# Patient Record
Sex: MALE | Race: WHITE | NOT HISPANIC OR LATINO | Employment: OTHER | URBAN - METROPOLITAN AREA
[De-identification: names, ages, dates, MRNs, and addresses within clinical notes are randomized per-mention and may not be internally consistent; named-entity substitution may affect disease eponyms.]

---

## 2017-01-05 ENCOUNTER — THERAPY VISIT (OUTPATIENT)
Dept: PHYSICAL THERAPY | Facility: CLINIC | Age: 57
End: 2017-01-05
Payer: COMMERCIAL

## 2017-01-05 DIAGNOSIS — M25.511 ACUTE PAIN OF RIGHT SHOULDER: Primary | ICD-10-CM

## 2017-01-05 PROCEDURE — 97110 THERAPEUTIC EXERCISES: CPT | Mod: GP | Performed by: PHYSICAL THERAPIST

## 2017-01-05 PROCEDURE — 97112 NEUROMUSCULAR REEDUCATION: CPT | Mod: GP | Performed by: PHYSICAL THERAPIST

## 2017-01-05 NOTE — PROGRESS NOTES
Subjective:    HPI                    Objective:    System    Physical Exam    General     ROS    Assessment/Plan:      SUBJECTIVE  Subjective changes as noted by pt:  Overall doing a little better--he saw MD and had an injection today. 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       Current pain level: 0/10     Changes in function:  Yes (See Goal flowsheet attached for changes in current functional level)     Adverse reaction to treatment or activity:  None    OBJECTIVE  Changes in objective findings:  Yes, Pt required significant manual, verbal and visual cuing to maintain proper scapular positioning during exercises today. NMR required for serratus, MT, and LT.      R s/l IR=45        ASSESSMENT  Jack continues to require intervention to meet STG and LTG's: PT  Patient's symptoms are resolving.  Patient is progressing as expected.  Response to therapy has shown an improvement in  muscle control and coordination and pain  Progress made towards STG/LTG?  Yes (See Goal flowsheet attached for updates on achievement of STG and LTG)    PLAN  Current treatment program is being advanced to more complex exercises.    PTA/ATC plan:  N/A    Please refer to the daily flowsheet for treatment today, total treatment time and time spent performing 1:1 timed codes.

## 2017-01-09 ENCOUNTER — THERAPY VISIT (OUTPATIENT)
Dept: PHYSICAL THERAPY | Facility: CLINIC | Age: 57
End: 2017-01-09
Payer: COMMERCIAL

## 2017-01-09 DIAGNOSIS — M25.511 ACUTE PAIN OF RIGHT SHOULDER: Primary | ICD-10-CM

## 2017-01-09 PROCEDURE — 97112 NEUROMUSCULAR REEDUCATION: CPT | Mod: GP | Performed by: PHYSICAL THERAPIST

## 2017-01-09 PROCEDURE — 97110 THERAPEUTIC EXERCISES: CPT | Mod: GP | Performed by: PHYSICAL THERAPIST

## 2017-01-09 NOTE — PROGRESS NOTES
"Subjective:    HPI                    Objective:    System    Physical Exam    General     ROS    Assessment/Plan:      SUBJECTIVE  Subjective changes as noted by pt:  Doing well--felt a \"good burn\" after doing his exercises this am.        Current pain level: 0/10     Changes in function:  Yes (See Goal flowsheet attached for changes in current functional level)     Adverse reaction to treatment or activity:  None    OBJECTIVE  Changes in objective findings:  Yes, pain at deltoid insertion when performing throwing mechanics incorrectly and also demonstrates hiking of his shoulder when he attempted 4 corner ABD/ER.    ER at 90/90 supine with wand = 100 and was pain free    See flow sheet for throwing mechanics points        ASSESSMENT  Jack continues to require intervention to meet STG and LTG's: PT  Patient's symptoms are resolving.  Patient is progressing as expected.  Response to therapy has shown an improvement in  pain level and flexibility  Progress made towards STG/LTG?  Yes (See Goal flowsheet attached for updates on achievement of STG and LTG)    PLAN  Current treatment program is being advanced to more complex exercises.    PTA/ATC plan:  N/A    Please refer to the daily flowsheet for treatment today, total treatment time and time spent performing 1:1 timed codes.              "

## 2017-01-12 ENCOUNTER — THERAPY VISIT (OUTPATIENT)
Dept: PHYSICAL THERAPY | Facility: CLINIC | Age: 57
End: 2017-01-12
Payer: COMMERCIAL

## 2017-01-12 ENCOUNTER — OFFICE VISIT (OUTPATIENT)
Dept: SLEEP MEDICINE | Facility: CLINIC | Age: 57
End: 2017-01-12
Payer: COMMERCIAL

## 2017-01-12 VITALS
WEIGHT: 195 LBS | DIASTOLIC BLOOD PRESSURE: 85 MMHG | HEART RATE: 79 BPM | BODY MASS INDEX: 25.84 KG/M2 | OXYGEN SATURATION: 98 % | HEIGHT: 73 IN | SYSTOLIC BLOOD PRESSURE: 126 MMHG

## 2017-01-12 DIAGNOSIS — G47.33 OSA (OBSTRUCTIVE SLEEP APNEA): ICD-10-CM

## 2017-01-12 DIAGNOSIS — G47.00 INSOMNIA, UNSPECIFIED TYPE: Primary | ICD-10-CM

## 2017-01-12 DIAGNOSIS — M25.511 ACUTE PAIN OF RIGHT SHOULDER: Primary | ICD-10-CM

## 2017-01-12 DIAGNOSIS — G25.81 RESTLESS LEGS SYNDROME (RLS): ICD-10-CM

## 2017-01-12 PROCEDURE — 97112 NEUROMUSCULAR REEDUCATION: CPT | Mod: GP | Performed by: PHYSICAL THERAPIST

## 2017-01-12 PROCEDURE — 90832 PSYTX W PT 30 MINUTES: CPT | Performed by: PSYCHOLOGIST

## 2017-01-12 PROCEDURE — 97110 THERAPEUTIC EXERCISES: CPT | Mod: GP | Performed by: PHYSICAL THERAPIST

## 2017-01-12 RX ORDER — DUTASTERIDE 0.5 MG/1
CAPSULE, LIQUID FILLED ORAL
COMMUNITY
Start: 2016-10-28 | End: 2017-08-30

## 2017-01-12 NOTE — PROGRESS NOTES
"BEHAVIORAL SLEEP MEDICINE PROGRESS NOTE  [facility]    Patient Name: Jack Marrero  MRN:  [id number]  Date of Service: Jan 12, 2017       Subjective Report     Jack Marrero returns to clinic today to discuss progress in implementing cognitive-behavioral strategies for the management of insomnia assocciated with RLS and severe SHY.  Jack reports excellent adherence to behavioral plan with improvement in SL, WASO and SE.  He. Reports he feels somewhat less tired.  Focus of session was on his \"resistance\" to attempting CPAP despite his awareness of the potention health implications of untreated severe SHY.  Discussed past history of successfully managing health issues and challenges including his recent smoking cessation.  Discussed possible benefits realized in daytime functioning and how to view treatment initiation as an experiment in self care and self monitoring.  Discussed motivators for initiating treatment including getting to sleep with wife again.    Daytime symptoms including tiredness have improved somewhat though underlying sleepiness persists.  .     Sleep Data:     Jack provided 30 days of sleep diary data.  Averaged estimates based on patient sleep diary data/self report include:    Sleep Latency: 10  min.  Times Awakened: 3  Wake Time After Sleep Onset: 15 min min.  Total Sleep Time:  450 min.  Sleep Efficiency: approx 90 %     Interventions     Cognitive-behavioral strategies and recommendations including maintenance of stimulus control behaviors were discussed today.  Education was provided regarding benefits of treatment for SHY and normal desens.      Vital Signs     /85 mmHg  Pulse 79  Ht 6' 1\" (1.854 m)  Wt 195 lb (88.451 kg)  BMI 25.73 kg/m2  SpO2 98%     Mental Status     Appearance:  Well kept  Orientation:  X3  Mood:  better  Affect:  appropriate and in normal range  Speech:  clear, coherent  Thought Process:  logical  Associations:  no loose " associations  Thought Content:  no evidence of suicidal ideation or homicidal ideation    Diagnostic Impressions and Plan   (G25.81) Restless legs syndrome (RLS)  (primary encounter diagnosis)        (G47.33) SHY (obstructive sleep apnea)  Comment: Patient indicates he will followup with Dr. Pak to initiate treatment      (G47.00) Insomnia, unspecified type  Comment: Excellent adherence to treatment and good treatment response    Follow-up: At this point with insomnia resolved, patient was advised to focus in initiation of CPAP therapy for SHY and to return if and as needed if insomnia symptoms return.    Time Spent: 35 minutes    Cristobal Rodríguez PsyD, LUKASZ, [facility]  Behavioral Sleep Medicine  [facility]  [facility]  [address], [address]  [place]  [address]  PH: [phone]

## 2017-01-12 NOTE — NURSING NOTE
"Chief Complaint   Patient presents with     RECHECK     insomnia       Initial Ht 1.854 m (6' 1\")  Wt 88.451 kg (195 lb)  BMI 25.73 kg/m2 Estimated body mass index is 25.73 kg/(m^2) as calculated from the following:    Height as of this encounter: 1.854 m (6' 1\").    Weight as of this encounter: 88.451 kg (195 lb).  BP completed using cuff size: large    "

## 2017-01-12 NOTE — PATIENT INSTRUCTIONS

## 2017-01-12 NOTE — PROGRESS NOTES
"Subjective:    HPI                    Objective:    System    Physical Exam    General     ROS    Assessment/Plan:      SUBJECTIVE  Subjective changes as noted by pt:  Pt was doing the \"w\" on the bench on Tuesday  And he sufferred a \"Pulled\" sensation in his L rib area and is now having pain while coughing, laughing, sneezing.     Current pain level: 4/10     Changes in function:  Yes (See Goal flowsheet attached for changes in current functional level)     Adverse reaction to treatment or activity:  None    OBJECTIVE  Changes in objective findings:  Yes, no evidence of posterior rib subluxations and pt reports no pain with rotation of spine to R or lateral bending to R.    When applying direct pressure to the lateral ribs on L he was able to reach L arm out to the side without pain as well as tolerate thoracic extension.        ASSESSMENT  Jack continues to require intervention to meet STG and LTG's: PT  Patient has experienced an exacerbation of symptoms.  Response to therapy has shown a worsening of  pain level  Progress made towards STG/LTG?  None    PLAN  Current treatment program is being advanced to more complex exercises.    PTA/ATC plan:  N/A    Please refer to the daily flowsheet for treatment today, total treatment time and time spent performing 1:1 timed codes.              "

## 2017-02-13 ENCOUNTER — DOCUMENTATION ONLY (OUTPATIENT)
Dept: SLEEP MEDICINE | Facility: CLINIC | Age: 57
End: 2017-02-13
Payer: COMMERCIAL

## 2017-02-13 DIAGNOSIS — G47.33 OSA (OBSTRUCTIVE SLEEP APNEA): ICD-10-CM

## 2017-02-13 DIAGNOSIS — G47.33 OSA (OBSTRUCTIVE SLEEP APNEA): Primary | ICD-10-CM

## 2017-02-13 DIAGNOSIS — G25.81 RESTLESS LEGS SYNDROME (RLS): ICD-10-CM

## 2017-02-13 PROCEDURE — 99207 ZZC NO BILLABLE SERVICE THIS VISIT: CPT

## 2017-02-13 NOTE — PROGRESS NOTES
Patient was offered choice of vendor and chose [facility].  Patient Jack Marrero was set up at [place] on February 13, 2017. Patient received a Resmed AirSense 10 Auto. Pressures were set at 6-12 cm H2O.   Patient s ramp is 5 cm H2O for Off and FLEX/EPR is EPR, 2.  Patient received a Resmed Mask name: Airfit P10  Pillow mask Size Medium, heated tubing and heated humidifier.  Patient is enrolled in the STM Program and does not need to meet compliance. Patient has a follow up on 4/5/17 with Dr. Pak.    Domitila Daugherty

## 2017-02-16 ENCOUNTER — DOCUMENTATION ONLY (OUTPATIENT)
Dept: SLEEP MEDICINE | Facility: CLINIC | Age: 57
End: 2017-02-16

## 2017-02-16 DIAGNOSIS — G25.81 RESTLESS LEGS SYNDROME (RLS): ICD-10-CM

## 2017-02-16 DIAGNOSIS — G47.33 OSA (OBSTRUCTIVE SLEEP APNEA): ICD-10-CM

## 2017-02-16 NOTE — PROGRESS NOTES
3 DAY STM VISIT    Patient contacted for 3 day STM visit  Message left for patient to return call    Current settings:  EPAP Min Auto CPAP: 6.0 (The minimum allowable pressure in cmH2O)       EPAP Max Auto CPAP: 12.0 (The maximum allowable pressure in cmH2O)       Assessment:  Pt is using nightly more than 4 hours   Action plan: Pt to have f/u 14 day  STM visit.

## 2017-02-17 NOTE — PROGRESS NOTES
Pt returned my call and states states things are going well.  He states that he has an nito that records his snoring and he's still snoring.  But other than that he has no issues or complaints.  Pt is benefiting from therapy.

## 2017-02-28 ENCOUNTER — DOCUMENTATION ONLY (OUTPATIENT)
Dept: SLEEP MEDICINE | Facility: CLINIC | Age: 57
End: 2017-02-28

## 2017-02-28 DIAGNOSIS — G25.81 RESTLESS LEGS SYNDROME (RLS): ICD-10-CM

## 2017-02-28 DIAGNOSIS — G47.33 OSA (OBSTRUCTIVE SLEEP APNEA): ICD-10-CM

## 2017-02-28 NOTE — PROGRESS NOTES
14 DAY [facility] VISIT    Message left for patient to return call    Assessment: Pt meeting objective benchmarks.     Action plan: Waiting for patient to return call and Pt to have 30 day STM visit.   Device settings:    EPAP Min Auto CPAP: 6.0 (The minimum allowable pressure in cmH2O)    EPAP Max Auto CPAP: 12.0 (The maximum allowable pressure in cmH2O)    Target IPAP (95% of Target) 14 day average (Resmed): 9.5cm H20      Objective measures: 14 day rolling measure      Compliance   (Goal >70%)  --% compliance greater than four hours rolling average 14 days: 100 %      Leak  (Goal < 24 lpm)  --95% OF Leak in litres Rolling Average 14 Days: 4.46 lpm last data upload         AHI  (Goal < 5)  --AHI Rolling Average 14 Day: 0.38  last data upload         Usage  (Goal >240)  --Time mask on face 14 day average: 481 min

## 2017-03-16 ENCOUNTER — DOCUMENTATION ONLY (OUTPATIENT)
Dept: SLEEP MEDICINE | Facility: CLINIC | Age: 57
End: 2017-03-16

## 2017-03-16 NOTE — PROGRESS NOTES
30 DAY [facility] VISIT    Message left for patient to return call     Assessment: Pt meeting objective benchmarks.     Action plan: Waiting for patient to return call.   Patient has a follow up visit with Dr. Pak on 4/5/2017.   Device settings:    EPAP Min Auto CPAP: 6.0 (The minimum allowable pressure in cmH2O)    EPAP Max Auto CPAP: 12.0 (The maximum allowable pressure in cmH2O)    Target EPAP (95% of Target) 14 day average (Resmed): 9.6cm H20    Objective measures: 14 day rolling measures      % compliance greater than four hours rolling average 14 days: 100 %     95% OF Leak in litres Rolling Average 14 Days: 4.4 lpm  last  upload     AHI Rolling Average 14 Day: 0.21 last  upload     Time mask on face 14 day average: 479 min        Objective measure goal  Compliance   Goal >70%  Leak   Goal < 10%  AHI  Goal < 5  Usage  Goal >240

## 2017-03-24 DIAGNOSIS — E78.5 DYSLIPIDEMIA: Primary | ICD-10-CM

## 2017-03-24 RX ORDER — ATORVASTATIN CALCIUM 40 MG/1
40 TABLET, FILM COATED ORAL DAILY
Qty: 90 TABLET | Refills: 2 | Status: SHIPPED | OUTPATIENT
Start: 2017-03-24 | End: 2017-12-15

## 2017-04-05 ENCOUNTER — OFFICE VISIT (OUTPATIENT)
Dept: SLEEP MEDICINE | Facility: CLINIC | Age: 57
End: 2017-04-05
Payer: COMMERCIAL

## 2017-04-05 VITALS
BODY MASS INDEX: 26.11 KG/M2 | HEIGHT: 73 IN | DIASTOLIC BLOOD PRESSURE: 86 MMHG | SYSTOLIC BLOOD PRESSURE: 130 MMHG | OXYGEN SATURATION: 97 % | WEIGHT: 197 LBS | HEART RATE: 82 BPM

## 2017-04-05 DIAGNOSIS — G25.81 RESTLESS LEGS SYNDROME (RLS): ICD-10-CM

## 2017-04-05 DIAGNOSIS — G47.33 OSA (OBSTRUCTIVE SLEEP APNEA): ICD-10-CM

## 2017-04-05 PROCEDURE — 99213 OFFICE O/P EST LOW 20 MIN: CPT | Performed by: INTERNAL MEDICINE

## 2017-04-05 NOTE — PROGRESS NOTES
Obstructive Sleep Apnea- PAP Follow-Up Visit:    Chief Complaint   Patient presents with     RECHECK     cpap f/u     Jack Marrero comes in today for follow-up of their severe sleep apnea, managed with CPAP.     Overall, the patient rates their experience with PAP as 9 (0 poor, 10 great). The mask is comfortable. The mask is leaking, 1-2 nights per week. They are rarely snoring with the mask on. They are not having gasp arousals.  They are not having significant oral/nasal dryness. The pressure settings are comfortable.     Patient uses nasal pillows.    Bedtime is typically midnight. Usually it takes about 5-15 minutes to fall asleep with the mask on. Wake time is typically 8am.  Patient is using PAP therapy 7.6 hours per night. The patient is usually getting 7 hours of sleep per night.    Patient does sometimes feel rested in the morning.    [place] Sleepiness Scale: 4/24    ResMed   Auto-PAP 6-12 cmH2O download:  29 total days of use. 1 nonuse days. 93% days with >4 hours use.  Average use 7 hours 17 minutes per day. Median Leak 0.2 L/min. 95%ile Leak 6.5 L/min. CPAP 95% pressure 9.9cm. AHI 0.2    Has had an awesome response to therapy.  Very happy with how he feels.  However, he is still feeling groggy in the mornings.    Currently taking 900 mg of Gabapentin in the AM and feels it works well for Restless Legs Syndrome.    Past medical/surgical history, family history, social history, medications and allergies were reviewed.      Problem List:  Patient Active Problem List    Diagnosis Date Noted     Acute pain of right shoulder 12/23/2016     Priority: Medium     SHY (obstructive sleep apnea) - Severe 12/13/2016     Priority: Medium     Split Night:  Sleep Study 12/01/2016 - (190.0 lbs) AHI 16.3, RDI 55.6, Supine AHI 17.1, REM AHI -, Low O2% 80.3%, Time Spent ?88% 14.2, Time Spent ?89% 21.5, CPAP was titrated to a pressure of 8 with an AHI 0.3. Time spent in REM supine at this pressure was 40.0 minutes.        "Restless legs syndrome (RLS) 11/16/2016     Priority: Medium     Benign non-nodular prostatic hyperplasia with lower urinary tract symptoms 10/24/2016     Priority: Medium     Tobacco abuse 10/24/2016     Priority: Medium     Venous lake of lip, left lower 05/20/2016     Priority: Medium     Patient will call for referral if ever needed       10 year risk of MI or stroke 7.5% or greater, 9.3% in Nov 2016 on atorvastatin 40 02/12/2016     Priority: Medium     Inflamed seborrheic keratosis 01/29/2016     Priority: Medium     Diverticulosis of colon 01/11/2016     Priority: Medium     Sensory polyneuropathy 11/19/2015     Priority: Medium     IGT (impaired glucose tolerance) 10/26/2015     Priority: Medium     High triglycerides 10/22/2015     Priority: Medium     HDL deficiency 10/22/2015     Priority: Medium     Tubular adenoma of colon on colonoscopy 01/12/2012     Priority: Medium     Erectile dysfunction 11/30/2011     Priority: Medium     ADHD (attention deficit hyperactivity disorder)      Priority: Medium     dx via Dr Olson PhD       HYPERLIPIDEMIA LDL GOAL <130 10/31/2010     Priority: Medium     Dyslipidemia 06/18/2009     Priority: Medium     Mixed anxiety depressive disorder 06/18/2009     Priority: Medium     (Problem list name updated by automated process. Provider to review and confirm.)          /86  Pulse 82  Ht 1.854 m (6' 1\")  Wt 89.4 kg (197 lb)  SpO2 97%  BMI 25.99 kg/m2        Impression/Plan:  1. Restless legs syndrome (RLS)  Options include waiting for 6 months on CPAP to see how you are doing or switching to an alternative Restless Legs Syndrome medication to see if that improves the grogginess.  If we were to switch, I would start you on Mirapex as you taper off Gabapentin.    One idea:  Go down to 600 mg (2 tabs) of Gabapentin for the next week and call or Mychart me to let me know how things are going.  If the grogginess resolves but the Restless Legs Syndrome returns we have our " answer.  If not we can still use the information and I can always electronically send a new prescription to your pharmacy for the Mirapex if we want to try it.    2. SHY (obstructive sleep apnea)  Moderate Sleep apnea. Tolerating PAP well. Daytime symptoms are improved..     Jack Marrero will follow up in about 1 year(s).     Fifteen minutes spent with patient, all of which were spent face-to-face counseling, consulting, coordinating plan of care.      Radames Pak MD, Sleep Physician  Apr 5, 2017     CC:  Jamshid Miller,

## 2017-04-05 NOTE — NURSING NOTE
"No chief complaint on file.      Initial There were no vitals taken for this visit. Estimated body mass index is 25.73 kg/(m^2) as calculated from the following:    Height as of 1/12/17: 1.854 m (6' 1\").    Weight as of 1/12/17: 88.5 kg (195 lb).  Medication Reconciliation: complete    "

## 2017-04-05 NOTE — PATIENT INSTRUCTIONS
Options include waiting for 6 months on CPAP to see how you are doing or switching to an alternative Restless Legs Syndrome medication to see if that improves the grogginess.  If we were to switch, I would start you on Mirapex as you taper off Gabapentin.    One idea:  Go down to 600 mg (2 tabs) of Gabapentin for the next week and call or Mychart me to let me know how things are going.  If the grogginess resolves but the Restless Legs Syndrome returns we have our answer.  If not we can still use the information and I can always electronically send a new prescription to your pharmacy for the Mirapex if we want to try it.      Your BMI is Body mass index is 25.99 kg/(m^2).  Weight management is a personal decision.  If you are interested in exploring weight loss strategies, the following discussion covers the approaches that may be successful. Body mass index (BMI) is one way to tell whether you are at a healthy weight, overweight, or obese. It measures your weight in relation to your height.  A BMI of 18.5 to 24.9 is in the healthy range. A person with a BMI of 25 to 29.9 is considered overweight, and someone with a BMI of 30 or greater is considered obese. More than two-thirds of American adults are considered overweight or obese.  Being overweight or obese increases the risk for further weight gain. Excess weight may lead to heart disease and diabetes.  Creating and following plans for healthy eating and physical activity may help you improve your health.  Weight control is part of healthy lifestyle and includes exercise, emotional health, and healthy eating habits. Careful eating habits lifelong are the mainstay of weight control. Though there are significant health benefits from weight loss, long-term weight loss with diet alone may be very difficult to achieve- studies show long-term success with dietary management in less than 10% of people. Attaining a healthy weight may be especially difficult to achieve in  those with severe obesity. In some cases, medications, devices and surgical management might be considered.  What can you do?  If you are overweight or obese and are interested in methods for weight loss, you should discuss this with your provider.     Consider reducing daily calorie intake by 500 calories.     Keep a food journal.     Avoiding skipping meals, consider cutting portions instead.    Diet combined with exercise helps maintain muscle while optimizing fat loss. Strength training is particularly important for building and maintaining muscle mass. Exercise helps reduce stress, increase energy, and improves fitness. Increasing exercise without diet control, however, may not burn enough calories to loose weight.       Start walking three days a week 10-20 minutes at a time    Work towards walking thirty minutes five days a week     Eventually, increase the speed of your walking for 1-2 minutes at time    In addition, we recommend that you review healthy lifestyles and methods for weight loss available through the National Institutes of Health patient information sites:  http://win.niddk.nih.gov/publications/index.htm    And look into health and wellness programs that may be available through your health insurance provider, employer, local community center, or garry club.    Weight management plan: Patient was referred to their PCP to discuss a diet and exercise plan.

## 2017-04-17 ENCOUNTER — TELEPHONE (OUTPATIENT)
Dept: FAMILY MEDICINE | Facility: CLINIC | Age: 57
End: 2017-04-17

## 2017-04-17 ENCOUNTER — TRANSFERRED RECORDS (OUTPATIENT)
Dept: HEALTH INFORMATION MANAGEMENT | Facility: CLINIC | Age: 57
End: 2017-04-17

## 2017-04-17 NOTE — TELEPHONE ENCOUNTER
Reason for Call:  Same Day Appointment, Requested Provider:  Angela    PCP: Jamshid Miller    Reason for visit: Follow up on ER visit for chest pain and anxiety      Duration of symptoms: yesterday     Have you been treated for this in the past? Yes    Additional comments: patient was seen in ER last night patient has some concerns on from 4/16/17 ER visit.    Can we leave a detailed message on this number? YES    Phone number patient can be reached at: Home number on file [phone] (home)    Best Time: anytime    Call taken on 4/17/2017 at 4:44 PM by Yana Lynn

## 2017-04-17 NOTE — TELEPHONE ENCOUNTER
[employer] message was sent to patient and he has read it.  Left message on his voicemail letting him know to call team RN tomorrow or notify via [employer] if issues with the scheduled appointment.  Mahsa Sin RN

## 2017-04-18 ENCOUNTER — OFFICE VISIT (OUTPATIENT)
Dept: SURGERY | Facility: CLINIC | Age: 57
End: 2017-04-18
Payer: COMMERCIAL

## 2017-04-18 VITALS
HEIGHT: 73 IN | BODY MASS INDEX: 25.71 KG/M2 | DIASTOLIC BLOOD PRESSURE: 86 MMHG | SYSTOLIC BLOOD PRESSURE: 140 MMHG | WEIGHT: 194 LBS

## 2017-04-18 DIAGNOSIS — R10.32 LT INGUINAL PAIN: Primary | ICD-10-CM

## 2017-04-18 PROCEDURE — 99214 OFFICE O/P EST MOD 30 MIN: CPT | Performed by: SURGERY

## 2017-04-18 NOTE — PATIENT INSTRUCTIONS
Assessment: Is tender on on the left one but no obvious hernia.  But has some swelling in the area.    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  And started smoking again in January    Plan to do since not able to feel obvious hernia will get ct scan.  If shows hernia or if patient is able to see me when it is sticking out more then would fix most likely anterior but if able to reduce could do laparoscopic robotic surgery.  If not able to prove a hernia can do laparoscopic look and if has hernia fix either way.    Briefly discussed laparoscopic robotic versus open.      Risks of surgery include damage to nerves, bleeding, infection, damage to  Vessels, recurrence.  Although mesh is a better long term repair if it gets infected it must be removed.   If the patient has any bacterial infection the week before and is seen by their doctor and started on antibiotics, I can probably still do the surgery if they are vastly improved by the time of surgery, but if the infection starts closer to the surgery date it will be better to cancel and reschedule to a later date.  A cough will also be hard on the repair and uncomfortable post operative.  If the patient is a smoker I did discuss increase risk of recurrence and more pain with the cough.  If the patient is willing to quit smoking would encourage to do so and start at least a week before surgery.  However, if patient is not going to quit then must understand that his repair is more likely to fail.    Risks of surgery discussed including, but not limited to bleeding, infection, recurrence, damage to nerves and what is in the hernia sac.  Risks of anesthesia also discussed.    Discussed massaging hernia back in and using ice if becomes more painful.  If not able to reduce then go to emergency room.  Also discussed hernia belt to use until able to get in for surgery.    For your feet neuralgia:  Capsaicin cream-  apply to area of chronic pain multiple times for several days. The first 2-3 days may be more painful. But you will notice a difference shortly after that. Do not get it in to your eyes. Wash hands well after putting it on the area of tenderness. Must be used daily and sometimes several times a day. Please follow the directions on the container.

## 2017-04-18 NOTE — TELEPHONE ENCOUNTER
Left message on answering machine for patient/parent to call back.   [phone].  Valery Kaba RN     Patient was seen today by Dr Gale.

## 2017-04-18 NOTE — NURSING NOTE
"Chief Complaint   Patient presents with     Hernia     LIH       Initial /86  Ht 6' 1\" (1.854 m)  Wt 194 lb (88 kg)  BMI 25.6 kg/m2 Estimated body mass index is 25.6 kg/(m^2) as calculated from the following:    Height as of this encounter: 6' 1\" (1.854 m).    Weight as of this encounter: 194 lb (88 kg).  Medication Reconciliation: complete   Crystal Camacho Cma      "

## 2017-04-18 NOTE — PROGRESS NOTES
"Dear Jamshid Dias  I was asked to see this patient by Jamshid Miller for please see below.  I have seen Jack Marrero and as you know his chief complaint is left groin lump that was there after surgery.  But in the last 2-3 weeks has prickly sensation and shooting in to the left  leg and scrotum  Worse with sitting or standing for a while.  And cl;imping steps and turning the lower extremity edema outwards.  Has some loose and constipation. Has been more frequent.  No abdomen pain.   HPI:  Patient is a 56 year old male  with complaints see above  The patient noticed the symptoms about 2-3 weeks ago.    Patient has family history of hernia problems  Laying down makes the episode better.   Is having burning sensation in the toes and heel on left side and occasional numbness in both feet. Sometimes feels like walking on hot coals. This has not been figured out as to the cause but is both feet and both lower legs.   On cpap for 2 months now.   Smokes 3/4 ppd  Review Of Systems  Respiratory: No shortness of breath, dyspnea on exertion, cough, or hemoptysis  Cardiovascular: negative  Gastrointestinal: negative, constipation and diarrhea  Endocrine: negative  :  Slow flow and nocturia x 1 or less  /86  Ht 6' 1\" (1.854 m)  Wt 194 lb (88 kg)  BMI 25.6 kg/m2    Past Medical History:   Diagnosis Date     ADHD (attention deficit hyperactivity disorder)     dx via Dr Olson PhD     Depression, anxiety      Depressive disorder      Diverticulosis of colon 1/11/2016     Dyslipidemia      Erectile dysfunction 11/30/2011     Hyperlipidemia LDL goal <130 10/31/2010     IGT (impaired glucose tolerance) 10/26/2015     Inflamed seborrheic keratosis 1/29/2016     Pain in joint, shoulder region 12/16/2013     Sensory polyneuropathy 11/19/2015     Tubular adenoma of colon on colonoscopy 1/12/2012       Past Surgical History:   Procedure Laterality Date     COLONOSCOPY       COLONOSCOPY WITH CO2 INSUFFLATION N/A " "1/11/2016    Procedure: COLONOSCOPY WITH CO2 INSUFFLATION;  Surgeon: Nilson Gale MD;  Location: [facility] OR     HC EXCISION SKIN OF NAIL FOLD  1993    BL GREAT TOE     HC TOOTH EXTRACTION W/FORCEP  1991     ALL 4 WISDOM TEETH EXTRACTED     HERNIORRHAPHY INGUINAL Left 3/11/2015    Procedure: HERNIORRHAPHY INGUINAL;  Surgeon: Nilson Gale MD;  Location: [facility] OR     TONSILLECTOMY & ADENOIDECTOMY         Social History     Social History     Marital status:      Spouse name: N/A     Number of children: N/A     Years of education: N/A     Occupational History     Not on file.     Social History Main Topics     Smoking status: Current Every Day Smoker     Packs/day: 0.50     Years: 40.00     Types: Pipe     Start date: 10/10/1975     Last attempt to quit: 2/19/2014     Smokeless tobacco: Never Used     Alcohol use Yes      Comment: Occasional; about 2 beers/week     Drug use: No     Sexual activity: Yes     Partners: Female     Other Topics Concern     Parent/Sibling W/ Cabg, Mi Or Angioplasty Before 65f 55m? No     Social History Narrative       Current Outpatient Prescriptions   Medication Sig Dispense Refill     atorvastatin (LIPITOR) 40 MG tablet Take 1 tablet (40 mg) by mouth daily 90 tablet 2     order for DME Equipment ordered: RESMED Auto PAP Mask type: Nasal Settings: 6-12 cm H2O       dutasteride (AVODART) 0.5 MG capsule        gabapentin (NEURONTIN) 300 MG capsule   5     NICOTROL 10 MG inhaler   5     aspirin 81 MG tablet Take 1 tablet by mouth daily.         10 Point review of systems all others are negative.   Above was reviewed  Physical exam: /86  Ht 6' 1\" (1.854 m)  Wt 194 lb (88 kg)  BMI 25.6 kg/m2   Patient able to get up on table with mild difficulty.   Patient is alert and orientated.   Head eyes, nose and mouth within normal limits.  No supraclavicular or cervical adenopathy palpated.  Thyroid within normal limits.  No carotid bruits auscultated.  Lungs are clear to " auscultation  Heart is regular rate and rhythm with no murmur or thrills noted.  No costal vertebral angle tenderness noted.  Abdomen is abdomen is soft without significant tenderness, masses, organomegaly or guarding  bowel sounds are positive and no caput medusa noted.  No obvious inguinal  hernias noted.  Is tender on on the left one but no obvious hernia.  But has some swelling in the area.     Testicles are normal.    Skin was warm and pink  Normal Affect    Easily palpable posterior tibial pulse or dorsalis pedis pulse bilaterally.  Lower extremity edema is not present.      Assessment: Is tender on on the left one but no obvious hernia.  But has some swelling in the area.    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  And started smoking again in January    Plan to do since not able to feel obvious hernia will get ct scan.  If shows hernia or if patient is able to see me when it is sticking out more then would fix most likely anterior but if able to reduce could do laparoscopic robotic surgery.  If not able to prove a hernia can do laparoscopic look and if has hernia fix either way.    Briefly discussed laparoscopic robotic versus open.      Risks of surgery include damage to nerves, bleeding, infection, damage to  Vessels, recurrence.  Although mesh is a better long term repair if it gets infected it must be removed.   If the patient has any bacterial infection the week before and is seen by their doctor and started on antibiotics, I can probably still do the surgery if they are vastly improved by the time of surgery, but if the infection starts closer to the surgery date it will be better to cancel and reschedule to a later date.  A cough will also be hard on the repair and uncomfortable post operative.  If the patient is a smoker I did discuss increase risk of recurrence and more pain with the cough.  If the patient is willing to quit smoking  would encourage to do so and start at least a week before surgery.  However, if patient is not going to quit then must understand that his repair is more likely to fail.    Risks of surgery discussed including, but not limited to bleeding, infection, recurrence, damage to nerves and what is in the hernia sac.  Risks of anesthesia also discussed.    Discussed massaging hernia back in and using ice if becomes more painful.  If not able to reduce then go to emergency room.  Also discussed hernia belt to use until able to get in for surgery.    For your feet neuralgia:  Capsaicin cream- apply to area of chronic pain multiple times for several days. The first 2-3 days may be more painful. But you will notice a difference shortly after that. Do not get it in to your eyes. Wash hands well after putting it on the area of tenderness. Must be used daily and sometimes several times a day. Please follow the directions on the container.    Time spent with the patient with greater that 50% of the time in discussion was 30 minutes.  In discussing the left groin pain.      Nilson Gale MD    Please wear compression stockings and see if they help your discomfort.  Then when you come back to see me let me know.  You will need to bring these with you for sclerotherapy or for your post op visit after surgery.  I would suggest going to either the [employer] medical supply store next to [facility] or to [employer] ([phone]) on [address].  They will need to measure your legs to get the right fit.  If you go somewhere else and they do not measure your legs do not get them there.  It is important that you get the right size.  Please allow several days after you are measured to get your stockings.  They may not have your size in stock and will have to order them.    Please wear your compression stocking as some insurance companies will want to wear them for at least 3 months before they  even consider paying for your surgery or sclerotherapy.    CALL [phone]  to get the utrasound set up.        You must follow up with me in the clinic to go over your utrasound results.  I will not be calling you with the results.   It is very difficult to do this over  the phone.  I am not able to show you while looking at your leg what the next step should be.

## 2017-04-19 ENCOUNTER — OFFICE VISIT (OUTPATIENT)
Dept: FAMILY MEDICINE | Facility: CLINIC | Age: 57
End: 2017-04-19
Payer: COMMERCIAL

## 2017-04-19 ENCOUNTER — TELEPHONE (OUTPATIENT)
Dept: FAMILY MEDICINE | Facility: CLINIC | Age: 57
End: 2017-04-19

## 2017-04-19 ENCOUNTER — RADIANT APPOINTMENT (OUTPATIENT)
Dept: CT IMAGING | Facility: CLINIC | Age: 57
End: 2017-04-19
Attending: SURGERY
Payer: COMMERCIAL

## 2017-04-19 VITALS
TEMPERATURE: 97.8 F | HEART RATE: 80 BPM | WEIGHT: 197 LBS | BODY MASS INDEX: 25.99 KG/M2 | SYSTOLIC BLOOD PRESSURE: 122 MMHG | OXYGEN SATURATION: 98 % | DIASTOLIC BLOOD PRESSURE: 80 MMHG

## 2017-04-19 DIAGNOSIS — R10.32 LT INGUINAL PAIN: ICD-10-CM

## 2017-04-19 DIAGNOSIS — N52.9 ERECTILE DYSFUNCTION, UNSPECIFIED ERECTILE DYSFUNCTION TYPE: ICD-10-CM

## 2017-04-19 DIAGNOSIS — F41.1 GAD (GENERALIZED ANXIETY DISORDER): ICD-10-CM

## 2017-04-19 DIAGNOSIS — F41.0 PANIC DISORDER WITHOUT AGORAPHOBIA: Primary | ICD-10-CM

## 2017-04-19 PROCEDURE — 74177 CT ABD & PELVIS W/CONTRAST: CPT | Performed by: RADIOLOGY

## 2017-04-19 PROCEDURE — 99214 OFFICE O/P EST MOD 30 MIN: CPT | Performed by: FAMILY MEDICINE

## 2017-04-19 RX ORDER — ESCITALOPRAM OXALATE 10 MG/1
10 TABLET ORAL DAILY
Qty: 30 TABLET | Refills: 1 | Status: CANCELLED | OUTPATIENT
Start: 2017-04-19

## 2017-04-19 RX ORDER — LORAZEPAM 0.5 MG/1
0.5 TABLET ORAL
Refills: 0 | COMMUNITY
Start: 2017-04-17 | End: 2017-04-19

## 2017-04-19 RX ORDER — ESCITALOPRAM OXALATE 10 MG/1
10 TABLET ORAL EVERY MORNING
Qty: 30 TABLET | Refills: 1 | Status: SHIPPED | OUTPATIENT
Start: 2017-04-19 | End: 2017-05-10

## 2017-04-19 RX ORDER — TADALAFIL 20 MG/1
10-20 TABLET ORAL DAILY PRN
Qty: 12 TABLET | Refills: 11 | Status: SHIPPED | OUTPATIENT
Start: 2017-04-19 | End: 2017-08-10

## 2017-04-19 RX ORDER — IOPAMIDOL 755 MG/ML
119 INJECTION, SOLUTION INTRAVASCULAR ONCE
Status: COMPLETED | OUTPATIENT
Start: 2017-04-19 | End: 2017-04-19

## 2017-04-19 RX ORDER — LORAZEPAM 0.5 MG/1
.25-.5 TABLET ORAL EVERY 6 HOURS PRN
Qty: 30 TABLET | Refills: 0 | Status: SHIPPED | OUTPATIENT
Start: 2017-04-19 | End: 2018-09-07

## 2017-04-19 RX ADMIN — IOPAMIDOL 119 ML: 755 INJECTION, SOLUTION INTRAVASCULAR at 08:28

## 2017-04-19 ASSESSMENT — ANXIETY QUESTIONNAIRES
7. FEELING AFRAID AS IF SOMETHING AWFUL MIGHT HAPPEN: SEVERAL DAYS
3. WORRYING TOO MUCH ABOUT DIFFERENT THINGS: MORE THAN HALF THE DAYS
IF YOU CHECKED OFF ANY PROBLEMS ON THIS QUESTIONNAIRE, HOW DIFFICULT HAVE THESE PROBLEMS MADE IT FOR YOU TO DO YOUR WORK, TAKE CARE OF THINGS AT HOME, OR GET ALONG WITH OTHER PEOPLE: SOMEWHAT DIFFICULT
2. NOT BEING ABLE TO STOP OR CONTROL WORRYING: MORE THAN HALF THE DAYS
1. FEELING NERVOUS, ANXIOUS, OR ON EDGE: MORE THAN HALF THE DAYS
5. BEING SO RESTLESS THAT IT IS HARD TO SIT STILL: NOT AT ALL
GAD7 TOTAL SCORE: 9
6. BECOMING EASILY ANNOYED OR IRRITABLE: SEVERAL DAYS

## 2017-04-19 ASSESSMENT — PATIENT HEALTH QUESTIONNAIRE - PHQ9: 5. POOR APPETITE OR OVEREATING: SEVERAL DAYS

## 2017-04-19 NOTE — MR AVS SNAPSHOT
After Visit Summary   4/19/2017    Jack Marrero    MRN: [id number]           Patient Information     Date Of Birth          1960        Visit Information        Provider Department      4/19/2017 9:30 AM Jamshid Miller MD [facility]        Today's Diagnoses     Panic disorder without agoraphobia    -  1    CASIMIRO (generalized anxiety disorder)          Care Instructions    Return to clinic for an appointment in 2.5 to 3 weeks         Follow-ups after your visit        Follow-up notes from your care team     Return in about 3 weeks (around 5/10/2017).      Who to contact     If you have questions or need follow up information about today's clinic visit or your schedule please contact [facility] directly at [phone].  Normal or non-critical lab and imaging results will be communicated to you by [employer], letter or phone within 4 business days after the clinic has received the results. If you do not hear from us within 7 days, please contact the clinic through [employer] or phone. If you have a critical or abnormal lab result, we will notify you by phone as soon as possible.  Submit refill requests through [employer] or call your pharmacy and they will forward the refill request to us. Please allow 3 business days for your refill to be completed.          Additional Information About Your Visit        MyChart Information     [employer] gives you secure access to your electronic health record. If you see a primary care provider, you can also send messages to your care team and make appointments. If you have questions, please call your primary care clinic.  If you do not have a primary care provider, please call [phone] and they will assist you.        Care EveryWhere ID     This is your Care EveryWhere ID. This could be used by other organizations to access your [facility] medical records  [id number]        Your Vitals Were     Pulse Temperature Pulse Oximetry BMI (Body  Mass Index)          80 97.8  F (36.6  C) (Oral) 98% 25.99 kg/m2         Blood Pressure from Last 3 Encounters:   04/19/17 122/80   04/18/17 140/86   04/05/17 130/86    Weight from Last 3 Encounters:   04/19/17 197 lb (89.4 kg)   04/18/17 194 lb (88 kg)   04/05/17 197 lb (89.4 kg)              We Performed the Following     DEPRESSION ACTION PLAN (DAP)          Today's Medication Changes          These changes are accurate as of: 4/19/17  9:53 AM.  If you have any questions, ask your nurse or doctor.               Start taking these medicines.        Dose/Directions    escitalopram 10 MG tablet   Commonly known as:  LEXAPRO   Used for:  Panic disorder without agoraphobia, CASIMIRO (generalized anxiety disorder)   Started by:  Jamshid Miller MD        Dose:  10 mg   Take 1 tablet (10 mg) by mouth every morning   Quantity:  30 tablet   Refills:  1         These medicines have changed or have updated prescriptions.        Dose/Directions    LORazepam 0.5 MG tablet   Commonly known as:  ATIVAN   This may have changed:    - how much to take  - how to take this  - when to take this  - reasons to take this   Used for:  CASIMIRO (generalized anxiety disorder)   Changed by:  Jamshid Miller MD        Dose:  0.25-0.5 mg   Take 0.5-1 tablets (0.25-0.5 mg) by mouth every 6 hours as needed for anxiety   Quantity:  30 tablet   Refills:  0            Where to get your medicines      These medications were sent to [facility] [address] [facility] [address] [address] [address]     Phone:  [phone]     escitalopram 10 MG tablet         Some of these will need a paper prescription and others can be bought over the counter.  Ask your nurse if you have questions.     Bring a paper prescription for each of these medications     LORazepam 0.5 MG tablet                Primary Care Provider Office Phone # Fax #    Jamshid Miller [phone]       [facility]  [place] [address] [facility] [address]        Thank you!     Thank you for choosing [facility]  for your care. Our goal is always to provide you with excellent care. Hearing back from our patients is one way we can continue to improve our services. Please take a few minutes to complete the written survey that you may receive in the mail after your visit with us. Thank you!             Your Updated Medication List - Protect others around you: Learn how to safely use, store and throw away your medicines at www.disposemymeds.org.          This list is accurate as of: 4/19/17  9:53 AM.  Always use your most recent med list.                   Brand Name Dispense Instructions for use    aspirin 81 MG tablet      Take 1 tablet by mouth daily.       atorvastatin 40 MG tablet    LIPITOR    90 tablet    Take 1 tablet (40 mg) by mouth daily       dutasteride 0.5 MG capsule    AVODART         EMERGEN-C FIVE PO          escitalopram 10 MG tablet    LEXAPRO    30 tablet    Take 1 tablet (10 mg) by mouth every morning       gabapentin 300 MG capsule    NEURONTIN         LORazepam 0.5 MG tablet    ATIVAN    30 tablet    Take 0.5-1 tablets (0.25-0.5 mg) by mouth every 6 hours as needed for anxiety       NICOTROL 10 MG Inhaler   Generic drug:  nicotine          order for DME      Equipment ordered: RESMED Auto PAP Mask type: Nasal Settings: 6-12 cm H2O

## 2017-04-19 NOTE — PROGRESS NOTES
"  SUBJECTIVE:                                                    Jack Marrero is a 56 year old male who presents to clinic today for the following health issues:      ER follow up Anxiety, [facility]. Medication making him dizzy. Stated taking in Monday. Went in 3:00 Monday morning      Reviewed and updated as needed this visit by clinical staff  Tobacco  Allergies  Meds  Med Hx  Surg Hx  Fam Hx  Soc Hx      Reviewed and updated as needed this visit by Provider       --------------------------------------------------------------------------------------------------------------------------------------  Subjective:  Jack is a 56 year old male who presents today for follow-up on a recent emergency department visit  at [facility] on 4-17 in the early morning, . The patient went to the hospital for symptoms of tingling down both arms , shortness of breath , chest tightness and pain in his between his shoulder blades. and feeling disoriented.  . He was diagnosed with anxiety. The patient was treated with IV ativan. He was asked to follow up today specifically to check up on . At this time the patient reports some improvement of the original symptoms. In addition the patient reports the following new symptoms:none.     He had a normal EKG and CHEST X-RAY    He has not had similar symptom(s).     He is feeling dizzy and groggy since taking the lorazepan.     He has taken wellbutrin for depression  He has taken several other medication for depression and anxiety.  He denies ever being treated for bipolar disorder.      There is all kinds of stressful stuff going on in his life.   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  (Use an \"x\" to indicate your answer) Not at all                (0) Several " days                (1) More than half the days        (2) Nearly every day          (3)   1. Feeling nervous, anxious or on edge   x    2. Not being able to stop or control worrying   x    3. Worrying too much about different things   x    4. Trouble relaxing  x     5. Being so restless that it is hard to sit still x      6. Becoming easily annoyed or irritable  x     7. Feeling afraid as if something awful might happen  x           Total_9______    Cut points for:   Mild Anxiety =  5  Moderate= 10  Severe=  15              Current Outpatient Prescriptions:      LORazepam (ATIVAN) 0.5 MG tablet, Take 0.5-1 tablets (0.25-0.5 mg) by mouth every 6 hours as needed for anxiety, Disp: 30 tablet, Rfl: 0     escitalopram (LEXAPRO) 10 MG tablet, Take 1 tablet (10 mg) by mouth every morning, Disp: 30 tablet, Rfl: 1     tadalafil (CIALIS) 20 MG tablet, Take 0.5-1 tablets (10-20 mg) by mouth daily as needed for erectile dysfunction Never use with nitroglycerin, terazosin or doxazosin., Disp: 12 tablet, Rfl: 11     atorvastatin (LIPITOR) 40 MG tablet, Take 1 tablet (40 mg) by mouth daily, Disp: 90 tablet, Rfl: 2     order for DME, Equipment ordered: RESMED Auto PAP Mask type: Nasal Settings: 6-12 cm H2O, Disp: , Rfl:      dutasteride (AVODART) 0.5 MG capsule, , Disp: , Rfl:      gabapentin (NEURONTIN) 300 MG capsule, , Disp: , Rfl: 5     NICOTROL 10 MG inhaler, , Disp: , Rfl: 5     aspirin 81 MG tablet, Take 1 tablet by mouth daily., Disp: , Rfl:      Multiple Vitamins-Minerals (EMERGEN-C FIVE PO), , Disp: , Rfl:     Past Medical History:   Diagnosis Date     ADHD (attention deficit hyperactivity disorder)     dx via Dr Olson PhD     Depression, anxiety      Depressive disorder      Diverticulosis of colon 1/11/2016     Dyslipidemia      Erectile dysfunction 11/30/2011     Hyperlipidemia LDL goal <130 10/31/2010     IGT (impaired glucose tolerance) 10/26/2015     Inflamed seborrheic keratosis 1/29/2016     Pain in joint,  shoulder region 12/16/2013     Sensory polyneuropathy 11/19/2015     Tubular adenoma of colon on colonoscopy 1/12/2012       OBJECTIVE:  /80  Pulse 80  Temp 97.8  F (36.6  C) (Oral)  Wt 197 lb (89.4 kg)  SpO2 98%  BMI 25.99 kg/m2  GENERAL APPEARANCE: healthy, alert and no distress      ASSESSMENT:56 year old  56 year old male who recently was had an emergency department visit for PANIC ATTACK which appear to have improved.      Plan: At this time we will start the patient on escitalopram 10 mg and continue the lorazepam but have him use this as needed only.  He was recommend(ed) to follow up in clinic in 2.5 to 3 weeks.   Refill(s) on cialis was given if needed he will fill it.   Jack  voiced understanding to informations discussed today.

## 2017-04-19 NOTE — TELEPHONE ENCOUNTER
Patient was seen for an appointment today for emergency department follow up.  Will close encounter.  Valery Kaba RN

## 2017-04-19 NOTE — NURSING NOTE
"Chief Complaint   Patient presents with     Hospital F/U     anxiety       Initial There were no vitals taken for this visit. Estimated body mass index is 25.6 kg/(m^2) as calculated from the following:    Height as of 4/18/17: 6' 1\" (1.854 m).    Weight as of 4/18/17: 194 lb (88 kg).  Medication Reconciliation: complete     Yesenia Kearns cma      "

## 2017-04-20 ASSESSMENT — PATIENT HEALTH QUESTIONNAIRE - PHQ9: SUM OF ALL RESPONSES TO PHQ QUESTIONS 1-9: 6

## 2017-04-20 ASSESSMENT — ANXIETY QUESTIONNAIRES: GAD7 TOTAL SCORE: 9

## 2017-05-10 ENCOUNTER — OFFICE VISIT (OUTPATIENT)
Dept: FAMILY MEDICINE | Facility: CLINIC | Age: 57
End: 2017-05-10
Payer: COMMERCIAL

## 2017-05-10 VITALS
TEMPERATURE: 98.7 F | DIASTOLIC BLOOD PRESSURE: 78 MMHG | BODY MASS INDEX: 25.46 KG/M2 | OXYGEN SATURATION: 98 % | SYSTOLIC BLOOD PRESSURE: 130 MMHG | HEART RATE: 69 BPM | WEIGHT: 193 LBS

## 2017-05-10 DIAGNOSIS — F41.1 GAD (GENERALIZED ANXIETY DISORDER): ICD-10-CM

## 2017-05-10 DIAGNOSIS — F41.0 PANIC DISORDER WITHOUT AGORAPHOBIA: ICD-10-CM

## 2017-05-10 PROBLEM — K82.4 GALLBLADDER POLYP: Status: ACTIVE | Noted: 2017-05-10

## 2017-05-10 PROBLEM — I70.0 ATHEROSCLEROSIS OF ABDOMINAL AORTA (H): Status: ACTIVE | Noted: 2017-05-10

## 2017-05-10 PROCEDURE — 99213 OFFICE O/P EST LOW 20 MIN: CPT | Performed by: FAMILY MEDICINE

## 2017-05-10 RX ORDER — ESCITALOPRAM OXALATE 10 MG/1
10 TABLET ORAL EVERY MORNING
Qty: 90 TABLET | Refills: 1 | Status: SHIPPED | OUTPATIENT
Start: 2017-05-10 | End: 2017-08-02

## 2017-05-10 ASSESSMENT — ANXIETY QUESTIONNAIRES
2. NOT BEING ABLE TO STOP OR CONTROL WORRYING: NOT AT ALL
3. WORRYING TOO MUCH ABOUT DIFFERENT THINGS: NOT AT ALL
IF YOU CHECKED OFF ANY PROBLEMS ON THIS QUESTIONNAIRE, HOW DIFFICULT HAVE THESE PROBLEMS MADE IT FOR YOU TO DO YOUR WORK, TAKE CARE OF THINGS AT HOME, OR GET ALONG WITH OTHER PEOPLE: NOT DIFFICULT AT ALL
GAD7 TOTAL SCORE: 0
1. FEELING NERVOUS, ANXIOUS, OR ON EDGE: NOT AT ALL
7. FEELING AFRAID AS IF SOMETHING AWFUL MIGHT HAPPEN: NOT AT ALL
6. BECOMING EASILY ANNOYED OR IRRITABLE: NOT AT ALL
5. BEING SO RESTLESS THAT IT IS HARD TO SIT STILL: NOT AT ALL

## 2017-05-10 ASSESSMENT — PATIENT HEALTH QUESTIONNAIRE - PHQ9: 5. POOR APPETITE OR OVEREATING: NOT AT ALL

## 2017-05-10 NOTE — PATIENT INSTRUCTIONS
Diverticulosis    Diverticulosis means that small pouches have formed in the wall of your large intestine (colon). Most often, this problem causes no symptoms and is common as people age. But the pouches in the colon are at risk of becoming infected. When this happens, the condition is called diverticulitis. Although most people with diverticulosis never develop diverticulitis, it is still not uncommon. Rectal bleeding can also occur and in less common situations, a type of colon inflammation called colitis.  While most people do not have symptoms, some people with diverticulosis may have:    Abdominal cramps and pain    Bloating    Constipation    Change in bowel habits  Causes  The exact cause of diverticulosis (and diverticulitis) has not been proved, but a few things are associated with the condition:    Low-fiber diet    Constipation    Lack of exercise  Your healthcare provider will talk with you about how to manage your condition. Diet changes may be all that are needed to help control diverticulosis and prevent progression to diverticulitis. If you develop diverticulitis, you will likely need other treatments.  Home care  You may be told to take fiber supplements daily. Fiber adds bulk to the stool so that it passes through the colon more easily. Stool softeners may be recommended. You may also be given medications for pain relief. Be sure to take all medications as directed.  In the past, people were told to avoid corn, nuts, and seeds. This is no longer necessary.  Follow these guidelines when caring for yourself at home:    Eat unprocessed foods that are high in fiber. Whole grains, fruits, and vegetables are good choices.    Drink 6 to 8 glasses of water every day unless your healthcare provider has you limit how much fluid you should have.    Watch for changes in your bowel movements. Tell your provider if you notice any changes.    Begin an exercise program. Ask your provider how to get started.  Generally, walking is the best.    Get plenty of rest and sleep.  Follow-up care  Follow up with your healthcare provider, or as advised. Regular visits may be needed to check on your health. Sometimes special procedures such as colonoscopy, are needed after an episode of diverticulitis or blooding. Be sure to keep all your appointments.  If a stool sample was taken, or cultures were done, you should be told if they are positive, or if your treatment needs to be changed. You can call as directed for the results.  If X-rays were done, a radiologist will look at them. You will be told if there is a change in your treatment.  If antibiotics were prescribed, be sure to finish them all.  When to seek medical advice  Call your healthcare provider right away if any of these occur:    Fever of 100.4 F (38 C) or higher, or as directed by your healthcare provider    Severe cramps in the lower left side of the abdomen or pain that is getting worse    Tenderness in the lower left side of the abdomen or worsening pain throughout the abdomen    Diarrhea or constipation that doesn't get better within 24 hours    Nausea and vomiting    Bleeding from the rectum  Call 911  Call emergency services if any of the following occur:    Trouble breathing    Confusion    Very drowsy or trouble awakening    Fainting or loss of consciousness    Rapid heart rate    Chest pain    [id number] [employer]. [address] [place] [address], [place] [address]. All rights reserved. This information is not intended as a substitute for professional medical care. Always follow your healthcare professional's instructions.

## 2017-05-10 NOTE — NURSING NOTE
"Chief Complaint   Patient presents with     Hospital F/U     anxiety       Initial /78  Pulse 69  Temp 98.7  F (37.1  C) (Oral)  Wt 193 lb (87.5 kg)  SpO2 98%  BMI 25.46 kg/m2 Estimated body mass index is 25.46 kg/(m^2) as calculated from the following:    Height as of 4/18/17: 6' 1\" (1.854 m).    Weight as of this encounter: 193 lb (87.5 kg).  Medication Reconciliation: complete     Yesenia Kearns, bob      "

## 2017-05-10 NOTE — MR AVS SNAPSHOT
After Visit Summary   5/10/2017    Jack Marrero    MRN: [id number]           Patient Information     Date Of Birth          1960        Visit Information        Provider Department      5/10/2017 12:00 PM Jamshid Miller MD [facility]        Today's Diagnoses     Panic disorder without agoraphobia        CASIMIRO (generalized anxiety disorder)          Care Instructions      Diverticulosis    Diverticulosis means that small pouches have formed in the wall of your large intestine (colon). Most often, this problem causes no symptoms and is common as people age. But the pouches in the colon are at risk of becoming infected. When this happens, the condition is called diverticulitis. Although most people with diverticulosis never develop diverticulitis, it is still not uncommon. Rectal bleeding can also occur and in less common situations, a type of colon inflammation called colitis.  While most people do not have symptoms, some people with diverticulosis may have:    Abdominal cramps and pain    Bloating    Constipation    Change in bowel habits  Causes  The exact cause of diverticulosis (and diverticulitis) has not been proved, but a few things are associated with the condition:    Low-fiber diet    Constipation    Lack of exercise  Your healthcare provider will talk with you about how to manage your condition. Diet changes may be all that are needed to help control diverticulosis and prevent progression to diverticulitis. If you develop diverticulitis, you will likely need other treatments.  Home care  You may be told to take fiber supplements daily. Fiber adds bulk to the stool so that it passes through the colon more easily. Stool softeners may be recommended. You may also be given medications for pain relief. Be sure to take all medications as directed.  In the past, people were told to avoid corn, nuts, and seeds. This is no longer necessary.  Follow these guidelines when caring  for yourself at home:    Eat unprocessed foods that are high in fiber. Whole grains, fruits, and vegetables are good choices.    Drink 6 to 8 glasses of water every day unless your healthcare provider has you limit how much fluid you should have.    Watch for changes in your bowel movements. Tell your provider if you notice any changes.    Begin an exercise program. Ask your provider how to get started. Generally, walking is the best.    Get plenty of rest and sleep.  Follow-up care  Follow up with your healthcare provider, or as advised. Regular visits may be needed to check on your health. Sometimes special procedures such as colonoscopy, are needed after an episode of diverticulitis or blooding. Be sure to keep all your appointments.  If a stool sample was taken, or cultures were done, you should be told if they are positive, or if your treatment needs to be changed. You can call as directed for the results.  If X-rays were done, a radiologist will look at them. You will be told if there is a change in your treatment.  If antibiotics were prescribed, be sure to finish them all.  When to seek medical advice  Call your healthcare provider right away if any of these occur:    Fever of 100.4 F (38 C) or higher, or as directed by your healthcare provider    Severe cramps in the lower left side of the abdomen or pain that is getting worse    Tenderness in the lower left side of the abdomen or worsening pain throughout the abdomen    Diarrhea or constipation that doesn't get better within 24 hours    Nausea and vomiting    Bleeding from the rectum  Call 911  Call emergency services if any of the following occur:    Trouble breathing    Confusion    Very drowsy or trouble awakening    Fainting or loss of consciousness    Rapid heart rate    Chest pain    4262-8926 [employer]. [address] [place] [address], [place] [address]. All rights reserved. This information is not intended as a substitute for professional  medical care. Always follow your healthcare professional's instructions.              Follow-ups after your visit        Follow-up notes from your care team     Return in about 3 months (around 8/10/2017) for recheck on anxiety.      Who to contact     If you have questions or need follow up information about today's clinic visit or your schedule please contact [facility] directly at [phone].  Normal or non-critical lab and imaging results will be communicated to you by [employer], letter or phone within 4 business days after the clinic has received the results. If you do not hear from us within 7 days, please contact the clinic through [employer] or phone. If you have a critical or abnormal lab result, we will notify you by phone as soon as possible.  Submit refill requests through [employer] or call your pharmacy and they will forward the refill request to us. Please allow 3 business days for your refill to be completed.          Additional Information About Your Visit        [employer] Information     [employer] gives you secure access to your electronic health record. If you see a primary care provider, you can also send messages to your care team and make appointments. If you have questions, please call your primary care clinic.  If you do not have a primary care provider, please call [phone] and they will assist you.        Care EveryWhere ID     This is your Care EveryWhere ID. This could be used by other organizations to access your [place] medical records  [id number]        Your Vitals Were     Pulse Temperature Pulse Oximetry BMI (Body Mass Index)          69 98.7  F (37.1  C) (Oral) 98% 25.46 kg/m2         Blood Pressure from Last 3 Encounters:   05/10/17 130/78   04/19/17 122/80   04/18/17 140/86    Weight from Last 3 Encounters:   05/10/17 193 lb (87.5 kg)   04/19/17 197 lb (89.4 kg)   04/18/17 194 lb (88 kg)              Today, you had the following     No orders found for display          Where to get your medicines      These medications were sent to [id number] - [place], [place] - [address] [facility] [address]  [address] [facility] [address], [facility] [address]     Phone:  [phone]     escitalopram 10 MG tablet          Primary Care Provider Office Phone # Fax #    Jamshid Miller [phone]       [facility] [address] [address] [facility] [[email or web address]] [id number]        Thank you!     Thank you for choosing [facility]  for your care. Our goal is always to provide you with excellent care. Hearing back from our patients is one way we can continue to improve our services. Please take a few minutes to complete the written survey that you may receive in the mail after your visit with us. Thank you!             Your Updated Medication List - Protect others around you: Learn how to safely use, store and throw away your medicines at www.disposemymeds.org.          This list is accurate as of: 5/10/17 12:40 PM.  Always use your most recent med list.                   Brand Name Dispense Instructions for use    aspirin 81 MG tablet      Take 1 tablet by mouth daily.       atorvastatin 40 MG tablet    LIPITOR    90 tablet    Take 1 tablet (40 mg) by mouth daily       dutasteride 0.5 MG capsule    AVODART         EMERGEN-C FIVE PO          escitalopram 10 MG tablet    LEXAPRO    90 tablet    Take 1 tablet (10 mg) by mouth every morning       gabapentin 300 MG capsule    NEURONTIN         LORazepam 0.5 MG tablet    ATIVAN    30 tablet    Take 0.5-1 tablets (0.25-0.5 mg) by mouth every 6 hours as needed for anxiety       NICOTROL 10 MG Inhaler   Generic drug:  nicotine          order for DME      Equipment ordered: RESMED Auto PAP Mask type: Nasal Settings: 6-12 cm H2O       tadalafil 20 MG tablet    CIALIS    12 tablet    Take 0.5-1 tablets (10-20 mg) by mouth daily as needed for erectile dysfunction Never use with nitroglycerin, terazosin or doxazosin.

## 2017-05-10 NOTE — PROGRESS NOTES
"  SUBJECTIVE:                                                    Jack Marrero is a 56 year old male who presents to clinic today for the following health issues:      ER follow up Anxiety. [facility]    Problem list and histories reviewed & adjusted, as indicated.  Additional history: none        Reviewed and updated as needed this visit by clinical staff       Reviewed and updated as needed this visit by Provider       --------------------------------------------------------------------------------------------------------------------------------------  SUBJECTIVE:  Jack Marrero is a 56 year old male who presents for a follow up evaluation of anxiety. The patient was started on Lexapro (escitalorpram) 10 mg daily at the last visit which was 2 weeks ago. The patient reports that his persistant symptoms of anxiety include Tension, edginess, and irritability.   The patient reports that his symptoms have significant(ly)  improved since starting this medication.   He reports that he feels more relaxed without being lethargic  The symptom(s) of tightness in his chest have occurred much less often.   When he feels the anxiety coming on he can sit down and get the symptom(s) to resolve quickly    The patient reports approximately a 90 percent improvement.   Hsi wife has noticed the improvement   He was even scared to drive his car or leave the house and that is not a problem now.      The patient reports that he has experienced side effects from this medication.  The patient reports the side effects include: tremors chattering of the teeth  He sometimes feels that he confused about some details at times.  He can have a flutter in his upper left chest that feels like a bubble working its way up and out.  It is not painful.        CASIMIRO-7    Over the last 2 weeks, how often have you been bothered by the following problems?  (Use an \"x\" to indicate your answer) Not at all                (0) Several days        "         (1) More than half the days        (2) Nearly every day          (3)   1. Feeling nervous, anxious or on edge  1/2     2. Not being able to stop or control worrying x      3. Worrying too much about different things x      4. Trouble relaxing x      5. Being so restless that it is hard to sit still x      6. Becoming easily annoyed or irritable x      7. Feeling afraid as if something awful might happen x        Total_______    Cut points for:   Mild Anxiety =  5  Moderate= 10  Severe=  15    CASIMIRO-7 SCORE 10/29/2014 12/15/2016 4/19/2017   Total Score 1 - -   Total Score - 3 9           Last PHQ-9 score on record= 1        OBJECTIVE:  /78  Pulse 69  Temp 98.7  F (37.1  C) (Oral)  Wt 193 lb (87.5 kg)  SpO2 98%  BMI 25.46 kg/m2   General: the patient had a calm and jocular affect during the visit today.    ASSESSMENT: Generalized Anxiety Disorder (CASIMIRO)  Panic Disorder which has improved    PLAN:  We will continue the patient on the same dose of his antidepressant and have the patient return to clinic for appointment in 3 month(s). He was instructed to return earlier if the anxiety symptoms return.     Problem List was updated with findings from the recent CT

## 2017-05-11 ASSESSMENT — ANXIETY QUESTIONNAIRES: GAD7 TOTAL SCORE: 0

## 2017-05-11 ASSESSMENT — PATIENT HEALTH QUESTIONNAIRE - PHQ9: SUM OF ALL RESPONSES TO PHQ QUESTIONS 1-9: 1

## 2017-06-01 DIAGNOSIS — G25.81 RESTLESS LEGS SYNDROME (RLS): Primary | ICD-10-CM

## 2017-06-02 RX ORDER — GABAPENTIN 600 MG/1
600 TABLET ORAL AT BEDTIME
Qty: 90 TABLET | Refills: 3 | Status: SHIPPED | OUTPATIENT
Start: 2017-06-02 | End: 2017-08-10

## 2017-06-02 NOTE — TELEPHONE ENCOUNTER
Patient has been taking 300mg 2 caps at bedtime. To provider for refill  .Elyssa PORTILLON, RN, CPN

## 2017-06-02 NOTE — TELEPHONE ENCOUNTER
Please contact the patient. The gabapentin was originally prescribed by his sleep doctor and they were trying different doses. Did they find a dose that works well? Please clarify and send back to me to sign the prescription(s)

## 2017-07-25 NOTE — PROGRESS NOTES
"  SUBJECTIVE:                                                    Jack Marrero is a 56 year old male who presents to clinic today for the following health issues:      Follow up Anxiety, things are going good.      Problem list and histories reviewed & adjusted, as indicated.      Reviewed and updated as needed this visit by clinical staff       Reviewed and updated as needed this visit by Provider       --------------------------------------------------------------------------------------------------------------------------------------  SUBJECTIVE:  Jack Marrero is a 56 year old male who presents for a follow up evaluation of anxiety. The patient was started on Lexapro (escitalorpram) 10  mg daily at the last visit which was 2.5 month(s)  ago. The patient reports that his persistant symptoms of anxiety include none.  He has not take the  lorazepam since his last visit.      The patient reports that his symptoms have improved since starting this medication. The patient reports approximately a 100 percent improvement.   The patient denies that he has experienced side effects from this medication.        CASIMIRO-7    Over the last 2 weeks, how often have you been bothered by the following problems?  (Use an \"x\" to indicate your answer) Not at all                (0) Several days                (1) More than half the days        (2) Nearly every day          (3)   1. Feeling nervous, anxious or on edge x      2. Not being able to stop or control worrying x      3. Worrying too much about different things x      4. Trouble relaxing x      5. Being so restless that it is hard to sit still x      6. Becoming easily annoyed or irritable x      7. Feeling afraid as if something awful might happen x        Total_______    Cut points for:   Mild Anxiety =  5  Moderate= 10  Severe=  15    CASIMIRO-7 SCORE 12/15/2016 4/19/2017 5/10/2017   Total Score - - -   Total Score 3 9 0           Last PHQ-9 score on record= 1    He reports " that he is taking the gabapentin 600 mg at bedtime for RESTLESS LEG SYNDROME.   He has tried 900 mg at bedtime and that is not helping very much lately.  It can take 45 minute(s) of before his legs will calm down and he can get to sleep.           OBJECTIVE:  General: the patient had a calm affect during the visit today.    ASSESSMENT: Generalized Anxiety Disorder (CASIMIRO) which has improved significant(ly)       PLAN:  We will continue the patient on the same dose of his antidepressant and have the patient return to clinic for appointment in 12 month(s). He was instructed to return earlier if the anxiety symptoms return.    For his restless leg syndrome we will taper down on the gabapentin and start mirapex.     Patient Instructions   Either follow up in 4 weeks regarding the restless leg syndrome medication or send me a [employer] message to let me know how you are doing on it.

## 2017-08-02 ENCOUNTER — OFFICE VISIT (OUTPATIENT)
Dept: FAMILY MEDICINE | Facility: CLINIC | Age: 57
End: 2017-08-02
Payer: COMMERCIAL

## 2017-08-02 VITALS
WEIGHT: 189 LBS | TEMPERATURE: 98.4 F | OXYGEN SATURATION: 98 % | BODY MASS INDEX: 24.94 KG/M2 | SYSTOLIC BLOOD PRESSURE: 129 MMHG | DIASTOLIC BLOOD PRESSURE: 81 MMHG | HEART RATE: 68 BPM

## 2017-08-02 DIAGNOSIS — G25.81 RESTLESS LEGS SYNDROME (RLS): ICD-10-CM

## 2017-08-02 DIAGNOSIS — M54.5 LOW BACK PAIN, UNSPECIFIED BACK PAIN LATERALITY, UNSPECIFIED CHRONICITY, WITH SCIATICA PRESENCE UNSPECIFIED: ICD-10-CM

## 2017-08-02 DIAGNOSIS — F41.0 PANIC DISORDER WITHOUT AGORAPHOBIA: ICD-10-CM

## 2017-08-02 DIAGNOSIS — F41.1 GAD (GENERALIZED ANXIETY DISORDER): Primary | ICD-10-CM

## 2017-08-02 PROCEDURE — 99214 OFFICE O/P EST MOD 30 MIN: CPT | Performed by: FAMILY MEDICINE

## 2017-08-02 RX ORDER — ROPINIROLE 0.25 MG/1
TABLET, FILM COATED ORAL
Qty: 60 TABLET | Refills: 1 | Status: SHIPPED | OUTPATIENT
Start: 2017-08-02 | End: 2017-08-30

## 2017-08-02 RX ORDER — ESCITALOPRAM OXALATE 10 MG/1
10 TABLET ORAL EVERY MORNING
Qty: 90 TABLET | Refills: 2 | Status: SHIPPED | OUTPATIENT
Start: 2017-08-02 | End: 2018-07-23

## 2017-08-02 ASSESSMENT — ANXIETY QUESTIONNAIRES
IF YOU CHECKED OFF ANY PROBLEMS ON THIS QUESTIONNAIRE, HOW DIFFICULT HAVE THESE PROBLEMS MADE IT FOR YOU TO DO YOUR WORK, TAKE CARE OF THINGS AT HOME, OR GET ALONG WITH OTHER PEOPLE: NOT DIFFICULT AT ALL
7. FEELING AFRAID AS IF SOMETHING AWFUL MIGHT HAPPEN: NOT AT ALL
6. BECOMING EASILY ANNOYED OR IRRITABLE: NOT AT ALL
1. FEELING NERVOUS, ANXIOUS, OR ON EDGE: NOT AT ALL
5. BEING SO RESTLESS THAT IT IS HARD TO SIT STILL: NOT AT ALL
GAD7 TOTAL SCORE: 0
3. WORRYING TOO MUCH ABOUT DIFFERENT THINGS: NOT AT ALL
2. NOT BEING ABLE TO STOP OR CONTROL WORRYING: NOT AT ALL

## 2017-08-02 ASSESSMENT — PATIENT HEALTH QUESTIONNAIRE - PHQ9: 5. POOR APPETITE OR OVEREATING: NOT AT ALL

## 2017-08-02 NOTE — PATIENT INSTRUCTIONS
Either follow up in 4 weeks regarding the restless leg syndrome medication or send me a [employer] message to let me know how you are doing on it.

## 2017-08-02 NOTE — MR AVS SNAPSHOT
After Visit Summary   8/2/2017    Jack Marrero    MRN: [id number]           Patient Information     Date Of Birth          1960        Visit Information        Provider Department      8/2/2017 10:00 AM Jamshid Miller MD [facility]        Today's Diagnoses     CASIMIRO (generalized anxiety disorder)    -  1    Restless legs syndrome (RLS)        Low back pain, unspecified back pain laterality, unspecified chronicity, with sciatica presence unspecified        Panic disorder without agoraphobia          Care Instructions    Either follow up in 4 weeks regarding the restless leg syndrome medication or send me a [employer] message to let me know how you are doing on it.          Follow-ups after your visit        Who to contact     If you have questions or need follow up information about today's clinic visit or your schedule please contact [facility] directly at [phone].  Normal or non-critical lab and imaging results will be communicated to you by MyChart, letter or phone within 4 business days after the clinic has received the results. If you do not hear from us within 7 days, please contact the clinic through [employer] or phone. If you have a critical or abnormal lab result, we will notify you by phone as soon as possible.  Submit refill requests through [employer] or call your pharmacy and they will forward the refill request to us. Please allow 3 business days for your refill to be completed.          Additional Information About Your Visit        MyChart Information     [employer] gives you secure access to your electronic health record. If you see a primary care provider, you can also send messages to your care team and make appointments. If you have questions, please call your primary care clinic.  If you do not have a primary care provider, please call [phone] and they will assist you.        Care EveryWhere ID     This is your Care EveryWhere ID. This could be  used by other organizations to access your [place] medical records  [id number]        Your Vitals Were     Pulse Temperature Pulse Oximetry BMI (Body Mass Index)          68 98.4  F (36.9  C) (Oral) 98% 24.94 kg/m2         Blood Pressure from Last 3 Encounters:   08/02/17 129/81   05/10/17 130/78   04/19/17 122/80    Weight from Last 3 Encounters:   08/02/17 189 lb (85.7 kg)   05/10/17 193 lb (87.5 kg)   04/19/17 197 lb (89.4 kg)              Today, you had the following     No orders found for display         Today's Medication Changes          These changes are accurate as of: 8/2/17 10:28 AM.  If you have any questions, ask your nurse or doctor.               Start taking these medicines.        Dose/Directions    nabumetone 750 MG tablet   Commonly known as:  RELAFEN   Used for:  Low back pain, unspecified back pain laterality, unspecified chronicity, with sciatica presence unspecified   Started by:  Jamshid Miller MD        1 tablet twice a day for 10 days and then as needed   Quantity:  60 tablet   Refills:  1       rOPINIRole 0.25 MG tablet   Commonly known as:  REQUIP   Used for:  Restless legs syndrome (RLS)   Started by:  Jamshid Miller MD        Take one tablet at bedtime for a week and then go up to 2 tablets at bedtime   Quantity:  60 tablet   Refills:  1         These medicines have changed or have updated prescriptions.        Dose/Directions    gabapentin 600 MG tablet   Commonly known as:  NEURONTIN   This may have changed:  how much to take   Used for:  Restless legs syndrome (RLS)        Dose:  600 mg   Take 1 tablet (600 mg) by mouth At Bedtime   Quantity:  90 tablet   Refills:  3            Where to get your medicines      These medications were sent to [facility] [address] [place], [place] - [address] [address] [address]  [address] [address] [address]     Phone:  [phone]     escitalopram 10 MG tablet    nabumetone 750 MG tablet    rOPINIRole 0.25 MG tablet                 Primary Care Provider Office Phone # Fax #    Jamshid Miller [phone]       [facility] [address] [facility] [address]        Equal Access to Services     MARIZOL KING: Hadii aad ku hadyoliso Sobruceali, waaxda luqadaha, qaybta kaalmada adeegyada, kathy cooper leonormadalyn villareal domitila king. So [facility] [phone].    ATENCIÓN: Si habla español, tiene a rivera disposición servicios gratuitos de asistencia lingüística. Llame al [phone].    We comply with applicable federal civil rights laws and [place] laws. We do not discriminate on the basis of race, color, national origin, age, disability sex, sexual orientation or gender identity.            Thank you!     Thank you for choosing [facility]  for your care. Our goal is always to provide you with excellent care. Hearing back from our patients is one way we can continue to improve our services. Please take a few minutes to complete the written survey that you may receive in the mail after your visit with us. Thank you!             Your Updated Medication List - Protect others around you: Learn how to safely use, store and throw away your medicines at www.disposemymeds.org.          This list is accurate as of: 8/2/17 10:28 AM.  Always use your most recent med list.                   Brand Name Dispense Instructions for use Diagnosis    aspirin 81 MG tablet      Take 1 tablet by mouth daily.        atorvastatin 40 MG tablet    LIPITOR    90 tablet    Take 1 tablet (40 mg) by mouth daily    Dyslipidemia       dutasteride 0.5 MG capsule    AVODART          EMERGEN-C FIVE PO           escitalopram 10 MG tablet    LEXAPRO    90 tablet    Take 1 tablet (10 mg) by mouth every morning    Panic disorder without agoraphobia, CASIMIRO (generalized anxiety disorder)       gabapentin 600 MG tablet    NEURONTIN    90 tablet    Take 1 tablet (600 mg) by mouth At Bedtime    Restless legs syndrome (RLS)       LORazepam 0.5 MG  tablet    ATIVAN    30 tablet    Take 0.5-1 tablets (0.25-0.5 mg) by mouth every 6 hours as needed for anxiety    CASIMIRO (generalized anxiety disorder)       nabumetone 750 MG tablet    RELAFEN    60 tablet    1 tablet twice a day for 10 days and then as needed    Low back pain, unspecified back pain laterality, unspecified chronicity, with sciatica presence unspecified       NICOTROL 10 MG Inhaler   Generic drug:  nicotine           order for DME      Equipment ordered: RESMED Auto PAP Mask type: Nasal Settings: 6-12 cm H2O        rOPINIRole 0.25 MG tablet    REQUIP    60 tablet    Take one tablet at bedtime for a week and then go up to 2 tablets at bedtime    Restless legs syndrome (RLS)       tadalafil 20 MG tablet    CIALIS    12 tablet    Take 0.5-1 tablets (10-20 mg) by mouth daily as needed for erectile dysfunction Never use with nitroglycerin, terazosin or doxazosin.    Erectile dysfunction, unspecified erectile dysfunction type

## 2017-08-02 NOTE — NURSING NOTE
"Chief Complaint   Patient presents with     Anxiety       Initial /81  Pulse 68  Temp 98.4  F (36.9  C) (Oral)  Wt 189 lb (85.7 kg)  SpO2 98%  BMI 24.94 kg/m2 Estimated body mass index is 24.94 kg/(m^2) as calculated from the following:    Height as of 4/18/17: 6' 1\" (1.854 m).    Weight as of this encounter: 189 lb (85.7 kg).  Medication Reconciliation: complete     Yesenia Kearns cma      "

## 2017-08-03 ASSESSMENT — PATIENT HEALTH QUESTIONNAIRE - PHQ9: SUM OF ALL RESPONSES TO PHQ QUESTIONS 1-9: 1

## 2017-08-03 ASSESSMENT — ANXIETY QUESTIONNAIRES: GAD7 TOTAL SCORE: 0

## 2017-08-10 ENCOUNTER — OFFICE VISIT (OUTPATIENT)
Dept: URGENT CARE | Facility: URGENT CARE | Age: 57
End: 2017-08-10
Payer: COMMERCIAL

## 2017-08-10 ENCOUNTER — RADIANT APPOINTMENT (OUTPATIENT)
Dept: GENERAL RADIOLOGY | Facility: CLINIC | Age: 57
End: 2017-08-10
Attending: PHYSICIAN ASSISTANT
Payer: COMMERCIAL

## 2017-08-10 VITALS
BODY MASS INDEX: 24.51 KG/M2 | WEIGHT: 185.8 LBS | HEART RATE: 65 BPM | DIASTOLIC BLOOD PRESSURE: 87 MMHG | TEMPERATURE: 98.5 F | OXYGEN SATURATION: 97 % | SYSTOLIC BLOOD PRESSURE: 140 MMHG

## 2017-08-10 DIAGNOSIS — M25.462 KNEE EFFUSION, LEFT: ICD-10-CM

## 2017-08-10 DIAGNOSIS — M25.562 ACUTE PAIN OF LEFT KNEE: Primary | ICD-10-CM

## 2017-08-10 PROCEDURE — 99213 OFFICE O/P EST LOW 20 MIN: CPT | Performed by: PHYSICIAN ASSISTANT

## 2017-08-10 PROCEDURE — 73562 X-RAY EXAM OF KNEE 3: CPT | Mod: LT

## 2017-08-10 ASSESSMENT — PAIN SCALES - GENERAL: PAINLEVEL: MILD PAIN (2)

## 2017-08-10 NOTE — PROGRESS NOTES
"  SUBJECTIVE:                                                    Jack Marrero is a 56 year old male who presents to clinic today for the following health issues:      Musculoskeletal problem/pain      Duration: 1 day    Description  Location: left knee    Intensity:  mild, severe    Accompanying signs and symptoms: pain with walking ,bending and squatting, swelling    History  Previous similar problem: no   Previous evaluation:  none    Precipitating or alleviating factors:  Trauma or overuse: no   Aggravating factors include: bending, walking and squatting    Therapies tried and outcome: nothing    This started with pain when he woke up yesterday morning.  Last night, he noticed it was swollen.  It feels \"puffed up\"  Pain is worse across the front of the knee and inner knee  Squatting is very painful, pivoting hurts  It does not lock or catch, but feels like it could give out  No treatments  No history of gout    Problem list and histories reviewed & adjusted, as indicated.  Additional history: as documented    Patient Active Problem List   Diagnosis     Dyslipidemia     Mixed anxiety depressive disorder     HYPERLIPIDEMIA LDL GOAL <130     ADHD (attention deficit hyperactivity disorder)     Erectile dysfunction     Tubular adenoma of colon on colonoscopy     High triglycerides     HDL deficiency     IGT (impaired glucose tolerance)     Sensory polyneuropathy     Diverticulosis of colon     Inflamed seborrheic keratosis     10 year risk of MI or stroke 7.5% or greater, 9.3% in Nov 2016 on atorvastatin 40     Venous lake of lip, left lower     Benign non-nodular prostatic hyperplasia with lower urinary tract symptoms     Tobacco abuse     Restless legs syndrome (RLS)     SHY (obstructive sleep apnea) - Severe     Acute pain of right shoulder     Gallbladder polyp, need fu US yearly     Atherosclerosis of abdominal aorta (H), seen on CT     Past Surgical History:   Procedure Laterality Date     COLONOSCOPY       " COLONOSCOPY WITH CO2 INSUFFLATION N/A 1/11/2016    Procedure: COLONOSCOPY WITH CO2 INSUFFLATION;  Surgeon: Nilson Gale MD;  Location: [facility] OR     HC EXCISION SKIN OF NAIL FOLD  1993    BL GREAT TOE     HC TOOTH EXTRACTION W/FORCEP  1991     ALL 4 WISDOM TEETH EXTRACTED     HERNIORRHAPHY INGUINAL Left 3/11/2015    Procedure: HERNIORRHAPHY INGUINAL;  Surgeon: Nilson Gale MD;  Location: [facility] OR     TONSILLECTOMY & ADENOIDECTOMY         Social History   Substance Use Topics     Smoking status: Current Every Day Smoker     Packs/day: 0.50     Years: 40.00     Types: Pipe     Start date: 10/10/1975     Last attempt to quit: 2/19/2014     Smokeless tobacco: Never Used     Alcohol use Yes      Comment: Occasional; about 2 beers/week     Family History   Problem Relation Age of Onset     Arthritis Mother      HEART DISEASE Mother      Lipids Mother      Eye Disorder Mother      GASTROINTESTINAL DISEASE Mother      irritable bowel     Breast Cancer Mother      OSTEOPOROSIS Mother      Lipids Father      HEART DISEASE Father      Psychotic Disorder Father      Depression Father      Eye Disorder Father      C.A.D. Father 56     CABG at age 56     Coronary Artery Disease Father      Hyperlipidemia Father      Depression/Anxiety Father      Mental Illness Father      Arthritis Maternal Grandmother      Alcohol/Drug Maternal Grandfather      Cancer - colorectal Maternal Grandfather      Hypertension Paternal Grandmother      Breast Cancer Paternal Grandmother      HEART DISEASE Paternal Grandfather      Hypertension Paternal Grandfather      Coronary Artery Disease Paternal Grandfather      Hypertension Brother      Depression/Anxiety Brother      Thyroid Disease Sister      Hypertension Brother      Depression/Anxiety Brother      Hypertension Brother      GASTROINTESTINAL DISEASE Brother      Prostate Cancer Brother      Cardiovascular Other      DIABETES Maternal Half-Brother      Thyroid Disease Sister       Thyroid Disease Sister      Other Cancer Other      Lung; Paternal Uncle/Lung; Paternal Uncle     CEREBROVASCULAR DISEASE No family hx of          Current Outpatient Prescriptions   Medication Sig Dispense Refill     escitalopram (LEXAPRO) 10 MG tablet Take 1 tablet (10 mg) by mouth every morning 90 tablet 2     rOPINIRole (REQUIP) 0.25 MG tablet Take one tablet at bedtime for a week and then go up to 2 tablets at bedtime 60 tablet 1     atorvastatin (LIPITOR) 40 MG tablet Take 1 tablet (40 mg) by mouth daily 90 tablet 2     order for DME Equipment ordered: RESMED Auto PAP Mask type: Nasal Settings: 6-12 cm H2O       dutasteride (AVODART) 0.5 MG capsule        aspirin 81 MG tablet Take 1 tablet by mouth daily.       LORazepam (ATIVAN) 0.5 MG tablet Take 0.5-1 tablets (0.25-0.5 mg) by mouth every 6 hours as needed for anxiety (Patient not taking: Reported on 8/10/2017) 30 tablet 0     NICOTROL 10 MG inhaler   5     Allergies   Allergen Reactions     Codeine      FELT WIRED ON THIS         Reviewed and updated as needed this visit by clinical staff     Reviewed and updated as needed this visit by Provider       ROS:  As in HPI      OBJECTIVE:     /87 (BP Location: Left arm, Patient Position: Chair)  Pulse 65  Temp 98.5  F (36.9  C) (Oral)  Wt 185 lb 12.8 oz (84.3 kg)  SpO2 97%  BMI 24.51 kg/m2  Body mass index is 24.51 kg/(m^2).  GENERAL: healthy, alert and no distress  MS: LLE exam shows: Left knee swelling and effusion, no erythema warmth or abrasion, tenderness over the medial joint line, pain with Gabbi's, tolerated passive motion without pain until endpoints are reached, negative Lachman's, varus stress and valgus stress tests  SKIN: no suspicious lesions or rashes  NEURO: Normal strength and tone, mentation intact and speech normal    Diagnostic Test Results:  Xray - 3 views of the left knee  Findings: Normal alignment, no fracture, no arthritic changes.     ASSESSMENT/PLAN:     1. Acute pain  of left knee  - XR Knee Left 3 Views  - ORTHO  REFERRAL  - EXTENSOR KNEE SLEEVE L    2. Knee effusion, left    I feel it is likely he has a medial meniscus tear.  We discussed several treatment options.  He would like to start with rest, time, elevation, ice, and a knee sleeve.  He will follow up with ortho in two weeks if he is not improving or the knee swelling does not go down.  He declined an MRI today, which I feel is very reasonable.   If he has locking or catching, he will follow up right away.       Brianna Stewart PA-C  [facility]

## 2017-08-10 NOTE — MR AVS SNAPSHOT
After Visit Summary   8/10/2017    Jack Marrero    MRN: [id number]           Patient Information     Date Of Birth          1960        Visit Information        Provider Department      8/10/2017 11:30 AM Brianna Stewart PA-C [facility]        Today's Diagnoses     Acute pain of left knee    -  1    Knee effusion, left           Follow-ups after your visit        Additional Services     ORTHO  REFERRAL       [facility] is referring you to the Orthopedic  Services at [facility].       The  Representative will assist you in the coordination of your Orthopedic and Musculoskeletal Care as prescribed by your physician.    The  Representative will call you within 1 business day to help schedule your appointment, or you may contact the  Representative at:    All areas ~ [phone]     Type of Referral : Non Surgical       Timeframe requested: 2-4 weeks  Patient has a knee effusion and likely a medial meniscus tear, and would like to try conservative treatment to start.     Coverage of these services is subject to the terms and limitations of your health insurance plan.  Please call member services at your health plan with any benefit or coverage questions.      If X-rays, CT or MRI's have been performed, please contact the facility where they were done to arrange for , prior to your scheduled appointment.  Please bring this referral request to your appointment and present it to your specialist.                  Who to contact     If you have questions or need follow up information about today's clinic visit or your schedule please contact [facility] directly at [phone].  Normal or non-critical lab and imaging results will be communicated to you by MyChart, letter or phone within 4 business days after the clinic has received the results. If you do not  hear from us within 7 days, please contact the clinic through [employer] or phone. If you have a critical or abnormal lab result, we will notify you by phone as soon as possible.  Submit refill requests through [employer] or call your pharmacy and they will forward the refill request to us. Please allow 3 business days for your refill to be completed.          Additional Information About Your Visit        [employer] Information     [employer] gives you secure access to your electronic health record. If you see a primary care provider, you can also send messages to your care team and make appointments. If you have questions, please call your primary care clinic.  If you do not have a primary care provider, please call [phone] and they will assist you.        Care EveryWhere ID     This is your Care EveryWhere ID. This could be used by other organizations to access your [facility] medical records  [id number]        Your Vitals Were     Pulse Temperature Pulse Oximetry BMI (Body Mass Index)          65 98.5  F (36.9  C) (Oral) 97% 24.51 kg/m2         Blood Pressure from Last 3 Encounters:   08/10/17 140/87   08/02/17 129/81   05/10/17 130/78    Weight from Last 3 Encounters:   08/10/17 185 lb 12.8 oz (84.3 kg)   08/02/17 189 lb (85.7 kg)   05/10/17 193 lb (87.5 kg)              We Performed the Following     EXTENSOR KNEE SLEEVE L     ORTHO  REFERRAL     XR Knee Left 3 Views        Primary Care Provider Office Phone # Fax #    Jamshid Miller [phone]       [address] [facility] [address]        Equal Access to Services     [facility] : Hadii aad ku hadasho Soomaali, waaxda luqadaha, qaybta kaalmada adejoe, kathy ramesh . So [facility] [phone].    ATENCIÓN: Si habla español, tiene a rivera disposición servicios gratuitos de asistencia lingüística. Llame al [phone].    We comply with applicable federal civil rights laws and [place] laws. We do not  discriminate on the basis of race, color, national origin, age, disability sex, sexual orientation or gender identity.            Thank you!     Thank you for choosing [facility]  for your care. Our goal is always to provide you with excellent care. Hearing back from our patients is one way we can continue to improve our services. Please take a few minutes to complete the written survey that you may receive in the mail after your visit with us. Thank you!             Your Updated Medication List - Protect others around you: Learn how to safely use, store and throw away your medicines at www.disposemymeds.org.          This list is accurate as of: 8/10/17 11:59 PM.  Always use your most recent med list.                   Brand Name Dispense Instructions for use Diagnosis    aspirin 81 MG tablet      Take 1 tablet by mouth daily.        atorvastatin 40 MG tablet    LIPITOR    90 tablet    Take 1 tablet (40 mg) by mouth daily    Dyslipidemia       dutasteride 0.5 MG capsule    AVODART          escitalopram 10 MG tablet    LEXAPRO    90 tablet    Take 1 tablet (10 mg) by mouth every morning    Panic disorder without agoraphobia, CASIMIRO (generalized anxiety disorder)       LORazepam 0.5 MG tablet    ATIVAN    30 tablet    Take 0.5-1 tablets (0.25-0.5 mg) by mouth every 6 hours as needed for anxiety    CASIMIRO (generalized anxiety disorder)       NICOTROL 10 MG Inhaler   Generic drug:  nicotine           order for DME      Equipment ordered: RESMED Auto PAP Mask type: Nasal Settings: 6-12 cm H2O        rOPINIRole 0.25 MG tablet    REQUIP    60 tablet    Take one tablet at bedtime for a week and then go up to 2 tablets at bedtime    Restless legs syndrome (RLS)

## 2017-08-24 ENCOUNTER — OFFICE VISIT (OUTPATIENT)
Dept: ORTHOPEDICS | Facility: CLINIC | Age: 57
End: 2017-08-24
Payer: COMMERCIAL

## 2017-08-24 VITALS — SYSTOLIC BLOOD PRESSURE: 132 MMHG | OXYGEN SATURATION: 97 % | HEART RATE: 70 BPM | DIASTOLIC BLOOD PRESSURE: 82 MMHG

## 2017-08-24 DIAGNOSIS — M25.462 SWELLING OF LEFT KNEE JOINT: Primary | ICD-10-CM

## 2017-08-24 PROCEDURE — 99213 OFFICE O/P EST LOW 20 MIN: CPT | Performed by: FAMILY MEDICINE

## 2017-08-24 RX ORDER — NAPROXEN 500 MG/1
500 TABLET ORAL 2 TIMES DAILY WITH MEALS
Qty: 30 TABLET | Refills: 0 | Status: SHIPPED | OUTPATIENT
Start: 2017-08-24 | End: 2017-10-11

## 2017-08-24 ASSESSMENT — PAIN SCALES - GENERAL: PAINLEVEL: MILD PAIN (3)

## 2017-08-24 NOTE — MR AVS SNAPSHOT
After Visit Summary   2017    Jack Marrero    MRN: [id number]           Patient Information     Date Of Birth          1960        Visit Information        Provider Department      2017 8:00 AM Harlan Hall, DO [facility]        Today's Diagnoses     Swelling of left knee joint    -  1      Care Instructions    Thanks for coming today.  Ortho/Sports Medicine Clinic  [address] [place] [address]    To schedule future appointments in Ortho Clinic, you may call [phone].    To schedule ordered imaging by your provider:   Call [facility] [phone]    To schedule an injection ordered by your provider:  Call [facility] Injection scheduling line: [phone]  [employer] available online at:  [email or web address]    Please call if any further questions or concerns ([phone]).  Clinic hours 8 am to 5 pm.    Return to clinic (call) if symptoms worsen or fail to improve.            Follow-ups after your visit        Who to contact     If you have questions or need follow up information about today's clinic visit or your schedule please contact [facility] directly at [phone].  Normal or non-critical lab and imaging results will be communicated to you by [employer], letter or phone within 4 business days after the clinic has received the results. If you do not hear from us within 7 days, please contact the clinic through [employer] or phone. If you have a critical or abnormal lab result, we will notify you by phone as soon as possible.  Submit refill requests through [employer] or call your pharmacy and they will forward the refill request to us. Please allow 3 business days for your refill to be completed.          Additional Information About Your Visit        MyChart Information     [employer] gives you secure access to your electronic health record. If you see a primary care provider, you can also send messages  to your care team and make appointments. If you have questions, please call your primary care clinic.  If you do not have a primary care provider, please call [phone] and they will assist you.      [employer] is an electronic gateway that provides easy, online access to your medical records. With [employer], you can request a clinic appointment, read your test results, renew a prescription or communicate with your care team.     To access your existing account, please contact your [facility] or call [phone] for assistance.        Care EveryWhere ID     This is your Care EveryWhere ID. This could be used by other organizations to access your [place] medical records  [id number]        Your Vitals Were     Pulse Pulse Oximetry                70 97%           Blood Pressure from Last 3 Encounters:   08/24/17 132/82   08/10/17 140/87   08/02/17 129/81    Weight from Last 3 Encounters:   08/10/17 84.3 kg (185 lb 12.8 oz)   08/02/17 85.7 kg (189 lb)   05/10/17 87.5 kg (193 lb)              Today, you had the following     No orders found for display         Today's Medication Changes          These changes are accurate as of: 8/24/17  9:00 AM.  If you have any questions, ask your nurse or doctor.               Start taking these medicines.        Dose/Directions    naproxen 500 MG tablet   Commonly known as:  NAPROSYN   Used for:  Swelling of left knee joint        Dose:  500 mg   Take 1 tablet (500 mg) by mouth 2 times daily (with meals)   Quantity:  30 tablet   Refills:  0            Where to get your medicines      [facility] [address] [facility] [address] [address] [address]     Phone:  [phone]     naproxen 500 MG tablet                Primary Care Provider Office Phone # Fax #    Jamshid Miller [phone] [address] [address] [facility] [address]        Equal Access  to Services     [facility] : Hadii kayla Stephens, wanadirda luqadaha, qaybta kaalmaveena finley, kathy king. So [facility] [phone].    ATENCIÓN: Si jlla lolis, tiene a rivera disposición servicios gratuitos de asistencia lingüística. Llame al [phone].    We comply with applicable federal civil rights laws and [place] laws. We do not discriminate on the basis of race, color, national origin, age, disability sex, sexual orientation or gender identity.            Thank you!     Thank you for choosing [facility]  for your care. Our goal is always to provide you with excellent care. Hearing back from our patients is one way we can continue to improve our services. Please take a few minutes to complete the written survey that you may receive in the mail after your visit with us. Thank you!             Your Updated Medication List - Protect others around you: Learn how to safely use, store and throw away your medicines at www.disposemymeds.org.          This list is accurate as of: 8/24/17  9:00 AM.  Always use your most recent med list.                   Brand Name Dispense Instructions for use Diagnosis    aspirin 81 MG tablet      Take 1 tablet by mouth daily.        atorvastatin 40 MG tablet    LIPITOR    90 tablet    Take 1 tablet (40 mg) by mouth daily    Dyslipidemia       dutasteride 0.5 MG capsule    AVODART          escitalopram 10 MG tablet    LEXAPRO    90 tablet    Take 1 tablet (10 mg) by mouth every morning    Panic disorder without agoraphobia, CASIMIRO (generalized anxiety disorder)       LORazepam 0.5 MG tablet    ATIVAN    30 tablet    Take 0.5-1 tablets (0.25-0.5 mg) by mouth every 6 hours as needed for anxiety    CASIMIRO (generalized anxiety disorder)       naproxen 500 MG tablet    NAPROSYN    30 tablet    Take 1 tablet (500 mg) by mouth 2 times daily (with meals)    Swelling of left knee joint       NICOTROL 10 MG Inhaler   Generic drug:  nicotine            order for DME      Equipment ordered: RESMED Auto PAP Mask type: Nasal Settings: 6-12 cm H2O        rOPINIRole 0.25 MG tablet    REQUIP    60 tablet    Take one tablet at bedtime for a week and then go up to 2 tablets at bedtime    Restless legs syndrome (RLS)

## 2017-08-24 NOTE — PROGRESS NOTES
HISTORY OF PRESENT ILLNESS  Mr. Marrero is a pleasant 56 year old year old male who presents to clinic today with left knee pain. He's referred by Brianna Stewart PA-C.   Elliot's knee started to bother him about 2 weeks ago with no clear injury. He woke up that morning with pain.  His knee became swollen the next day, prompting him to go to the urgent care facility.  He was given a compression sleeve and Advil. Pain with bending, walking, squatting.  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  Quality: achy pain, sharp    Severity: moderate to severe  Timing: occurs intermittently  Modifying factors:  resting and non-use makes it better, movement and use makes it worse  Associated signs & symptoms: none  Additional history: as documented    MEDICAL HISTORY  Patient Active Problem List   Diagnosis     Dyslipidemia     Mixed anxiety depressive disorder     HYPERLIPIDEMIA LDL GOAL <130     ADHD (attention deficit hyperactivity disorder)     Erectile dysfunction     Tubular adenoma of colon on colonoscopy     High triglycerides     HDL deficiency     IGT (impaired glucose tolerance)     Sensory polyneuropathy     Diverticulosis of colon     Inflamed seborrheic keratosis     10 year risk of MI or stroke 7.5% or greater, 9.3% in Nov 2016 on atorvastatin 40     Venous lake of lip, left lower     Benign non-nodular prostatic hyperplasia with lower urinary tract symptoms     Tobacco abuse     Restless legs syndrome (RLS)     SHY (obstructive sleep apnea) - Severe     Acute pain of right shoulder     Gallbladder polyp, need fu US yearly     Atherosclerosis of abdominal aorta (H), seen on CT       Current Outpatient Prescriptions   Medication Sig Dispense Refill     escitalopram (LEXAPRO) 10 MG tablet Take 1 tablet (10 mg) by mouth every morning 90 tablet 2     rOPINIRole (REQUIP) 0.25 MG tablet Take one tablet at bedtime for a week and then go up to 2 tablets at bedtime 60 tablet 1     LORazepam (ATIVAN)  0.5 MG tablet Take 0.5-1 tablets (0.25-0.5 mg) by mouth every 6 hours as needed for anxiety (Patient not taking: Reported on 8/10/2017) 30 tablet 0     atorvastatin (LIPITOR) 40 MG tablet Take 1 tablet (40 mg) by mouth daily 90 tablet 2     order for DME Equipment ordered: RESMED Auto PAP Mask type: Nasal Settings: 6-12 cm H2O       dutasteride (AVODART) 0.5 MG capsule        NICOTROL 10 MG inhaler   5     aspirin 81 MG tablet Take 1 tablet by mouth daily.         Allergies   Allergen Reactions     Codeine      FELT WIRED ON THIS       Family History   Problem Relation Age of Onset     Arthritis Mother      HEART DISEASE Mother      Lipids Mother      Eye Disorder Mother      GASTROINTESTINAL DISEASE Mother      irritable bowel     Breast Cancer Mother      OSTEOPOROSIS Mother      Lipids Father      HEART DISEASE Father      Psychotic Disorder Father      Depression Father      Eye Disorder Father      C.A.D. Father 56     CABG at age 56     Coronary Artery Disease Father      Hyperlipidemia Father      Depression/Anxiety Father      Mental Illness Father      Arthritis Maternal Grandmother      Alcohol/Drug Maternal Grandfather      Cancer - colorectal Maternal Grandfather      Hypertension Paternal Grandmother      Breast Cancer Paternal Grandmother      HEART DISEASE Paternal Grandfather      Hypertension Paternal Grandfather      Coronary Artery Disease Paternal Grandfather      Hypertension Brother      Depression/Anxiety Brother      Thyroid Disease Sister      Hypertension Brother      Depression/Anxiety Brother      Hypertension Brother      GASTROINTESTINAL DISEASE Brother      Prostate Cancer Brother      Cardiovascular Other      DIABETES Maternal Half-Brother      Thyroid Disease Sister      Thyroid Disease Sister      Other Cancer Other      Lung; Paternal Uncle/Lung; Paternal Uncle     CEREBROVASCULAR DISEASE No family hx of        Additional medical/Social/Surgical histories reviewed in [facility] and  updated as appropriate.     REVIEW OF SYSTEMS (8/24/2017)  10 point ROS of systems including Constitutional, Eyes, Respiratory, Cardiovascular, Gastroenterology, Genitourinary, Integumentary, Musculoskeletal, Psychiatric were all negative except for pertinent positives noted in my HPI.     PHYSICAL EXAM  Vitals:    08/24/17 0810   BP: 132/82   BP Location: Right arm   Patient Position: Chair   Cuff Size: Adult Regular   Pulse: 70   SpO2: 97%     General  - normal appearance, in no obvious distress  CV  - normal popliteal pulse  Pulm  - normal respiratory pattern, non-labored  Musculoskeletal - left knee  - stance: normal gait without limp, no obvious leg length discrepancy  - inspection: trace effusion  - palpation: lateral joint line tenderness, patella and patellar tendon non-tender, normal popliteal pulse  - ROM: 135 degrees flexion, -5 degrees extension  - strength: 5/5 in flexion, 5/5 in extension  - neuro: no sensory or motor deficit  - special tests:  (-) Lachman  (-) anterior drawer  (-) posterior drawer  (-) pivot shift  (+) Gabbi  (-) varus at 0 and 30 degrees flexion  (-) valgus at 0 and 30 degrees flexion  Neuro  - no sensory or motor deficit, grossly normal coordination, normal muscle tone  Skin  - no ecchymosis, erythema, warmth, or induration, no obvious rash  Psych  - interactive, appropriate, normal mood and affect          ASSESSMENT & PLAN  Mr. Marrero is a 56 year old year old male who is in the office today with a left knee effusion.    I reviewed his x-ray in the room with him which appears normal.    I do think it's likely that Elliot has an intra-articular process given his swelling and positional pain.  We had a long discussion centering around pathology and initial management.  If this is a meniscus injury this may get better in the next 2-4 weeks.    I discussed some easy exercises that Elliot can do at home to strengthen his knee.  I do recommend that he continues wearing his knee sleeve.  I  did prescribe him naproxen for longer term anti-inflammatory coverage over the next week to 2 weeks.    Elliot is going to let me know how he is doing in about 2 or 3 weeks.  If no better, or worsening, I will likely order an MRI.    It was a pleasure taking care of Elliot.        Harlan Hall, DO, CAM

## 2017-08-24 NOTE — NURSING NOTE
"Jack Marrero's goals for this visit include: Evaluate and treat left knee pain.   He requests these members of his care team be copied on today's visit information: yes    PCP: Jamshid Miller    Referring Provider:  No referring provider defined for this encounter.    Chief Complaint   Patient presents with     Consult     Knee left     Pain x 2 weeks. No known injury.        Initial /82 (BP Location: Right arm, Patient Position: Chair, Cuff Size: Adult Regular)  Pulse 70  SpO2 97% Estimated body mass index is 24.51 kg/(m^2) as calculated from the following:    Height as of 4/18/17: 1.854 m (6' 1\").    Weight as of 8/10/17: 84.3 kg (185 lb 12.8 oz).  Medication Reconciliation: complete    "

## 2017-08-29 NOTE — PROGRESS NOTES
SUBJECTIVE:   Jack Marrero is a 56 year old male who presents to clinic today for the following health issues:      Restless legs, x a few years. Getting worse          Problem list and histories reviewed & adjusted, as indicated.      Reviewed and updated as needed this visit by clinical staff  Tobacco  Allergies  Meds  Med Hx  Surg Hx  Fam Hx  Soc Hx      Reviewed and updated as needed this visit by Provider       --------------------------------------------------------------------------------------------------------------------------------------    SUBJECTIVE:  Jack Marrero is a 56 year old male who scheduled an appointment to discuss the following issues:  RESTLESS LEG SYNDROME and neuropathy    He was on gabapentin for about 1 year for his neuropathy but it stopped working after a while.   About 6 weeks ago he was started on the requip     He is taking 2 tabs at bedtime now.    During the day(s) if he sits too long or takes a nap he will get the restless legs..  They can get just as bad as they do at night. He never had day time symptom(s) before the switch off of gabapentin to requip    He reports that his toes go numb at night  There is pain in the calves and ankle and bottom of the feet.   His uncle had similar symptom(s) and has PVD.        Past Medical, social, family histories, medications, and allergies reviewed and updated   ROS: other than that noted above all other review of systems was negative    ROS:       Current Outpatient Prescriptions:      rOPINIRole (REQUIP) 1 MG tablet, Take 1 tablet (1 mg) by mouth At Bedtime, Disp: 30 tablet, Rfl: 1     amitriptyline (ELAVIL) 10 MG tablet, Start taking 1 tab at bedtime for 7 day(s). If this dose is effective then continue If this dose is not effective increase by 10 mg weekly until you reach an effective dose or reach 50 mg, Disp: 150 tablet, Rfl: 2     naproxen (NAPROSYN) 500 MG tablet, Take 1 tablet (500 mg) by mouth 2 times daily (with  meals), Disp: 30 tablet, Rfl: 0     escitalopram (LEXAPRO) 10 MG tablet, Take 1 tablet (10 mg) by mouth every morning, Disp: 90 tablet, Rfl: 2     LORazepam (ATIVAN) 0.5 MG tablet, Take 0.5-1 tablets (0.25-0.5 mg) by mouth every 6 hours as needed for anxiety, Disp: 30 tablet, Rfl: 0     atorvastatin (LIPITOR) 40 MG tablet, Take 1 tablet (40 mg) by mouth daily, Disp: 90 tablet, Rfl: 2     order for DME, Equipment ordered: RESMED Auto PAP Mask type: Nasal Settings: 6-12 cm H2O, Disp: , Rfl:      aspirin 81 MG tablet, Take 1 tablet by mouth daily., Disp: , Rfl:      [DISCONTINUED] rOPINIRole (REQUIP) 0.25 MG tablet, Take one tablet at bedtime for a week and then go up to 2 tablets at bedtime, Disp: 60 tablet, Rfl: 1    OBJECTIVE:  /85  Pulse 69  Temp 98.2  F (36.8  C) (Oral)  Wt 188 lb (85.3 kg)  SpO2 98%  BMI 24.8 kg/m2    EXAM:  GENERAL APPEARANCE: healthy, alert and no distress  EYES: EOMI,  PERRL  HENT: ear canals and TM's normal and nose and mouth without ulcers or lesions  RESP: lungs clear to auscultation - no rales, rhonchi or wheezes  CV: regular rates and rhythm, normal S1 S2, no S3 or S4 and no murmur, click or rub -  ABDOMEN:  soft, nontender, no HSM or masses and bowel sounds normal  MS: lower extremities normal- no gross deformities noted, no evidence of inflammation in joints, FROM in all extremities. Normal DP pulse of +2/2 bilateral(ly) cap refill was less than 2 seconds      Results for orders placed or performed in visit on 08/30/17   Hemoglobin A1c   Result Value Ref Range    Hemoglobin A1C 5.7 4.3 - 6.0 %         ASSESSMENT/PLAN:    (G25.81) Restless legs syndrome (RLS)  (primary encounter diagnosis)  Comment:   Plan: rOPINIRole (REQUIP) 1 MG tablet        We will increase the dose from 0.5 mg  Check an iron level    (G60.8) Sensory polyneuropathy    Plan:  amitriptyline (ELAVIL) 10 MG tablet in titrating doses, Hemoglobin A1c          Follow up in 6 weeks on both    Will recheck blood  pressure at the next visit

## 2017-08-30 ENCOUNTER — OFFICE VISIT (OUTPATIENT)
Dept: FAMILY MEDICINE | Facility: CLINIC | Age: 57
End: 2017-08-30
Payer: COMMERCIAL

## 2017-08-30 VITALS
TEMPERATURE: 98.2 F | OXYGEN SATURATION: 98 % | SYSTOLIC BLOOD PRESSURE: 144 MMHG | DIASTOLIC BLOOD PRESSURE: 85 MMHG | HEART RATE: 69 BPM | WEIGHT: 188 LBS | BODY MASS INDEX: 24.8 KG/M2

## 2017-08-30 DIAGNOSIS — G25.81 RESTLESS LEGS SYNDROME (RLS): Primary | ICD-10-CM

## 2017-08-30 DIAGNOSIS — G25.81 RESTLESS LEGS SYNDROME (RLS): ICD-10-CM

## 2017-08-30 DIAGNOSIS — G60.8 SENSORY POLYNEUROPATHY: ICD-10-CM

## 2017-08-30 LAB
HBA1C MFR BLD: 5.7 % (ref 4.3–6)
IRON SATN MFR SERPL: 32 % (ref 15–46)
IRON SERPL-MCNC: 100 UG/DL (ref 35–180)
TIBC SERPL-MCNC: 313 UG/DL (ref 240–430)

## 2017-08-30 PROCEDURE — 36415 COLL VENOUS BLD VENIPUNCTURE: CPT | Performed by: FAMILY MEDICINE

## 2017-08-30 PROCEDURE — 99214 OFFICE O/P EST MOD 30 MIN: CPT | Performed by: FAMILY MEDICINE

## 2017-08-30 PROCEDURE — 83550 IRON BINDING TEST: CPT | Performed by: FAMILY MEDICINE

## 2017-08-30 PROCEDURE — 83036 HEMOGLOBIN GLYCOSYLATED A1C: CPT | Performed by: FAMILY MEDICINE

## 2017-08-30 PROCEDURE — 83540 ASSAY OF IRON: CPT | Performed by: FAMILY MEDICINE

## 2017-08-30 RX ORDER — AMITRIPTYLINE HYDROCHLORIDE 10 MG/1
TABLET ORAL
Qty: 150 TABLET | Refills: 2 | Status: SHIPPED | OUTPATIENT
Start: 2017-08-30 | End: 2017-10-11

## 2017-08-30 RX ORDER — ROPINIROLE 1 MG/1
1 TABLET, FILM COATED ORAL AT BEDTIME
Qty: 30 TABLET | Refills: 1 | Status: SHIPPED | OUTPATIENT
Start: 2017-08-30 | End: 2017-12-15

## 2017-08-30 NOTE — NURSING NOTE
"Chief Complaint   Patient presents with     restless legs       Initial /85  Pulse 69  Temp 98.2  F (36.8  C) (Oral)  Wt 188 lb (85.3 kg)  SpO2 98%  BMI 24.8 kg/m2 Estimated body mass index is 24.8 kg/(m^2) as calculated from the following:    Height as of 4/18/17: 6' 1\" (1.854 m).    Weight as of this encounter: 188 lb (85.3 kg).  Medication Reconciliation: complete     Yesenia Kearns cma      "

## 2017-08-30 NOTE — MR AVS SNAPSHOT
After Visit Summary   8/30/2017    Jack Marrero    MRN: [id number]           Patient Information     Date Of Birth          1960        Visit Information        Provider Department      8/30/2017 11:30 AM Jamshid Miller MD [facility]        Today's Diagnoses     Restless legs syndrome (RLS)    -  1    Sensory polyneuropathy           Follow-ups after your visit        Follow-up notes from your care team     Return in about 6 weeks (around 10/11/2017) for recheck on pain RLS and neuropathy.      Who to contact     If you have questions or need follow up information about today's clinic visit or your schedule please contact [facility] directly at [phone].  Normal or non-critical lab and imaging results will be communicated to you by MyChart, letter or phone within 4 business days after the clinic has received the results. If you do not hear from us within 7 days, please contact the clinic through [facility] [employer] or phone. If you have a critical or abnormal lab result, we will notify you by phone as soon as possible.  Submit refill requests through [employer] or call your pharmacy and they will forward the refill request to us. Please allow 3 business days for your refill to be completed.          Additional Information About Your Visit        MyChart Information     [employer] gives you secure access to your electronic health record. If you see a primary care provider, you can also send messages to your care team and make appointments. If you have questions, please call your primary care clinic.  If you do not have a primary care provider, please call [phone] and they will assist you.        Care EveryWhere ID     This is your Care EveryWhere ID. This could be used by other organizations to access your [facility] medical records  [id number]        Your Vitals Were     Pulse Temperature Pulse Oximetry BMI (Body Mass Index)          69 98.2  F (36.8  C) (Oral) 98% 24.8  kg/m2         Blood Pressure from Last 3 Encounters:   08/30/17 144/85   08/24/17 132/82   08/10/17 140/87    Weight from Last 3 Encounters:   08/30/17 188 lb (85.3 kg)   08/10/17 185 lb 12.8 oz (84.3 kg)   08/02/17 189 lb (85.7 kg)              We Performed the Following     Iron and iron binding capacity          Today's Medication Changes          These changes are accurate as of: 8/30/17 11:50 AM.  If you have any questions, ask your nurse or doctor.               Start taking these medicines.        Dose/Directions    amitriptyline 10 MG tablet   Commonly known as:  ELAVIL   Used for:  Sensory polyneuropathy   Started by:  Jamshid Miller MD        Start taking 1 tab at bedtime for 7 day(s). If this dose is effective then continue If this dose is not effective increase by 10 mg weekly until you reach an effective dose or reach 50 mg   Quantity:  150 tablet   Refills:  2         These medicines have changed or have updated prescriptions.        Dose/Directions    rOPINIRole 1 MG tablet   Commonly known as:  REQUIP   This may have changed:    - medication strength  - how much to take  - how to take this  - when to take this  - additional instructions   Used for:  Restless legs syndrome (RLS)   Changed by:  Jamshid Miller MD        Dose:  1 mg   Take 1 tablet (1 mg) by mouth At Bedtime   Quantity:  30 tablet   Refills:  1         Stop taking these medicines if you haven't already. Please contact your care team if you have questions.     dutasteride 0.5 MG capsule   Commonly known as:  AVODART   Stopped by:  Jamshid Miller MD           NICOTROL 10 MG Inhaler   Generic drug:  nicotine   Stopped by:  Jamshid Miller MD                Where to get your medicines      These medications were sent to [facility] [address] [place], [place] - [address] [address]  [address] [address] [address]     Phone:  [phone]     rOPINIRole 1 MG tablet         Some of these will need a paper  prescription and others can be bought over the counter.  Ask your nurse if you have questions.     Bring a paper prescription for each of these medications     amitriptyline 10 MG tablet                Primary Care Provider Office Phone # Fax #    Jamshid Miller [phone] [address] [facility] [address]        Equal Access to Services     [facility] : Hadii aad ku hadasho Soomaali, waaxda luqadaha, qaybta kaalmada adeegyada, waxay asmitain hayaan ademadalyn khprashantbenjamín ramesh . So [facility] [phone].    ATENCIÓN: Si habla español, tiene a rivera disposición servicios gratuitos de asistencia lingüística. Ll[facility] [phone].    We comply with applicable federal civil rights laws and [place] laws. We do not discriminate on the basis of race, color, national origin, age, disability sex, sexual orientation or gender identity.            Thank you!     Thank you for choosing [facility]  for your care. Our goal is always to provide you with excellent care. Hearing back from our patients is one way we can continue to improve our services. Please take a few minutes to complete the written survey that you may receive in the mail after your visit with us. Thank you!             Your Updated Medication List - Protect others around you: Learn how to safely use, store and throw away your medicines at www.disposemymeds.org.          This list is accurate as of: 8/30/17 11:50 AM.  Always use your most recent med list.                   Brand Name Dispense Instructions for use Diagnosis    amitriptyline 10 MG tablet    ELAVIL    150 tablet    Start taking 1 tab at bedtime for 7 day(s). If this dose is effective then continue If this dose is not effective increase by 10 mg weekly until you reach an effective dose or reach 50 mg    Sensory polyneuropathy       aspirin 81 MG tablet      Take 1 tablet by mouth daily.        atorvastatin 40 MG tablet    LIPITOR    90 tablet    Take 1 tablet (40 mg)  by mouth daily    Dyslipidemia       escitalopram 10 MG tablet    LEXAPRO    90 tablet    Take 1 tablet (10 mg) by mouth every morning    Panic disorder without agoraphobia, CASIMIRO (generalized anxiety disorder)       LORazepam 0.5 MG tablet    ATIVAN    30 tablet    Take 0.5-1 tablets (0.25-0.5 mg) by mouth every 6 hours as needed for anxiety    CASIMIRO (generalized anxiety disorder)       naproxen 500 MG tablet    NAPROSYN    30 tablet    Take 1 tablet (500 mg) by mouth 2 times daily (with meals)    Swelling of left knee joint       order for DME      Equipment ordered: RESMED Auto PAP Mask type: Nasal Settings: 6-12 cm H2O        rOPINIRole 1 MG tablet    REQUIP    30 tablet    Take 1 tablet (1 mg) by mouth At Bedtime    Restless legs syndrome (RLS)

## 2017-09-01 RX ORDER — ROPINIROLE 1 MG/1
TABLET, FILM COATED ORAL
Qty: 90 TABLET | Refills: 1 | OUTPATIENT
Start: 2017-09-01

## 2017-09-01 NOTE — PROGRESS NOTES
Jack,  I have reviewed the results of the laboratory tests that we recently ordered. All of the lab work performed was normal or considered normal for you.  Sincerely,   Jamshid Miller

## 2017-10-05 ENCOUNTER — OFFICE VISIT (OUTPATIENT)
Dept: FAMILY MEDICINE | Facility: CLINIC | Age: 57
End: 2017-10-05
Payer: COMMERCIAL

## 2017-10-05 VITALS
SYSTOLIC BLOOD PRESSURE: 137 MMHG | DIASTOLIC BLOOD PRESSURE: 84 MMHG | TEMPERATURE: 99.3 F | HEART RATE: 68 BPM | OXYGEN SATURATION: 95 % | BODY MASS INDEX: 24.91 KG/M2 | WEIGHT: 188.8 LBS

## 2017-10-05 DIAGNOSIS — J01.90 ACUTE SINUSITIS WITH SYMPTOMS > 10 DAYS: Primary | ICD-10-CM

## 2017-10-05 DIAGNOSIS — R07.0 THROAT PAIN: ICD-10-CM

## 2017-10-05 DIAGNOSIS — Z23 NEED FOR PROPHYLACTIC VACCINATION AND INOCULATION AGAINST INFLUENZA: ICD-10-CM

## 2017-10-05 LAB
DEPRECATED S PYO AG THROAT QL EIA: NORMAL
SPECIMEN SOURCE: NORMAL

## 2017-10-05 PROCEDURE — 87081 CULTURE SCREEN ONLY: CPT | Performed by: PHYSICIAN ASSISTANT

## 2017-10-05 PROCEDURE — 99213 OFFICE O/P EST LOW 20 MIN: CPT | Performed by: PHYSICIAN ASSISTANT

## 2017-10-05 PROCEDURE — 87880 STREP A ASSAY W/OPTIC: CPT | Performed by: PHYSICIAN ASSISTANT

## 2017-10-05 RX ORDER — GUAIFENESIN AND DEXTROMETHORPHAN HYDROBROMIDE 1200; 60 MG/1; MG/1
1 TABLET, EXTENDED RELEASE ORAL 2 TIMES DAILY
Qty: 28 TABLET | Refills: 0 | Status: SHIPPED | OUTPATIENT
Start: 2017-10-05 | End: 2017-12-15

## 2017-10-05 NOTE — NURSING NOTE
"Chief Complaint   Patient presents with     URI     started Thursday       Initial /84 (BP Location: Left arm, Patient Position: Chair, Cuff Size: Adult Regular)  Pulse 68  Temp 99.3  F (37.4  C) (Oral)  Wt 188 lb 12.8 oz (85.6 kg)  SpO2 95%  BMI 24.91 kg/m2 Estimated body mass index is 24.91 kg/(m^2) as calculated from the following:    Height as of 4/18/17: 6' 1\" (1.854 m).    Weight as of this encounter: 188 lb 12.8 oz (85.6 kg).  Medication Reconciliation: complete   Cynthia Boston MA        "

## 2017-10-05 NOTE — PATIENT INSTRUCTIONS
Augmentin 875 mg, 1 tablet twice a day for 10 days, take probiotic over the counter while on antibiotic   Increase fluids  Nasal wash  Mucinex DM 1 tab twice a day   Follow up if not better after the antibiotic course.     Sinusitis (Antibiotic Treatment)    The sinuses are air-filled spaces within the bones of the face. They connect to the inside of the nose. Sinusitis is an inflammation of the tissue lining the sinus cavity. Sinus inflammation can occur during a cold. It can also be due to allergies to pollens and other particles in the air. Sinusitis can cause symptoms of sinus congestion and fullness. A sinus infection causes fever, headache and facial pain. There is often green or yellow drainage from the nose or into the back of the throat (post-nasal drip). You have been given antibiotics to treat this condition.  Home care:    Take the full course of antibiotics as instructed. Do not stop taking them, even if you feel better.    Drink plenty of water, hot tea, and other liquids. This may help thin mucus. It also may promote sinus drainage.    Heat may help soothe painful areas of the face. Use a towel soaked in hot water. Or,  the shower and direct the hot spray onto your face. Using a vaporizer along with a menthol rub at night may also help.     An expectorant containing guaifenesin may help thin the mucus and promote drainage from the sinuses.    Over-the-counter decongestants may be used unless a similar medicine was prescribed. Nasal sprays work the fastest. Use one that contains phenylephrine or oxymetazoline. First blow the nose gently. Then use the spray. Do not use these medicines more often than directed on the label or symptoms may get worse. You may also use tablets containing pseudoephedrine. Avoid products that combine ingredients, because side effects may be increased. Read labels. You can also ask the pharmacist for help. (NOTE: Persons with high blood pressure should not use  decongestants. They can raise blood pressure.)    Over-the-counter antihistamines may help if allergies contributed to your sinusitis.      Do not use nasal rinses or irrigation during an acute sinus infection, unless told to by your health care provider. Rinsing may spread the infection to other sinuses.    Use acetaminophen or ibuprofen to control pain, unless another pain medicine was prescribed. (If you have chronic liver or kidney disease or ever had a stomach ulcer, talk with your doctor before using these medicines. Aspirin should never be used in anyone under 18 years of age who is ill with a fever. It may cause severe liver damage.)    Don't smoke. This can worsen symptoms.  Follow-up care  Follow up with your healthcare provider or our staff if you are not improving within the next week.  When to seek medical advice  Call your healthcare provider if any of these occur:    Facial pain or headache becoming more severe    Stiff neck    Unusual drowsiness or confusion    Swelling of the forehead or eyelids    Vision problems, including blurred or double vision    Fever of 100.4 F (38 C) or higher, or as directed by your healthcare provider    Seizure    Breathing problems    Symptoms not resolving within 10 days  Date Last Reviewed: 4/13/2015 2000-2017 [employer]. [address] [place] [address], [place] [address]. All rights reserved. This information is not intended as a substitute for professional medical care. Always follow your healthcare professional's instructions.

## 2017-10-05 NOTE — MR AVS SNAPSHOT
After Visit Summary   10/5/2017    Jack Marrero    MRN: [id number]           Patient Information     Date Of Birth          1960        Visit Information        Provider Department      10/5/2017 10:20 AM Nidhi Donato PA-C [facility]        Today's Diagnoses     Acute sinusitis with symptoms > 10 days    -  1    Need for prophylactic vaccination and inoculation against influenza        Throat pain          Care Instructions    Augmentin 875 mg, 1 tablet twice a day for 10 days, take probiotic over the counter while on antibiotic   Increase fluids  Nasal wash  Mucinex DM 1 tab twice a day   Follow up if not better after the antibiotic course.     Sinusitis (Antibiotic Treatment)    The sinuses are air-filled spaces within the bones of the face. They connect to the inside of the nose. Sinusitis is an inflammation of the tissue lining the sinus cavity. Sinus inflammation can occur during a cold. It can also be due to allergies to pollens and other particles in the air. Sinusitis can cause symptoms of sinus congestion and fullness. A sinus infection causes fever, headache and facial pain. There is often green or yellow drainage from the nose or into the back of the throat (post-nasal drip). You have been given antibiotics to treat this condition.  Home care:    Take the full course of antibiotics as instructed. Do not stop taking them, even if you feel better.    Drink plenty of water, hot tea, and other liquids. This may help thin mucus. It also may promote sinus drainage.    Heat may help soothe painful areas of the face. Use a towel soaked in hot water. Or,  the shower and direct the hot spray onto your face. Using a vaporizer along with a menthol rub at night may also help.     An expectorant containing guaifenesin may help thin the mucus and promote drainage from the sinuses.    Over-the-counter decongestants may be used unless a similar medicine  was prescribed. Nasal sprays work the fastest. Use one that contains phenylephrine or oxymetazoline. First blow the nose gently. Then use the spray. Do not use these medicines more often than directed on the label or symptoms may get worse. You may also use tablets containing pseudoephedrine. Avoid products that combine ingredients, because side effects may be increased. Read labels. You can also ask the pharmacist for help. (NOTE: Persons with high blood pressure should not use decongestants. They can raise blood pressure.)    Over-the-counter antihistamines may help if allergies contributed to your sinusitis.      Do not use nasal rinses or irrigation during an acute sinus infection, unless told to by your health care provider. Rinsing may spread the infection to other sinuses.    Use acetaminophen or ibuprofen to control pain, unless another pain medicine was prescribed. (If you have chronic liver or kidney disease or ever had a stomach ulcer, talk with your doctor before using these medicines. Aspirin should never be used in anyone under 18 years of age who is ill with a fever. It may cause severe liver damage.)    Don't smoke. This can worsen symptoms.  Follow-up care  Follow up with your healthcare provider or our staff if you are not improving within the next week.  When to seek medical advice  Call your healthcare provider if any of these occur:    Facial pain or headache becoming more severe    Stiff neck    Unusual drowsiness or confusion    Swelling of the forehead or eyelids    Vision problems, including blurred or double vision    Fever of 100.4 F (38 C) or higher, or as directed by your healthcare provider    Seizure    Breathing problems    Symptoms not resolving within 10 days  Date Last Reviewed: 4/13/2015 2000-2017 [employer]. [address] [place] [address], [place] [address]. All rights reserved. This information is not intended as a substitute for professional medical care. Always  follow your healthcare professional's instructions.                Follow-ups after your visit        Your next 10 appointments already scheduled     Oct 11, 2017 10:00 AM CDT   SHORT with Jamshid Miller MD   [facility] ([facility])    [address] [facility] [address] [phone]              Who to contact     If you have questions or need follow up information about today's clinic visit or your schedule please contact [facility] JARED ARIAS directly at [phone].  Normal or non-critical lab and imaging results will be communicated to you by [employer], letter or phone within 4 business days after the clinic has received the results. If you do not hear from us within 7 days, please contact the clinic through [employer] or phone. If you have a critical or abnormal lab result, we will notify you by phone as soon as possible.  Submit refill requests through [employer] or call your pharmacy and they will forward the refill request to us. Please allow 3 business days for your refill to be completed.          Additional Information About Your Visit        [employer] Information     [employer] gives you secure access to your electronic health record. If you see a primary care provider, you can also send messages to your care team and make appointments. If you have questions, please call your primary care clinic.  If you do not have a primary care provider, please call [phone] and they will assist you.        Care EveryWhere ID     This is your Care EveryWhere ID. This could be used by other organizations to access your [place] medical records  [id number]        Your Vitals Were     Pulse Temperature Pulse Oximetry BMI (Body Mass Index)          68 99.3  F (37.4  C) (Oral) 95% 24.91 kg/m2         Blood Pressure from Last 3 Encounters:   10/05/17 137/84   08/30/17 144/85   08/24/17 132/82    Weight from Last 3 Encounters:   10/05/17 188 lb 12.8 oz (85.6 kg)   08/30/17 188 lb  (85.3 kg)   08/10/17 185 lb 12.8 oz (84.3 kg)              We Performed the Following     Beta strep group A culture     Strep, Rapid Screen          Today's Medication Changes          These changes are accurate as of: 10/5/17 10:47 AM.  If you have any questions, ask your nurse or doctor.               Start taking these medicines.        Dose/Directions    amoxicillin-clavulanate 875-125 MG per tablet   Commonly known as:  AUGMENTIN   Used for:  Acute sinusitis with symptoms > 10 days   Started by:  Nidhi Donato PA-C        Dose:  1 tablet   Take 1 tablet by mouth 2 times daily for 10 days   Quantity:  20 tablet   Refills:  0       Dextromethorphan-Guaifenesin  MG Tb12   Used for:  Acute sinusitis with symptoms > 10 days   Started by:  Nidhi Donato PA-C        Dose:  1 tablet   Take 1 tablet by mouth 2 times daily   Quantity:  28 tablet   Refills:  0            Where to get your medicines      These medications were sent to [facility] [id number] [place] [address]     Phone:  [phone]     amoxicillin-clavulanate 875-125 MG per tablet    Dextromethorphan-Guaifenesin  MG Tb12                Primary Care Provider Office Phone # Fax #    Jamshid Miller [phone] [address] [facility] [address]        Equal Access to Services     [facility]: Hadii kayla ku hadasho Soomaali, waaxda luqadaha, qaybta kaalmada adeegyada, kathy cooper ademadalyn king. So [facility] [place] [phone].    ATENCIÓN: Si habla español, tiene a rivera disposición servicios gratuitos de asistencia lingüística. Pierre al [phone].    We comply with applicable federal civil rights laws and [place] laws. We do not discriminate on the basis of race, color, national origin, age, disability, sex, sexual orientation, or gender identity.            Thank you!     Thank you for choosing  [facility]  for your care. Our goal is always to provide you with excellent care. Hearing back from our patients is one way we can continue to improve our services. Please take a few minutes to complete the written survey that you may receive in the mail after your visit with us. Thank you!             Your Updated Medication List - Protect others around you: Learn how to safely use, store and throw away your medicines at www.disposemymeds.org.          This list is accurate as of: 10/5/17 10:47 AM.  Always use your most recent med list.                   Brand Name Dispense Instructions for use Diagnosis    amitriptyline 10 MG tablet    ELAVIL    150 tablet    Start taking 1 tab at bedtime for 7 day(s). If this dose is effective then continue If this dose is not effective increase by 10 mg weekly until you reach an effective dose or reach 50 mg    Sensory polyneuropathy       amoxicillin-clavulanate 875-125 MG per tablet    AUGMENTIN    20 tablet    Take 1 tablet by mouth 2 times daily for 10 days    Acute sinusitis with symptoms > 10 days       aspirin 81 MG tablet      Take 1 tablet by mouth daily.        atorvastatin 40 MG tablet    LIPITOR    90 tablet    Take 1 tablet (40 mg) by mouth daily    Dyslipidemia       Dextromethorphan-Guaifenesin  MG Tb12     28 tablet    Take 1 tablet by mouth 2 times daily    Acute sinusitis with symptoms > 10 days       escitalopram 10 MG tablet    LEXAPRO    90 tablet    Take 1 tablet (10 mg) by mouth every morning    Panic disorder without agoraphobia, CASIMIRO (generalized anxiety disorder)       LORazepam 0.5 MG tablet    ATIVAN    30 tablet    Take 0.5-1 tablets (0.25-0.5 mg) by mouth every 6 hours as needed for anxiety    CASIMIRO (generalized anxiety disorder)       naproxen 500 MG tablet    NAPROSYN    30 tablet    Take 1 tablet (500 mg) by mouth 2 times daily (with meals)    Swelling of left knee joint       order for DME      Equipment ordered: Exploredge  Auto PAP Mask type: Nasal Settings: 6-12 cm H2O        rOPINIRole 1 MG tablet    REQUIP    30 tablet    Take 1 tablet (1 mg) by mouth At Bedtime    Restless legs syndrome (RLS)

## 2017-10-05 NOTE — PROGRESS NOTES
SUBJECTIVE:   Jack Marrero is a 57 year old male who presents to clinic today for the following health issues:      Acute Illness   Acute illness concerns: cough/Sore throat  Onset: 7-10 days ago    Fever: no    Chills/Sweats: no    Headache (location?): no    Sinus Pressure:no    Conjunctivitis:  no    Ear Pain: no    Rhinorrhea: no    Congestion: YES    Sore Throat: YES     Cough: YES-productive of yellow sputum    Wheeze: YES    Decreased Appetite: YES    Nausea: no    Vomiting: no    Diarrhea:  no    Dysuria/Freq.: no    Fatigue/Achiness: YES- body achiness    Sick/Strep Exposure: Yes     Therapies Tried and outcome: OTC med        Problem list and histories reviewed & adjusted, as indicated.  Additional history: as documented    Patient Active Problem List   Diagnosis     Dyslipidemia     Mixed anxiety depressive disorder     HYPERLIPIDEMIA LDL GOAL <130     ADHD (attention deficit hyperactivity disorder)     Erectile dysfunction     Tubular adenoma of colon on colonoscopy     High triglycerides     HDL deficiency     IGT (impaired glucose tolerance)     Sensory polyneuropathy     Diverticulosis of colon     Inflamed seborrheic keratosis     10 year risk of MI or stroke 7.5% or greater, 9.3% in Nov 2016 on atorvastatin 40     Venous lake of lip, left lower     Benign non-nodular prostatic hyperplasia with lower urinary tract symptoms     Tobacco abuse     Restless legs syndrome (RLS)     SHY (obstructive sleep apnea) - Severe     Acute pain of right shoulder     Gallbladder polyp, need fu US yearly     Atherosclerosis of abdominal aorta (H), seen on CT     Past Surgical History:   Procedure Laterality Date     COLONOSCOPY       COLONOSCOPY WITH CO2 INSUFFLATION N/A 1/11/2016    Procedure: COLONOSCOPY WITH CO2 INSUFFLATION;  Surgeon: Nilson Gale MD;  Location: [facility] OR     HC EXCISION SKIN OF NAIL FOLD  1993    BL GREAT TOE     HC TOOTH EXTRACTION W/FORCEP  1991     ALL 4 WISDOM TEETH EXTRACTED      HERNIORRHAPHY INGUINAL Left 3/11/2015    Procedure: HERNIORRHAPHY INGUINAL;  Surgeon: Nilson Gale MD;  Location: [facility] OR     TONSILLECTOMY & ADENOIDECTOMY         Social History   Substance Use Topics     Smoking status: Current Every Day Smoker     Packs/day: 0.50     Years: 40.00     Types: Pipe     Start date: 10/10/1975     Last attempt to quit: 2/19/2014     Smokeless tobacco: Never Used     Alcohol use Yes      Comment: Occasional; about 2 beers/week     Family History   Problem Relation Age of Onset     Arthritis Mother      HEART DISEASE Mother      Lipids Mother      Eye Disorder Mother      GASTROINTESTINAL DISEASE Mother      irritable bowel     Breast Cancer Mother      OSTEOPOROSIS Mother      Lipids Father      HEART DISEASE Father      Psychotic Disorder Father      Depression Father      Eye Disorder Father      C.A.D. Father 56     CABG at age 56     Coronary Artery Disease Father      Hyperlipidemia Father      Depression/Anxiety Father      Mental Illness Father      Arthritis Maternal Grandmother      Alcohol/Drug Maternal Grandfather      Cancer - colorectal Maternal Grandfather      Hypertension Paternal Grandmother      Breast Cancer Paternal Grandmother      HEART DISEASE Paternal Grandfather      Hypertension Paternal Grandfather      Coronary Artery Disease Paternal Grandfather      Hypertension Brother      Depression/Anxiety Brother      Thyroid Disease Sister      Hypertension Brother      Depression/Anxiety Brother      Hypertension Brother      GASTROINTESTINAL DISEASE Brother      Prostate Cancer Brother      Cardiovascular Other      DIABETES Maternal Half-Brother      Thyroid Disease Sister      Thyroid Disease Sister      Other Cancer Other      Lung; Paternal Uncle/Lung; Paternal Uncle     CEREBROVASCULAR DISEASE No family hx of          Current Outpatient Prescriptions   Medication Sig Dispense Refill     amoxicillin-clavulanate (AUGMENTIN) 875-125 MG per tablet Take 1  tablet by mouth 2 times daily for 10 days 20 tablet 0     rOPINIRole (REQUIP) 1 MG tablet Take 1 tablet (1 mg) by mouth At Bedtime 30 tablet 1     amitriptyline (ELAVIL) 10 MG tablet Start taking 1 tab at bedtime for 7 day(s). If this dose is effective then continue  If this dose is not effective increase by 10 mg weekly until you reach an effective dose or reach 50 mg 150 tablet 2     naproxen (NAPROSYN) 500 MG tablet Take 1 tablet (500 mg) by mouth 2 times daily (with meals) 30 tablet 0     escitalopram (LEXAPRO) 10 MG tablet Take 1 tablet (10 mg) by mouth every morning 90 tablet 2     LORazepam (ATIVAN) 0.5 MG tablet Take 0.5-1 tablets (0.25-0.5 mg) by mouth every 6 hours as needed for anxiety 30 tablet 0     atorvastatin (LIPITOR) 40 MG tablet Take 1 tablet (40 mg) by mouth daily 90 tablet 2     order for DME Equipment ordered: RESMED Auto PAP Mask type: Nasal Settings: 6-12 cm H2O       aspirin 81 MG tablet Take 1 tablet by mouth daily.       Allergies   Allergen Reactions     Codeine      FELT WIRED ON THIS         Reviewed and updated as needed this visit by clinical staff     Reviewed and updated as needed this visit by Provider         ROS:  Constitutional, HEENT, cardiovascular, pulmonary, gi and gu systems are negative, except as otherwise noted.      OBJECTIVE:   /84 (BP Location: Left arm, Patient Position: Chair, Cuff Size: Adult Regular)  Pulse 68  Temp 99.3  F (37.4  C) (Oral)  Wt 188 lb 12.8 oz (85.6 kg)  SpO2 95%  BMI 24.91 kg/m2  Body mass index is 24.91 kg/(m^2).  GENERAL: healthy, alert and no distress  EYES: Eyes grossly normal to inspection, PERRL and conjunctivae and sclerae normal  HENT: normal cephalic/atraumatic, right ear: normal: no effusions, no erythema, normal landmarks, left ear: clear effusion, nasal mucosa edematous , oropharynx clear, oral mucous membranes moist and sinuses: maxillary tenderness on bilateral  NECK: no adenopathy, no asymmetry, masses, or scars and  thyroid normal to palpation  RESP: lungs clear to auscultation - no rales, rhonchi or wheezes  CV: regular rate and rhythm, normal S1 S2, no S3 or S4, no murmur, click or rub, no peripheral edema and peripheral pulses strong  ABDOMEN: soft, nontender, no hepatosplenomegaly, no masses and bowel sounds normal  MS: no gross musculoskeletal defects noted, no edema    Diagnostic Test Results:  Results for orders placed or performed in visit on 10/05/17 (from the past 24 hour(s))   Strep, Rapid Screen   Result Value Ref Range    Specimen Description Throat     Rapid Strep A Screen       NEGATIVE: No Group A streptococcal antigen detected by immunoassay, await culture report.       ASSESSMENT/PLAN:       ICD-10-CM    1. Acute sinusitis with symptoms > 10 days J01.90 amoxicillin-clavulanate (AUGMENTIN) 875-125 MG per tablet     Dextromethorphan-Guaifenesin  MG TB12   2. Need for prophylactic vaccination and inoculation against influenza Z23    3. Throat pain R07.0 Strep, Rapid Screen     Beta strep group A culture     Augmentin 875 mg, 1 tablet twice a day for 10 days, take probiotic over the counter while on antibiotic plus 2 weeks after. Stop antibiotic if get diarrhea  Increase fluids  Nasal wash  Mucinex DM 1 tab twice a day   Follow up if not better after the antibiotic course.       Nidhi Donato PA-C  [facility]

## 2017-10-06 LAB
BACTERIA SPEC CULT: NORMAL
SPECIMEN SOURCE: NORMAL

## 2017-10-11 ENCOUNTER — OFFICE VISIT (OUTPATIENT)
Dept: FAMILY MEDICINE | Facility: CLINIC | Age: 57
End: 2017-10-11
Payer: COMMERCIAL

## 2017-10-11 VITALS
BODY MASS INDEX: 24.67 KG/M2 | SYSTOLIC BLOOD PRESSURE: 134 MMHG | TEMPERATURE: 97.7 F | OXYGEN SATURATION: 98 % | WEIGHT: 187 LBS | DIASTOLIC BLOOD PRESSURE: 82 MMHG | HEART RATE: 72 BPM

## 2017-10-11 DIAGNOSIS — G60.8 SENSORY POLYNEUROPATHY: ICD-10-CM

## 2017-10-11 DIAGNOSIS — G25.81 RESTLESS LEGS SYNDROME (RLS): Primary | ICD-10-CM

## 2017-10-11 DIAGNOSIS — Z23 NEED FOR PROPHYLACTIC VACCINATION AND INOCULATION AGAINST INFLUENZA: ICD-10-CM

## 2017-10-11 PROCEDURE — 90471 IMMUNIZATION ADMIN: CPT | Performed by: FAMILY MEDICINE

## 2017-10-11 PROCEDURE — 99213 OFFICE O/P EST LOW 20 MIN: CPT | Mod: 25 | Performed by: FAMILY MEDICINE

## 2017-10-11 PROCEDURE — 90686 IIV4 VACC NO PRSV 0.5 ML IM: CPT | Performed by: FAMILY MEDICINE

## 2017-10-11 RX ORDER — AMITRIPTYLINE HYDROCHLORIDE 10 MG/1
TABLET ORAL
Qty: 150 TABLET | Refills: 0 | Status: SHIPPED | OUTPATIENT
Start: 2017-10-11 | End: 2017-12-15

## 2017-10-11 NOTE — PATIENT INSTRUCTIONS
Start taking the amitriptyline as soon as possible.   As started on the prescription(s) you will experiment to find the right dose for you. If you find a dose that works well and is tolerable please let me know that via a [employer] message. If this is not helpful let me know that and I will most likely have you follow up with Dr Shore or  we can try a higher dose of the requip

## 2017-10-11 NOTE — NURSING NOTE
"Chief Complaint   Patient presents with     restless legs     follow up, getting worse     Flu Shot       Initial /82  Pulse 72  Temp 97.7  F (36.5  C) (Oral)  Wt 187 lb (84.8 kg)  SpO2 98%  BMI 24.67 kg/m2 Estimated body mass index is 24.67 kg/(m^2) as calculated from the following:    Height as of 4/18/17: 6' 1\" (1.854 m).    Weight as of this encounter: 187 lb (84.8 kg).  Medication Reconciliation: complete     Yesenia Kearns cma      "

## 2017-10-11 NOTE — PROGRESS NOTES
SUBJECTIVE:  Jack Marrero is a 57 year old male who scheduled an appointment to discuss the following issues:    About 6 weeks ago he was taken off of the gabapentin due to side effects. He was started on requip 0.5. He was given a prescription(s) for amitriptyline but he did not fill it.     He was tried on the 1 mg of requip at bedtime and there has been no improvement. Infact the day time symptom(s) are somewhat worse and the bedtime symptom(s) are no better,.         He reports that the symptom(s) can start in the early afternoon.    He reports that as he is laying in bed trying to fall asleep he gets the cramping in his legs and a strong desire to move his legs.   He gets up often and stretches     It is taking about 10-15 minute(s) to fall asleep  If he gets woken up it takes 20-30 minute(s) to fall back asleep.     He has not filled the amitriptyline yet.   The toes and feet are bothersome every night     He denies any side effects from the higher dose of the requip      Any additional relevant history includes: I review(ed) his evaluation with Dr Shore and his evaluation with the sleep clinician      Past Medical, social, family histories, medications, and allergies reviewed and updated   ROS: other than that noted above all other review of systems was negative    ROS:       Current Outpatient Prescriptions:      amoxicillin-clavulanate (AUGMENTIN) 875-125 MG per tablet, Take 1 tablet by mouth 2 times daily for 10 days, Disp: 20 tablet, Rfl: 0     Dextromethorphan-Guaifenesin  MG TB12, Take 1 tablet by mouth 2 times daily, Disp: 28 tablet, Rfl: 0     rOPINIRole (REQUIP) 1 MG tablet, Take 1 tablet (1 mg) by mouth At Bedtime, Disp: 30 tablet, Rfl: 1     amitriptyline (ELAVIL) 10 MG tablet, Start taking 1 tab at bedtime for 7 day(s). If this dose is effective then continue If this dose is not effective increase by 10 mg weekly until you reach an effective dose or reach 50 mg, Disp: 150 tablet, Rfl:  2     escitalopram (LEXAPRO) 10 MG tablet, Take 1 tablet (10 mg) by mouth every morning, Disp: 90 tablet, Rfl: 2     LORazepam (ATIVAN) 0.5 MG tablet, Take 0.5-1 tablets (0.25-0.5 mg) by mouth every 6 hours as needed for anxiety, Disp: 30 tablet, Rfl: 0     atorvastatin (LIPITOR) 40 MG tablet, Take 1 tablet (40 mg) by mouth daily, Disp: 90 tablet, Rfl: 2     order for DME, Equipment ordered: RESMED Auto PAP Mask type: Nasal Settings: 6-12 cm H2O, Disp: , Rfl:      aspirin 81 MG tablet, Take 1 tablet by mouth daily., Disp: , Rfl:     OBJECTIVE:  /82  Pulse 72  Temp 97.7  F (36.5  C) (Oral)  Wt 187 lb (84.8 kg)  SpO2 98%  BMI 24.67 kg/m2    EXAM:  GENERAL APPEARANCE: healthy, alert and no distress        ASSESSMENT/PLAN:      (G25.81) Restless legs syndrome (RLS)  (primary encounter diagnosis)  Comment: perhaps his neuropathy is causing the day time symptom(s) or at least contributing  Plan: continue(s) the requip at the current dose    (G60.8) Sensory polyneuropathy  Comment:   Plan: amitriptyline (ELAVIL) 10 MG tablet        Titrate the dose to effective tolerable level      Patient Instructions   Start taking the amitriptyline as soon as possible.   As started on the prescription(s) you will experiment to find the right dose for you. If you find a dose that works well and is tolerable please let me know that via a kooldiner message. If this is not helpful let me know that and I will most likely have you follow up with Dr Shore or  we can try a higher dose of the requip        (Z23) Need for prophylactic vaccination and inoculation against influenza  Comment:   Plan: FLU VAC, SPLIT VIRUS IM > 3 YO (QUADRIVALENT)         [[id number]], Vaccine Administration, Initial         [[id number]]

## 2017-10-11 NOTE — MR AVS SNAPSHOT
After Visit Summary   10/11/2017    Jack Marrero    MRN: [id number]           Patient Information     Date Of Birth          1960        Visit Information        Provider Department      10/11/2017 10:00 AM Jamshid Miller MD [facility]        Today's Diagnoses     Need for prophylactic vaccination and inoculation against influenza    -  1    Sensory polyneuropathy          Care Instructions    Start taking the amitriptyline as soon as possible.   As started on the prescription(s) you will experiment to find the right dose for you. If you find a dose that works well and is tolerable please let me know that via a icomply message. If this is not helpful let me know that and I will most likely have you follow up with Dr Shore or  we can try a higher dose of the requip          Follow-ups after your visit        Who to contact     If you have questions or need follow up information about today's clinic visit or your schedule please contact [facility] directly at [phone].  Normal or non-critical lab and imaging results will be communicated to you by TapRoot Systemshart, letter or phone within 4 business days after the clinic has received the results. If you do not hear from us within 7 days, please contact the clinic through Want[employer] Musict or phone. If you have a critical or abnormal lab result, we will notify you by phone as soon as possible.  Submit refill requests through icomply or call your pharmacy and they will forward the refill request to us. Please allow 3 business days for your refill to be completed.          Additional Information About Your Visit        TapRoot Systemshart Information     icomply gives you secure access to your electronic health record. If you see a primary care provider, you can also send messages to your care team and make appointments. If you have questions, please call your primary care clinic.  If you do not have a primary care provider, please call  [phone] and they will assist you.        Care EveryWhere ID     This is your Care EveryWhere ID. This could be used by other organizations to access your [facility] medical records  [id number]        Your Vitals Were     Pulse Temperature Pulse Oximetry BMI (Body Mass Index)          72 97.7  F (36.5  C) (Oral) 98% 24.67 kg/m2         Blood Pressure from Last 3 Encounters:   10/11/17 134/82   10/05/17 137/84   08/30/17 144/85    Weight from Last 3 Encounters:   10/11/17 187 lb (84.8 kg)   10/05/17 188 lb 12.8 oz (85.6 kg)   08/30/17 188 lb (85.3 kg)              We Performed the Following     FLU VAC, SPLIT VIRUS IM > 3 YO (QUADRIVALENT) [44414]     Vaccine Administration, Initial [27418]          Today's Medication Changes          These changes are accurate as of: 10/11/17 10:35 AM.  If you have any questions, ask your nurse or doctor.               Stop taking these medicines if you haven't already. Please contact your care team if you have questions.     naproxen 500 MG tablet   Commonly known as:  NAPROSYN   Stopped by:  Jamshid Miller MD                Where to get your medicines      Some of these will need a paper prescription and others can be bought over the counter.  Ask your nurse if you have questions.     Bring a paper prescription for each of these medications     amitriptyline 10 MG tablet                Primary Care Provider Office Phone # Fax #    Jamshid Miller [phone] [address] [facility] [address]        Equal Access to Services     CORDELIA TODD : Hadii kayla baro Soernestine, waaxda luqadaha, qaybta kaalmada kathy finley [facility]. So [facility] [phone].    ATENCIÓN: Si habla español, tiene a rivera disposición servicios gratuitos de asistencia lingüística. Llame al [phone].    We comply with applicable federal civil rights laws and [place] laws. We do not discriminate on the basis of race, color, national origin,  age, disability, sex, sexual orientation, or gender identity.            Thank you!     [facility] you for [facility]  [facility]. Our goal is always to provide you with excellent care. Hearing back from our patients is one way we can continue to improve our services. Please take a few minutes to complete the written survey that you may receive in the mail after your visit with us. Thank you!             Your Updated Medication List - Protect others around you: Learn how to safely use, store and throw away your medicines at www.disposemymeds.org.          This list is accurate as of: 10/11/17 10:35 AM.  Always use your most recent med list.                   Brand Name Dispense Instructions for use Diagnosis    amitriptyline 10 MG tablet    ELAVIL    150 tablet    Start taking 1 tab at bedtime for 7 day(s). If this dose is effective then continue If this dose is not effective increase by 10 mg weekly until you reach an effective dose or reach 50 mg    Sensory polyneuropathy       amoxicillin-clavulanate 875-125 MG per tablet    AUGMENTIN    20 tablet    Take 1 tablet by mouth 2 times daily for 10 days    Acute sinusitis with symptoms > 10 days       aspirin 81 MG tablet      Take 1 tablet by mouth daily.        atorvastatin 40 MG tablet    LIPITOR    90 tablet    Take 1 tablet (40 mg) by mouth daily    Dyslipidemia       Dextromethorphan-Guaifenesin  MG Tb12     28 tablet    Take 1 tablet by mouth 2 times daily    Acute sinusitis with symptoms > 10 days       escitalopram 10 MG tablet    LEXAPRO    90 tablet    Take 1 tablet (10 mg) by mouth every morning    Panic disorder without agoraphobia, CASIMIRO (generalized anxiety disorder)       LORazepam 0.5 MG tablet    ATIVAN    30 tablet    Take 0.5-1 tablets (0.25-0.5 mg) by mouth every 6 hours as needed for anxiety    CASIMIRO (generalized anxiety disorder)       order for DME      Equipment ordered: RESMED Auto PAP Mask type: Nasal Settings: 6-12  cm H2O        rOPINIRole 1 MG tablet    REQUIP    30 tablet    Take 1 tablet (1 mg) by mouth At Bedtime    Restless legs syndrome (RLS)

## 2017-11-04 DIAGNOSIS — N40.1 BENIGN NON-NODULAR PROSTATIC HYPERPLASIA WITH LOWER URINARY TRACT SYMPTOMS: ICD-10-CM

## 2017-11-07 ENCOUNTER — MYC MEDICAL ADVICE (OUTPATIENT)
Dept: FAMILY MEDICINE | Facility: CLINIC | Age: 57
End: 2017-11-07

## 2017-11-07 RX ORDER — DUTASTERIDE 0.5 MG/1
CAPSULE, LIQUID FILLED ORAL
OUTPATIENT
Start: 2017-11-07

## 2017-11-07 NOTE — TELEPHONE ENCOUNTER
Per protocol, will route encounter to be cosigned by provider for Verbal Orders.  Valery Kaba RN

## 2017-11-07 NOTE — TELEPHONE ENCOUNTER
I have discussed this patient's issue with the RN involved and we have come up with this plan.  Jamshid Miller MD

## 2017-11-20 DIAGNOSIS — N40.1 BENIGN NON-NODULAR PROSTATIC HYPERPLASIA WITH LOWER URINARY TRACT SYMPTOMS: ICD-10-CM

## 2017-11-21 RX ORDER — DUTASTERIDE 0.5 MG/1
0.5 CAPSULE, LIQUID FILLED ORAL DAILY
Qty: 90 CAPSULE | Refills: 3 | Status: SHIPPED | OUTPATIENT
Start: 2017-11-21 | End: 2018-11-10

## 2017-12-15 ENCOUNTER — OFFICE VISIT (OUTPATIENT)
Dept: FAMILY MEDICINE | Facility: CLINIC | Age: 57
End: 2017-12-15
Payer: COMMERCIAL

## 2017-12-15 VITALS
BODY MASS INDEX: 26.39 KG/M2 | TEMPERATURE: 98.5 F | HEART RATE: 82 BPM | DIASTOLIC BLOOD PRESSURE: 88 MMHG | SYSTOLIC BLOOD PRESSURE: 128 MMHG | OXYGEN SATURATION: 97 % | WEIGHT: 200 LBS

## 2017-12-15 DIAGNOSIS — E78.1 HIGH TRIGLYCERIDES: ICD-10-CM

## 2017-12-15 DIAGNOSIS — D12.6 TUBULAR ADENOMA OF COLON: ICD-10-CM

## 2017-12-15 DIAGNOSIS — N40.1 BENIGN NON-NODULAR PROSTATIC HYPERPLASIA WITH LOWER URINARY TRACT SYMPTOMS: ICD-10-CM

## 2017-12-15 DIAGNOSIS — Z91.89 10 YEAR RISK OF MI OR STROKE 7.5% OR GREATER: ICD-10-CM

## 2017-12-15 DIAGNOSIS — Z00.00 ROUTINE GENERAL MEDICAL EXAMINATION AT A HEALTH CARE FACILITY: Primary | ICD-10-CM

## 2017-12-15 DIAGNOSIS — R73.02 IGT (IMPAIRED GLUCOSE TOLERANCE): ICD-10-CM

## 2017-12-15 DIAGNOSIS — E78.6 HDL DEFICIENCY: ICD-10-CM

## 2017-12-15 DIAGNOSIS — E78.5 DYSLIPIDEMIA: ICD-10-CM

## 2017-12-15 DIAGNOSIS — Z72.0 TOBACCO ABUSE: ICD-10-CM

## 2017-12-15 DIAGNOSIS — G60.8 SENSORY POLYNEUROPATHY: ICD-10-CM

## 2017-12-15 PROCEDURE — 99396 PREV VISIT EST AGE 40-64: CPT | Performed by: FAMILY MEDICINE

## 2017-12-15 RX ORDER — AMITRIPTYLINE HYDROCHLORIDE 10 MG/1
40 TABLET ORAL AT BEDTIME
Qty: 360 TABLET | Refills: 3 | Status: SHIPPED | OUTPATIENT
Start: 2017-12-15 | End: 2018-11-06

## 2017-12-15 RX ORDER — ATORVASTATIN CALCIUM 40 MG/1
40 TABLET, FILM COATED ORAL DAILY
Qty: 90 TABLET | Refills: 3 | Status: SHIPPED | OUTPATIENT
Start: 2017-12-15 | End: 2018-12-09

## 2017-12-15 NOTE — MR AVS SNAPSHOT
After Visit Summary   12/15/2017    Jack Marrero    MRN: [id number]           Patient Information     Date Of Birth          1960        Visit Information        Provider Department      12/15/2017 11:00 AM Jamshid Miller MD [facility]        Today's Diagnoses     Routine general medical examination at a health care facility    -  1    Tubular adenoma of colon on colonoscopy        High triglycerides        HDL deficiency        IGT (impaired glucose tolerance)        10 year risk of MI or stroke 7.5% or greater, 9.3% in Nov 2016 on atorvastatin 40        Benign non-nodular prostatic hyperplasia with lower urinary tract symptoms        Tobacco abuse        Sensory polyneuropathy        Dyslipidemia          Care Instructions      Preventive Health Recommendations  Male Ages 50 - 64    Yearly exam:             See your health care provider every year in order to  o   Review health changes.   o   Discuss preventive care.    o   Review your medicines if your doctor has prescribed any.     Have a cholesterol test every 5 years, or more frequently if you are at risk for high cholesterol/heart disease.     Have a diabetes test (fasting glucose) every three years. If you are at risk for diabetes, you should have this test more often.     Have a colonoscopy at age 50, or have a yearly FIT test (stool test). These exams will check for colon cancer.      Talk with your health care provider about whether or not a prostate cancer screening test (PSA) is right for you.    You should be tested each year for STDs (sexually transmitted diseases), if you re at risk.     Shots: Get a flu shot each year. Get a tetanus shot every 10 years.     Nutrition:    Eat at least 5 servings of fruits and vegetables daily.     Eat whole-grain bread, whole-wheat pasta and brown rice instead of white grains and rice.     Talk to your provider about Calcium and Vitamin D.     Lifestyle    Exercise for at least  150 minutes a week (30 minutes a day, 5 days a week). This will help you control your weight and prevent disease.     Limit alcohol to one drink per day.     No smoking.     Wear sunscreen to prevent skin cancer.     See your dentist every six months for an exam and cleaning.     See your eye doctor every 1 to 2 years.      Please schedule a future laboratory appointment(s) and be sure to have fasted for 10 hours prior to arrival    Please call our [facility] Scheduling Line at [phone] to schedule your:  CT scan                       Follow-ups after your visit        Follow-up notes from your care team     Return in about 1 year (around 12/15/2018) for Physical Exam.      Future tests that were ordered for you today     Open Future Orders        Priority Expected Expires Ordered    Lipid panel reflex to direct LDL Fasting Routine  2/15/2018 12/15/2017    **Comprehensive metabolic panel FUTURE 6mo Routine 6/13/2018 7/13/2018 12/15/2017    CT Chest Lung Cancer Scrn Low Dose wo Routine  12/16/2018 12/15/2017            Who to contact     If you have questions or need follow up information about today's clinic visit or your schedule please contact [facility] directly at [phone].  Normal or non-critical lab and imaging results will be communicated to you by MyChart, letter or phone within 4 business days after the clinic has received the results. If you do not hear from us within 7 days, please contact the clinic through Huaathart or phone. If you have a critical or abnormal lab result, we will notify you by phone as soon as possible.  Submit refill requests through Stellar or call your pharmacy and they will forward the refill request to us. Please allow 3 business days for your refill to be completed.          Additional Information About Your Visit        [employer]ua[employer]WL Research Information     Stellar gives you secure access to your electronic health record. If you see a primary care provider, you can  also send messages to your care team and make appointments. If you have questions, please call your primary care clinic.  If you do not have a primary care provider, please call [phone] and they will assist you.        Care EveryWhere ID     This is your Care EveryWhere ID. This could be used by other organizations to access your [facility] medical records  [id number]        Your Vitals Were     Pulse Temperature Pulse Oximetry BMI (Body Mass Index)          82 98.5  F (36.9  C) (Oral) 97% 26.39 kg/m2         Blood Pressure from Last 3 Encounters:   12/15/17 128/88   10/11/17 134/82   10/05/17 137/84    Weight from Last 3 Encounters:   12/15/17 200 lb (90.7 kg)   10/11/17 187 lb (84.8 kg)   10/05/17 188 lb 12.8 oz (85.6 kg)                 Today's Medication Changes          These changes are accurate as of: 12/15/17 12:11 PM.  If you have any questions, ask your nurse or doctor.               Start taking these medicines.        Dose/Directions    nicotine 10 MG Inhaler   Commonly known as:  NICOTROL   Used for:  Tobacco abuse   Started by:  Jamshid Miller MD        Dose:  6-16 Cartridge   Inhale 6-16 Cartridges into the lungs daily as needed for smoking cessation   Quantity:  180 each   Refills:  1         These medicines have changed or have updated prescriptions.        Dose/Directions    amitriptyline 10 MG tablet   Commonly known as:  ELAVIL   This may have changed:    - how much to take  - how to take this  - when to take this  - additional instructions   Used for:  Sensory polyneuropathy   Changed by:  Jamshid Miller MD        Dose:  40 mg   Take 4 tablets (40 mg) by mouth At Bedtime   Quantity:  360 tablet   Refills:  3         Stop taking these medicines if you haven't already. Please contact your care team if you have questions.     Dextromethorphan-Guaifenesin  MG Tb12   Stopped by:  Jamshid Miller MD           rOPINIRole 1 MG tablet   Commonly known as:  REQUIP   Stopped by:   Jamshid Miller MD                Where to get your medicines      These medications were sent to [id number] - [place], [place] - [address] [facility] [address]  [id number] [place] [address], [facility] [address]     Phone:  [phone]     amitriptyline 10 MG tablet    atorvastatin 40 MG tablet    nicotine 10 MG Inhaler                Primary Care Provider Office Phone # Fax #    Jamshid Miller [phone] [address] [facility] [address]        Equal Access to Services     [facility]: Hadii aad ku hadasho Soomaali, waaxda luqadaha, qaybta kaalmada adeegyada, waxay idiin hayaan adeeg kharabenjamín ramesh . So [place] [facility] [phone].    ATENCIÓN: Si habla español, tiene a rivera disposición servicios gratuitos de asistencia lingüística. [facility] [phone].    We comply with applicable federal civil rights laws and [place] laws. We do not discriminate on the basis of race, color, national origin, age, disability, sex, sexual orientation, or gender identity.            Thank you!     Thank you for choosing [facility]  for your care. Our goal is always to provide you with excellent care. Hearing back from our patients is one way we can continue to improve our services. Please take a few minutes to complete the written survey that you may receive in the mail after your visit with us. Thank you!             Your Updated Medication List - Protect others around you: Learn how to safely use, store and throw away your medicines at www.disposemymeds.org.          This list is accurate as of: 12/15/17 12:11 PM.  Always use your most recent med list.                   Brand Name Dispense Instructions for use Diagnosis    amitriptyline 10 MG tablet    ELAVIL    360 tablet    Take 4 tablets (40 mg) by mouth At Bedtime    Sensory polyneuropathy       aspirin 81 MG tablet      Take 1 tablet by mouth daily.        atorvastatin 40 MG tablet    LIPITOR    90 tablet    Take 1 tablet (40 mg)  by mouth daily    Dyslipidemia       dutasteride 0.5 MG capsule    AVODART    90 capsule    Take 1 capsule (0.5 mg) by mouth daily    Benign non-nodular prostatic hyperplasia with lower urinary tract symptoms       escitalopram 10 MG tablet    LEXAPRO    90 tablet    Take 1 tablet (10 mg) by mouth every morning    Panic disorder without agoraphobia, CASIMIRO (generalized anxiety disorder)       LORazepam 0.5 MG tablet    ATIVAN    30 tablet    Take 0.5-1 tablets (0.25-0.5 mg) by mouth every 6 hours as needed for anxiety    CASIMIRO (generalized anxiety disorder)       nicotine 10 MG Inhaler    NICOTROL    180 each    Inhale 6-16 Cartridges into the lungs daily as needed for smoking cessation    Tobacco abuse       order for DME      Equipment ordered: RESMED Auto PAP Mask type: Nasal Settings: 6-12 cm H2O

## 2017-12-15 NOTE — PROGRESS NOTES
SUBJECTIVE:   CC: Jack Marrero is an 57 year old male who presents for preventative health visit.     Physical   Annual:     Getting at least 3 servings of Calcium per day::  Yes    Bi-annual eye exam::  Yes    Dental care twice a year::  Yes    Sleep apnea or symptoms of sleep apnea::  Excessive snoring and Sleep apnea    Diet::  Regular (no restrictions)    Frequency of exercise::  2-3 days/week    Duration of exercise::  15-30 minutes    Taking medications regularly::  Yes    Medication side effects::  Not applicable    Additional concerns today::  No                Today's PHQ-2 Score: PHQ-2 ( 1999 Pf[employer]) 12/15/2017   Q1: Little interest or pleasure in doing things 0   Q2: Feeling down, depressed or hopeless 0   PHQ-2 Score 0   Q1: Little interest or pleasure in doing things Not at all   Q2: Feeling down, depressed or hopeless Not at all   PHQ-2 Score 0       Abuse: Current or Past(Physical, Sexual or Emotional)- No  Do you feel safe in your environment - Yes    Social History   Substance Use Topics     Smoking status: Current Every Day Smoker     Packs/day: 0.50     Years: 40.00     Types: Pipe     Start date: 10/10/1975     Last attempt to quit: 2/19/2014     Smokeless tobacco: Never Used     Alcohol use Yes      Comment: Occasional; about 2 beers/week     Alcohol Use 12/15/2017   If you drink alcohol, do you typically have greater than 3 drinks per day OR greater than 7 drinks per week?   No   No flowsheet data found.      Last PSA: No results found for: PSA    Reviewed orders with patient. Reviewed health maintenance and updated orders accordingly -       Reviewed and updated as needed this visit by clinical staff  Tobacco  Allergies  Meds  Med Hx  Surg Hx  Fam Hx  Soc Hx        Reviewed and updated as needed this visit by Provider          Review of Systems      OBJECTIVE:   /88  Pulse 82  Temp 98.5  F (36.9  C) (Oral)  Wt 200 lb (90.7 kg)  SpO2 97%  BMI 26.39 kg/m2    Physical  Exam    ASSESSMENT/PLAN:       ICD-10-CM    1. Routine general medical examination at a health care facility Z00.00 **Comprehensive metabolic panel FUTURE 6mo   2. Tubular adenoma of colon on colonoscopy D12.6    3. High triglycerides E78.1 Lipid panel reflex to direct LDL Fasting     **Comprehensive metabolic panel FUTURE 6mo   4. HDL deficiency E78.6 Lipid panel reflex to direct LDL Fasting     **Comprehensive metabolic panel FUTURE 6mo   5. IGT (impaired glucose tolerance) R73.02 **Comprehensive metabolic panel FUTURE 6mo   6. 10 year risk of MI or stroke 7.5% or greater, 9.3% in Nov 2016 on atorvastatin 40 Z91.89 Lipid panel reflex to direct LDL Fasting     **Comprehensive metabolic panel FUTURE 6mo   7. Benign non-nodular prostatic hyperplasia with lower urinary tract symptoms N40.1    8. Tobacco abuse Z72.0 CT Chest Lung Cancer Scrn Low Dose wo     nicotine (NICOTROL) 10 MG Inhaler   9. Sensory polyneuropathy G60.8 amitriptyline (ELAVIL) 10 MG tablet   10. Dyslipidemia E78.5 atorvastatin (LIPITOR) 40 MG tablet       COUNSELING:   Reviewed preventive health counseling, as reflected in patient instructions       Regular exercise       Vision screening       Family planning       Colon cancer screening       Osteoporosis Prevention/Bone Health         Consider lung cancer screening for ages 55-80 years and 30 pack-year smoking history        BP Screening:   Last 3 BP Readings:    BP Readings from Last 3 Encounters:   12/15/17 128/88   10/11/17 134/82   10/05/17 137/84       The following was recommended to the patient:  Re-screen BP within a year and recommended lifestyle modifications       reports that he has been smoking Pipe.  He started smoking about 42 years ago. He has a 20.00 pack-year smoking history. He has never used smokeless tobacco.  Tobacco Cessation Action Plan: Pharmacotherapies : other Nicotine replacement  Estimated body mass index is 26.39 kg/(m^2) as calculated from the following:    Height  "as of 4/18/17: 6' 1\" (1.854 m).    Weight as of this encounter: 200 lb (90.7 kg).   Weight management plan: Discussed healthy diet and exercise guidelines and patient will follow up in 12 months in clinic to re-evaluate.    Counseling Resources:  ATP IV Guidelines  Pooled Cohorts Equation Calculator  FRAX Risk Assessment  ICSI Preventive Guidelines  Dietary Guidelines for Americans, 2010  USDA's MyPlate  ASA Prophylaxis  Lung CA Screening    Jamshid Miller MD  [facility]  Answers for HPI/ROS submitted by the patient on 12/15/2017   PHQ-2 Score: 0  --------------------------------------------------------------------------------------------------------------------------------------    SUBJECTIVE:  Jack Marrero is a 57 year old male who presents to the clinic today for a routine physical exam.    The patient's last physical was 14  months ago.     Cholesterol   Date Value Ref Range Status   11/07/2016 127 <200 mg/dL Final   10/26/2015 143 <200 mg/dL Final     Comment:     LDL Cholesterol is the primary guide to therapy.   The NCEP recommends further evaluation of: patients with cholesterol greater   than 200 mg/dL if additional risk factors are present, cholesterol greater   than   240 mg/dL, triglycerides greater than 150 mg/dL, or HDL less than 40 mg/dL.       HDL Cholesterol   Date Value Ref Range Status   11/07/2016 42 >39 mg/dL Final   10/26/2015 40 (L) >40 mg/dL Final     LDL Cholesterol Calculated   Date Value Ref Range Status   11/07/2016 70 <100 mg/dL Final     Comment:     Desirable:       <100 mg/dl   10/26/2015 83 0 - 129 mg/dL Final     Comment:     LDL Cholesterol is the primary guide to therapy: LDL-cholesterol goal in high   risk patients is <100 mg/dL and in very high risk patients is <70 mg/dL.       Triglycerides   Date Value Ref Range Status   11/07/2016 75 <150 mg/dL Final     Comment:     Fasting specimen   10/26/2015 99 0 - 150 mg/dL Final     Comment:     Fasting specimen "     Cholesterol/HDL Ratio   Date Value Ref Range Status   10/26/2015 3.6 0.0 - 5.0 Final   07/21/2015 5.8 (H) 0.0 - 5.0 Final     The patient's last fasting lipid panel was done 1 years ago and the results are listed above.       The ASCVD Risk score (Macclesfieldbobby HOLLAND Jr, et al., 2013) failed to calculate for the following reasons:    The valid total cholesterol range is 130 to 320 mg/dL    The patient reports that he has never been treated for high blood pressure.    The patient reports that he does take a daily aspirin.    Lab Results   Component Value Date    HCVAB  03/30/2015     Nonreactive   Assay performance characteristics have not been established for newborns,   infants, and children       The patient reports that he has been screened for Hepatitis C    (Screen all baby boomers once per CDC-- the generation born from 1945 through 1965)    Immunization History   Administered Date(s) Administered     HEPA 10/18/2007, 06/22/2011     Influenza (H1N1) 01/11/2010     Influenza Vaccine IM 3yrs+ 4 Valent IIV4 10/03/2013, 10/28/2014, 11/02/2015, 10/24/2016, 10/11/2017     Pneumococcal 23 valent 06/25/2013     Poliovirus, inactivated (IPV) 06/22/2011     TD (ADULT, 7+) 05/03/2005     TDAP Vaccine (Adacel) 04/06/2011     Typhoid IM 06/22/2011     Yellow Fever 06/22/2011     The patient's believes that his last tetanus shot was given 6 year(s) ago.   The patient believes that he has not had a Zostavax in the past  The patient believes that he has had a PPSV23 in the past.  The patient believes that he has not had a PCV13 in the past.  The patient believes that he has had a seasonal flu vaccination this fall or winter.  The patient would like to have a no vaccinations today      Results for orders placed or performed during the hospital encounter of 01/11/16   COLONOSCOPY   Result Value Ref Range    COLONOSCOPY       [facility]  Endoscopy Department-[place]  [place]  _______________________________________________________________________________  Patient Name: Jack Marrero           Procedure Date: 1/11/2016 9:54 AM  MRN: [id number]                       YOB: 1960  Admit Type: Outpatient                Age: 55  Gender: Male                          Note Status: Finalized  Attending MD: Nilson Gale MD       _______________________________________________________________________________     Procedure:                Colonoscopy  Indications:              Personal history of colonic polyps  Providers:                Nilson Gale MD, Lima Ordonez RN  Referring MD:             Jamshid Miller MD  Medicines:                Fentanyl 200 micrograms IV, Midazolam 3 mg IV,                             Diphenhydramine 12.5 mg IV  Complications:            No immediate complications.  ______________________________________________________________________________ _  Procedure:                Pre-Anesthesia Assessment:                            - Prior to the procedure, a History and Physical                             was performed, and patient medications and                             allergies were reviewed. The patient is competent.                             The risks and benefits of the procedure and the                             sedation options and risks were discussed with the                             patient. All questions were answered and informed                             consent was obtained. Patient identification and                             proposed procedure were verified by the physician                             in the procedure room. Mental Status Examination:                             alert and oriented. Airway Examination: normal                             oropharyngeal airway and neck mobility. Respiratory                             Examination: clear to auscultation. CV Examination:                               normal. Prophylactic Antibiotics: The patient does                             not require prophylactic antibiotics. Prior                             Anticoagulants: The patient has taken aspirin, last                             dose was 7 days prior to procedure. ASA Grade                             Assessment: II - A patient with mild systemic                             disease. After reviewing the risks and benefits,                             the patient was deemed in satisfactory condition to                             undergo the procedure. The anesthesia plan was to                             use moderate sedation / analgesia (conscious                             sedation). Immediately prior to administration of                             medications, the patient was re-assessed for                             adequacy to receive sedatives. The heart rate,                             respiratory rate, oxygen saturations, blood                             pres sure, adequacy of pulmonary ventilation, and                             response to care were monitored throughout the                             procedure. The physical status of the patient was                             re-assessed after the procedure.                            After obtaining informed consent, the colonoscope                             was passed under direct vision. Throughout the                             procedure, the patient's blood pressure, pulse, and                             oxygen saturations were monitored continuously. The                             Colonoscope was introduced through the anus and                             advanced to the cecum, identified by appendiceal                             orifice and ileocecal valve. The colonoscopy was                             performed without difficulty. The patient tolerated                             the procedure well. The quality of the bowel                              preparation was go od.                                                                                   Findings:       The perianal and digital rectal examinations were normal.       Multiple small and large-mouthed diverticula were found in the sigmoid        colon.       Four sessile polyps were found in the cecum and at 70 cm proximal to the        anus. The polyps were 1 to 4 mm in size. These polyps were removed with        a cold snare. Resection and retrieval were complete.       The exam was otherwise without abnormality.                                                                                   Impression:               - Diverticulosis in the sigmoid colon.                            - Four 1 to 4 mm polyps in the cecum and at 70 cm                             proximal to the anus. Resected and retrieved.                            - The examination was otherwise normal.  Recommendation:           - Path report will tell when next colonoscopy                             should be                             For the diverticulosis will need to start on                             Metamucil or its equivalent for more details look                             at patient instructions.                            5 sooner if mroe than tubular like 3. one at 70 cm                             was the largest. ones in cecum were very small                                                                                     ___________________  Nilson Gale MD  1/11/2016 10:31 AM                              Number of Addenda: 0    Note Initiated On: 1/11/2016 9:54 AM  Scope Withdrawal Time: 0 hours 0 minutes 0 seconds   Total Procedure Duration: 0 hours 0 minutes 0 seconds      ]   The patient denies a family history of colon cancer.  The patient reports that he has had a colonoscopy. His  last colonoscopy was in 2016 and he  report that is was abnormal. The patient was told to have  this repeated in 5 years.    The patient reports that he and his wife or partner are not using contraception as she has gone through menopause.    The patient reports a family history of diabetes in an uncle.  The patient denies a family history of prostate cancer.  The patient reports that he eats or drinks 3 servings of dairy products per day.   The patient reports that he has dental appointments approximately every 6 months.  The patient reports that he  has an eye examination approximately every 2 year(s).    Do you currently smoke? Yes 1 ppd  How many years have you smoked? 43   How many packs per day did you smoke on average? 3/4 ppd  (if more than 30 pack year history and the patient is age 55-80 consider ordering an annual low dose radiation lung CT to screen for cancer)  (Do not order if patient has quit more than 15 years ago or has a health condition that limits life expectancy or could not tolerate curative lung surgery)  Are you interested having a lung CT to screen for lung cancer? Yes: .    If the patient has smoked more that 100 cigarettes, has the patient had an imaging study (US or CT) for an AAA between the ages of 65 and 75? N/A            Patient Active Problem List   Diagnosis     Dyslipidemia     Mixed anxiety depressive disorder     HYPERLIPIDEMIA LDL GOAL <130     ADHD (attention deficit hyperactivity disorder)     Erectile dysfunction     Tubular adenoma of colon on colonoscopy     High triglycerides     HDL deficiency     IGT (impaired glucose tolerance)     Sensory polyneuropathy     Diverticulosis of colon     Inflamed seborrheic keratosis     10 year risk of MI or stroke 7.5% or greater, 9.3% in Nov 2016 on atorvastatin 40     Venous lake of lip, left lower     Benign non-nodular prostatic hyperplasia with lower urinary tract symptoms     Tobacco abuse     Restless legs syndrome (RLS)     SHY (obstructive sleep apnea) - Severe     Acute pain of right shoulder     Gallbladder polyp, need  fu US yearly     Atherosclerosis of abdominal aorta (H), seen on CT       Past Surgical History:   Procedure Laterality Date     COLONOSCOPY       COLONOSCOPY WITH CO2 INSUFFLATION N/A 1/11/2016    Procedure: COLONOSCOPY WITH CO2 INSUFFLATION;  Surgeon: Nilson Gale MD;  Location: [facility] OR     HC EXCISION SKIN OF NAIL FOLD  1993    BL GREAT TOE     HC TOOTH EXTRACTION W/FORCEP  1991     ALL 4 WISDOM TEETH EXTRACTED     HERNIORRHAPHY INGUINAL Left 3/11/2015    Procedure: HERNIORRHAPHY INGUINAL;  Surgeon: Nilson Gale MD;  Location: [facility] OR     TONSILLECTOMY & ADENOIDECTOMY         Family History   Problem Relation Age of Onset     Arthritis Mother      HEART DISEASE Mother      Lipids Mother      Eye Disorder Mother      GASTROINTESTINAL DISEASE Mother      irritable bowel     Breast Cancer Mother      OSTEOPOROSIS Mother      Lipids Father      HEART DISEASE Father      Psychotic Disorder Father      Depression Father      Eye Disorder Father      C.A.D. Father 56     CABG at age 56     Coronary Artery Disease Father      Hyperlipidemia Father      Depression/Anxiety Father      Mental Illness Father      Arthritis Maternal Grandmother      Alcohol/Drug Maternal Grandfather      Cancer - colorectal Maternal Grandfather      Hypertension Paternal Grandmother      Breast Cancer Paternal Grandmother      HEART DISEASE Paternal Grandfather      Hypertension Paternal Grandfather      Coronary Artery Disease Paternal Grandfather      Hypertension Brother      Depression/Anxiety Brother      Thyroid Disease Sister      Hypertension Brother      Depression/Anxiety Brother      Hypertension Brother      GASTROINTESTINAL DISEASE Brother      Prostate Cancer Brother      Cardiovascular Other      DIABETES Maternal Half-Brother      Thyroid Disease Sister      Thyroid Disease Sister      Other Cancer Other      Lung; Paternal Uncle/Lung; Paternal Uncle     CEREBROVASCULAR DISEASE No family hx of        Social  History     Social History     Marital status:      Spouse name: N/A     Number of children: N/A     Years of education: N/A     Occupational History     Not on file.     Social History Main Topics     Smoking status: Current Every Day Smoker     Packs/day: 0.50     Years: 40.00     Types: Pipe     Start date: 10/10/1975     Last attempt to quit: 2/19/2014     Smokeless tobacco: Never Used     Alcohol use Yes      Comment: Occasional; about 2 beers/week     Drug use: No     Sexual activity: Yes     Partners: Female     Other Topics Concern     Parent/Sibling W/ Cabg, Mi Or Angioplasty Before 65f 55m? No     Social History Narrative       Current Outpatient Prescriptions   Medication Sig Dispense Refill     dutasteride (AVODART) 0.5 MG capsule Take 1 capsule (0.5 mg) by mouth daily 90 capsule 3     amitriptyline (ELAVIL) 10 MG tablet Start taking 1 tab at bedtime for 7 day(s). If this dose is effective then continue  If this dose is not effective increase by 10 mg weekly until you reach an effective dose or reach 50 mg (Patient taking differently: Taking 4 tablets daily  Start taking 1 tab at bedtime for 7 day(s). If this dose is effective then continue  If this dose is not effective increase by 10 mg weekly until you reach an effective dose or reach 50 mg) 150 tablet 0     escitalopram (LEXAPRO) 10 MG tablet Take 1 tablet (10 mg) by mouth every morning 90 tablet 2     LORazepam (ATIVAN) 0.5 MG tablet Take 0.5-1 tablets (0.25-0.5 mg) by mouth every 6 hours as needed for anxiety 30 tablet 0     atorvastatin (LIPITOR) 40 MG tablet Take 1 tablet (40 mg) by mouth daily 90 tablet 2     order for DME Equipment ordered: RESMED Auto PAP Mask type: Nasal Settings: 6-12 cm H2O       aspirin 81 MG tablet Take 1 tablet by mouth daily.             PHYSICAL EXAMINATION:  Blood pressure 128/88, pulse 82, temperature 98.5  F (36.9  C), temperature source Oral, weight 200 lb (90.7 kg), SpO2 97 %.  General appearance - healthy,  alert and no distress  Skin - Skin color, texture, turgor normal. No rashes or lesions.  Head - Normocephalic. No masses, lesions, tenderness or abnormalities  Eyes - conjunctivae/corneas clear. PERRL, EOM's intact. Fundi benign  Ears - External ears normal. Canals clear. TM's normal.  Nose/Sinuses - Nares normal. Septum midline. Mucosa normal. No drainage or sinus tenderness.  Oropharynx - Lips, mucosa, and tongue normal. Teeth and gums normal.  Neck - Neck supple. No adenopathy. Thyroid symmetric, normal size,  Lungs - Percussion normal. Good diaphragmatic excursion. Lungs clear  Heart - PMI normal. No lifts, heaves, or thrills. RRR. No murmurs, clicks gallops or rub  Abdomen - Abdomen soft, non-tender. BS normal. No masses, organomegaly  Extremities - Extremities normal. No deformities, edema, or skin discoloration.  Musculoskeletal - Spine ROM normal. Muscular strength intact.  Peripheral pulses - radial=4/4, femoral=4/4, popliteal=4/4, dorsalis pedis=4/4,  Neuro - Gait normal. Reflexes normal and symmetric. Sensation grossly WNL.  Genitalia - Penis normal. No urethral discharge. Scrotum normal to palpation. No hernia.  Rectal - Rectal negative. Prostate palpation negative.  No rectal masses or abnormalities, positive findings: prostate 1+      Office Visit on 10/05/2017   Component Date Value Ref Range Status     Specimen Description 10/05/2017 Throat   Final     Rapid Strep A Screen 10/05/2017 NEGATIVE: No Group A streptococcal antigen detected by immunoassay, await culture report.   Final     Specimen Description 10/05/2017 Throat   Final     Culture Micro 10/05/2017 No beta hemolytic Streptococcus Group A isolated   Final       ASSESSMENT:    ICD-10-CM    1. Routine general medical examination at a health care facility Z00.00        Well-Adult Physical Exam.  Health Maintenance Due   Topic Date Due     LUNG CANCER SCREENING ANNUAL  11/07/2017     Health Maintenance   Topic Date Due     LUNG CANCER SCREENING  ANNUAL  11/07/2017     PHQ-9 Q6 MONTHS  02/02/2018     DEPRESSION ACTION PLAN Q1 YR  04/19/2018     ADVANCE DIRECTIVE PLANNING Q5 YRS  12/09/2018     COLONOSCOPY Q5 YR  01/11/2021     TETANUS IMMUNIZATION (SYSTEM ASSIGNED)  04/06/2021     LIPID SCREEN Q5 YR MALE (SYSTEM ASSIGNED)  11/07/2021     INFLUENZA VACCINE (SYSTEM ASSIGNED)  Completed     HEPATITIS C SCREENING  Completed         HEALTH CARE MAINTENENCE: The recommended screening tests and vaccinatons for this patient have been discussed as above.  The appropriate tests and vaccinations  have been ordered or declined by the patient. Please see the orders in EPIC.The patient specifically declines: n/a     Immunization Status:  up to date and documented    Patient Active Problem List   Diagnosis     Dyslipidemia     Mixed anxiety depressive disorder     HYPERLIPIDEMIA LDL GOAL <130     ADHD (attention deficit hyperactivity disorder)     Erectile dysfunction     Tubular adenoma of colon on colonoscopy     High triglycerides     HDL deficiency     IGT (impaired glucose tolerance)     Sensory polyneuropathy     Diverticulosis of colon     Inflamed seborrheic keratosis     10 year risk of MI or stroke 7.5% or greater, 9.3% in Nov 2016 on atorvastatin 40     Venous lake of lip, left lower     Benign non-nodular prostatic hyperplasia with lower urinary tract symptoms     Tobacco abuse     Restless legs syndrome (RLS)     SHY (obstructive sleep apnea) - Severe     Acute pain of right shoulder     Gallbladder polyp, need fu US yearly     Atherosclerosis of abdominal aorta (H), seen on CT        ATP III Guidelines  ICSI Preventive Guidelines    PLAN:   The patient will make a future lab appointment for all bloodwork as they are not fasting today  Check a fasting lipid profile  I recommended to take a daily aspirin (81 to 325 mg)  Check a fasting glucose  Discussed calcium intake, vitamins and supplements. Recommended 1000 mg of calcium daily  Weight loss through diet and  exercise was recommended  Sunscreen use was recommended especially in the area of tatoos  Refills on chronic medication given  Recommended dental exams every 6 months  Recommended eye exam every 1-2 years  Follow up in 1 year for the next preventative medical visit    Continue(s) the amitriptyline at 40 mg which he reports has been effective.

## 2017-12-15 NOTE — PATIENT INSTRUCTIONS
Preventive Health Recommendations  Male Ages 50   64    Yearly exam:             See your health care provider every year in order to  o   Review health changes.   o   Discuss preventive care.    o   Review your medicines if your doctor has prescribed any.     Have a cholesterol test every 5 years, or more frequently if you are at risk for high cholesterol/heart disease.     Have a diabetes test (fasting glucose) every three years. If you are at risk for diabetes, you should have this test more often.     Have a colonoscopy at age 50, or have a yearly FIT test (stool test). These exams will check for colon cancer.      Talk with your health care provider about whether or not a prostate cancer screening test (PSA) is right for you.    You should be tested each year for STDs (sexually transmitted diseases), if you re at risk.     Shots: Get a flu shot each year. Get a tetanus shot every 10 years.     Nutrition:    Eat at least 5 servings of fruits and vegetables daily.     Eat whole-grain bread, whole-wheat pasta and brown rice instead of white grains and rice.     Talk to your provider about Calcium and Vitamin D.     Lifestyle    Exercise for at least 150 minutes a week (30 minutes a day, 5 days a week). This will help you control your weight and prevent disease.     Limit alcohol to one drink per day.     No smoking.     Wear sunscreen to prevent skin cancer.     See your dentist every six months for an exam and cleaning.     See your eye doctor every 1 to 2 years.      Please schedule a future laboratory appointment(s) and be sure to have fasted for 10 hours prior to arrival    Please call our [facility] Scheduling Line at [phone] to schedule your:  CT scan

## 2017-12-17 DIAGNOSIS — E78.5 HYPERLIPIDEMIA LDL GOAL <100: ICD-10-CM

## 2017-12-17 DIAGNOSIS — G60.8 SENSORY POLYNEUROPATHY: ICD-10-CM

## 2017-12-17 DIAGNOSIS — Z91.89 10 YEAR RISK OF MI OR STROKE 7.5% OR GREATER: ICD-10-CM

## 2017-12-18 RX ORDER — ATORVASTATIN CALCIUM 40 MG/1
TABLET, FILM COATED ORAL
Qty: 90 TABLET | Refills: 0 | OUTPATIENT
Start: 2017-12-18

## 2017-12-18 RX ORDER — AMITRIPTYLINE HYDROCHLORIDE 10 MG/1
TABLET ORAL
Qty: 150 TABLET | Refills: 0 | OUTPATIENT
Start: 2017-12-18

## 2017-12-19 ENCOUNTER — RADIANT APPOINTMENT (OUTPATIENT)
Dept: CT IMAGING | Facility: CLINIC | Age: 57
End: 2017-12-19
Attending: FAMILY MEDICINE
Payer: COMMERCIAL

## 2017-12-19 DIAGNOSIS — Z72.0 TOBACCO ABUSE: ICD-10-CM

## 2017-12-19 DIAGNOSIS — E78.1 HIGH TRIGLYCERIDES: ICD-10-CM

## 2017-12-19 DIAGNOSIS — E78.6 HDL DEFICIENCY: ICD-10-CM

## 2017-12-19 DIAGNOSIS — Z91.89 10 YEAR RISK OF MI OR STROKE 7.5% OR GREATER: ICD-10-CM

## 2017-12-19 DIAGNOSIS — R73.02 IGT (IMPAIRED GLUCOSE TOLERANCE): ICD-10-CM

## 2017-12-19 DIAGNOSIS — Z00.00 ROUTINE GENERAL MEDICAL EXAMINATION AT A HEALTH CARE FACILITY: ICD-10-CM

## 2017-12-19 LAB
ALBUMIN SERPL-MCNC: 3.6 G/DL (ref 3.4–5)
ALP SERPL-CCNC: 80 U/L (ref 40–150)
ALT SERPL W P-5'-P-CCNC: 55 U/L (ref 0–70)
ANION GAP SERPL CALCULATED.3IONS-SCNC: 5 MMOL/L (ref 3–14)
AST SERPL W P-5'-P-CCNC: 45 U/L (ref 0–45)
BILIRUB SERPL-MCNC: 0.4 MG/DL (ref 0.2–1.3)
BUN SERPL-MCNC: 22 MG/DL (ref 7–30)
CALCIUM SERPL-MCNC: 8.6 MG/DL (ref 8.5–10.1)
CHLORIDE SERPL-SCNC: 107 MMOL/L (ref 94–109)
CHOLEST SERPL-MCNC: 145 MG/DL
CO2 SERPL-SCNC: 31 MMOL/L (ref 20–32)
CREAT SERPL-MCNC: 0.8 MG/DL (ref 0.66–1.25)
GFR SERPL CREATININE-BSD FRML MDRD: >90 ML/MIN/1.7M2
GLUCOSE SERPL-MCNC: 87 MG/DL (ref 70–99)
HDLC SERPL-MCNC: 40 MG/DL
LDLC SERPL CALC-MCNC: 56 MG/DL
NONHDLC SERPL-MCNC: 105 MG/DL
POTASSIUM SERPL-SCNC: 3.9 MMOL/L (ref 3.4–5.3)
PROT SERPL-MCNC: 6.9 G/DL (ref 6.8–8.8)
SODIUM SERPL-SCNC: 143 MMOL/L (ref 133–144)
TRIGL SERPL-MCNC: 246 MG/DL

## 2017-12-19 PROCEDURE — 80053 COMPREHEN METABOLIC PANEL: CPT | Performed by: FAMILY MEDICINE

## 2017-12-19 PROCEDURE — 80061 LIPID PANEL: CPT | Performed by: FAMILY MEDICINE

## 2017-12-19 PROCEDURE — G0297 LDCT FOR LUNG CA SCREEN: HCPCS | Performed by: RADIOLOGY

## 2017-12-19 PROCEDURE — 36415 COLL VENOUS BLD VENIPUNCTURE: CPT | Performed by: FAMILY MEDICINE

## 2017-12-20 NOTE — PROGRESS NOTES
Jack,  I have reviewed the report of the imaging test or tests that we recently ordered. The results were normal or considered normal for you.  Sincerely,   Jamshid Miller

## 2018-01-18 ENCOUNTER — TELEPHONE (OUTPATIENT)
Dept: FAMILY MEDICINE | Facility: CLINIC | Age: 58
End: 2018-01-18

## 2018-01-18 NOTE — TELEPHONE ENCOUNTER
Panel Management Review      Patient has the following on his problem list:       IVD   ASA: Passed    Last LDL:    Lab Results   Component Value Date    CHOL 145 12/19/2017     Lab Results   Component Value Date    HDL 40 12/19/2017     Lab Results   Component Value Date    LDL 56 12/19/2017     Lab Results   Component Value Date    TRIG 246 12/19/2017        Lab Results   Component Value Date    CHOLHDLRATIO 3.6 10/26/2015        Is the patient on a Statin? YES   Is the patient on Aspirin? YES                  Medications     HMG CoA Reductase Inhibitors    atorvastatin (LIPITOR) 40 MG tablet    Salicylates    aspirin 81 MG tablet          Last three blood pressure readings:  BP Readings from Last 3 Encounters:   12/15/17 128/88   10/11/17 134/82   10/05/17 137/84        Tobacco History:     History   Smoking Status     Current Every Day Smoker     Packs/day: 0.50     Years: 40.00     Types: Pipe     Start date: 10/10/1975     Last attempt to quit: 2/19/2014   Smokeless Tobacco     Never Used           Composite cancer screening  Chart review shows that this patient is due/due soon for the following None  Summary:    Patient is due/failing the following:   None, patient is a smoker    Action needed:   none    Type of outreach:    none    Questions for provider review:    None                                                                                                                                    Yesenia Kearns cma       Chart routed to closed .

## 2018-02-23 ENCOUNTER — OFFICE VISIT (OUTPATIENT)
Dept: URGENT CARE | Facility: URGENT CARE | Age: 58
End: 2018-02-23
Payer: COMMERCIAL

## 2018-02-23 VITALS
BODY MASS INDEX: 27.05 KG/M2 | SYSTOLIC BLOOD PRESSURE: 143 MMHG | TEMPERATURE: 98.7 F | OXYGEN SATURATION: 97 % | HEART RATE: 84 BPM | DIASTOLIC BLOOD PRESSURE: 93 MMHG | WEIGHT: 205 LBS

## 2018-02-23 DIAGNOSIS — J10.1 INFLUENZA A: Primary | ICD-10-CM

## 2018-02-23 DIAGNOSIS — R05.9 COUGH: ICD-10-CM

## 2018-02-23 DIAGNOSIS — J01.90 ACUTE SINUSITIS WITH COEXISTING CONDITION REQUIRING PROPHYLACTIC TREATMENT: ICD-10-CM

## 2018-02-23 LAB
FLUAV+FLUBV AG SPEC QL: NEGATIVE
FLUAV+FLUBV AG SPEC QL: POSITIVE
SPECIMEN SOURCE: ABNORMAL

## 2018-02-23 PROCEDURE — 87804 INFLUENZA ASSAY W/OPTIC: CPT | Performed by: FAMILY MEDICINE

## 2018-02-23 PROCEDURE — 99214 OFFICE O/P EST MOD 30 MIN: CPT | Performed by: FAMILY MEDICINE

## 2018-02-23 RX ORDER — OSELTAMIVIR PHOSPHATE 75 MG/1
75 CAPSULE ORAL 2 TIMES DAILY
Qty: 10 CAPSULE | Refills: 0 | Status: SHIPPED | OUTPATIENT
Start: 2018-02-23 | End: 2018-09-04

## 2018-02-23 RX ORDER — BENZONATATE 200 MG/1
200 CAPSULE ORAL 3 TIMES DAILY PRN
Qty: 21 CAPSULE | Refills: 0 | Status: SHIPPED | OUTPATIENT
Start: 2018-02-23 | End: 2018-09-04

## 2018-02-23 ASSESSMENT — ENCOUNTER SYMPTOMS
CARDIOVASCULAR NEGATIVE: 1
WHEEZING: 0
SHORTNESS OF BREATH: 0
SINUS PAIN: 1
MYALGIAS: 1
SORE THROAT: 0
COUGH: 1
GASTROINTESTINAL NEGATIVE: 1
FEVER: 0
CHILLS: 0

## 2018-02-23 NOTE — NURSING NOTE
"Chief Complaint   Patient presents with     URI     cold sx       Initial BP (!) 143/93 (BP Location: Left arm, Patient Position: Chair, Cuff Size: Adult Large)  Pulse 84  Temp 98.7  F (37.1  C) (Oral)  Wt 205 lb (93 kg)  SpO2 97%  BMI 27.05 kg/m2 Estimated body mass index is 27.05 kg/(m^2) as calculated from the following:    Height as of 4/18/17: 6' 1\" (1.854 m).    Weight as of this encounter: 205 lb (93 kg).  Medication Reconciliation: complete   Lily Jimenez CMA      "

## 2018-02-23 NOTE — MR AVS SNAPSHOT
After Visit Summary   2/23/2018    Jack Marrero    MRN: [id number]           Patient Information     Date Of Birth          1960        Visit Information        Provider Department      2/23/2018 2:25 PM Kyler Jones MD [facility]        Today's Diagnoses     Cough    -  1    Acute sinusitis with coexisting condition requiring prophylactic treatment          Care Instructions    Okay to take ibuprofen 200 mg - 4 tablets (800 mg) every 8 hours as needed.  Okay to take tylenol 500 mg - 2 tablets (1000 mg) every 6-8 hours as needed, do not exceed 3000 mg in 24 hours.  Okay to take tessalon perles to help with cough.    If sinus symptoms persists for 7-10 days more, then okay to start antibiotic.      Sinusitis (Antibiotic Treatment)    The sinuses are air-filled spaces within the bones of the face. They connect to the inside of the nose. Sinusitis is an inflammation of the tissue lining the sinus cavity. Sinus inflammation can occur during a cold. It can also be due to allergies to pollens and other particles in the air. Sinusitis can cause symptoms of sinus congestion and fullness. A sinus infection causes fever, headache and facial pain. There is often green or yellow drainage from the nose or into the back of the throat (post-nasal drip). You have been given antibiotics to treat this condition.  Home care:    Take the full course of antibiotics as instructed. Do not stop taking them, even if you feel better.    Drink plenty of water, hot tea, and other liquids. This may help thin mucus. It also may promote sinus drainage.    Heat may help soothe painful areas of the face. Use a towel soaked in hot water. Or,  the shower and direct the hot spray onto your face. Using a vaporizer along with a menthol rub at night may also help.     An expectorant containing guaifenesin may help thin the mucus and promote drainage from the sinuses.    Over-the-counter decongestants may  be used unless a similar medicine was prescribed. Nasal sprays work the fastest. Use one that contains phenylephrine or oxymetazoline. First blow the nose gently. Then use the spray. Do not use these medicines more often than directed on the label or symptoms may get worse. You may also use tablets containing pseudoephedrine. Avoid products that combine ingredients, because side effects may be increased. Read labels. You can also ask the pharmacist for help. (NOTE: Persons with high blood pressure should not use decongestants. They can raise blood pressure.)    Over-the-counter antihistamines may help if allergies contributed to your sinusitis.      Do not use nasal rinses or irrigation during an acute sinus infection, unless told to by your health care provider. Rinsing may spread the infection to other sinuses.    Use acetaminophen or ibuprofen to control pain, unless another pain medicine was prescribed. (If you have chronic liver or kidney disease or ever had a stomach ulcer, talk with your doctor before using these medicines. Aspirin should never be used in anyone under 18 years of age who is ill with a fever. It may cause severe liver damage.)    Don't smoke. This can worsen symptoms.  Follow-up care  Follow up with your healthcare provider or our staff if you are not improving within the next week.  When to seek medical advice  Call your healthcare provider if any of these occur:    Facial pain or headache becoming more severe    Stiff neck    Unusual drowsiness or confusion    Swelling of the forehead or eyelids    Vision problems, including blurred or double vision    Fever of 100.4 F (38 C) or higher, or as directed by your healthcare provider    Seizure    Breathing problems    Symptoms not resolving within 10 days  Date Last Reviewed: 4/13/2015 2000-2017 [employer]. [address] [place] [address], [place] [address]. All rights reserved. This information is not intended as a substitute for  professional medical care. Always follow your healthcare professional's instructions.        Influenza (Adult)    Influenza is also called the flu. It is a viral illness that affects the air passages of your lungs. It is different from the common cold. The flu can easily be passed from one to person to another. It may be spread through the air by coughing and sneezing. Or it can be spread by touching the sick person and then touching your own eyes, nose, or mouth.  The flu starts 1 to 3 days after you are exposed to the flu virus. It may last for 1 to 2 weeks but many people feel tired or fatigued for many weeks afterward. You usually don t need to take antibiotics unless you have a complication. This might be an ear or sinus infection or pneumonia.  Symptoms of the flu may be mild or severe. They can include extreme tiredness (wanting to stay in bed all day), chills, fevers, muscle aches, soreness with eye movement, headache, and a dry, hacking cough.  Home care  Follow these guidelines when caring for yourself at home:    Avoid being around cigarette smoke, whether yours or other people s.    Acetaminophen or ibuprofen will help ease your fever, muscle aches, and headache. Don t give aspirin to anyone younger than 18 who has the flu. Aspirin can harm the liver.    Nausea and loss of appetite are common with the flu. Eat light meals. Drink 6 to 8 glasses of liquids every day. Good choices are water, sport drinks, soft drinks without caffeine, juices, tea, and soup. Extra fluids will also help loosen secretions in your nose and lungs.    Over-the-counter cold medicines will not make the flu go away faster. But the medicines may help with coughing, sore throat, and congestion in your nose and sinuses. Don t use a decongestant if you have high blood pressure.    Stay home until your fever has been gone for at least 24 hours without using medicine to reduce fever.  Follow-up care  Follow up with your healthcare  provider, or as advised, if you are not getting better over the next week.  If you are age 65 or older, talk with your provider about getting a pneumococcal vaccine every 5 years. You should also get this vaccine if you have chronic asthma or COPD. All adults should get a flu vaccine every fall. Ask your provider about this.  When to seek medical advice  Call your healthcare provider right away if any of these occur:    Cough with lots of colored mucus (sputum) or blood in your mucus    Chest pain, shortness of breath, wheezing, or trouble breathing    Severe headache, or face, neck, or ear pain    New rash with fever    Fever of 100.4 F (38 C) or higher, or as directed by your healthcare provider    Confusion, behavior change, or seizure    Severe weakness or dizziness    You get a new fever or cough after getting better for a few days  Date Last Reviewed: 1/1/2017 2000-2017 [employer]. [address] [place] [address]. All rights reserved. This information is not intended as a substitute for professional medical care. Always follow your healthcare professional's instructions.                Follow-ups after your visit        Who to contact     If you have questions or need follow up information about today's clinic visit or your schedule please contact [facility] directly at [phone].  Normal or non-critical lab and imaging results will be communicated to you by MyChart, letter or phone within 4 business days after the clinic has received the results. If you do not hear from us within 7 days, please contact the clinic through MyChart or phone. If you have a critical or abnormal lab result, we will notify you by phone as soon as possible.  Submit refill requests through [employer] or call your pharmacy and they will forward the refill request to us. Please allow 3 business days for your refill to be completed.          Additional Information About Your Visit         [employer] Information     [employer] gives you secure access to your electronic health record. If you see a primary care provider, you can also send messages to your care team and make appointments. If you have questions, please call your primary care clinic.  If you do not have a primary care provider, please call [phone] and they will assist you.        Care EveryWhere ID     This is your Care EveryWhere ID. This could be used by other organizations to access your [facility] medical records  [id number]        Your Vitals Were     Pulse Temperature Pulse Oximetry BMI (Body Mass Index)          84 98.7  F (37.1  C) (Oral) 97% 27.05 kg/m2         Blood Pressure from Last 3 Encounters:   02/23/18 (!) 143/93   12/15/17 128/88   10/11/17 134/82    Weight from Last 3 Encounters:   02/23/18 205 lb (93 kg)   12/15/17 200 lb (90.7 kg)   10/11/17 187 lb (84.8 kg)              We Performed the Following     Influenza A/B antigen          Today's Medication Changes          These changes are accurate as of 2/23/18  3:00 PM.  If you have any questions, ask your nurse or doctor.               Start taking these medicines.        Dose/Directions    amoxicillin-clavulanate 875-125 MG per tablet   Commonly known as:  AUGMENTIN   Used for:  Acute sinusitis with coexisting condition requiring prophylactic treatment   Started by:  Kyler Jones MD        Dose:  1 tablet   Take 1 tablet by mouth 2 times daily for 10 days   Quantity:  20 tablet   Refills:  0       benzonatate 200 MG capsule   Commonly known as:  TESSALON   Used for:  Cough   Started by:  Kyler Jones MD        Dose:  200 mg   Take 1 capsule (200 mg) by mouth 3 times daily as needed for cough   Quantity:  21 capsule   Refills:  0            Where to get your medicines      These medications were sent to [facility] [address] [facility] [address] [place] [address]  [id number] [place] [address]     Phone:  [phone]     benzonatate 200 MG  capsule         Some of these will need a paper prescription and others can be bought over the counter.  Ask your nurse if you have questions.     Bring a paper prescription for each of these medications     amoxicillin-clavulanate 875-125 MG per tablet                Primary Care Provider Office Phone # Fax #    Jamshid Miller [phone] [address] [facility] [address]        Equal Access to Services     [facility] PEYTON : Hadii aad ku hadasho Soomaali, waaxda luqadaha, qaybta kaalmada adeegyada, waxay idiin hayaan adeeg kharash la'african . So [facility] [phone].    ATENCIÓN: Si habla espjodi, tiene a rivera disposición servicios gratuitos de asistencia lingüística. Llame al [phone].    We comply with applicable federal civil rights laws and [place] laws. We do not discriminate on the basis of race, color, national origin, age, disability, sex, sexual orientation, or gender identity.            Thank you!     Thank you for choosing [facility]  for your care. Our goal is always to provide you with excellent care. Hearing back from our patients is one way we can continue to improve our services. Please take a few minutes to complete the written survey that you may receive in the mail after your visit with us. Thank you!             Your Updated Medication List - Protect others around you: Learn how to safely use, store and throw away your medicines at www.disposemymeds.org.          This list is accurate as of 2/23/18  3:00 PM.  Always use your most recent med list.                   Brand Name Dispense Instructions for use Diagnosis    amitriptyline 10 MG tablet    ELAVIL    360 tablet    Take 4 tablets (40 mg) by mouth At Bedtime    Sensory polyneuropathy       amoxicillin-clavulanate 875-125 MG per tablet    AUGMENTIN    20 tablet    Take 1 tablet by mouth 2 times daily for 10 days    Acute sinusitis with coexisting condition requiring prophylactic treatment        aspirin 81 MG tablet      Take 1 tablet by mouth daily.        atorvastatin 40 MG tablet    LIPITOR    90 tablet    Take 1 tablet (40 mg) by mouth daily    Dyslipidemia       benzonatate 200 MG capsule    TESSALON    21 capsule    Take 1 capsule (200 mg) by mouth 3 times daily as needed for cough    Cough       dutasteride 0.5 MG capsule    AVODART    90 capsule    Take 1 capsule (0.5 mg) by mouth daily    Benign non-nodular prostatic hyperplasia with lower urinary tract symptoms       escitalopram 10 MG tablet    LEXAPRO    90 tablet    Take 1 tablet (10 mg) by mouth every morning    Panic disorder without agoraphobia, CASIMIRO (generalized anxiety disorder)       LORazepam 0.5 MG tablet    ATIVAN    30 tablet    Take 0.5-1 tablets (0.25-0.5 mg) by mouth every 6 hours as needed for anxiety    CASIMIRO (generalized anxiety disorder)       nicotine 10 MG Inhaler    NICOTROL    180 each    Inhale 6-16 Cartridges into the lungs daily as needed for smoking cessation    Tobacco abuse       order for DME      Equipment ordered: RESMED Auto PAP Mask type: Nasal Settings: 6-12 cm H2O

## 2018-02-23 NOTE — PATIENT INSTRUCTIONS
Okay to take ibuprofen 200 mg - 4 tablets (800 mg) every 8 hours as needed.  Okay to take tylenol 500 mg - 2 tablets (1000 mg) every 6-8 hours as needed, do not exceed 3000 mg in 24 hours.  Okay to take tessalon perles to help with cough.  Take full course of Tamiflu for influenza A.    If sinus symptoms persists for 7-10 days more, then okay to start antibiotic.      Sinusitis (Antibiotic Treatment)    The sinuses are air-filled spaces within the bones of the face. They connect to the inside of the nose. Sinusitis is an inflammation of the tissue lining the sinus cavity. Sinus inflammation can occur during a cold. It can also be due to allergies to pollens and other particles in the air. Sinusitis can cause symptoms of sinus congestion and fullness. A sinus infection causes fever, headache and facial pain. There is often green or yellow drainage from the nose or into the back of the throat (post-nasal drip). You have been given antibiotics to treat this condition.  Home care:    Take the full course of antibiotics as instructed. Do not stop taking them, even if you feel better.    Drink plenty of water, hot tea, and other liquids. This may help thin mucus. It also may promote sinus drainage.    Heat may help soothe painful areas of the face. Use a towel soaked in hot water. Or,  the shower and direct the hot spray onto your face. Using a vaporizer along with a menthol rub at night may also help.     An expectorant containing guaifenesin may help thin the mucus and promote drainage from the sinuses.    Over-the-counter decongestants may be used unless a similar medicine was prescribed. Nasal sprays work the fastest. Use one that contains phenylephrine or oxymetazoline. First blow the nose gently. Then use the spray. Do not use these medicines more often than directed on the label or symptoms may get worse. You may also use tablets containing pseudoephedrine. Avoid products that combine ingredients, because  side effects may be increased. Read labels. You can also ask the pharmacist for help. (NOTE: Persons with high blood pressure should not use decongestants. They can raise blood pressure.)    Over-the-counter antihistamines may help if allergies contributed to your sinusitis.      Do not use nasal rinses or irrigation during an acute sinus infection, unless told to by your health care provider. Rinsing may spread the infection to other sinuses.    Use acetaminophen or ibuprofen to control pain, unless another pain medicine was prescribed. (If you have chronic liver or kidney disease or ever had a stomach ulcer, talk with your doctor before using these medicines. Aspirin should never be used in anyone under 18 years of age who is ill with a fever. It may cause severe liver damage.)    Don't smoke. This can worsen symptoms.  Follow-up care  Follow up with your healthcare provider or our staff if you are not improving within the next week.  When to seek medical advice  Call your healthcare provider if any of these occur:    Facial pain or headache becoming more severe    Stiff neck    Unusual drowsiness or confusion    Swelling of the forehead or eyelids    Vision problems, including blurred or double vision    Fever of 100.4 F (38 C) or higher, or as directed by your healthcare provider    Seizure    Breathing problems    Symptoms not resolving within 10 days  Date Last Reviewed: 4/13/2015 2000-2017 [employer]. [address] [place] [address], [place] [address]. All rights reserved. This information is not intended as a substitute for professional medical care. Always follow your healthcare professional's instructions.        Influenza (Adult)    Influenza is also called the flu. It is a viral illness that affects the air passages of your lungs. It is different from the common cold. The flu can easily be passed from one to person to another. It may be spread through the air by coughing and sneezing. Or it can  be spread by touching the sick person and then touching your own eyes, nose, or mouth.  The flu starts 1 to 3 days after you are exposed to the flu virus. It may last for 1 to 2 weeks but many people feel tired or fatigued for many weeks afterward. You usually don t need to take antibiotics unless you have a complication. This might be an ear or sinus infection or pneumonia.  Symptoms of the flu may be mild or severe. They can include extreme tiredness (wanting to stay in bed all day), chills, fevers, muscle aches, soreness with eye movement, headache, and a dry, hacking cough.  Home care  Follow these guidelines when caring for yourself at home:    Avoid being around cigarette smoke, whether yours or other people s.    Acetaminophen or ibuprofen will help ease your fever, muscle aches, and headache. Don t give aspirin to anyone younger than 18 who has the flu. Aspirin can harm the liver.    Nausea and loss of appetite are common with the flu. Eat light meals. Drink 6 to 8 glasses of liquids every day. Good choices are water, sport drinks, soft drinks without caffeine, juices, tea, and soup. Extra fluids will also help loosen secretions in your nose and lungs.    Over-the-counter cold medicines will not make the flu go away faster. But the medicines may help with coughing, sore throat, and congestion in your nose and sinuses. Don t use a decongestant if you have high blood pressure.    Stay home until your fever has been gone for at least 24 hours without using medicine to reduce fever.  Follow-up care  Follow up with your healthcare provider, or as advised, if you are not getting better over the next week.  If you are age 65 or older, talk with your provider about getting a pneumococcal vaccine every 5 years. You should also get this vaccine if you have chronic asthma or COPD. All adults should get a flu vaccine every fall. Ask your provider about this.  When to seek medical advice  Call your healthcare provider  right away if any of these occur:    Cough with lots of colored mucus (sputum) or blood in your mucus    Chest pain, shortness of breath, wheezing, or trouble breathing    Severe headache, or face, neck, or ear pain    New rash with fever    Fever of 100.4 F (38 C) or higher, or as directed by your healthcare provider    Confusion, behavior change, or seizure    Severe weakness or dizziness    You get a new fever or cough after getting better for a few days  Date Last Reviewed: 1/1/2017 2000-2017 [employer]. [address] [place], [place] [id number]. All rights reserved. This information is not intended as a substitute for professional medical care. Always follow your healthcare professional's instructions.

## 2018-02-23 NOTE — NURSING NOTE
Called and informed pt of + result per provider. Meds were sent to pharmacy and if he has questions to call us back at [phone].    Lily Jimenez, CMA

## 2018-02-23 NOTE — PROGRESS NOTES
SUBJECTIVE:   Jack Marrero is a 57 year old male presenting with a chief complaint of   Chief Complaint   Patient presents with     URI     cold sx   .    Onset of symptoms was 2 day(s) ago.  Course of illness is worsening.    Severity moderate  Current and Associated symptoms: stuffy nose, cough - non-productive, cough - productive, facial pain/pressure and sneezing, watery eyes  Treatment measures tried include Decongestants and Antihistamine.  Predisposing factors include recent airplane trip.    Endorse sinus headache, drainage, and cough.  Last time treated for sinus infection was couple of years ago.    Patient was in [place], developed symptoms on Wednesday.  Patient denies any close influenza contact but was around many sick people while out of town.        Review of Systems   Constitutional: Positive for malaise/fatigue. Negative for chills and fever.   HENT: Positive for congestion and sinus pain. Negative for sore throat.    Respiratory: Positive for cough. Negative for shortness of breath and wheezing.    Cardiovascular: Negative.    Gastrointestinal: Negative.    Musculoskeletal: Positive for myalgias.   Skin: Negative.          Past Medical History:   Diagnosis Date     ADHD (attention deficit hyperactivity disorder)     dx via Dr Olson PhD     Depression, anxiety      Depressive disorder      Diverticulosis of colon 1/11/2016     Dyslipidemia      Erectile dysfunction 11/30/2011     Hyperlipidemia LDL goal <130 10/31/2010     IGT (impaired glucose tolerance) 10/26/2015     Inflamed seborrheic keratosis 1/29/2016     Pain in joint, shoulder region 12/16/2013     Sensory polyneuropathy 11/19/2015     Tubular adenoma of colon on colonoscopy 1/12/2012     Current Outpatient Prescriptions   Medication Sig Dispense Refill     amoxicillin-clavulanate (AUGMENTIN) 875-125 MG per tablet Take 1 tablet by mouth 2 times daily for 10 days 20 tablet 0     benzonatate (TESSALON) 200 MG capsule Take 1 capsule (200  mg) by mouth 3 times daily as needed for cough 21 capsule 0     oseltamivir (TAMIFLU) 75 MG capsule Take 1 capsule (75 mg) by mouth 2 times daily 10 capsule 0     nicotine (NICOTROL) 10 MG Inhaler Inhale 6-16 Cartridges into the lungs daily as needed for smoking cessation 180 each 1     amitriptyline (ELAVIL) 10 MG tablet Take 4 tablets (40 mg) by mouth At Bedtime 360 tablet 3     atorvastatin (LIPITOR) 40 MG tablet Take 1 tablet (40 mg) by mouth daily 90 tablet 3     dutasteride (AVODART) 0.5 MG capsule Take 1 capsule (0.5 mg) by mouth daily 90 capsule 3     escitalopram (LEXAPRO) 10 MG tablet Take 1 tablet (10 mg) by mouth every morning 90 tablet 2     LORazepam (ATIVAN) 0.5 MG tablet Take 0.5-1 tablets (0.25-0.5 mg) by mouth every 6 hours as needed for anxiety 30 tablet 0     order for DME Equipment ordered: RESMED Auto PAP Mask type: Nasal Settings: 6-12 cm H2O       aspirin 81 MG tablet Take 1 tablet by mouth daily.       Social History   Substance Use Topics     Smoking status: Current Every Day Smoker     Packs/day: 0.50     Years: 40.00     Types: Pipe     Start date: 10/10/1975     Last attempt to quit: 2/19/2014     Smokeless tobacco: Never Used     Alcohol use Yes      Comment: Occasional; about 2 beers/week       OBJECTIVE  BP (!) 143/93 (BP Location: Left arm, Patient Position: Chair, Cuff Size: Adult Large)  Pulse 84  Temp 98.7  F (37.1  C) (Oral)  Wt 205 lb (93 kg)  SpO2 97%  BMI 27.05 kg/m2    Physical Exam   Constitutional: He is oriented to person, place, and time and well-developed, well-nourished, and in no distress.   HENT:   Head: Normocephalic and atraumatic.   Right Ear: Tympanic membrane and external ear normal.   Left Ear: Tympanic membrane and external ear normal.   Mouth/Throat: Oropharynx is clear and moist.   Mild frontal sinus tenderness   Eyes: Conjunctivae are normal. Pupils are equal, round, and reactive to light.   Neck: Normal range of motion. Neck supple.   Cardiovascular:  Normal rate, regular rhythm and normal heart sounds.    Pulmonary/Chest: Effort normal and breath sounds normal. He has no wheezes. He has no rales.   Neurological: He is alert and oriented to person, place, and time.   Skin: Skin is warm and dry. No rash noted. No erythema.   Psychiatric: Mood, affect and judgment normal.       Labs:  Results for orders placed or performed in visit on 02/23/18 (from the past 24 hour(s))   Influenza A/B antigen   Result Value Ref Range    Influenza A/B Agn Specimen Nasal     Influenza A Positive (A) NEG^Negative    Influenza B Negative NEG^Negative       X-Ray was not done.    ASSESSMENT:      ICD-10-CM    1. Influenza A J10.1 oseltamivir (TAMIFLU) 75 MG capsule   2. Cough R05 Influenza A/B antigen     benzonatate (TESSALON) 200 MG capsule   3. Acute sinusitis with coexisting condition requiring prophylactic treatment J01.90 amoxicillin-clavulanate (AUGMENTIN) 875-125 MG per tablet        Medical Decision Making:    Differential Diagnosis:  URI Adult/Peds:  Bronchitis-viral, Influenza, Sinusitis, Viral pharyngitis and Viral upper respiratory illness    Serious Comorbid Conditions:  Adult:  TOB user    PLAN:    URI Adult:  Tylenol, Ibuprofen, Fluids and Rest  RX Tamiflu   RX tessalon perles to help with cough  If sinus symptoms worsens in the next 7-10 days, then okay to take antibiotic - RX Augmentin.  Encourage TOB cessation    Followup:    If not improving or if condition worsens, follow up with your Primary Care Provider    Patient Instructions   Okay to take ibuprofen 200 mg - 4 tablets (800 mg) every 8 hours as needed.  Okay to take tylenol 500 mg - 2 tablets (1000 mg) every 6-8 hours as needed, do not exceed 3000 mg in 24 hours.  Okay to take tessalon perles to help with cough.    If sinus symptoms persists for 7-10 days more, then okay to start antibiotic.      Sinusitis (Antibiotic Treatment)    The sinuses are air-filled spaces within the bones of the face. They connect to  the inside of the nose. Sinusitis is an inflammation of the tissue lining the sinus cavity. Sinus inflammation can occur during a cold. It can also be due to allergies to pollens and other particles in the air. Sinusitis can cause symptoms of sinus congestion and fullness. A sinus infection causes fever, headache and facial pain. There is often green or yellow drainage from the nose or into the back of the throat (post-nasal drip). You have been given antibiotics to treat this condition.  Home care:    Take the full course of antibiotics as instructed. Do not stop taking them, even if you feel better.    Drink plenty of water, hot tea, and other liquids. This may help thin mucus. It also may promote sinus drainage.    Heat may help soothe painful areas of the face. Use a towel soaked in hot water. Or,  the shower and direct the hot spray onto your face. Using a vaporizer along with a menthol rub at night may also help.     An expectorant containing guaifenesin may help thin the mucus and promote drainage from the sinuses.    Over-the-counter decongestants may be used unless a similar medicine was prescribed. Nasal sprays work the fastest. Use one that contains phenylephrine or oxymetazoline. First blow the nose gently. Then use the spray. Do not use these medicines more often than directed on the label or symptoms may get worse. You may also use tablets containing pseudoephedrine. Avoid products that combine ingredients, because side effects may be increased. Read labels. You can also ask the pharmacist for help. (NOTE: Persons with high blood pressure should not use decongestants. They can raise blood pressure.)    Over-the-counter antihistamines may help if allergies contributed to your sinusitis.      Do not use nasal rinses or irrigation during an acute sinus infection, unless told to by your health care provider. Rinsing may spread the infection to other sinuses.    Use acetaminophen or ibuprofen to  control pain, unless another pain medicine was prescribed. (If you have chronic liver or kidney disease or ever had a stomach ulcer, talk with your doctor before using these medicines. Aspirin should never be used in anyone under 18 years of age who is ill with a fever. It may cause severe liver damage.)    Don't smoke. This can worsen symptoms.  Follow-up care  Follow up with your healthcare provider or our staff if you are not improving within the next week.  When to seek medical advice  Call your healthcare provider if any of these occur:    Facial pain or headache becoming more severe    Stiff neck    Unusual drowsiness or confusion    Swelling of the forehead or eyelids    Vision problems, including blurred or double vision    Fever of 100.4 F (38 C) or higher, or as directed by your healthcare provider    Seizure    Breathing problems    Symptoms not resolving within 10 days  Date Last Reviewed: 4/13/2015 2000-2017 The [employer]. [address] [place] [address]. All rights reserved. This information is not intended as a substitute for professional medical care. Always follow your healthcare professional's instructions.        Influenza (Adult)    Influenza is also called the flu. It is a viral illness that affects the air passages of your lungs. It is different from the common cold. The flu can easily be passed from one to person to another. It may be spread through the air by coughing and sneezing. Or it can be spread by touching the sick person and then touching your own eyes, nose, or mouth.  The flu starts 1 to 3 days after you are exposed to the flu virus. It may last for 1 to 2 weeks but many people feel tired or fatigued for many weeks afterward. You usually don t need to take antibiotics unless you have a complication. This might be an ear or sinus infection or pneumonia.  Symptoms of the flu may be mild or severe. They can include extreme tiredness (wanting to stay in bed all  day), chills, fevers, muscle aches, soreness with eye movement, headache, and a dry, hacking cough.  Home care  Follow these guidelines when caring for yourself at home:    Avoid being around cigarette smoke, whether yours or other people s.    Acetaminophen or ibuprofen will help ease your fever, muscle aches, and headache. Don t give aspirin to anyone younger than 18 who has the flu. Aspirin can harm the liver.    Nausea and loss of appetite are common with the flu. Eat light meals. Drink 6 to 8 glasses of liquids every day. Good choices are water, sport drinks, soft drinks without caffeine, juices, tea, and soup. Extra fluids will also help loosen secretions in your nose and lungs.    Over-the-counter cold medicines will not make the flu go away faster. But the medicines may help with coughing, sore throat, and congestion in your nose and sinuses. Don t use a decongestant if you have high blood pressure.    Stay home until your fever has been gone for at least 24 hours without using medicine to reduce fever.  Follow-up care  Follow up with your healthcare provider, or as advised, if you are not getting better over the next week.  If you are age 65 or older, talk with your provider about getting a pneumococcal vaccine every 5 years. You should also get this vaccine if you have chronic asthma or COPD. All adults should get a flu vaccine every fall. Ask your provider about this.  When to seek medical advice  Call your healthcare provider right away if any of these occur:    Cough with lots of colored mucus (sputum) or blood in your mucus    Chest pain, shortness of breath, wheezing, or trouble breathing    Severe headache, or face, neck, or ear pain    New rash with fever    Fever of 100.4 F (38 C) or higher, or as directed by your healthcare provider    Confusion, behavior change, or seizure    Severe weakness or dizziness    You get a new fever or cough after getting better for a few days  Date Last Reviewed:  1/1/2017 2000-2017 The [employer]. [address] [place] [address], [place] [address]. All rights reserved. This information is not intended as a substitute for professional medical care. Always follow your healthcare professional's instructions.

## 2018-05-08 ENCOUNTER — TELEPHONE (OUTPATIENT)
Dept: FAMILY MEDICINE | Facility: CLINIC | Age: 58
End: 2018-05-08

## 2018-05-08 NOTE — LETTER
Jack Marrero  [address] [address] [address]          May 8, 2018          DeaRamo Marrero      Our records indicate that you have not scheduled for a(n)Blood pressure check  and depression follow up which was recommended by your health care team. Monitoring and managing your preventative and chronic health conditions are very important to us.       If you have received your health care elsewhere, please provide us with that information so it can be documented in your chart.    Please call [phone] or message us through your [employer] account to schedule an appointment or provide information for your chart.     We look forward to seeing you and working with you on your health care needs.     Sincerely,   Jamshid Miller MD/ms            *If you have already scheduled an appointment, please disregard this reminder

## 2018-05-08 NOTE — TELEPHONE ENCOUNTER
Panel Management Review      Patient has the following on his problem list:       IVD   ASA: Passed    Last LDL:    Lab Results   Component Value Date    CHOL 145 12/19/2017     Lab Results   Component Value Date    HDL 40 12/19/2017     Lab Results   Component Value Date    LDL 56 12/19/2017     Lab Results   Component Value Date    TRIG 246 12/19/2017        Lab Results   Component Value Date    CHOLHDLRATIO 3.6 10/26/2015        Is the patient on a Statin? YES   Is the patient on Aspirin? YES                  Medications     HMG CoA Reductase Inhibitors    atorvastatin (LIPITOR) 40 MG tablet    Salicylates    aspirin 81 MG tablet          Last three blood pressure readings:  BP Readings from Last 3 Encounters:   02/23/18 (!) 143/93   12/15/17 128/88   10/11/17 134/82        Tobacco History:     History   Smoking Status     Current Every Day Smoker     Packs/day: 0.50     Years: 40.00     Types: Pipe     Start date: 10/10/1975     Last attempt to quit: 2/19/2014   Smokeless Tobacco     Never Used         Composite cancer screening  Chart review shows that this patient is due/due soon for the following None  Summary:    Patient is due/failing the following:   BP CHECK, DAP and PHQ9    Action needed:   Patient needs office visit for Blood pressure and depression follow up.    Type of outreach:    Sent letter.    Questions for provider review:    None                                                                                                                                    Yesenia Kearns cma       Chart routed to closed letter sent .

## 2018-07-11 ENCOUNTER — OFFICE VISIT (OUTPATIENT)
Dept: URGENT CARE | Facility: URGENT CARE | Age: 58
End: 2018-07-11
Payer: COMMERCIAL

## 2018-07-11 VITALS
TEMPERATURE: 98.4 F | BODY MASS INDEX: 27.05 KG/M2 | OXYGEN SATURATION: 96 % | WEIGHT: 205 LBS | SYSTOLIC BLOOD PRESSURE: 145 MMHG | DIASTOLIC BLOOD PRESSURE: 89 MMHG | HEART RATE: 85 BPM

## 2018-07-11 DIAGNOSIS — L08.9 LOCAL INFECTION OF SKIN AND SUBCUTANEOUS TISSUE: Primary | ICD-10-CM

## 2018-07-11 PROCEDURE — 99213 OFFICE O/P EST LOW 20 MIN: CPT | Performed by: PHYSICIAN ASSISTANT

## 2018-07-11 ASSESSMENT — ENCOUNTER SYMPTOMS
RHINORRHEA: 0
UNEXPECTED WEIGHT CHANGE: 0
CHILLS: 0
EYE REDNESS: 0
ROS SKIN COMMENTS: BUG BITE
ARTHRALGIAS: 0
EYE ITCHING: 0
VOMITING: 0
SHORTNESS OF BREATH: 0
COUGH: 0
FEVER: 0
SINUS PRESSURE: 0
SORE THROAT: 0
DIARRHEA: 0
MYALGIAS: 0
SINUS PAIN: 0
NAUSEA: 0
EYE DISCHARGE: 0
CONSTIPATION: 0
EYE PAIN: 0
PALPITATIONS: 0
ABDOMINAL PAIN: 0
WHEEZING: 0
FATIGUE: 0

## 2018-07-11 NOTE — PROGRESS NOTES
SUBJECTIVE:   Jack Marrero is a 57 year old male presenting with a chief complaint of   Chief Complaint   Patient presents with     Insect Bites     x6 days- bite on back, itching, tingling, rash around it is getting larger, has a dark center. Works on a farm.       He is an established patient of [facility].    Bite/Sting    Onset of symptoms was 6 day(s) ago.  Course of illness is same.    Severity mild  Location: back   Context: thought is was a mosquito bite, now getting more red around the bite. Has been scratching it.   Current and Associated symptoms: itching and redness  Treatment measures tried include: nothing  Relief from treatment: none  Significant past medical history: N/A      Review of Systems   Constitutional: Negative for chills, fatigue, fever and unexpected weight change.   HENT: Negative for congestion, ear pain, rhinorrhea, sinus pain, sinus pressure and sore throat.    Eyes: Negative for pain, discharge, redness and itching.   Respiratory: Negative for cough, shortness of breath and wheezing.    Cardiovascular: Negative for chest pain, palpitations and leg swelling.   Gastrointestinal: Negative for abdominal pain, constipation, diarrhea, nausea and vomiting.   Musculoskeletal: Negative for arthralgias and myalgias.   Skin: Negative for rash.        Bug bite       Past Medical History:   Diagnosis Date     ADHD (attention deficit hyperactivity disorder)     dx via Dr Olson PhD     Depression, anxiety      Depressive disorder      Diverticulosis of colon 1/11/2016     Dyslipidemia      Erectile dysfunction 11/30/2011     Hyperlipidemia LDL goal <130 10/31/2010     IGT (impaired glucose tolerance) 10/26/2015     Inflamed seborrheic keratosis 1/29/2016     Pain in joint, shoulder region 12/16/2013     Sensory polyneuropathy 11/19/2015     Tubular adenoma of colon on colonoscopy 1/12/2012     Family History   Problem Relation Age of Onset     Arthritis Mother      HEART DISEASE Mother      Lipids  Mother      Eye Disorder Mother      GASTROINTESTINAL DISEASE Mother      irritable bowel     Breast Cancer Mother      Osteoperosis Mother      Lipids Father      HEART DISEASE Father      Psychotic Disorder Father      Depression Father      Eye Disorder Father      C.A.D. Father 56     CABG at age 56     Coronary Artery Disease Father      Hyperlipidemia Father      Depression/Anxiety Father      Mental Illness Father      Arthritis Maternal Grandmother      Alcohol/Drug Maternal Grandfather      Cancer - colorectal Maternal Grandfather      Hypertension Paternal Grandmother      Breast Cancer Paternal Grandmother      HEART DISEASE Paternal Grandfather      Hypertension Paternal Grandfather      Coronary Artery Disease Paternal Grandfather      Hypertension Brother      Depression/Anxiety Brother      Thyroid Disease Sister      Hypertension Brother      Depression/Anxiety Brother      Hypertension Brother      GASTROINTESTINAL DISEASE Brother      Prostate Cancer Brother      Cardiovascular Other      Diabetes Maternal Half-Brother      Thyroid Disease Sister      Thyroid Disease Sister      Other Cancer Other      Lung; Paternal Uncle/Lung; Paternal Uncle     Cerebrovascular Disease No family hx of      Current Outpatient Prescriptions   Medication Sig Dispense Refill     amitriptyline (ELAVIL) 10 MG tablet Take 4 tablets (40 mg) by mouth At Bedtime 360 tablet 3     amoxicillin-clavulanate (AUGMENTIN) 875-125 MG per tablet Take 1 tablet by mouth 2 times daily for 7 days 14 tablet 0     aspirin 81 MG tablet Take 1 tablet by mouth daily.       atorvastatin (LIPITOR) 40 MG tablet Take 1 tablet (40 mg) by mouth daily 90 tablet 3     benzonatate (TESSALON) 200 MG capsule Take 1 capsule (200 mg) by mouth 3 times daily as needed for cough 21 capsule 0     dutasteride (AVODART) 0.5 MG capsule Take 1 capsule (0.5 mg) by mouth daily 90 capsule 3     escitalopram (LEXAPRO) 10 MG tablet Take 1 tablet (10 mg) by mouth  every morning 90 tablet 2     LORazepam (ATIVAN) 0.5 MG tablet Take 0.5-1 tablets (0.25-0.5 mg) by mouth every 6 hours as needed for anxiety 30 tablet 0     nicotine (NICOTROL) 10 MG Inhaler Inhale 6-16 Cartridges into the lungs daily as needed for smoking cessation 180 each 1     order for DME Equipment ordered: RESMED Auto PAP Mask type: Nasal Settings: 6-12 cm H2O       oseltamivir (TAMIFLU) 75 MG capsule Take 1 capsule (75 mg) by mouth 2 times daily 10 capsule 0     Social History   Substance Use Topics     Smoking status: Current Every Day Smoker     Packs/day: 0.50     Years: 40.00     Types: Pipe     Start date: 10/10/1975     Last attempt to quit: 2/19/2014     Smokeless tobacco: Never Used     Alcohol use Yes      Comment: Occasional; about 2 beers/week       OBJECTIVE  /89 (BP Location: Left arm, Patient Position: Chair, Cuff Size: Adult Regular)  Pulse 85  Temp 98.4  F (36.9  C) (Oral)  Wt 205 lb (93 kg)  SpO2 96%  BMI 27.05 kg/m2    Physical Exam   Constitutional: He appears well-developed and well-nourished. No distress.   HENT:   Head: Normocephalic and atraumatic.   Right Ear: Tympanic membrane and ear canal normal.   Left Ear: Tympanic membrane and ear canal normal.   Mouth/Throat: Oropharynx is clear and moist.   Eyes: Conjunctivae are normal. Pupils are equal, round, and reactive to light.   Cardiovascular: Normal rate and regular rhythm.    Pulmonary/Chest: Effort normal and breath sounds normal.   Skin: Skin is warm and dry. No rash noted.   Mid back has scabbed area from bite with surrounding redness and slight tenderness with palpation. No drainage/dishcarge    Psychiatric: He has a normal mood and affect. His behavior is normal.       Labs:  No results found for this or any previous visit (from the past 24 hour(s)).    X-Ray was not done.    ASSESSMENT:      ICD-10-CM    1. Local infection of skin and subcutaneous tissue L08.9 amoxicillin-clavulanate (AUGMENTIN) 875-125 MG per  tablet        Medical Decision Making:    Differential Diagnosis:  Insect Bite: Insect sting, Mosquito bite, Deer tick bite, Woodtick bite and Cellulitis    Serious Comorbid Conditions:  Adult:  None    PLAN:    Will treat with Augmentin x 7 days. Keep area clean and dry. Return to clinic if symptoms worsen or do not improve; otherwise follow up as needed      Followup:    If not improving or if condition worsens, follow up with your Primary Care Provider    There are no Patient Instructions on file for this visit.

## 2018-07-11 NOTE — MR AVS SNAPSHOT
After Visit Summary   7/11/2018    Jack Marrero    MRN: [id number]           Patient Information     Date Of Birth          1960        Visit Information        Provider Department      7/11/2018 12:10 PM Stefani Ennis PA-C [facility]        Today's Diagnoses     Local infection of skin and subcutaneous tissue    -  1       Follow-ups after your visit        Follow-up notes from your care team     Return if symptoms worsen or fail to improve.      Your next 10 appointments already scheduled     Jul 11, 2018 12:10 PM CDT   Team Short with Stefani Ennis PA-C   [facility] ([facility] )    [address]  [facility] [address]   [phone]              Who to contact     If you have questions or need follow up information about today's clinic visit or your schedule please contact [facility] directly at [phone].  Normal or non-critical lab and imaging results will be communicated to you by [employer], letter or phone within 4 business days after the clinic has received the results. If you do not hear from us within 7 days, please contact the clinic through [employer] or phone. If you have a critical or abnormal lab result, we will notify you by phone as soon as possible.  Submit refill requests through [employer] or call your pharmacy and they will forward the refill request to us. Please allow 3 business days for your refill to be completed.          Additional Information About Your Visit        MyChart Information     [employer] gives you secure access to your electronic health record. If you see a primary care provider, you can also send messages to your care team and make appointments. If you have questions, please call your primary care clinic.  If you do not have a primary care provider, please call [phone] and they will assist you.        Care EveryWhere ID     This is your Care EveryWhere ID. This  could be used by other organizations to access your [place] medical records  [id number]        Your Vitals Were     Pulse Temperature Pulse Oximetry BMI (Body Mass Index)          85 98.4  F (36.9  C) (Oral) 96% 27.05 kg/m2         Blood Pressure from Last 3 Encounters:   07/11/18 145/89   02/23/18 (!) 143/93   12/15/17 128/88    Weight from Last 3 Encounters:   07/11/18 205 lb (93 kg)   02/23/18 205 lb (93 kg)   12/15/17 200 lb (90.7 kg)              Today, you had the following     No orders found for display         Today's Medication Changes          These changes are accurate as of 7/11/18 11:58 AM.  If you have any questions, ask your nurse or doctor.               Start taking these medicines.        Dose/Directions    amoxicillin-clavulanate 875-125 MG per tablet   Commonly known as:  AUGMENTIN   Used for:  Local infection of skin and subcutaneous tissue   Started by:  Stefani Ennis PA-C        Dose:  1 tablet   Take 1 tablet by mouth 2 times daily for 7 days   Quantity:  14 tablet   Refills:  0            Where to get your medicines      These medications were sent to [facility] [address] [place] [id number]     Phone:  [phone]     amoxicillin-clavulanate 875-125 MG per tablet                Primary Care Provider Office Phone # Fax #    Jamshid Miller [phone] [address] [facility] [address]        Equal Access to Services     CORDELIA TODD : Hadkatie baro Soernestine, waaxda luqadaha, qaybta kaalmada tushar, kathy idityler king. So [facility] [phone].    ATENCIÓN: Si habla español, tiene a rivera disposición servicios gratuitos de asistencia lingüística. Pierre al [phone].    We comply with applicable federal civil rights laws and [place] laws. We do not discriminate on the basis of race, color, national origin, age, disability, sex, sexual orientation, or  gender identity.            Thank you!     Thank you for choosing [facility]  for your care. Our goal is always to provide you with excellent care. Hearing back from our patients is one way we can continue to improve our services. Please take a few minutes to complete the written survey that you may receive in the mail after your visit with us. Thank you!             Your Updated Medication List - Protect others around you: Learn how to safely use, store and throw away your medicines at www.disposemymeds.org.          This list is accurate as of 7/11/18 11:58 AM.  Always use your most recent med list.                   Brand Name Dispense Instructions for use Diagnosis    amitriptyline 10 MG tablet    ELAVIL    360 tablet    Take 4 tablets (40 mg) by mouth At Bedtime    Sensory polyneuropathy       amoxicillin-clavulanate 875-125 MG per tablet    AUGMENTIN    14 tablet    Take 1 tablet by mouth 2 times daily for 7 days    Local infection of skin and subcutaneous tissue       aspirin 81 MG tablet      Take 1 tablet by mouth daily.        atorvastatin 40 MG tablet    LIPITOR    90 tablet    Take 1 tablet (40 mg) by mouth daily    Dyslipidemia       benzonatate 200 MG capsule    TESSALON    21 capsule    Take 1 capsule (200 mg) by mouth 3 times daily as needed for cough    Cough       dutasteride 0.5 MG capsule    AVODART    90 capsule    Take 1 capsule (0.5 mg) by mouth daily    Benign non-nodular prostatic hyperplasia with lower urinary tract symptoms       escitalopram 10 MG tablet    LEXAPRO    90 tablet    Take 1 tablet (10 mg) by mouth every morning    Panic disorder without agoraphobia, CASIMIRO (generalized anxiety disorder)       LORazepam 0.5 MG tablet    ATIVAN    30 tablet    Take 0.5-1 tablets (0.25-0.5 mg) by mouth every 6 hours as needed for anxiety    CASIMIRO (generalized anxiety disorder)       nicotine 10 MG Inhaler    NICOTROL    180 each    Inhale 6-16 Cartridges into the lungs daily as  needed for smoking cessation    Tobacco abuse       order for DME      Equipment ordered: RESMED Auto PAP Mask type: Nasal Settings: 6-12 cm H2O        oseltamivir 75 MG capsule    TAMIFLU    10 capsule    Take 1 capsule (75 mg) by mouth 2 times daily    Influenza A

## 2018-07-23 DIAGNOSIS — F41.1 GAD (GENERALIZED ANXIETY DISORDER): ICD-10-CM

## 2018-07-23 DIAGNOSIS — F41.0 PANIC DISORDER WITHOUT AGORAPHOBIA: ICD-10-CM

## 2018-07-25 RX ORDER — ESCITALOPRAM OXALATE 10 MG/1
TABLET ORAL
Qty: 90 TABLET | Refills: 1 | Status: SHIPPED | OUTPATIENT
Start: 2018-07-25 | End: 2019-01-22

## 2018-07-27 ENCOUNTER — ALLIED HEALTH/NURSE VISIT (OUTPATIENT)
Dept: NURSING | Facility: CLINIC | Age: 58
End: 2018-07-27
Payer: COMMERCIAL

## 2018-07-27 ENCOUNTER — OFFICE VISIT (OUTPATIENT)
Dept: URGENT CARE | Facility: URGENT CARE | Age: 58
End: 2018-07-27
Payer: COMMERCIAL

## 2018-07-27 ENCOUNTER — TRANSFERRED RECORDS (OUTPATIENT)
Dept: HEALTH INFORMATION MANAGEMENT | Facility: CLINIC | Age: 58
End: 2018-07-27

## 2018-07-27 VITALS
HEART RATE: 84 BPM | RESPIRATION RATE: 14 BRPM | DIASTOLIC BLOOD PRESSURE: 103 MMHG | OXYGEN SATURATION: 97 % | TEMPERATURE: 98.3 F | SYSTOLIC BLOOD PRESSURE: 153 MMHG

## 2018-07-27 VITALS
TEMPERATURE: 98.3 F | DIASTOLIC BLOOD PRESSURE: 103 MMHG | RESPIRATION RATE: 16 BRPM | OXYGEN SATURATION: 97 % | SYSTOLIC BLOOD PRESSURE: 153 MMHG | HEART RATE: 84 BPM

## 2018-07-27 DIAGNOSIS — S06.0X0A CONCUSSION WITHOUT LOSS OF CONSCIOUSNESS, INITIAL ENCOUNTER: Primary | ICD-10-CM

## 2018-07-27 DIAGNOSIS — S09.90XA HEAD INJURY: Primary | ICD-10-CM

## 2018-07-27 PROCEDURE — 99211 OFF/OP EST MAY X REQ PHY/QHP: CPT

## 2018-07-27 ASSESSMENT — PAIN SCALES - GENERAL
PAINLEVEL: MODERATE PAIN (5)
PAINLEVEL: MODERATE PAIN (5)

## 2018-07-27 NOTE — PROGRESS NOTES
Nurse triaged. Needs to go to ER for eval/CT scan. I laid eyes on the patient. Appears stable, go to ER. I did not examine. Nursing charge only.    Windy Stanley PA-C

## 2018-07-27 NOTE — NURSING NOTE
"RN Triage:     Chief Complaint   Patient presents with     Headache     Head Injury     probable concussion     Fall     x 2 in last 3 weeks       Initial BP (!) 153/103 (BP Location: Left arm, Patient Position: Sitting, Cuff Size: Adult Regular)  Pulse 84  Temp 98.3  F (36.8  C) (Oral)  Resp 16  SpO2 97% Estimated body mass index is 27.05 kg/(m^2) as calculated from the following:    Height as of 4/18/17: 6' 1\" (1.854 m).    Weight as of 7/11/18: 205 lb (93 kg).  BP completed using cuff size: regular    Assessment:  Patient walked in to clinic with complaints of headache and possible concussion. Pt owns and works on personal farm. Fell off a ladder at home about 3 weeks ago and snapped neck back really hard with fall, does not remember hitting head. Had occasional nausea and headaches afterward. Pt then fell again at home about 1 week ago, tripping over some farm equipment, and again snapped neck to the side \"really hard\". Has had an increase in symptoms since second fall. C/o dizziness, nausea, feeling \"foggy headed\" and delayed reactions to things, sensitive to light and noise, vomited one time with in 10 minutes of second fall but has not since, feels if turns head quick to one side then \"eyes feel like they have to catch up\", body chills, metallic like taste in his mucus and sinuses. Denies: difficulty breathing, any noticeable bleeding or drainage, numbness or tingling in extremities, noticeable swelling, trouble arousing or being very tired, or seizures. Headaches started as occasional but have now progressed to consistent and persistent. Vital signs obtained. BP is elevated. HTN not on problem list. Other vitals WNL and all documented in chart. Pt stable at this time.    Triage Dispo: Urgent to Emergent: Patient assessed to have immediate needs. Patient placed in clinic room and provider notified. RN recommendation is to go to ER for further evaluation and hopefully CT scan of head/brain. Huddled with UC " provider and agrees [facility] plan.  provider to lay eyes on pt and advise of plan.    Intervention: Patient to proceed to [facility] ER for further evaluation. Pt reports he drove self here to clinic today and is alone. Pt feels is stable enough to drive self.  provider ok with this. Pt agreed with plan and will head to ER immediately upon leaving clinic. Pt advised to call 9-1-1 if has worsening symptoms while driving.      Marjorie Luke RN

## 2018-07-27 NOTE — NURSING NOTE
"RN Triage:     Chief Complaint   Patient presents with     Headache     Head Injury     probable concussion     Fall     x 2 in last 3 weeks       Initial BP (!) 153/103 (BP Location: Left arm, Patient Position: Sitting, Cuff Size: Adult Regular)  Pulse 84  Temp 98.3  F (36.8  C) (Oral)  Resp 16  SpO2 97% Estimated body mass index is 27.05 kg/(m^2) as calculated from the following:    Height as of 4/18/17: 6' 1\" (1.854 m).    Weight as of 7/11/18: 205 lb (93 kg).  BP completed using cuff size: regular    Assessment:  Patient walked in to clinic with complaints of headache and possible concussion. Pt owns and works on personal farm. Fell off a ladder at home about 3 weeks ago and snapped neck back really hard with fall, does not remember hitting head. Had occasional nausea and headaches afterward. Pt then fell again at home about 1 week ago, tripping over some farm equipment, and again snapped neck to the side \"really hard\". Has had an increase in symptoms since second fall. C/o dizziness, nausea, feeling \"foggy headed\" and delayed reactions to things, sensitive to light and noise, vomited one time with in 10 minutes of second fall but has not since, feels if turns head quick to one side then \"eyes feel like they have to catch up\", body chills, metallic like taste in his mucus and sinuses. Denies: difficulty breathing, any noticeable bleeding or drainage, numbness or tingling in extremities, noticeable swelling, trouble arousing or being very tired, or seizures. Headaches started as occasional but have now progressed to consistent and persistent. Vital signs obtained. BP is elevated. HTN not on problem list. Other vitals WNL and all documented in chart. Pt stable at this time.    Triage Dispo: Urgent to Emergent: Patient assessed to have immediate needs. Patient placed in clinic room and provider notified. RN recommendation is to go to ER for further evaluation and hopefully CT scan of head/brain. Huddled with UC " provider and agrees [facility] plan.    Intervention: Patient to proceed to [facility] ER for further evaluation. Pt reports he drove self here to clinic today and is alone. Pt feels is stable enough to drive self.  provider ok with this. Pt agreed with plan and will head to ER immediately upon leaving clinic. Pt advised to call 9-1-1 if has worsening symptoms while driving.      Marjorie Luke RN

## 2018-07-27 NOTE — MR AVS SNAPSHOT
After Visit Summary   7/27/2018    Jack Marrero    MRN: [id number]           Patient Information     Date Of Birth          1960        Visit Information        Provider Department      7/27/2018 11:00 AM BK RN [facility]        Today's Diagnoses     Head injury    -  1       Follow-ups after your visit        Your next 10 appointments already scheduled     Jul 27, 2018 11:30 AM CDT   Team Short with Windy Stanley PA-C   [facility] ([facility] )    [address]  [facility] [address]   [phone]              Who to contact     If you have questions or need follow up information about today's clinic visit or your schedule please contact [facility] directly at [phone].  Normal or non-critical lab and imaging results will be communicated to you by [employer], letter or phone within 4 business days after the clinic has received the results. If you do not hear from us within 7 days, please contact the clinic through MyChart or phone. If you have a critical or abnormal lab result, we will notify you by phone as soon as possible.  Submit refill requests through [employer] or call your pharmacy and they will forward the refill request to us. Please allow 3 business days for your refill to be completed.          Additional Information About Your Visit        MyChart Information     [employer] gives you secure access to your electronic health record. If you see a primary care provider, you can also send messages to your care team and make appointments. If you have questions, please call your primary care clinic.  If you do not have a primary care provider, please call [phone] and they will assist you.        Care EveryWhere ID     This is your Care EveryWhere ID. This could be used by other organizations to access your [facility] medical records  [id number]        Your Vitals Were     Pulse Temperature  Respirations Pulse Oximetry          84 98.3  F (36.8  C) (Oral) 16 97%         Blood Pressure from Last 3 Encounters:   07/27/18 (!) 153/103   07/11/18 145/89   02/23/18 (!) 143/93    Weight from Last 3 Encounters:   07/11/18 205 lb (93 kg)   02/23/18 205 lb (93 kg)   12/15/17 200 lb (90.7 kg)              Today, you had the following     No orders found for display       Primary Care Provider Office Phone # Fax #    Jamshid Miller [phone] [address] [facility] [address]        Equal Access to Services     CORDELIA TODD : Richie Stephens, wawalt monroe, qachristopher kaalmada tushar, kathy king. So [facility] [phone].    ATENCIÓN: Si habla español, tiene a rivera disposición servicios gratuitos de asistencia lingüística. Llame al [phone].    We comply with applicable federal civil rights laws and [place] laws. We do not discriminate on the basis of race, color, national origin, age, disability, sex, sexual orientation, or gender identity.            Thank you!     Thank you for choosing [facility]  for your care. Our goal is always to provide you with excellent care. Hearing back from our patients is one way we can continue to improve our services. Please take a few minutes to complete the written survey that you may receive in the mail after your visit with us. Thank you!             Your Updated Medication List - Protect others around you: Learn how to safely use, store and throw away your medicines at www.disposemymeds.org.          This list is accurate as of 7/27/18 11:29 AM.  Always use your most recent med list.                   Brand Name Dispense Instructions for use Diagnosis    amitriptyline 10 MG tablet    ELAVIL    360 tablet    Take 4 tablets (40 mg) by mouth At Bedtime    Sensory polyneuropathy       aspirin 81 MG tablet      Take 1 tablet by mouth daily.        atorvastatin 40 MG tablet    LIPITOR     90 tablet    Take 1 tablet (40 mg) by mouth daily    Dyslipidemia       benzonatate 200 MG capsule    TESSALON    21 capsule    Take 1 capsule (200 mg) by mouth 3 times daily as needed for cough    Cough       dutasteride 0.5 MG capsule    AVODART    90 capsule    Take 1 capsule (0.5 mg) by mouth daily    Benign non-nodular prostatic hyperplasia with lower urinary tract symptoms       escitalopram 10 MG tablet    LEXAPRO    90 tablet    TAKE ONE TABLET BY MOUTH EVERY MORNING    Panic disorder without agoraphobia, CASIMIRO (generalized anxiety disorder)       LORazepam 0.5 MG tablet    ATIVAN    30 tablet    Take 0.5-1 tablets (0.25-0.5 mg) by mouth every 6 hours as needed for anxiety    CASIMIRO (generalized anxiety disorder)       nicotine 10 MG Inhaler    NICOTROL    180 each    Inhale 6-16 Cartridges into the lungs daily as needed for smoking cessation    Tobacco abuse       order for DME      Equipment ordered: RESMED Auto PAP Mask type: Nasal Settings: 6-12 cm H2O        oseltamivir 75 MG capsule    TAMIFLU    10 capsule    Take 1 capsule (75 mg) by mouth 2 times daily    Influenza A

## 2018-07-27 NOTE — MR AVS SNAPSHOT
After Visit Summary   7/27/2018    Jack Marrero    MRN: [id number]           Patient Information     Date Of Birth          1960        Visit Information        Provider Department      7/27/2018 11:30 AM Windy Stanley PA-C [facility]        Today's Diagnoses     Concussion without loss of consciousness, initial encounter    -  1       Follow-ups after your visit        Who to contact     If you have questions or need follow up information about today's clinic visit or your schedule please contact [facility] directly at [phone].  Normal or non-critical lab and imaging results will be communicated to you by [employer], letter or phone within 4 business days after the clinic has received the results. If you do not hear from us within 7 days, please contact the clinic through [employer] or phone. If you have a critical or abnormal lab result, we will notify you by phone as soon as possible.  Submit refill requests through [employer] or call your pharmacy and they will forward the refill request to us. Please allow 3 business days for your refill to be completed.          Additional Information About Your Visit        MyChart Information     [employer] gives you secure access to your electronic health record. If you see a primary care provider, you can also send messages to your care team and make appointments. If you have questions, please call your primary care clinic.  If you do not have a primary care provider, please call [phone] and they will assist you.        Care EveryWhere ID     This is your Care EveryWhere ID. This could be used by other organizations to access your [place] medical records  [id number]        Your Vitals Were     Pulse Temperature Respirations Pulse Oximetry          84 98.3  F (36.8  C) (Oral) 14 97%         Blood Pressure from Last 3 Encounters:   07/27/18 (!) 153/103   07/27/18 (!) 153/103   07/11/18 145/89    Weight from  Last 3 Encounters:   07/11/18 205 lb (93 kg)   02/23/18 205 lb (93 kg)   12/15/17 200 lb (90.7 kg)              Today, you had the following     No orders found for display       Primary Care Provider Office Phone # Fax #    Jamshid Miller [phone] [address] [facility] [address]        Equal Access to Services     [facility] HospitalROXANA : Hadii aad ku hadasho Soomaali, waaxda luqadaha, qaybta kaalmada adeegyada, waxay idiin hayaan adeeg kharash la'aan ah. So [facility] [phone].    ATENCIÓN: Si jose rafael danielle, tiene a rivera disposición servicios gratuitos de asistencia lingüística. Llame al [phone].    We comply with applicable federal civil rights laws and [place] laws. We do not discriminate on the basis of race, color, national origin, age, disability, sex, sexual orientation, or gender identity.            Thank you!     Thank you for choosing [facility]  for your care. Our goal is always to provide you with excellent care. Hearing back from our patients is one way we can continue to improve our services. Please take a few minutes to complete the written survey that you may receive in the mail after your visit with us. Thank you!             Your Updated Medication List - Protect others around you: Learn how to safely use, store and throw away your medicines at www.disposemymeds.org.          This list is accurate as of 7/27/18 11:40 AM.  Always use your most recent med list.                   Brand Name Dispense Instructions for use Diagnosis    amitriptyline 10 MG tablet    ELAVIL    360 tablet    Take 4 tablets (40 mg) by mouth At Bedtime    Sensory polyneuropathy       aspirin 81 MG tablet      Take 1 tablet by mouth daily.        atorvastatin 40 MG tablet    LIPITOR    90 tablet    Take 1 tablet (40 mg) by mouth daily    Dyslipidemia       benzonatate 200 MG capsule    TESSALON    21 capsule    Take 1 capsule (200 mg) by mouth 3 times daily as needed for cough     Cough       dutasteride 0.5 MG capsule    AVODART    90 capsule    Take 1 capsule (0.5 mg) by mouth daily    Benign non-nodular prostatic hyperplasia with lower urinary tract symptoms       escitalopram 10 MG tablet    LEXAPRO    90 tablet    TAKE ONE TABLET BY MOUTH EVERY MORNING    Panic disorder without agoraphobia, CASIMIRO (generalized anxiety disorder)       LORazepam 0.5 MG tablet    ATIVAN    30 tablet    Take 0.5-1 tablets (0.25-0.5 mg) by mouth every 6 hours as needed for anxiety    CASIMIRO (generalized anxiety disorder)       nicotine 10 MG Inhaler    NICOTROL    180 each    Inhale 6-16 Cartridges into the lungs daily as needed for smoking cessation    Tobacco abuse       order for DME      Equipment ordered: RESMED Auto PAP Mask type: Nasal Settings: 6-12 cm H2O        oseltamivir 75 MG capsule    TAMIFLU    10 capsule    Take 1 capsule (75 mg) by mouth 2 times daily    Influenza A

## 2018-07-31 ENCOUNTER — MYC MEDICAL ADVICE (OUTPATIENT)
Dept: FAMILY MEDICINE | Facility: CLINIC | Age: 58
End: 2018-07-31

## 2018-09-04 ENCOUNTER — OFFICE VISIT (OUTPATIENT)
Dept: FAMILY MEDICINE | Facility: CLINIC | Age: 58
End: 2018-09-04
Payer: COMMERCIAL

## 2018-09-04 VITALS
TEMPERATURE: 98.1 F | WEIGHT: 206 LBS | RESPIRATION RATE: 16 BRPM | BODY MASS INDEX: 27.3 KG/M2 | OXYGEN SATURATION: 97 % | HEART RATE: 86 BPM | SYSTOLIC BLOOD PRESSURE: 138 MMHG | DIASTOLIC BLOOD PRESSURE: 89 MMHG | HEIGHT: 73 IN

## 2018-09-04 DIAGNOSIS — S06.0X0D CONCUSSION WITHOUT LOSS OF CONSCIOUSNESS, SUBSEQUENT ENCOUNTER: Primary | ICD-10-CM
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
## 2018-09-04 PROCEDURE — 99213 OFFICE O/P EST LOW 20 MIN: CPT | Performed by: PHYSICIAN ASSISTANT

## 2018-09-04 ASSESSMENT — ANXIETY QUESTIONNAIRES
5. BEING SO RESTLESS THAT IT IS HARD TO SIT STILL: NOT AT ALL
6. BECOMING EASILY ANNOYED OR IRRITABLE: NOT AT ALL
2. NOT BEING ABLE TO STOP OR CONTROL WORRYING: NOT AT ALL
7. FEELING AFRAID AS IF SOMETHING AWFUL MIGHT HAPPEN: NOT AT ALL
GAD7 TOTAL SCORE: 0
IF YOU CHECKED OFF ANY PROBLEMS ON THIS QUESTIONNAIRE, HOW DIFFICULT HAVE THESE PROBLEMS MADE IT FOR YOU TO DO YOUR WORK, TAKE CARE OF THINGS AT HOME, OR GET ALONG WITH OTHER PEOPLE: NOT DIFFICULT AT ALL
3. WORRYING TOO MUCH ABOUT DIFFERENT THINGS: NOT AT ALL
1. FEELING NERVOUS, ANXIOUS, OR ON EDGE: NOT AT ALL

## 2018-09-04 ASSESSMENT — PATIENT HEALTH QUESTIONNAIRE - PHQ9: 5. POOR APPETITE OR OVEREATING: NOT AT ALL

## 2018-09-04 NOTE — LETTER
My Depression Action Plan  Name: Jack Marrero   Date of Birth 1960  Date: 9/4/2018    My doctor: Jamshid Miller   My clinic: [address] [address] [phone]          GREEN    ZONE   Good Control    What it looks like:     Things are going generally well. You have normal up s and down s. You may even feel depressed from time to time, but bad moods usually last less than a day.   What you need to do:  1. Continue to care for yourself (see self care plan)  2. Check your depression survival kit and update it as needed  3. Follow your physician s recommendations including any medication.  4. Do not stop taking medication unless you consult with your physician first.           YELLOW         ZONE Getting Worse    What it looks like:     Depression is starting to interfere with your life.     It may be hard to get out of bed; you may be starting to isolate yourself from others.    Symptoms of depression are starting to last most all day and this has happened for several days.     You may have suicidal thoughts but they are not constant.   What you need to do:     1. Call your care team, your response to treatment will improve if you keep your care team informed of your progress. Yellow periods are signs an adjustment may need to be made.     2. Continue your self-care, even if you have to fake it!    3. Talk to someone in your support network    4. Open up your depression survival kit           RED    ZONE Medical Alert - Get Help    What it looks like:     Depression is seriously interfering with your life.     You may experience these or other symptoms: You can t get out of bed most days, can t work or engage in other necessary activities, you have trouble taking care of basic hygiene, or basic responsibilities, thoughts of suicide or death that will not go away, self-injurious behavior.     What you need to do:  1. Call your care team and request  a same-day appointment. If they are not available (weekends or after hours) call your local crisis line, emergency room or 911.            Depression Self Care Plan / Survival Kit    Self-Care for Depression  Here s the deal. Your body and mind are really not as separate as most people think.  What you do and think affects how you feel and how you feel influences what you do and think. This means if you do things that people who feel good do, it will help you feel better.  Sometimes this is all it takes.  There is also a place for medication and therapy depending on how severe your depression is, so be sure to consult with your medical provider and/ or Behavioral Health Consultant if your symptoms are worsening or not improving.     In order to better manage my stress, I will:    Exercise  Get some form of exercise, every day. This will help reduce pain and release endorphins, the  feel good  chemicals in your brain. This is almost as good as taking antidepressants!  This is not the same as joining a gym and then never going! (they count on that by the way ) It can be as simple as just going for a walk or doing some gardening, anything that will get you moving.      Hygiene   Maintain good hygiene (Get out of bed in the morning, Make your bed, Brush your teeth, Take a shower, and Get dressed like you were going to work, even if you are unemployed).  If your clothes don't fit try to get ones that do.    Diet  I will strive to eat foods that are good for me, drink plenty of water, and avoid excessive sugar, caffeine, alcohol, and other mood-altering substances.  Some foods that are helpful in depression are: complex carbohydrates, B vitamins, flaxseed, fish or fish oil, fresh fruits and vegetables.    Psychotherapy  I agree to participate in Individual Therapy (if recommended).    Medication  If prescribed medications, I agree to take them.  Missing doses can result in serious side effects.  I understand that drinking  alcohol, or other illicit drug use, may cause potential side effects.  I will not stop my medication abruptly without first discussing it with my provider.    Staying Connected With Others  I will stay in touch with my friends, family members, and my primary care provider/team.    Use your imagination  Be creative.  We all have a creative side; it doesn t matter if it s oil painting, sand castles, or mud pies! This will also kick up the endorphins.    Witness Beauty  (AKA stop and smell the roses) Take a look outside, even in mid-winter. Notice colors, textures. Watch the squirrels and birds.     Service to others  Be of service to others.  There is always someone else in need.  By helping others we can  get out of ourselves  and remember the really important things.  This also provides opportunities for practicing all the other parts of the program.    Humor  Laugh and be silly!  Adjust your TV habits for less news and crime-drama and more comedy.    Control your stress  Try breathing deep, massage therapy, biofeedback, and meditation. Find time to relax each day.     My support system    Clinic Contact:  Phone number:    Contact 1:  Phone number:    Contact 2:  Phone number:    Denominational/:  Phone number:    Therapist:  Phone number:    Local crisis center:    Phone number:    Other community support:  Phone number:

## 2018-09-04 NOTE — PROGRESS NOTES
"  SUBJECTIVE:   Jack Marrero is a 57 year old male who presents to clinic today for the following health issues:    ED/UC Followup:    Facility:  [facility]  Date of visit: 08/17/2018  Reason for visit: Bug bite and concussion  Current Status: Per pt SX's are improving and no issues at the moment     Concussion    Pt had one fall in late July and one fall in early August. He does not remember hitting his head, but does remember a whiplash sensation with his head jolting to the right. Both incidents he felt nauseous and dizzy afterwards, but did not lose consciousness. During the second incident he had the feeling something was wrong and went to the ER after he vomited. His CT from the ER came back normal. Since the incident, the patient reports mild, but sharp HAs intermittently primarily on the right side with light sensitivity. Once or twice a week he also feels \"like the world is moving in slow motion\" if he turns his head. Overall is feeling better.     Care Everywhere Reviewed    Bug Bite    Pt reports his two bug bites on the center of his back and posterior of his right arm pit were improving and almost gone after his regiment of abx. He has no concerns at this point. Previous providers were concerned a stinger or burrowing insect was inside the skin.     Problem list and histories reviewed & adjusted, as indicated.  Additional history: as documented    Current Outpatient Prescriptions   Medication Sig Dispense Refill     amitriptyline (ELAVIL) 10 MG tablet Take 4 tablets (40 mg) by mouth At Bedtime 360 tablet 3     aspirin 81 MG tablet Take 1 tablet by mouth daily.       atorvastatin (LIPITOR) 40 MG tablet Take 1 tablet (40 mg) by mouth daily 90 tablet 3     dutasteride (AVODART) 0.5 MG capsule Take 1 capsule (0.5 mg) by mouth daily 90 capsule 3     escitalopram (LEXAPRO) 10 MG tablet TAKE ONE TABLET BY MOUTH EVERY MORNING 90 tablet 1     LORazepam (ATIVAN) 0.5 MG tablet Take 0.5-1 tablets " "(0.25-0.5 mg) by mouth every 6 hours as needed for anxiety 30 tablet 0     order for DME Equipment ordered: RESMED Auto PAP Mask type: Nasal Settings: 6-12 cm H2O       Allergies   Allergen Reactions     Codeine      FELT WIRED ON THIS     BP Readings from Last 3 Encounters:   09/04/18 138/89   07/27/18 (!) 153/103   07/27/18 (!) 153/103    Wt Readings from Last 3 Encounters:   09/04/18 206 lb (93.4 kg)   07/11/18 205 lb (93 kg)   02/23/18 205 lb (93 kg)                    Reviewed and updated as needed this visit by clinical staff  Tobacco  Allergies  Meds  Med Hx  Surg Hx  Fam Hx  Soc Hx      Reviewed and updated as needed this visit by Provider         ROS:  HEENT: Positive for occasional HAs  GI: Positive for occasional nausea  Constitutional, HEENT, cardiovascular, pulmonary, and GI systems are negative, except as otherwise noted.    OBJECTIVE:     /89  Pulse 86  Temp 98.1  F (36.7  C) (Oral)  Resp 16  Ht 6' 1\" (1.854 m)  Wt 206 lb (93.4 kg)  SpO2 97%  BMI 27.18 kg/m2  Body mass index is 27.18 kg/(m^2).  GENERAL: healthy, alert and no distress  NECK: no adenopathy, no asymmetry, masses, or scars and thyroid normal to palpation  RESP: lungs clear to auscultation - no rales, rhonchi or wheezes  CV: regular rate and rhythm, normal S1 S2, no S3 or S4, no murmur, click or rub, no peripheral edema and peripheral pulses strong  SKIN: no suspicious lesions or rashes and papule - back  NEURO: Normal strength and tone, sensory exam grossly normal, mentation intact, DTR's normal and symmetric, Romberg normal and rapid alternating movements normal    Diagnostic Test Results:  none     ASSESSMENT/PLAN:         ICD-10-CM    1. Concussion without loss of consciousness, subsequent encounter S06.0X0D    2. Bug bite, subsequent encounter W57.XXXD      1. Pt neurological exam was unremarkable and pt reports symptoms improving over time. CT with most recent concussion was also unremarkable. Pt advised APAP or " IBU for occasional HAs and told to RTC if symptoms do not improve or worsen over time.  2. Bug bites on back have improved after doxycycline regiment at [facility]. Pt to RTC if bite marks worsen.     Jesus MCCRACKEN    The student acted as a scribe and the encounter documented above was completely performed by myself and the documentation reflects the work I have performed today.   Vamshi Haddad PA-C  [facility]

## 2018-09-04 NOTE — MR AVS SNAPSHOT
"              After Visit Summary   9/4/2018    Jack Marrero    MRN: [id number]           Patient Information     Date Of Birth          1960        Visit Information        Provider Department      9/4/2018 8:40 AM Vamshi Haddad PA-C [facility] [place]         Follow-ups after your visit        Who to contact     If you have questions or need follow up information about today's clinic visit or your schedule please contact [facility] directly at [phone].  Normal or non-critical lab and imaging results will be communicated to you by MyChart, letter or phone within 4 business days after the clinic has received the results. If you do not hear from us within 7 days, please contact the clinic through [employer] or phone. If you have a critical or abnormal lab result, we will notify you by phone as soon as possible.  Submit refill requests through sourceasy or call your pharmacy and they will forward the refill request to us. Please allow 3 business days for your refill to be completed.          Additional Information About Your Visit        MyChart Information     sourceasy gives you secure access to your electronic health record. If you see a primary care provider, you can also send messages to your care team and make appointments. If you have questions, please call your primary care clinic.  If you do not have a primary care provider, please call [phone] and they will assist you.        Care EveryWhere ID     This is your Care EveryWhere ID. This could be used by other organizations to access your [place] medical records  [id number]        Your Vitals Were     Pulse Temperature Respirations Height Pulse Oximetry BMI (Body Mass Index)    86 98.1  F (36.7  C) (Oral) 16 6' 1\" (1.854 m) 97% 27.18 kg/m2       Blood Pressure from Last 3 Encounters:   09/04/18 138/89   07/27/18 (!) 153/103   07/27/18 (!) 153/103    Weight from Last 3 Encounters:   09/04/18 206 lb (93.4 kg)   07/11/18 " 205 lb (93 kg)   02/23/18 205 lb (93 kg)              Today, you had the following     No orders found for display       Primary Care Provider Office Phone # Fax #    Jamshid Miller [phone] [address] [facility] [address]        Equal Access to Services     [facility] PEYTON : Hadii aad ku hadasho Soomaali, waaxda luqadaha, qaybta kaalmada adeegyada, waxay asmitain hayafrican ademadalyn rebekabenjamín ramesh . So [facility] [phone].    ATENCIÓN: Si habla español, tiene a rivera disposición servicios gratuitos de asistencia lingüística. Llame al [phone].    We comply with applicable federal civil rights laws and [place] laws. We do not discriminate on the basis of race, color, national origin, age, disability, sex, sexual orientation, or gender identity.            Thank you!     Thank you for choosing [facility]  for your care. Our goal is always to provide you with excellent care. Hearing back from our patients is one way we can continue to improve our services. Please take a few minutes to complete the written survey that you may receive in the mail after your visit with us. Thank you!             Your Updated Medication List - Protect others around you: Learn how to safely use, store and throw away your medicines at www.disposemymeds.org.          This list is accurate as of 9/4/18  9:20 AM.  Always use your most recent med list.                   Brand Name Dispense Instructions for use Diagnosis    amitriptyline 10 MG tablet    ELAVIL    360 tablet    Take 4 tablets (40 mg) by mouth At Bedtime    Sensory polyneuropathy       aspirin 81 MG tablet      Take 1 tablet by mouth daily.        atorvastatin 40 MG tablet    LIPITOR    90 tablet    Take 1 tablet (40 mg) by mouth daily    Dyslipidemia       dutasteride 0.5 MG capsule    AVODART    90 capsule    Take 1 capsule (0.5 mg) by mouth daily    Benign non-nodular prostatic hyperplasia with lower urinary tract symptoms        escitalopram 10 MG tablet    LEXAPRO    90 tablet    TAKE ONE TABLET BY MOUTH EVERY MORNING    Panic disorder without agoraphobia, CASIMIRO (generalized anxiety disorder)       LORazepam 0.5 MG tablet    ATIVAN    30 tablet    Take 0.5-1 tablets (0.25-0.5 mg) by mouth every 6 hours as needed for anxiety    CASIMIRO (generalized anxiety disorder)       order for DME      Equipment ordered: RESMED Auto PAP Mask type: Nasal Settings: 6-12 cm H2O

## 2018-09-06 ASSESSMENT — PATIENT HEALTH QUESTIONNAIRE - PHQ9: SUM OF ALL RESPONSES TO PHQ QUESTIONS 1-9: 1

## 2018-09-06 ASSESSMENT — ANXIETY QUESTIONNAIRES: GAD7 TOTAL SCORE: 0

## 2018-11-06 ENCOUNTER — OFFICE VISIT (OUTPATIENT)
Dept: FAMILY MEDICINE | Facility: CLINIC | Age: 58
End: 2018-11-06
Payer: COMMERCIAL

## 2018-11-06 VITALS
RESPIRATION RATE: 20 BRPM | OXYGEN SATURATION: 97 % | BODY MASS INDEX: 27.84 KG/M2 | WEIGHT: 211 LBS | DIASTOLIC BLOOD PRESSURE: 92 MMHG | SYSTOLIC BLOOD PRESSURE: 132 MMHG | HEART RATE: 81 BPM | TEMPERATURE: 98.5 F

## 2018-11-06 DIAGNOSIS — R03.0 ELEVATED BP WITHOUT DIAGNOSIS OF HYPERTENSION: ICD-10-CM

## 2018-11-06 DIAGNOSIS — R73.02 IGT (IMPAIRED GLUCOSE TOLERANCE): Primary | ICD-10-CM

## 2018-11-06 DIAGNOSIS — Z23 NEED FOR PROPHYLACTIC VACCINATION AND INOCULATION AGAINST INFLUENZA: ICD-10-CM

## 2018-11-06 DIAGNOSIS — R73.9 ELEVATED BLOOD SUGAR: ICD-10-CM

## 2018-11-06 DIAGNOSIS — G60.8 SENSORY POLYNEUROPATHY: ICD-10-CM

## 2018-11-06 LAB
ALBUMIN UR-MCNC: NEGATIVE MG/DL
ANION GAP SERPL CALCULATED.3IONS-SCNC: 3 MMOL/L (ref 3–14)
APPEARANCE UR: CLEAR
BILIRUB UR QL STRIP: NEGATIVE
BUN SERPL-MCNC: 12 MG/DL (ref 7–30)
CALCIUM SERPL-MCNC: 9 MG/DL (ref 8.5–10.1)
CHLORIDE SERPL-SCNC: 108 MMOL/L (ref 94–109)
CO2 SERPL-SCNC: 30 MMOL/L (ref 20–32)
COLOR UR AUTO: YELLOW
CREAT SERPL-MCNC: 0.97 MG/DL (ref 0.66–1.25)
ERYTHROCYTE [DISTWIDTH] IN BLOOD BY AUTOMATED COUNT: 14.5 % (ref 10–15)
GFR SERPL CREATININE-BSD FRML MDRD: 79 ML/MIN/1.7M2
GLUCOSE SERPL-MCNC: 116 MG/DL (ref 70–99)
GLUCOSE UR STRIP-MCNC: NEGATIVE MG/DL
HBA1C MFR BLD: 5.7 % (ref 0–5.6)
HCT VFR BLD AUTO: 47.6 % (ref 40–53)
HGB BLD-MCNC: 16.2 G/DL (ref 13.3–17.7)
HGB UR QL STRIP: NEGATIVE
KETONES UR STRIP-MCNC: NEGATIVE MG/DL
LEUKOCYTE ESTERASE UR QL STRIP: NEGATIVE
MCH RBC QN AUTO: 31.9 PG (ref 26.5–33)
MCHC RBC AUTO-ENTMCNC: 34 G/DL (ref 31.5–36.5)
MCV RBC AUTO: 94 FL (ref 78–100)
NITRATE UR QL: NEGATIVE
PH UR STRIP: 7 PH (ref 5–7)
PLATELET # BLD AUTO: 268 10E9/L (ref 150–450)
POTASSIUM SERPL-SCNC: 5.2 MMOL/L (ref 3.4–5.3)
RBC # BLD AUTO: 5.08 10E12/L (ref 4.4–5.9)
SODIUM SERPL-SCNC: 141 MMOL/L (ref 133–144)
SOURCE: NORMAL
SP GR UR STRIP: 1.01 (ref 1–1.03)
URATE SERPL-MCNC: 3.5 MG/DL (ref 3.5–7.2)
UROBILINOGEN UR STRIP-ACNC: 0.2 EU/DL (ref 0.2–1)
WBC # BLD AUTO: 7.1 10E9/L (ref 4–11)

## 2018-11-06 PROCEDURE — 81003 URINALYSIS AUTO W/O SCOPE: CPT | Performed by: FAMILY MEDICINE

## 2018-11-06 PROCEDURE — 80048 BASIC METABOLIC PNL TOTAL CA: CPT | Performed by: FAMILY MEDICINE

## 2018-11-06 PROCEDURE — 36415 COLL VENOUS BLD VENIPUNCTURE: CPT | Performed by: FAMILY MEDICINE

## 2018-11-06 PROCEDURE — 99214 OFFICE O/P EST MOD 30 MIN: CPT | Mod: 25 | Performed by: FAMILY MEDICINE

## 2018-11-06 PROCEDURE — 90471 IMMUNIZATION ADMIN: CPT | Performed by: FAMILY MEDICINE

## 2018-11-06 PROCEDURE — 85027 COMPLETE CBC AUTOMATED: CPT | Performed by: FAMILY MEDICINE

## 2018-11-06 PROCEDURE — 84550 ASSAY OF BLOOD/URIC ACID: CPT | Performed by: FAMILY MEDICINE

## 2018-11-06 PROCEDURE — 83036 HEMOGLOBIN GLYCOSYLATED A1C: CPT | Performed by: FAMILY MEDICINE

## 2018-11-06 PROCEDURE — 90682 RIV4 VACC RECOMBINANT DNA IM: CPT | Performed by: FAMILY MEDICINE

## 2018-11-06 RX ORDER — AMITRIPTYLINE HYDROCHLORIDE 10 MG/1
TABLET ORAL
Qty: 300 TABLET | Refills: 1 | Status: SHIPPED | OUTPATIENT
Start: 2018-11-06 | End: 2019-02-01

## 2018-11-06 ASSESSMENT — PAIN SCALES - GENERAL: PAINLEVEL: NO PAIN (0)

## 2018-11-06 NOTE — MR AVS SNAPSHOT
After Visit Summary   11/6/2018    Jack Marrero    MRN: [id number]           Patient Information     Date Of Birth          1960        Visit Information        Provider Department      11/6/2018 11:45 AM Jamshid Miller MD [facility]        Today's Diagnoses     IGT (impaired glucose tolerance)    -  1    Sensory polyneuropathy        Elevated BP without diagnosis of hypertension        Elevated blood sugar          Care Instructions    I recommended that he return to clinic for an appointment in 6 weeks  for his yearly complete physical exam and we will get all of his preventative medical needs taken care of at that time. I also recommended that he have a laboratory appointment 1 week prior to that to do his fasting laboratory work.            Follow-ups after your visit        Follow-up notes from your care team     Return in about 6 weeks (around 12/18/2018) for Physical Exam.      Who to contact     If you have questions or need follow up information about today's clinic visit or your schedule please contact [facility] directly at [phone].  Normal or non-critical lab and imaging results will be communicated to you by Notegraphyhart, letter or phone within 4 business days after the clinic has received the results. If you do not hear from us within 7 days, please contact the clinic through [employer] or phone. If you have a critical or abnormal lab result, we will notify you by phone as soon as possible.  Submit refill requests through [employer] or call your pharmacy and they will forward the refill request to us. Please allow 3 business days for your refill to be completed.          Additional Information About Your Visit        MyChart Information     [employer] gives you secure access to your electronic health record. If you see a primary care provider, you can also send messages to your care team and make appointments. If you have questions, please call your primary  care clinic.  If you do not have a primary care provider, please call [phone] and they will assist you.        Care EveryWhere ID     This is your Care EveryWhere ID. This could be used by other organizations to access your [place] medical records  [id number]        Your Vitals Were     Pulse Temperature Respirations Pulse Oximetry BMI (Body Mass Index)       81 98.5  F (36.9  C) (Oral) 20 97% 27.84 kg/m2        Blood Pressure from Last 3 Encounters:   11/06/18 (!) 132/92   09/04/18 138/89   07/27/18 (!) 153/103    Weight from Last 3 Encounters:   11/06/18 211 lb (95.7 kg)   09/04/18 206 lb (93.4 kg)   07/11/18 205 lb (93 kg)              We Performed the Following     *UA reflex to Microscopic     Basic metabolic panel     CBC with platelets     Hemoglobin A1c     Uric acid          Today's Medication Changes          These changes are accurate as of 11/6/18 12:15 PM.  If you have any questions, ask your nurse or doctor.               These medicines have changed or have updated prescriptions.        Dose/Directions    amitriptyline 10 MG tablet   Commonly known as:  ELAVIL   This may have changed:    - how much to take  - how to take this  - when to take this  - additional instructions   Used for:  Sensory polyneuropathy   Changed by:  Jamshid Miller MD        Take 5 tablets at bedtime for a week.  Increase by 1 tablet every week  Until you find a dose that helps control the nerve pain.  Maximum dose is 10 tablets   Quantity:  300 tablet   Refills:  1            Where to get your medicines      Some of these will need a paper prescription and others can be bought over the counter.  Ask your nurse if you have questions.     Bring a paper prescription for each of these medications     amitriptyline 10 MG tablet                Primary Care Provider Office Phone # Fax #    Jamshid Miller [phone] [address] [address] [facility] [address]        Equal Access to Services     MARIZOL  GAAR : Hadii aad ku melo Stephens, waaxda luqadaha, qaybta kashimonda tushar, kathy layla gretchencarol goldsteinprashantbenajmín ramesh . So [facility] [phone].    ATENCIÓN: Si habla espjodi, tiene a rivera disposición servicios gratuitos de asistencia lingüística. Llame al [phone].    We comply with applicable federal civil rights laws and [place] laws. We do not discriminate on the basis of race, color, national origin, age, disability, sex, sexual orientation, or gender identity.            Thank you!     [facility] you for [facility]  [facility]. Our goal is always to provide you with excellent care. Hearing back from our patients is one way we can continue to improve our services. Please take a few minutes to complete the written survey that you may receive in the mail after your visit with us. Thank you!             Your Updated Medication List - Protect others around you: Learn how to safely use, store and throw away your medicines at www.disposemymeds.org.          This list is accurate as of 11/6/18 12:15 PM.  Always use your most recent med list.                   Brand Name Dispense Instructions for use Diagnosis    amitriptyline 10 MG tablet    ELAVIL    300 tablet    Take 5 tablets at bedtime for a week.  Increase by 1 tablet every week  Until you find a dose that helps control the nerve pain.  Maximum dose is 10 tablets    Sensory polyneuropathy       aspirin 81 MG tablet      Take 1 tablet by mouth daily.        atorvastatin 40 MG tablet    LIPITOR    90 tablet    Take 1 tablet (40 mg) by mouth daily    Dyslipidemia       dutasteride 0.5 MG capsule    AVODART    90 capsule    Take 1 capsule (0.5 mg) by mouth daily    Benign non-nodular prostatic hyperplasia with lower urinary tract symptoms       escitalopram 10 MG tablet    LEXAPRO    90 tablet    TAKE ONE TABLET BY MOUTH EVERY MORNING    Panic disorder without agoraphobia, CASIMIRO (generalized anxiety disorder)       LORazepam 0.5 MG tablet     ATIVAN    30 tablet    Take 0.5-1 tablets (0.25-0.5 mg) by mouth every 6 hours as needed for anxiety    CASIMIRO (generalized anxiety disorder)       order for DME      Equipment ordered: [employer] Auto PAP Mask type: Nasal Settings: 6-12 cm H2O

## 2018-11-06 NOTE — PROGRESS NOTES
Injectable Influenza Immunization Documentation    1.  Is the person to be vaccinated sick today?   No    2. Does the person to be vaccinated have an allergy to a component   of the vaccine?   No  Egg Allergy Algorithm Link    3. Has the person to be vaccinated ever had a serious reaction   to influenza vaccine in the past?   No    4. Has the person to be vaccinated ever had Guillain-Barré syndrome?   No    Form completed by Yesenia Kearns cma    Prior to injection verified patient identity using patient's name and date of birth.  Due to injection administration, patient instructed to remain in clinic for 15 minutes  afterwards, and to report any adverse reaction to me immediately.

## 2018-11-06 NOTE — PATIENT INSTRUCTIONS
I recommended that he return to clinic for an appointment in 6 weeks  for his yearly complete physical exam and we will get all of his preventative medical needs taken care of at that time. I also recommended that he have a laboratory appointment 1 week prior to that to do his fasting laboratory work.    Low-Salt Choices  Eating salt (sodium) can make your body retain too much water. Excess water makes your heart work harder. Canned, packaged, and frozen foods are easy to prepare. But they are often high in sodium. Here are some ideas for low-salt foods you can easily make yourself.    For breakfast    Fruit or 100% fruit juice    Whole-wheat bread or an English muffin. Look for sodium content on Nutrition Facts labels.    Low-fat milk or yogurt    Unsalted eggs    Shredded wheat    Corn tortillas    Unsalted steamed rice    Regular (not instant) hot cereal, made without salt  Stay away from:    Sausage, greenwood, and ham    Flour tortillas    Packaged muffins, pancakes, and biscuits    Instant hot cereals    Cottage cheese  For lunch and dinner    Fresh fish, chicken, turkey, or meat--baked, broiled, or roasted without salt    Dry beans, cooked without salt    Tofu, stir-fried without salt    Unsalted fresh fruit and vegetables, or frozen or canned fruit and vegetables with no added salt  Stay away from:    Lunch or deli meat that is cured or smoked    Cheese    Tomato juice and ketchup    Canned vegetables, soups, and fish not labeled as no-salt-added or reduced sodium    Packaged gravies and sauces    Olives, pickles, and relish    Bottled salad dressings  For snacks and desserts    Yogurt    Unsalted, air-popped popcorn    Unsalted nuts or seeds  Stay away from:    Pies and cakes    Packaged dessert mixes    Pizza    Canned and packaged puddings    Pretzels, chips, crackers, and nuts--unless the label says unsalted  Date Last Reviewed: 6/1/2017 2000-2018 [employer]. [address] [facility],  [place] [address]. All rights reserved. This information is not intended as a substitute for professional medical care. Always follow your healthcare professional's instructions.

## 2018-11-06 NOTE — NURSING NOTE
"Chief Complaint   Patient presents with     Diabetes     discuss blood sugars and numbness in feet, in Aug was in ER for Concussion and was told he had elevated blood sugars     Health Maintenance     Flu Shot       Initial BP (!) 162/99  Pulse 81  Temp 98.5  F (36.9  C) (Oral)  Resp 20  Wt 211 lb (95.7 kg)  SpO2 97%  BMI 27.84 kg/m2 Estimated body mass index is 27.84 kg/(m^2) as calculated from the following:    Height as of 9/4/18: 6' 1\" (1.854 m).    Weight as of this encounter: 211 lb (95.7 kg).  Medication Reconciliation: complete  Yesenia Kearns, YVONNE  "

## 2018-11-06 NOTE — PROGRESS NOTES
Subjective:  Jack is a 58 year old male who presents today for follow-up on a recent emergency department visit  at [facility] [place]. He was seen twice. First or a concussion and secondly for a insect bite. The patient was treated with keflex at the last visit. He was asked to follow up today specifically to check up on his blood sugar(s). They were noted to be elevated. At this time the patient reports a lot of improvement of the original symptoms. In addition the patient reports the following new symptoms:none.      He reports that the symptom(s) of the headache(s) have resolved.   The rash has also completely resolved with the antibiotic(s)/     He has one uncle with type 2 (adult onset) diabetes     He was seen at the [facility] for his neuropathy. No additional diagnosis was found and no addition treatment was recommend(ed)   He is currently taking 40 mg of amitriptyline at bedtime without side effects. He still has pins and needles sensation.      Current Outpatient Prescriptions:      amitriptyline (ELAVIL) 10 MG tablet, Take 5 tablets at bedtime for a week.  Increase by 1 tablet every week  Until you find a dose that helps control the nerve pain.  Maximum dose is 10 tablets, Disp: 300 tablet, Rfl: 1     aspirin 81 MG tablet, Take 1 tablet by mouth daily., Disp: , Rfl:      atorvastatin (LIPITOR) 40 MG tablet, Take 1 tablet (40 mg) by mouth daily, Disp: 90 tablet, Rfl: 3     dutasteride (AVODART) 0.5 MG capsule, Take 1 capsule (0.5 mg) by mouth daily, Disp: 90 capsule, Rfl: 3     escitalopram (LEXAPRO) 10 MG tablet, TAKE ONE TABLET BY MOUTH EVERY MORNING, Disp: 90 tablet, Rfl: 1     LORazepam (ATIVAN) 0.5 MG tablet, Take 0.5-1 tablets (0.25-0.5 mg) by mouth every 6 hours as needed for anxiety, Disp: 30 tablet, Rfl: 0     order for DME, Equipment ordered: RESMED Auto PAP Mask type: Nasal Settings: 6-12 cm H2O, Disp: , Rfl:      [DISCONTINUED] amitriptyline (ELAVIL) 10 MG tablet, Take 4 tablets (40 mg)  by mouth At Bedtime, Disp: 360 tablet, Rfl: 3    Past Medical History:   Diagnosis Date     ADHD (attention deficit hyperactivity disorder)     dx via Dr Olson PhD     Depression, anxiety      Depressive disorder      Diverticulosis of colon 1/11/2016     Dyslipidemia      Erectile dysfunction 11/30/2011     Hyperlipidemia LDL goal <130 10/31/2010     IGT (impaired glucose tolerance) 10/26/2015     Inflamed seborrheic keratosis 1/29/2016     Pain in joint, shoulder region 12/16/2013     Sensory polyneuropathy 11/19/2015     Tubular adenoma of colon on colonoscopy 1/12/2012       OBJECTIVE:  BP (!) 132/92  Pulse 81  Temp 98.5  F (36.9  C) (Oral)  Resp 20  Wt 211 lb (95.7 kg)  SpO2 97%  BMI 27.84 kg/m2  GENERAL APPEARANCE: healthy, alert and no distress  E    ASSESSMENT:58 year old  58 year old male who recently was had an emergency department visit for concussion/ cellulitis and elevated blood sugar(s).   The first two appear to have resolved.      Plan: At this time we will check the patient for diabetes . He has previously been diagnosed with IMPAIRED GLUCOSE TOLERANCE.     His dbp is elevated today.   An educational reference regarding this subject was printed from [email or web address] and given to the patient.    He will follow up in Mid December for his complete physical exam and a blood pressure recheck       Jack  voiced understanding to informations discussed today.        Regarding his neuropathy we will try titrating up the amitriptyline by 10 mg a week.

## 2018-11-09 NOTE — PROGRESS NOTES
Jack,  I have reviewed the results of the laboratory tests that we recently ordered. All of the lab work performed was normal or considered normal for you except the blood sugar(s) testing shows that you have prediabetes.  Sincerely,   Jamshid Miller

## 2018-11-10 DIAGNOSIS — N40.1 BENIGN NON-NODULAR PROSTATIC HYPERPLASIA WITH LOWER URINARY TRACT SYMPTOMS: ICD-10-CM

## 2018-11-13 RX ORDER — DUTASTERIDE 0.5 MG/1
CAPSULE, LIQUID FILLED ORAL
Qty: 30 CAPSULE | Refills: 0 | Status: SHIPPED | OUTPATIENT
Start: 2018-11-13 | End: 2018-12-09

## 2018-11-13 NOTE — TELEPHONE ENCOUNTER
Medication is being filled for 1 time refill only due to:  Patient needs to be seen because in one month.    Recent office visit. BP was elevated and noted at visit. Patient was instructed to follow up in December for physical. Jamila Randall RN

## 2018-12-09 DIAGNOSIS — N40.1 BENIGN NON-NODULAR PROSTATIC HYPERPLASIA WITH LOWER URINARY TRACT SYMPTOMS: ICD-10-CM

## 2018-12-09 DIAGNOSIS — E78.5 DYSLIPIDEMIA: ICD-10-CM

## 2018-12-10 RX ORDER — DUTASTERIDE 0.5 MG/1
CAPSULE, LIQUID FILLED ORAL
Qty: 30 CAPSULE | Refills: 0 | Status: SHIPPED | OUTPATIENT
Start: 2018-12-10 | End: 2019-01-08

## 2018-12-10 RX ORDER — ATORVASTATIN CALCIUM 40 MG/1
TABLET, FILM COATED ORAL
Qty: 90 TABLET | Refills: 0 | Status: SHIPPED | OUTPATIENT
Start: 2018-12-10 | End: 2019-02-26

## 2018-12-10 NOTE — TELEPHONE ENCOUNTER
Routing refill request to provider for review/approval because:  Nina given x1 and patient did not follow up, please advise  Yesica Brito BSN, RN

## 2019-01-08 DIAGNOSIS — N40.1 BENIGN NON-NODULAR PROSTATIC HYPERPLASIA WITH LOWER URINARY TRACT SYMPTOMS: ICD-10-CM

## 2019-01-08 RX ORDER — DUTASTERIDE 0.5 MG/1
CAPSULE, LIQUID FILLED ORAL
Qty: 90 CAPSULE | Refills: 3 | Status: SHIPPED | OUTPATIENT
Start: 2019-01-08 | End: 2019-11-09

## 2019-01-08 NOTE — TELEPHONE ENCOUNTER
Last office visit 11/6/18  Please advise on refill for Avodart.   Routing refill request to provider for review/approval because:  Labs out of range:  Blood pressure

## 2019-01-22 DIAGNOSIS — F41.1 GAD (GENERALIZED ANXIETY DISORDER): ICD-10-CM

## 2019-01-22 DIAGNOSIS — F41.0 PANIC DISORDER WITHOUT AGORAPHOBIA: ICD-10-CM

## 2019-01-23 RX ORDER — ESCITALOPRAM OXALATE 10 MG/1
TABLET ORAL
Qty: 90 TABLET | Refills: 1 | Status: SHIPPED | OUTPATIENT
Start: 2019-01-23 | End: 2019-08-26

## 2019-01-28 ENCOUNTER — OFFICE VISIT (OUTPATIENT)
Dept: FAMILY MEDICINE | Facility: CLINIC | Age: 59
End: 2019-01-28
Payer: COMMERCIAL

## 2019-01-28 VITALS
OXYGEN SATURATION: 97 % | SYSTOLIC BLOOD PRESSURE: 136 MMHG | WEIGHT: 217 LBS | HEART RATE: 85 BPM | HEIGHT: 73 IN | DIASTOLIC BLOOD PRESSURE: 92 MMHG | RESPIRATION RATE: 18 BRPM | TEMPERATURE: 98.1 F | BODY MASS INDEX: 28.76 KG/M2

## 2019-01-28 DIAGNOSIS — Z91.89 10 YEAR RISK OF MI OR STROKE 7.5% OR GREATER: ICD-10-CM

## 2019-01-28 DIAGNOSIS — E78.1 HIGH TRIGLYCERIDES: ICD-10-CM

## 2019-01-28 DIAGNOSIS — E78.5 HYPERLIPIDEMIA LDL GOAL <130: ICD-10-CM

## 2019-01-28 DIAGNOSIS — Z72.0 TOBACCO ABUSE: Primary | ICD-10-CM

## 2019-01-28 DIAGNOSIS — R73.02 IGT (IMPAIRED GLUCOSE TOLERANCE): ICD-10-CM

## 2019-01-28 DIAGNOSIS — E78.6 HDL DEFICIENCY: ICD-10-CM

## 2019-01-28 DIAGNOSIS — Z12.5 SCREENING FOR PROSTATE CANCER: ICD-10-CM

## 2019-01-28 LAB
ALBUMIN SERPL-MCNC: 3.7 G/DL (ref 3.4–5)
ALP SERPL-CCNC: 79 U/L (ref 40–150)
ALT SERPL W P-5'-P-CCNC: 76 U/L (ref 0–70)
ANION GAP SERPL CALCULATED.3IONS-SCNC: 3 MMOL/L (ref 3–14)
AST SERPL W P-5'-P-CCNC: 41 U/L (ref 0–45)
BILIRUB SERPL-MCNC: 0.5 MG/DL (ref 0.2–1.3)
BUN SERPL-MCNC: 13 MG/DL (ref 7–30)
CALCIUM SERPL-MCNC: 8.8 MG/DL (ref 8.5–10.1)
CHLORIDE SERPL-SCNC: 109 MMOL/L (ref 94–109)
CHOLEST SERPL-MCNC: 162 MG/DL
CO2 SERPL-SCNC: 30 MMOL/L (ref 20–32)
CREAT SERPL-MCNC: 0.96 MG/DL (ref 0.66–1.25)
GFR SERPL CREATININE-BSD FRML MDRD: 86 ML/MIN/{1.73_M2}
GLUCOSE SERPL-MCNC: 117 MG/DL (ref 70–99)
HBA1C MFR BLD: 5.7 % (ref 0–5.6)
HDLC SERPL-MCNC: 44 MG/DL
LDLC SERPL CALC-MCNC: 90 MG/DL
NONHDLC SERPL-MCNC: 118 MG/DL
POTASSIUM SERPL-SCNC: 5.6 MMOL/L (ref 3.4–5.3)
PROT SERPL-MCNC: 7.1 G/DL (ref 6.8–8.8)
PSA SERPL-ACNC: 0.92 UG/L (ref 0–4)
SODIUM SERPL-SCNC: 142 MMOL/L (ref 133–144)
TRIGL SERPL-MCNC: 140 MG/DL

## 2019-01-28 PROCEDURE — 36415 COLL VENOUS BLD VENIPUNCTURE: CPT | Performed by: FAMILY MEDICINE

## 2019-01-28 PROCEDURE — 80061 LIPID PANEL: CPT | Performed by: FAMILY MEDICINE

## 2019-01-28 PROCEDURE — 83036 HEMOGLOBIN GLYCOSYLATED A1C: CPT | Performed by: FAMILY MEDICINE

## 2019-01-28 PROCEDURE — 99213 OFFICE O/P EST LOW 20 MIN: CPT | Performed by: FAMILY MEDICINE

## 2019-01-28 PROCEDURE — 80053 COMPREHEN METABOLIC PANEL: CPT | Performed by: FAMILY MEDICINE

## 2019-01-28 PROCEDURE — G0103 PSA SCREENING: HCPCS | Performed by: FAMILY MEDICINE

## 2019-01-28 ASSESSMENT — PAIN SCALES - GENERAL: PAINLEVEL: NO PAIN (0)

## 2019-01-28 ASSESSMENT — MIFFLIN-ST. JEOR: SCORE: 1848.19

## 2019-01-28 NOTE — PROGRESS NOTES
"psaSUBJECTIVE:  Jack Marrero is a 58 year old male who scheduled an appointment to discuss the following issues:  Lung cancer screening  Diabetes testing      He is still smoking 15-20 cigs per day    He has smoked for 45 years   He smoked about 3/4 ppd     He has had an elevated blood sugar(s) in the past but a normal hemoglobin A1C   He is due for his yearly physical.         Past Medical, social, family histories, medications, and allergies reviewed and updated   ROS: other than that noted above all other review of systems was negative    ROS:       Current Outpatient Medications:      amitriptyline (ELAVIL) 10 MG tablet, Take 5 tablets at bedtime for a week.  Increase by 1 tablet every week  Until you find a dose that helps control the nerve pain.  Maximum dose is 10 tablets, Disp: 300 tablet, Rfl: 1     aspirin 81 MG tablet, Take 1 tablet by mouth daily., Disp: , Rfl:      atorvastatin (LIPITOR) 40 MG tablet, TAKE 1 TABLET(40 MG) BY MOUTH DAILY, Disp: 90 tablet, Rfl: 0     dutasteride (AVODART) 0.5 MG capsule, TAKE ONE CAPSULE BY MOUTH DAILY., Disp: 90 capsule, Rfl: 3     escitalopram (LEXAPRO) 10 MG tablet, TAKE ONE TABLET BY MOUTH EVERY MORNING, Disp: 90 tablet, Rfl: 1     LORazepam (ATIVAN) 0.5 MG tablet, Take 0.5-1 tablets (0.25-0.5 mg) by mouth every 6 hours as needed for anxiety, Disp: 30 tablet, Rfl: 0     order for DME, Equipment ordered: RESMED Auto PAP Mask type: Nasal Settings: 6-12 cm H2O, Disp: , Rfl:     OBJECTIVE:  BP (!) 136/92   Pulse 85   Temp 98.1  F (36.7  C) (Oral)   Resp 18   Ht 1.854 m (6' 1\")   Wt 98.4 kg (217 lb)   SpO2 97%   BMI 28.63 kg/m      EXAM:  GENERAL APPEARANCE: healthy, alert and no distress    Results for orders placed or performed in visit on 11/06/18   Hemoglobin A1c   Result Value Ref Range    Hemoglobin A1C 5.7 (H) 0 - 5.6 %   Basic metabolic panel   Result Value Ref Range    Sodium 141 133 - 144 mmol/L    Potassium 5.2 3.4 - 5.3 mmol/L    Chloride 108 94 - 109 " mmol/L    Carbon Dioxide 30 20 - 32 mmol/L    Anion Gap 3 3 - 14 mmol/L    Glucose 116 (H) 70 - 99 mg/dL    Urea Nitrogen 12 7 - 30 mg/dL    Creatinine 0.97 0.66 - 1.25 mg/dL    GFR Estimate 79 >60 mL/min/1.7m2    GFR Estimate If Black >90 >60 mL/min/1.7m2    Calcium 9.0 8.5 - 10.1 mg/dL   CBC with platelets   Result Value Ref Range    WBC 7.1 4.0 - 11.0 10e9/L    RBC Count 5.08 4.4 - 5.9 10e12/L    Hemoglobin 16.2 13.3 - 17.7 g/dL    Hematocrit 47.6 40.0 - 53.0 %    MCV 94 78 - 100 fl    MCH 31.9 26.5 - 33.0 pg    MCHC 34.0 31.5 - 36.5 g/dL    RDW 14.5 10.0 - 15.0 %    Platelet Count 268 150 - 450 10e9/L   Uric acid   Result Value Ref Range    Uric Acid 3.5 3.5 - 7.2 mg/dL   *UA reflex to Microscopic   Result Value Ref Range    Color Urine Yellow     Appearance Urine Clear     Glucose Urine Negative NEG^Negative mg/dL    Bilirubin Urine Negative NEG^Negative    Ketones Urine Negative NEG^Negative mg/dL    Specific Gravity Urine 1.015 1.003 - 1.035    Blood Urine Negative NEG^Negative    pH Urine 7.0 5.0 - 7.0 pH    Protein Albumin Urine Negative NEG^Negative mg/dL    Urobilinogen Urine 0.2 0.2 - 1.0 EU/dL    Nitrite Urine Negative NEG^Negative    Leukocyte Esterase Urine Negative NEG^Negative    Source Midstream Urine          ASSESSMENT/PLAN:    (Z72.0) Tobacco abuse  (primary encounter diagnosis)  Comment:   Plan: CT Chest Lung Cancer Scrn Low Dose wo        We will discuss smoking cessation at his complete physical exam     All of the labs for his physical were ordered today as he is fasting

## 2019-01-28 NOTE — PATIENT INSTRUCTIONS
Please call our [facility] Scheduling Line at [phone] to schedule your:  CT scan       I recommended that he return to clinic for an appointment for a complete physical exam  in a few weeks

## 2019-01-28 NOTE — NURSING NOTE
"Chief Complaint   Patient presents with     RECHECK     blood sugars     Referral     lung cancer screening     Health Maintenance     adv dir       Initial BP (!) 169/106   Pulse 85   Temp 98.1  F (36.7  C) (Oral)   Resp 18   Ht 1.854 m (6' 1\")   Wt 98.4 kg (217 lb)   SpO2 97%   BMI 28.63 kg/m   Estimated body mass index is 28.63 kg/m  as calculated from the following:    Height as of this encounter: 1.854 m (6' 1\").    Weight as of this encounter: 98.4 kg (217 lb).  Medication Reconciliation: complete  Yesenia Kearns, YVONNE  "

## 2019-01-29 ENCOUNTER — MYC MEDICAL ADVICE (OUTPATIENT)
Dept: FAMILY MEDICINE | Facility: CLINIC | Age: 59
End: 2019-01-29

## 2019-01-29 NOTE — RESULT ENCOUNTER NOTE
These labs were ordered for the patient future complete physical exam .   He was instructed to schedule this at his last appointment with me.   \

## 2019-01-30 ENCOUNTER — TELEPHONE (OUTPATIENT)
Dept: FAMILY MEDICINE | Facility: CLINIC | Age: 59
End: 2019-01-30

## 2019-01-30 DIAGNOSIS — G60.8 SENSORY POLYNEUROPATHY: ICD-10-CM

## 2019-01-30 NOTE — TELEPHONE ENCOUNTER
[employer] message sent to patient.   See message for details.    Marj Thornton, BANDARN RN

## 2019-01-30 NOTE — TELEPHONE ENCOUNTER
Please call the patient and ask what dose of the amitriptyline he is taking. 1, 2,    3, 4 or 5 tabs at bedtime and send back to me.

## 2019-02-01 RX ORDER — AMITRIPTYLINE HYDROCHLORIDE 75 MG/1
75 TABLET ORAL AT BEDTIME
Qty: 90 TABLET | Refills: 3 | Status: SHIPPED | OUTPATIENT
Start: 2019-02-01 | End: 2020-02-20

## 2019-02-01 NOTE — TELEPHONE ENCOUNTER
Called patient and left detailed voicemail on home number to call me back directly or respond to MyChart sent earlier to let us know how many tablets of his medication he takes at bedtime.     Direct number given: [phone].  Marj Thornton, BANDARN RN

## 2019-02-11 ENCOUNTER — ANCILLARY PROCEDURE (OUTPATIENT)
Dept: CT IMAGING | Facility: CLINIC | Age: 59
End: 2019-02-11
Attending: FAMILY MEDICINE
Payer: COMMERCIAL

## 2019-02-11 DIAGNOSIS — Z72.0 TOBACCO ABUSE: ICD-10-CM

## 2019-02-11 PROCEDURE — G0297 LDCT FOR LUNG CA SCREEN: HCPCS | Performed by: RADIOLOGY

## 2019-02-19 NOTE — PATIENT INSTRUCTIONS
Preventive Health Recommendations  Male Ages 50 - 64    Yearly exam:             See your health care provider every year in order to  o   Review health changes.   o   Discuss preventive care.    o   Review your medicines if your doctor has prescribed any.     Have a cholesterol test every 5 years, or more frequently if you are at risk for high cholesterol/heart disease.     Have a diabetes test (fasting glucose) every three years. If you are at risk for diabetes, you should have this test more often.     Have a colonoscopy at age 50, or have a yearly FIT test (stool test). These exams will check for colon cancer.      Talk with your health care provider about whether or not a prostate cancer screening test (PSA) is right for you.    You should be tested each year for STDs (sexually transmitted diseases), if you re at risk.     Shots: Get a flu shot each year. Get a tetanus shot every 10 years.     Nutrition:    Eat at least 5 servings of fruits and vegetables daily.     Eat whole-grain bread, whole-wheat pasta and brown rice instead of white grains and rice.     Get adequate Calcium and Vitamin D.     Lifestyle    Exercise for at least 150 minutes a week (30 minutes a day, 5 days a week). This will help you control your weight and prevent disease.     Limit alcohol to one drink per day.     No smoking.     Wear sunscreen to prevent skin cancer.     See your dentist every six months for an exam and cleaning.     See your eye doctor every 1 to 2 years.        Patient Education     Prediabetes  You have been diagnosed with prediabetes. This means that the level of sugar (glucose) in your blood is too high. If you have prediabetes, you are at risk for developing type 2 diabetes. Type 2 diabetes is diagnosed when the level of glucose in the blood reaches a certain high level. With prediabetes, it hasn t reached this point yet, but it is higher than normal. It is vital to make lifestyle changes to lower your blood  sugar, improve your health, and prevent diabetes. This sheet will tell you more.      Why worry about prediabetes?  Prediabetes is a disease where the body s cells have trouble using glucose in the blood for energy. As a result, too much glucose stays in the blood and can affect how your heart and blood vessels work. Without changes in diet and lifestyle, the problem can get worse. Once you have type 2 diabetes, it is chronic (ongoing) and needs to be managed for the rest of your life. Diabetes can harm the body and your health by damaging organs, such as your eyes and kidneys. It makes you more likely to have heart disease. And it can damage nerves and blood vessels.  Who is a risk for prediabetes?  The exact cause of prediabetes is not clear. But certain risk factors make a person more likely to have it. These include:    A family history of type 2 diabetes    Being overweight    Being over age 45    Have hypertension or elevated cholesterol     Having had gestational diabetes    Not being physically active    Being ,  American, , , , or   Diagnosing prediabetes  Prediabetes may have no symptoms or you may have some of the symptoms of diabetes. The diagnosis is made with a blood test. You may have one or more of these blood tests:     Fasting glucose test. Blood is taken and tested after you have fasted (not eaten) for at least 8 hours. A normal test result is 99 milligrams per deciliter (mg/dL) or lower. Prediabetes is 100 mg/dL to 125 mg/dL. Diabetes is 126 mg/dL or higher.    Glucose tolerance test. Your blood sugar is measured before and after you drink a very sugary liquid. A normal test result is 139 milligrams per deciliter (mg/dL) or lower. Prediabetes is 140 mg/dL to 199 mg/dL. Diabetes is 200 mg/dL or higher.    Hemoglobin A1c (HbA1c). Your HbA1c is normal if it is below 5.7%. Prediabetes is 5.7% to 6.4%. Diabetes is 6.5% or  higher.   Treating prediabetes  The best way to treat prediabetes is to lose at least 5% to 7% of your current weight and be more physically active by getting at least 150 minutes a week of physical activity. When sitting for long periods of time, get up for short sessions of light activity every 30 minutes. These changes help the body s cells use blood sugar better. Even a small amount of weight loss can help. Work with your healthcare provider to make a plan to eat well and be more active. Keep in mind that small changes can add up. Other changes in your lifestyle (or even taking certain medicines, such as metformin) may make you less likely to develop diabetes. Your healthcare provider can talk with you about these.  Follow-up  If it is untreated, prediabetes can turn into diabetes. This is a serious health condition. Take steps to stop this from happening. Follow the treatment plan you have been given. You may have your blood glucose tested again in about 12 to 18 months.  Symptoms of diabetes  Let your healthcare provider know if you have any of the following:    Always feel very tired    Feel very thirsty or hungry much of the time    Have to urinate often    Lose weight for no reason    Feel numbness or tingling in your fingers or toes    Have cuts or bruises that don t seem to heal    Have blurry vision   Date Last Reviewed: 5/1/2016 2000-2018 The [employer]. [address] [place] [address]. All rights reserved. This information is not intended as a substitute for professional medical care. Always follow your healthcare professional's instructions.         Your provider has referred you to: [facility]: [facility] [place] [phone]  Dermatology  [email or web address]  Patient Education     Chronic Lung Disease: Tips for Quitting Smoking  Cigarette smoke damages lung tissue and irritates airways, which makes  breathing harder. Smoking also damages cilia (tiny hairs) in the airways, so the cilia can t do their job of clearing mucus, dirt, and germs from the lungs. It s never too late to quit smoking. Your health will start to improve on the same day you stub out your last cigarette.    You don t have to quit alone  You may be more likely to quit for good if you seek support from others.    Talk with your healthcare provider about your plans to quit. Ask about medicines that can help. Some contain nicotine and some do not. Some are available by prescription. You can buy others over-the-counter. These medicines help control the desire to use tobacco and the uncomfortable symptoms people have when they try to quit. Others gradually lessen the level of nicotine in the body. Your healthcare provider can tell you about all the choices available including:  ? Oral medicines such as bupropion or varenicline  ? Nicotine replacement therapy such as gum, lozenge, a patch, inhaler, or nasal spray    Join a support group or get advice from an ex-smoker.    Ask other smokers in your household to quit with you.   Tips for quitting smoking  There isn t one right way to stop smoking. Everyone quits in his or her own way. Some of these tips may help:    Make a list of reasons you want to quit. Keep this list and read it often.    Pick a date to quit smoking. Then stick to it.    List the things that make you want to smoke. Think of ways to avoid these triggers.    Set goals for yourself, such as going for a week without smoking. Reward yourself when you meet your goals.    If you don t quit the first time, keep trying! Many people have to try more than once before they stop smoking for good.     For more information    smokefree.gov/xwhs-bq-ky-expert    National Cancer [place] Smoking Quitline: 877-44U-QUIT (302-639-3684)   Date Last Reviewed: 5/1/2016 2000-2018 [employer]. [address] [place] [address], [place] [address]. All  rights reserved. This information is not intended as a substitute for professional medical care. Always follow your healthcare professional's instructions.           Patient Education     How to Quit Smoking  Smoking is one of the hardest habits to break. About half of all people who have ever smoked have been able to quit. Most people who still smoke want to quit. Here are some of the best ways to stop smoking.    Keep trying  Most smokers make many attempts at quitting before they are successful. It s important not to give up.  Go cold turkey  Most former smokers quit cold turkey (all at once). Trying to cut back gradually doesn't seem to work as well, perhaps because it continues the smoking habit. Also, it is possible to inhale more while smoking fewer cigarettes. This results in the same amount of nicotine in your body.  Get support  Support programs can be a big help, especially for heavy smokers. These groups offer lectures, ways to change behavior, and peer support. Here are some ways to find a support program:    Free national quitline: 800-QUIT-NOW (792-484-3605).    Hospital quit-smoking programs.    American Lung Association: (550.702.9709).    American Cancer Society (803-284-4625).  Support at home is important too. Nonsmokers can offer praise and encouragement. If the smoker in your life finds it hard to quit, encourage them to keep trying.  Over-the-counter medicines  Nicotine replacement therapy may make quitting easier. Certain aids, such as the nicotine patch, gum, and lozenges, are available without a prescription. It is best to use these under a doctor s care, though. The skin patch provides a steady supply of nicotine. Nicotine gum and lozenges give temporary bursts of low levels of nicotine. Both methods reduce the craving for cigarettes. Warning: If you have nausea, vomiting, dizziness, weakness, or a fast heartbeat, stop using these products and see your doctor.  Prescription  "medicines  After reviewing your smoking patterns and past attempts to quit, your doctor may offer a prescription medicine such as bupropion, varenicline, a nicotine inhaler, or nasal spray. Each has advantages and side effects. Your doctor can review these with you.  Health benefits of quitting  The benefits of quitting start right away and keep improving the longer you go without smoking. These benefits occur at any age.  So whether you are 17 or 70, quitting is a good decision. Some of the benefits include:    20 minutes: Blood pressure and pulse return to normal.    8 hours: Oxygen levels return to normal.    2 days: Ability to smell and taste begin to improve as damaged nerves regrow.    2 to 3 weeks: Circulation and lung function improve.    1 to 9 months: Coughing, congestion, and shortness of breath decrease; tiredness decreases.    1 year: Risk of heart attack decreases by half.    5 years: Risk of lung cancer decreases by half; risk of stroke becomes the same as a nonsmoker s.  For more on how to quit smoking, try these online resources:     Smokefree.gov    \"Clearing the Air\" booklet from the National Cancer [place]: smokefree.gov/sites/default/files/pdf/clearing-the-air-accessible.pdf  Date Last Reviewed: 3/1/2017    1550-9143 The [employer]. [address] [place] [address], [place] [address]. All rights reserved. This information is not intended as a substitute for professional medical care. Always follow your healthcare professional's instructions.           "

## 2019-02-21 ENCOUNTER — MYC MEDICAL ADVICE (OUTPATIENT)
Dept: FAMILY MEDICINE | Facility: CLINIC | Age: 59
End: 2019-02-21

## 2019-02-26 ENCOUNTER — OFFICE VISIT (OUTPATIENT)
Dept: FAMILY MEDICINE | Facility: CLINIC | Age: 59
End: 2019-02-26
Payer: COMMERCIAL

## 2019-02-26 VITALS
WEIGHT: 216 LBS | SYSTOLIC BLOOD PRESSURE: 132 MMHG | RESPIRATION RATE: 20 BRPM | BODY MASS INDEX: 28.5 KG/M2 | OXYGEN SATURATION: 96 % | DIASTOLIC BLOOD PRESSURE: 89 MMHG | TEMPERATURE: 98.7 F | HEART RATE: 87 BPM

## 2019-02-26 DIAGNOSIS — E87.5 HYPERKALEMIA: ICD-10-CM

## 2019-02-26 DIAGNOSIS — E78.5 DYSLIPIDEMIA: ICD-10-CM

## 2019-02-26 DIAGNOSIS — L60.9 NAIL ABNORMALITIES: ICD-10-CM

## 2019-02-26 DIAGNOSIS — Z00.00 ROUTINE GENERAL MEDICAL EXAMINATION AT A HEALTH CARE FACILITY: Primary | ICD-10-CM

## 2019-02-26 LAB
ANION GAP SERPL CALCULATED.3IONS-SCNC: 4 MMOL/L (ref 3–14)
BUN SERPL-MCNC: 18 MG/DL (ref 7–30)
CALCIUM SERPL-MCNC: 8.8 MG/DL (ref 8.5–10.1)
CHLORIDE SERPL-SCNC: 108 MMOL/L (ref 94–109)
CO2 SERPL-SCNC: 29 MMOL/L (ref 20–32)
CREAT SERPL-MCNC: 0.98 MG/DL (ref 0.66–1.25)
GFR SERPL CREATININE-BSD FRML MDRD: 83 ML/MIN/{1.73_M2}
GLUCOSE SERPL-MCNC: 111 MG/DL (ref 70–99)
POTASSIUM SERPL-SCNC: 4.9 MMOL/L (ref 3.4–5.3)
SODIUM SERPL-SCNC: 141 MMOL/L (ref 133–144)

## 2019-02-26 PROCEDURE — 80048 BASIC METABOLIC PNL TOTAL CA: CPT | Performed by: FAMILY MEDICINE

## 2019-02-26 PROCEDURE — 36415 COLL VENOUS BLD VENIPUNCTURE: CPT | Performed by: FAMILY MEDICINE

## 2019-02-26 PROCEDURE — 99396 PREV VISIT EST AGE 40-64: CPT | Performed by: FAMILY MEDICINE

## 2019-02-26 RX ORDER — ATORVASTATIN CALCIUM 40 MG/1
40 TABLET, FILM COATED ORAL DAILY
Qty: 90 TABLET | Refills: 3 | Status: SHIPPED | OUTPATIENT
Start: 2019-02-26 | End: 2019-12-09

## 2019-02-26 ASSESSMENT — ENCOUNTER SYMPTOMS
NAUSEA: 1
EYE PAIN: 0
DIARRHEA: 0
CONSTIPATION: 0
HEMATURIA: 0
COUGH: 0
FEVER: 0
DIZZINESS: 1
WEAKNESS: 0
JOINT SWELLING: 0
DYSURIA: 0
HEADACHES: 1
ARTHRALGIAS: 0
CHILLS: 0
NERVOUS/ANXIOUS: 0
ABDOMINAL PAIN: 0
SORE THROAT: 0
FREQUENCY: 0
HEMATOCHEZIA: 0
SHORTNESS OF BREATH: 0
PALPITATIONS: 0
MYALGIAS: 0
HEARTBURN: 0
PARESTHESIAS: 0

## 2019-02-26 ASSESSMENT — ANXIETY QUESTIONNAIRES
5. BEING SO RESTLESS THAT IT IS HARD TO SIT STILL: NOT AT ALL
7. FEELING AFRAID AS IF SOMETHING AWFUL MIGHT HAPPEN: NOT AT ALL
2. NOT BEING ABLE TO STOP OR CONTROL WORRYING: NOT AT ALL
6. BECOMING EASILY ANNOYED OR IRRITABLE: NOT AT ALL
GAD7 TOTAL SCORE: 0
IF YOU CHECKED OFF ANY PROBLEMS ON THIS QUESTIONNAIRE, HOW DIFFICULT HAVE THESE PROBLEMS MADE IT FOR YOU TO DO YOUR WORK, TAKE CARE OF THINGS AT HOME, OR GET ALONG WITH OTHER PEOPLE: NOT DIFFICULT AT ALL
3. WORRYING TOO MUCH ABOUT DIFFERENT THINGS: NOT AT ALL
1. FEELING NERVOUS, ANXIOUS, OR ON EDGE: NOT AT ALL

## 2019-02-26 ASSESSMENT — PATIENT HEALTH QUESTIONNAIRE - PHQ9
SUM OF ALL RESPONSES TO PHQ QUESTIONS 1-9: 0
5. POOR APPETITE OR OVEREATING: NOT AT ALL

## 2019-02-26 ASSESSMENT — PAIN SCALES - GENERAL: PAINLEVEL: NO PAIN (0)

## 2019-02-26 NOTE — NURSING NOTE
"Chief Complaint   Patient presents with     Physical     Health Maintenance     Phq-9       Initial BP (!) 159/98   Pulse 87   Temp 98.7  F (37.1  C) (Oral)   Resp 20   Wt 98 kg (216 lb)   SpO2 96%   BMI 28.50 kg/m   Estimated body mass index is 28.5 kg/m  as calculated from the following:    Height as of 1/28/19: 1.854 m (6' 1\").    Weight as of this encounter: 98 kg (216 lb).  Medication Reconciliation: complete  Yesenia Kearns CMA  "

## 2019-02-26 NOTE — PROGRESS NOTES
SUBJECTIVE:   CC: Jack Marrero is an 58 year old male who presents for preventative health visit.     Physical   Annual:     Getting at least 3 servings of Calcium per day:  Yes    Bi-annual eye exam:  Yes    Dental care twice a year:  Yes    Sleep apnea or symptoms of sleep apnea:  Sleep apnea    Diet:  Regular (no restrictions)    Frequency of exercise:  None    Taking medications regularly:  Yes    Medication side effects:  None    Additional concerns today:  Yes    PHQ-2 Total Score: 0              Today's PHQ-2 Score:   PHQ-2 (  [employer]) 2019   Q1: Little interest or pleasure in doing things 0   Q2: Feeling down, depressed or hopeless 0   PHQ-2 Score 0   Q1: Little interest or pleasure in doing things Not at all   Q2: Feeling down, depressed or hopeless Not at all   PHQ-2 Score 0       Abuse: Current or Past(Physical, Sexual or Emotional)- No  Do you feel safe in your environment? Yes    Social History     Tobacco Use     Smoking status: Current Every Day Smoker     Packs/day: 0.50     Years: 40.00     Pack years: 20.00     Types: Cigarettes     Start date: 10/10/1974     Last attempt to quit: 2014     Years since quittin.0     Smokeless tobacco: Never Used   Substance Use Topics     Alcohol use: Yes     Comment: Occasional; about 1-2 drinks/week     Alcohol Use 2019   If you drink alcohol do you typically have greater than 3 drinks per day OR greater than 7 drinks per week? No       Last PSA:   PSA   Date Value Ref Range Status   2019 0.92 0 - 4 ug/L Final     Comment:     Assay Method:  Chemiluminescence using Siemens Vista analyzer       Reviewed orders with patient. Reviewed health maintenance and updated orders accordingly -       Reviewed and updated as needed this visit by clinical staff  Tobacco  Allergies  Meds  Med Hx  Surg Hx  Fam Hx  Soc Hx        Reviewed and updated as needed this visit by Provider            Review of Systems   Constitutional: Negative for  chills and fever.   HENT: Negative for congestion, ear pain, hearing loss and sore throat.    Eyes: Positive for visual disturbance. Negative for pain.   Respiratory: Negative for cough and shortness of breath.    Cardiovascular: Negative for chest pain, palpitations and peripheral edema.   Gastrointestinal: Positive for nausea. Negative for abdominal pain, constipation, diarrhea, heartburn and hematochezia.   Genitourinary: Negative for discharge, dysuria, frequency, genital sores, hematuria, impotence and urgency.   Musculoskeletal: Negative for arthralgias, joint swelling and myalgias.   Skin: Negative for rash.   Neurological: Positive for dizziness and headaches. Negative for weakness and paresthesias.   Psychiatric/Behavioral: Negative for mood changes. The patient is not nervous/anxious.          OBJECTIVE:   /89   Pulse 87   Temp 98.7  F (37.1  C) (Oral)   Resp 20   Wt 98 kg (216 lb)   SpO2 96%   BMI 28.50 kg/m      Physical Exam      Diagnostic Test Results:  none     ASSESSMENT/PLAN:       ICD-10-CM    1. Routine general medical examination at a health care facility Z00.00    2. Nail abnormalities L60.9 DERMATOLOGY REFERRAL   3. Dyslipidemia E78.5 atorvastatin (LIPITOR) 40 MG tablet   4. Hyperkalemia E87.5 Basic metabolic panel       COUNSELING:   Reviewed preventive health counseling, as reflected in patient instructions       Regular exercise       Healthy diet/nutrition       Vision screening       Aspirin Prophylaxsis       Family planning       Consider Hep C screening for patients born between 1945 and 1965       HIV screeninx in teen years, 1x in adult years, and at intervals if high risk       Colon cancer screening       Prostate cancer screening       Consider lung cancer screening for ages 55-80 years and 30 pack-year smoking history         One time pneumovax for smokers    BP Readings from Last 1 Encounters:   19 132/89     Estimated body mass index is 28.5 kg/m  as  "calculated from the following:    Height as of 1/28/19: 1.854 m (6' 1\").    Weight as of this encounter: 98 kg (216 lb).    BP Screening:   Last 3 BP Readings:    BP Readings from Last 3 Encounters:   02/26/19 132/89   01/28/19 (!) 136/92   11/06/18 (!) 132/92       The following was recommended to the patient:  Re-screen BP within a year and recommended lifestyle modifications  Weight management plan: Discussed healthy diet and exercise guidelines     reports that he has been smoking cigarettes.  He started smoking about 44 years ago. He has a 20.00 pack-year smoking history. he has never used smokeless tobacco.  Tobacco Cessation Action Plan: Information offered: Patient not interested at this time  Pharmacotherapies : other Nicotine replacement    Counseling Resources:  ATP IV Guidelines  Pooled Cohorts Equation Calculator  FRAX Risk Assessment  ICSI Preventive Guidelines  Dietary Guidelines for Americans, 2010  [employer]'s MyPlate  ASA Prophylaxis  Lung CA Screening    Jamshid Miller MD  [facility]  --------------------------------------------------------------------------------------------------------------------------------------  SUBJECTIVE:  Jack Marrero is a 58 year old male who presents to the clinic today for a routine physical exam.    The patient's last physical was 15 months ago.     Cholesterol   Date Value Ref Range Status   01/28/2019 162 <200 mg/dL Final   12/19/2017 145 <200 mg/dL Final     HDL Cholesterol   Date Value Ref Range Status   01/28/2019 44 >39 mg/dL Final   12/19/2017 40 >39 mg/dL Final     LDL Cholesterol Calculated   Date Value Ref Range Status   01/28/2019 90 <100 mg/dL Final     Comment:     Desirable:       <100 mg/dl   12/19/2017 56 <100 mg/dL Final     Comment:     Desirable:       <100 mg/dl     Triglycerides   Date Value Ref Range Status   01/28/2019 140 <150 mg/dL Final     Comment:     Fasting specimen   12/19/2017 246 (H) <150 mg/dL Final     Comment:     " Borderline high:  150-199 mg/dl  High:             200-499 mg/dl  Very high:       >499 mg/dl  Fasting specimen       Cholesterol/HDL Ratio   Date Value Ref Range Status   10/26/2015 3.6 0.0 - 5.0 Final   07/21/2015 5.8 (H) 0.0 - 5.0 Final     The patient's last fasting lipid panel was done 1 months ago and the results are listed above.        The 10-year ASCVD risk score (Buffalobobby HOLLAND Jr., et al., 2013) is: 13%    Values used to calculate the score:      Age: 58 years      Sex: Male      Is Non- : No      Diabetic: No      Tobacco smoker: Yes      Systolic Blood Pressure: 132 mmHg      Is BP treated: No      HDL Cholesterol: 44 mg/dL      Total Cholesterol: 162 mg/dL        The patient reports that he has never been treated for high blood pressure.    The patient reports that he does take a daily aspirin.    Lab Results   Component Value Date    HCVAB  03/30/2015     Nonreactive   Assay performance characteristics have not been established for newborns,   infants, and children       The patient reports that he has been screened for Hepatitis C    (Screen all baby boomers once per CDC-- the generation born from 1945 through 1965)  He would not like to have an Hepatitis C test today    Lab Results   Component Value Date    HIAGAB  02/10/2015     Nonreactive   HIV-1 p24 Ag & HIV-1/HIV-2 Ab Not Detected       The patient reports that he has been screened for HIV   (Screen all 15 to 64 years old)  He would not like to have an HIV test today      Immunization History   Administered Date(s) Administered     HEPA 10/18/2007, 06/22/2011     Influenza (H1N1) 01/11/2010     Influenza Quad, Recombinant, p-free (RIV4) 11/06/2018     Influenza Vaccine IM 3yrs+ 4 Valent IIV4 10/03/2013, 10/28/2014, 11/02/2015, 10/24/2016, 10/11/2017     Pneumococcal 23 valent 06/25/2013     Poliovirus, inactivated (IPV) 06/22/2011     TD (ADULT, 7+) 05/03/2005     TDAP Vaccine (Adacel) 04/06/2011     Typhoid IM 06/22/2011      Yellow Fever 06/22/2011     The patient's believes that his last tetanus shot was given 8 year(s) ago.   The patient believes that he has not had a Shingrix in the past  The patient believes that he has had a PPSV23 in the past.  The patient believes that he has not had a PCV13 in the past.  The patient believes that he has had a seasonal flu vaccination this fall or winter.  The patient would like to have a Shingrix      Results for orders placed or performed during the hospital encounter of 01/11/16   COLONOSCOPY   Result Value Ref Range    COLONOSCOPY       [facility]  Endoscopy Department-[place]  _______________________________________________________________________________  Patient Name: Jack Marrero           Procedure Date: 1/11/2016 9:54 AM  MRN: [id number]                       YOB: 1960  Admit Type: Outpatient                Age: 55  Gender: Male                          Note Status: Finalized  Attending MD: Nilson Gale MD       _______________________________________________________________________________     Procedure:                Colonoscopy  Indications:              Personal history of colonic polyps  Providers:                Nilson Gale MD, Lima Ordonez RN  Referring MD:             Jamshid Miller MD  Medicines:                Fentanyl 200 micrograms IV, Midazolam 3 mg IV,                             Diphenhydramine 12.5 mg IV  Complications:            No immediate complications.  ______________________________________________________________________________ _  Procedure:                Pre-Anesthesia Assessment:                            - Prior to the procedure, a History and Physical                             was performed, and patient medications and                             allergies were reviewed. The patient is competent.                             The risks and benefits of the procedure and the                              sedation options and risks were discussed with the                             patient. All questions were answered and informed                             consent was obtained. Patient identification and                             proposed procedure were verified by the physician                             in the procedure room. Mental Status Examination:                             alert and oriented. Airway Examination: normal                             oropharyngeal airway and neck mobility. Respiratory                             Examination: clear to auscultation. CV Examination:                              normal. Prophylactic Antibiotics: The patient does                             not require prophylactic antibiotics. Prior                             Anticoagulants: The patient has taken aspirin, last                             dose was 7 days prior to procedure. ASA Grade                             Assessment: II - A patient with mild systemic                             disease. After reviewing the risks and benefits,                             the patient was deemed in satisfactory condition to                             undergo the procedure. The anesthesia plan was to                             use moderate sedation / analgesia (conscious                             sedation). Immediately prior to administration of                             medications, the patient was re-assessed for                             adequacy to receive sedatives. The heart rate,                             respiratory rate, oxygen saturations, blood                             pres sure, adequacy of pulmonary ventilation, and                             response to care were monitored throughout the                             procedure. The physical status of the patient was                             re-assessed after the procedure.                            After obtaining informed consent, the  colonoscope                             was passed under direct vision. Throughout the                             procedure, the patient's blood pressure, pulse, and                             oxygen saturations were monitored continuously. The                             Colonoscope was introduced through the anus and                             advanced to the cecum, identified by appendiceal                             orifice and ileocecal valve. The colonoscopy was                             performed without difficulty. The patient tolerated                             the procedure well. The quality of the bowel                             preparation was go od.                                                                                   Findings:       The perianal and digital rectal examinations were normal.       Multiple small and large-mouthed diverticula were found in the sigmoid        colon.       Four sessile polyps were found in the cecum and at 70 cm proximal to the        anus. The polyps were 1 to 4 mm in size. These polyps were removed with        a cold snare. Resection and retrieval were complete.       The exam was otherwise without abnormality.                                                                                   Impression:               - Diverticulosis in the sigmoid colon.                            - Four 1 to 4 mm polyps in the cecum and at 70 cm                             proximal to the anus. Resected and retrieved.                            - The examination was otherwise normal.  Recommendation:           - Path report will tell when next colonoscopy                             should be                             For the diverticulosis will need to start on                             Metamucil or its equivalent for more details look                             at patient instructions.                            5 sooner if mroe than tubular like 3. one at  70 cm                             was the largest. ones in cecum were very small                                                                                     ___________________  Nilson Gale MD  1/11/2016 10:31 AM                              Number of Addenda: 0    Note Initiated On: 1/11/2016 9:54 AM  Scope Withdrawal Time: 0 hours 0 minutes 0 seconds   Total Procedure Duration: 0 hours 0 minutes 0 seconds      ]   The patient reports a family history of colon cancer in his brother at age 54.  The patient reports that he has had a colonoscopy. His  last colonoscopy was in 2016 and he  report that is was abnormal. The patient was told to have this repeated in 5 years.      The patient reports that he and his wife or partner are not using contraception as she has gone through menopause.    The patient reports a family history of diabetes in an uncle.  The patient reports a family history of prostate cancer in a brother at age 54.   The patient reports that he eats or drinks 3 servings of dairy products per day.   The patient reports that he has dental appointments approximately every 6 months.  The patient reports that he  has an eye examination approximately every 1.0 year(s).    Do you currently smoke? Yes  How many years have you smoked? 45   How many packs per day did you smoke on average?  3/4 ppd  (if more than 30 pack year history and the patient is age 55-80 consider ordering an annual low dose radiation lung CT to screen for cancer)  (Do not order if patient has quit more than 15 years ago or has a health condition that limits life expectancy or could not tolerate curative lung surgery)  Are you interested having a lung CT to screen for lung cancer? No: as he just  had one done a few wks ago.    If the patient has smoked more that 100 cigarettes, has the patient had an imaging study (US or CT) for an AAA between the ages of 65 and 75? N/A      He is thinking about smoking cessation  He has a  nicotrol inhaler   He is thinking about March 1st as a quit date              Patient Active Problem List   Diagnosis     Dyslipidemia     Mixed anxiety depressive disorder     HYPERLIPIDEMIA LDL GOAL <130     ADHD (attention deficit hyperactivity disorder)     Erectile dysfunction     Tubular adenoma of colon on colonoscopy     High triglycerides     HDL deficiency     IGT (impaired glucose tolerance)     Sensory polyneuropathy     Diverticulosis of colon     Inflamed seborrheic keratosis     10 year risk of MI or stroke 7.5% or greater, 9.3% in Nov 2016 on atorvastatin 40     Venous lake of lip, left lower     Benign non-nodular prostatic hyperplasia with lower urinary tract symptoms     Tobacco abuse     Restless legs syndrome (RLS)     SHY (obstructive sleep apnea) - Severe     Acute pain of right shoulder     Gallbladder polyp, need fu US yearly     Atherosclerosis of abdominal aorta (H), seen on CT       Past Surgical History:   Procedure Laterality Date     BIOPSY      growth on right shin     COLONOSCOPY       COLONOSCOPY WITH CO2 INSUFFLATION N/A 1/11/2016    Procedure: COLONOSCOPY WITH CO2 INSUFFLATION;  Surgeon: Nilson Gale MD;  Location: [facility] OR     HC EXCISION SKIN OF NAIL FOLD  1993    BL GREAT TOE     HC TOOTH EXTRACTION W/FORCEP  1991     ALL 4 WISDOM TEETH EXTRACTED     HERNIORRHAPHY INGUINAL Left 3/11/2015    Procedure: HERNIORRHAPHY INGUINAL;  Surgeon: Nilson Gale MD;  Location: [facility] OR     TONSILLECTOMY & ADENOIDECTOMY         Family History   Problem Relation Age of Onset     Arthritis Mother      Heart Disease Mother      Lipids Mother      Eye Disorder Mother      Gastrointestinal Disease Mother         irritable bowel     Breast Cancer Mother      Osteoporosis Mother      Lipids Father      Heart Disease Father      Psychotic Disorder Father      Depression Father      Eye Disorder Father      C.A.D. Father 56        CABG at age 56     Coronary Artery Disease Father          Heart attack at age 56     Hyperlipidemia Father      Depression/Anxiety Father      Mental Illness Father      Hypertension Father      Anxiety Disorder Father      Arthritis Maternal Grandmother      Alcohol/Drug Maternal Grandfather      Cancer - colorectal Maternal Grandfather      Hypertension Paternal Grandmother      Breast Cancer Paternal Grandmother      Heart Disease Paternal Grandfather      Hypertension Paternal Grandfather      Coronary Artery Disease Paternal Grandfather          of heart attack at age 59     Hypertension Brother      Depression/Anxiety Brother      Depression Brother      Thyroid Disease Sister      Hypertension Brother      Depression/Anxiety Brother      Hypertension Brother      Gastrointestinal Disease Brother      Prostate Cancer Brother      Mental Illness Brother      Cardiovascular Other      Diabetes Maternal Half-Brother      Thyroid Disease Sister      Thyroid Disease Sister      Other Cancer Other         Lung; Paternal Uncle/Lung; Paternal Uncle     Coronary Artery Disease Other      Hyperlipidemia Other      Other Cancer Other         Lung; Paternal Uncle     Cerebrovascular Disease No family hx of        Social History     Socioeconomic History     Marital status:      Spouse name: Not on file     Number of children: Not on file     Years of education: Not on file     Highest education level: Not on file   Occupational History     Not on file   Social Needs     Financial resource strain: Not on file     Food insecurity:     Worry: Not on file     Inability: Not on file     Transportation needs:     Medical: Not on file     Non-medical: Not on file   Tobacco Use     Smoking status: Current Every Day Smoker     Packs/day: 0.50     Years: 40.00     Pack years: 20.00     Types: Cigarettes     Start date: 10/10/1974     Last attempt to quit: 2014     Years since quittin.0     Smokeless tobacco: Never Used   Substance and Sexual Activity     Alcohol use:  Yes     Comment: Occasional; about 1-2 drinks/week     Drug use: No     Sexual activity: Yes     Partners: Female   Lifestyle     Physical activity:     Days per week: Not on file     Minutes per session: Not on file     Stress: Not on file   Relationships     Social connections:     Talks on phone: Not on file     Gets together: Not on file     Attends Synagogue service: Not on file     Active member of club or organization: Not on file     Attends meetings of clubs or organizations: Not on file     Relationship status: Not on file     Intimate partner violence:     Fear of current or ex partner: Not on file     Emotionally abused: Not on file     Physically abused: Not on file     Forced sexual activity: Not on file   Other Topics Concern     Parent/sibling w/ CABG, MI or angioplasty before 65F 55M? No   Social History Narrative     Not on file       Current Outpatient Medications   Medication Sig Dispense Refill     amitriptyline (ELAVIL) 75 MG tablet Take 1 tablet (75 mg) by mouth At Bedtime 90 tablet 3     aspirin 81 MG tablet Take 1 tablet by mouth daily.       atorvastatin (LIPITOR) 40 MG tablet TAKE 1 TABLET(40 MG) BY MOUTH DAILY 90 tablet 0     dutasteride (AVODART) 0.5 MG capsule TAKE ONE CAPSULE BY MOUTH DAILY. 90 capsule 3     escitalopram (LEXAPRO) 10 MG tablet TAKE ONE TABLET BY MOUTH EVERY MORNING 90 tablet 1     LORazepam (ATIVAN) 0.5 MG tablet Take 0.5-1 tablets (0.25-0.5 mg) by mouth every 6 hours as needed for anxiety 30 tablet 0     order for DME Equipment ordered: RESMED Auto PAP Mask type: Nasal Settings: 6-12 cm H2O             PHYSICAL EXAMINATION:  Blood pressure 132/89, pulse 87, temperature 98.7  F (37.1  C), temperature source Oral, resp. rate 20, weight 98 kg (216 lb), SpO2 96 %.  General appearance - healthy, alert and no distress  Skin - Skin color, texture, turgor normal. No rashes or lesions. Abnormal curve to nails   Head - Normocephalic. No masses, lesions, tenderness or  abnormalities  Eyes - conjunctivae/corneas clear. PERRL, EOM's intact. Fundi benign  Ears - External ears normal. Canals clear. TM's normal.  Nose/Sinuses - Nares normal. Septum midline. Mucosa normal. No drainage or sinus tenderness.  Oropharynx - Lips, mucosa, and tongue normal. Teeth and gums normal.  Neck - Neck supple. No adenopathy. Thyroid symmetric, normal size,  Lungs - Percussion normal. Good diaphragmatic excursion. Lungs clear  Heart - PMI normal. No lifts, heaves, or thrills. RRR. No murmurs, clicks gallops or rub  Abdomen - Abdomen soft, non-tender. BS normal. No masses, organomegaly  Extremities - Extremities normal. No deformities, edema, or skin discoloration.  Musculoskeletal - Spine ROM normal. Muscular strength intact.  Peripheral pulses - radial=4/4, femoral=4/4, popliteal=4/4, dorsalis pedis=4/4,  Neuro - Gait normal. Reflexes normal and symmetric. Sensation grossly WNL.  Genitalia - Penis normal. No urethral discharge. Scrotum normal to palpation. No hernia.  Rectal - Rectal negative. Prostate palpation negative.  No rectal masses or abnormalities        Office Visit on 01/28/2019   Component Date Value Ref Range Status     Sodium 01/28/2019 142  133 - 144 mmol/L Final     Potassium 01/28/2019 5.6* 3.4 - 5.3 mmol/L Final     Chloride 01/28/2019 109  94 - 109 mmol/L Final     Carbon Dioxide 01/28/2019 30  20 - 32 mmol/L Final     Anion Gap 01/28/2019 3  3 - 14 mmol/L Final     Glucose 01/28/2019 117* 70 - 99 mg/dL Final    Fasting specimen     Urea Nitrogen 01/28/2019 13  7 - 30 mg/dL Final     Creatinine 01/28/2019 0.96  0.66 - 1.25 mg/dL Final     GFR Estimate 01/28/2019 86  >60 mL/min/[1.73_m2] Final    Comment: Non  GFR Calc  Starting 12/18/2018, serum creatinine based estimated GFR (eGFR) will be   calculated using the Chronic Kidney Disease Epidemiology Collaboration   (CKD-EPI) equation.       GFR Estimate If Black 01/28/2019 >90  >60 mL/min/[1.73_m2] Final    Comment:   GFR Calc  Starting 12/18/2018, serum creatinine based estimated GFR (eGFR) will be   calculated using the Chronic Kidney Disease Epidemiology Collaboration   (CKD-EPI) equation.       Calcium 01/28/2019 8.8  8.5 - 10.1 mg/dL Final     Bilirubin Total 01/28/2019 0.5  0.2 - 1.3 mg/dL Final     Albumin 01/28/2019 3.7  3.4 - 5.0 g/dL Final     Protein Total 01/28/2019 7.1  6.8 - 8.8 g/dL Final     Alkaline Phosphatase 01/28/2019 79  40 - 150 U/L Final     ALT 01/28/2019 76* 0 - 70 U/L Final     AST 01/28/2019 41  0 - 45 U/L Final     Cholesterol 01/28/2019 162  <200 mg/dL Final     Triglycerides 01/28/2019 140  <150 mg/dL Final    Fasting specimen     HDL Cholesterol 01/28/2019 44  >39 mg/dL Final     LDL Cholesterol Calculated 01/28/2019 90  <100 mg/dL Final    Desirable:       <100 mg/dl     Non HDL Cholesterol 01/28/2019 118  <130 mg/dL Final     Hemoglobin A1C 01/28/2019 5.7* 0 - 5.6 % Final    Comment: Normal <5.7% Prediabetes 5.7-6.4%  Diabetes 6.5% or higher - adopted from ADA   consensus guidelines.       PSA 01/28/2019 0.92  0 - 4 ug/L Final    Assay Method:  Chemiluminescence using Siemens Vista analyzer       ASSESSMENT:    ICD-10-CM    1. Routine general medical examination at a health care facility Z00.00        Well-Adult Physical Exam.  Health Maintenance Due   Topic Date Due     ZOSTER IMMUNIZATION (1 of 2) 09/06/2008     PREVENTIVE CARE VISIT  12/15/2018     PHQ-9 Q6 MONTHS  03/04/2019     Health Maintenance   Topic Date Due     ZOSTER IMMUNIZATION (1 of 2) 09/06/2008     PREVENTIVE CARE VISIT  12/15/2018     PHQ-9 Q6 MONTHS  03/04/2019     LUNG CANCER SCREENING ANNUAL  02/11/2020     COLONOSCOPY Q5 YR  01/11/2021     DTAP/TDAP/TD IMMUNIZATION (3 - Td) 04/06/2021     LIPID SCREEN Q5 YR MALE (SYSTEM ASSIGNED)  01/28/2024     ADVANCE DIRECTIVE PLANNING Q5 YRS  01/28/2024     DEPRESSION ACTION PLAN  Completed     HIV SCREEN (SYSTEM ASSIGNED)  Completed     INFLUENZA VACCINE  Completed      HEPATITIS C SCREENING  Completed     IPV IMMUNIZATION  Aged Out     MENINGITIS IMMUNIZATION  Aged Out         HEALTH CARE MAINTENENCE: The recommended screening tests and vaccinatons for this patient have been discussed as above.  The appropriate tests and vaccinations  have been ordered or declined by the patient. Please see the orders in EPIC.The patient specifically declines: n/a     Immunization Status:  up to date and documented except for Shingrix    Patient Active Problem List   Diagnosis     Dyslipidemia     Mixed anxiety depressive disorder     HYPERLIPIDEMIA LDL GOAL <130     ADHD (attention deficit hyperactivity disorder)     Erectile dysfunction     Tubular adenoma of colon on colonoscopy     High triglycerides     HDL deficiency     IGT (impaired glucose tolerance)     Sensory polyneuropathy     Diverticulosis of colon     Inflamed seborrheic keratosis     10 year risk of MI or stroke 7.5% or greater, 9.3% in Nov 2016 on atorvastatin 40     Venous lake of lip, left lower     Benign non-nodular prostatic hyperplasia with lower urinary tract symptoms     Tobacco abuse     Restless legs syndrome (RLS)     SHY (obstructive sleep apnea) - Severe     Acute pain of right shoulder     Gallbladder polyp, need fu US yearly     Atherosclerosis of abdominal aorta (H), seen on CT        ATP III Guidelines  ICSI Preventive Guidelines    PLAN:   I recommended continuing to take a daily aspirin (81 to 325 mg)  Shingrix recommended and the patient was sent to the pharmacy   Discussed calcium intake, vitamins and supplements. Recommended 1000 mg of calcium daily  Weight loss through diet and exercise was recommended  Sunscreen use was recommended especially in the area of tatoos  Refills on chronic medication given  Recommended dental exams every 6 months  Recommended eye exam every 1-2 years  Follow up in 1 year for the next preventative medical visit    The patients chronic medication was filled for 12   months.      Body mass index is 28.5 kg/m .    Smoking cessation was discussed and educational references regarding this subject was printed from [employer] and given to the patient.

## 2019-02-27 ASSESSMENT — ANXIETY QUESTIONNAIRES: GAD7 TOTAL SCORE: 0

## 2019-05-13 ENCOUNTER — OFFICE VISIT (OUTPATIENT)
Dept: URGENT CARE | Facility: URGENT CARE | Age: 59
End: 2019-05-13
Payer: COMMERCIAL

## 2019-05-13 VITALS
WEIGHT: 217.6 LBS | DIASTOLIC BLOOD PRESSURE: 91 MMHG | RESPIRATION RATE: 14 BRPM | SYSTOLIC BLOOD PRESSURE: 150 MMHG | HEART RATE: 83 BPM | OXYGEN SATURATION: 97 % | BODY MASS INDEX: 28.71 KG/M2 | TEMPERATURE: 98.7 F

## 2019-05-13 DIAGNOSIS — M54.41 ACUTE RIGHT-SIDED LOW BACK PAIN WITH RIGHT-SIDED SCIATICA: Primary | ICD-10-CM

## 2019-05-13 DIAGNOSIS — M62.830 BACK MUSCLE SPASM: ICD-10-CM

## 2019-05-13 PROCEDURE — 99213 OFFICE O/P EST LOW 20 MIN: CPT | Performed by: PHYSICIAN ASSISTANT

## 2019-05-13 RX ORDER — CYCLOBENZAPRINE HCL 10 MG
10 TABLET ORAL 3 TIMES DAILY PRN
Qty: 30 TABLET | Refills: 0 | Status: SHIPPED | OUTPATIENT
Start: 2019-05-13 | End: 2019-12-10

## 2019-05-13 ASSESSMENT — ENCOUNTER SYMPTOMS
EYES NEGATIVE: 1
ADENOPATHY: 0
LIGHT-HEADEDNESS: 0
CARDIOVASCULAR NEGATIVE: 1
ENDOCRINE NEGATIVE: 1
NEUROLOGICAL NEGATIVE: 1
BACK PAIN: 1
DIZZINESS: 0
RESPIRATORY NEGATIVE: 1
DIARRHEA: 0
EYE ITCHING: 0
CHILLS: 0
FEVER: 0
SHORTNESS OF BREATH: 0
PALPITATIONS: 0
POLYDIPSIA: 0
ABDOMINAL PAIN: 0
EYE REDNESS: 0
GASTROINTESTINAL NEGATIVE: 1
RHINORRHEA: 0
NAUSEA: 0
FREQUENCY: 0
EYE DISCHARGE: 0
CHEST TIGHTNESS: 0
COUGH: 0
WEAKNESS: 0
CONSTITUTIONAL NEGATIVE: 1
HEMATURIA: 0
HEADACHES: 0
VOMITING: 0
DYSURIA: 0
SORE THROAT: 0
DIAPHORESIS: 0
MYALGIAS: 0
WHEEZING: 0

## 2019-05-13 NOTE — PROGRESS NOTES
Chief Complaint:    Chief Complaint   Patient presents with     Back Pain     Lower right back pain- possible pinched nerve x3 weeks. Patient states he has a long history of back problems.        HPI: Jack Marrero is an 58 year old male who presents for evaluation and treatment of R sided low back pain.  Patient has had low back pain for years.  His symptoms started again roughly 3 weeks ago on the lower R side.  He does have some R sided sciatica down to the R knee.  He denies any weakness of the R leg.  No loss of bowel or bladder control.  Ice and ibuprofen have been helping to a small degree.      ROS:      Review of Systems   Constitutional: Negative.  Negative for chills, diaphoresis and fever.   HENT: Negative.  Negative for congestion, ear pain, rhinorrhea and sore throat.    Eyes: Negative.  Negative for discharge, redness and itching.   Respiratory: Negative.  Negative for cough, chest tightness, shortness of breath and wheezing.    Cardiovascular: Negative.  Negative for chest pain and palpitations.   Gastrointestinal: Negative.  Negative for abdominal pain, diarrhea, nausea and vomiting.   Endocrine: Negative.  Negative for polydipsia and polyuria.   Genitourinary: Negative for dysuria, frequency, hematuria and urgency.   Musculoskeletal: Positive for back pain. Negative for myalgias.   Skin: Negative for rash.   Allergic/Immunologic: Negative for immunocompromised state.   Neurological: Negative.  Negative for dizziness, weakness, light-headedness and headaches.   Hematological: Negative for adenopathy.        Family History   Family History   Problem Relation Age of Onset     Arthritis Mother      Heart Disease Mother      Lipids Mother      Eye Disorder Mother      Gastrointestinal Disease Mother         irritable bowel     Breast Cancer Mother      Osteoporosis Mother      Lipids Father      Heart Disease Father      Psychotic Disorder Father      Depression Father      Eye Disorder Father       CANDIASERGIO. Father 56        CABG at age 56     Coronary Artery Disease Father         Heart attack at age 56     Hyperlipidemia Father      Depression/Anxiety Father      Mental Illness Father      Hypertension Father      Anxiety Disorder Father      Arthritis Maternal Grandmother      Alcohol/Drug Maternal Grandfather      Cancer - colorectal Maternal Grandfather      Hypertension Paternal Grandmother      Breast Cancer Paternal Grandmother      Heart Disease Paternal Grandfather      Hypertension Paternal Grandfather      Coronary Artery Disease Paternal Grandfather          of heart attack at age 59     Hypertension Brother      Depression/Anxiety Brother      Depression Brother      Thyroid Disease Sister      Hypertension Brother      Depression/Anxiety Brother      Hypertension Brother      Gastrointestinal Disease Brother      Prostate Cancer Brother      Mental Illness Brother      Cardiovascular Other      Diabetes Maternal Half-Brother      Thyroid Disease Sister      Thyroid Disease Sister      Other Cancer Other         Lung; Paternal Uncle/Lung; Paternal Uncle     Coronary Artery Disease Other      Hyperlipidemia Other      Other Cancer Other         Lung; Paternal Uncle     Cerebrovascular Disease No family hx of        Social History  Social History     Socioeconomic History     Marital status:      Spouse name: Not on file     Number of children: Not on file     Years of education: Not on file     Highest education level: Not on file   Occupational History     Not on file   Social Needs     Financial resource strain: Not on file     Food insecurity:     Worry: Not on file     Inability: Not on file     Transportation needs:     Medical: Not on file     Non-medical: Not on file   Tobacco Use     Smoking status: Current Every Day Smoker     Packs/day: 0.50     Years: 40.00     Pack years: 20.00     Types: Cigarettes     Start date: 10/10/1974     Last attempt to quit: 2014     Years since  quittin.2     Smokeless tobacco: Never Used   Substance and Sexual Activity     Alcohol use: Yes     Comment: Occasional; about 1-2 drinks/week     Drug use: No     Sexual activity: Yes     Partners: Female   Lifestyle     Physical activity:     Days per week: Not on file     Minutes per session: Not on file     Stress: Not on file   Relationships     Social connections:     Talks on phone: Not on file     Gets together: Not on file     Attends Judaism service: Not on file     Active member of club or organization: Not on file     Attends meetings of clubs or organizations: Not on file     Relationship status: Not on file     Intimate partner violence:     Fear of current or ex partner: Not on file     Emotionally abused: Not on file     Physically abused: Not on file     Forced sexual activity: Not on file   Other Topics Concern     Parent/sibling w/ CABG, MI or angioplasty before 65F 55M? No   Social History Narrative     Not on file        Surgical History:  Past Surgical History:   Procedure Laterality Date     BIOPSY      growth on right shin     COLONOSCOPY       COLONOSCOPY WITH CO2 INSUFFLATION N/A 2016    Procedure: COLONOSCOPY WITH CO2 INSUFFLATION;  Surgeon: Nilson Gale MD;  Location: [facility] OR     HC EXCISION SKIN OF NAIL FOLD      BL GREAT TOE     HC TOOTH EXTRACTION W/FORCEP       ALL 4 WISDOM TEETH EXTRACTED     HERNIORRHAPHY INGUINAL Left 3/11/2015    Procedure: HERNIORRHAPHY INGUINAL;  Surgeon: Nilson Gale MD;  Location: [facility] OR     TONSILLECTOMY & ADENOIDECTOMY          Problem List:  Patient Active Problem List   Diagnosis     Dyslipidemia     Mixed anxiety depressive disorder     HYPERLIPIDEMIA LDL GOAL <130     ADHD (attention deficit hyperactivity disorder)     Erectile dysfunction     Tubular adenoma of colon on colonoscopy     High triglycerides     HDL deficiency     IGT (impaired glucose tolerance)     Sensory polyneuropathy     Diverticulosis of colon      Inflamed seborrheic keratosis     10 year risk of MI or stroke 7.5% or greater, 9.3% in Nov 2016 on atorvastatin 40     Venous lake of lip, left lower     Benign non-nodular prostatic hyperplasia with lower urinary tract symptoms     Tobacco abuse     Restless legs syndrome (RLS)     SHY (obstructive sleep apnea) - Severe     Acute pain of right shoulder     Gallbladder polyp, need fu US yearly     Atherosclerosis of abdominal aorta (H), seen on CT        Allergies:  Allergies   Allergen Reactions     Codeine      FELT WIRED ON THIS        Current Meds:    Current Outpatient Medications:      amitriptyline (ELAVIL) 75 MG tablet, Take 1 tablet (75 mg) by mouth At Bedtime (Patient taking differently: Take 40 mg by mouth At Bedtime ), Disp: 90 tablet, Rfl: 3     aspirin 81 MG tablet, Take 1 tablet by mouth daily., Disp: , Rfl:      atorvastatin (LIPITOR) 40 MG tablet, Take 1 tablet (40 mg) by mouth daily, Disp: 90 tablet, Rfl: 3     cyclobenzaprine (FLEXERIL) 10 MG tablet, Take 1 tablet (10 mg) by mouth 3 times daily as needed for muscle spasms, Disp: 30 tablet, Rfl: 0     dutasteride (AVODART) 0.5 MG capsule, TAKE ONE CAPSULE BY MOUTH DAILY., Disp: 90 capsule, Rfl: 3     escitalopram (LEXAPRO) 10 MG tablet, TAKE ONE TABLET BY MOUTH EVERY MORNING, Disp: 90 tablet, Rfl: 1     order for DME, Equipment ordered: RESMED Auto PAP Mask type: Nasal Settings: 6-12 cm H2O, Disp: , Rfl:      LORazepam (ATIVAN) 0.5 MG tablet, Take 0.5-1 tablets (0.25-0.5 mg) by mouth every 6 hours as needed for anxiety (Patient not taking: Reported on 5/13/2019), Disp: 30 tablet, Rfl: 0     PHYSICAL EXAM:     Vital signs noted and reviewed by Justin Najera  BP (!) 150/91   Pulse 83   Temp 98.7  F (37.1  C) (Oral)   Resp 14   Wt 98.7 kg (217 lb 9.6 oz)   SpO2 97%   BMI 28.71 kg/m       PEFR:    Physical Exam   Constitutional: He appears well-developed and well-nourished. He is cooperative.  Non-toxic appearance. He does not have a  sickly appearance. He does not appear ill. No distress.   HENT:   Head: Normocephalic and atraumatic.   Right Ear: Hearing, tympanic membrane, external ear and ear canal normal. Tympanic membrane is not perforated, not erythematous, not retracted and not bulging.   Left Ear: Hearing, tympanic membrane, external ear and ear canal normal. Tympanic membrane is not perforated, not erythematous, not retracted and not bulging.   Nose: Nose normal. No mucosal edema or rhinorrhea.   Mouth/Throat: Oropharynx is clear and moist and mucous membranes are normal. No oropharyngeal exudate, posterior oropharyngeal edema, posterior oropharyngeal erythema or tonsillar abscesses. Tonsils are 0 on the right. Tonsils are 0 on the left. No tonsillar exudate.   Eyes: Pupils are equal, round, and reactive to light. EOM are normal.   Neck: Normal range of motion. Neck supple.   Cardiovascular: Normal rate, regular rhythm, S1 normal, S2 normal, normal heart sounds and intact distal pulses. Exam reveals no gallop, no distant heart sounds and no friction rub.   No murmur heard.  Pulmonary/Chest: Effort normal and breath sounds normal. No respiratory distress. He has no decreased breath sounds. He has no wheezes. He has no rhonchi. He has no rales.   Abdominal: Soft. Bowel sounds are normal. He exhibits no distension. There is no tenderness.   Musculoskeletal:        Lumbar back: He exhibits tenderness, pain and spasm. He exhibits normal range of motion, no bony tenderness, no swelling, no edema and no deformity.        Back:    Lymphadenopathy:     He has no cervical adenopathy.   Neurological: He is alert. He has normal strength and normal reflexes. He displays no atrophy and normal reflexes. No cranial nerve deficit or sensory deficit. He exhibits normal muscle tone. Coordination and gait normal.   Reflex Scores:       Tricep reflexes are 2+ on the right side and 2+ on the left side.       Bicep reflexes are 2+ on the right side and 2+ on  the left side.       Brachioradialis reflexes are 2+ on the right side and 2+ on the left side.       Patellar reflexes are 2+ on the right side and 2+ on the left side.       Achilles reflexes are 2+ on the right side and 2+ on the left side.  Skin: Skin is warm and dry. No rash noted. He is not diaphoretic.   Psychiatric: He has a normal mood and affect. His speech is normal and behavior is normal. Judgment and thought content normal. Cognition and memory are normal. He is attentive.   Nursing note and vitals reviewed.       Labs:       Medical Decision Making:    Differential Diagnosis:  Back Pain: myofascial low back strain, lumbosacral strain, degenerative disc disease, possible herniated disc  and chronic low back pain      ASSESSMENT:     1. Acute right-sided low back pain with right-sided sciatica    2. Back muscle spasm         PLAN:     Order placed for patient to follow up with PT.  Rx for Flexeril for muscle spasm.  Continue ibuprofen and ice.  Stay active.  He will follow up with his PCP after PT has been initiated.  Worrisome symptoms discussed with instructions to go to the ED.  Patient verbalized understanding and agreed with this plan.     Justin Najera  5/13/2019, 10:47 AM

## 2019-05-30 ENCOUNTER — THERAPY VISIT (OUTPATIENT)
Dept: PHYSICAL THERAPY | Facility: CLINIC | Age: 59
End: 2019-05-30
Payer: COMMERCIAL

## 2019-05-30 DIAGNOSIS — M54.41 RIGHT-SIDED LOW BACK PAIN WITH RIGHT-SIDED SCIATICA: ICD-10-CM

## 2019-05-30 DIAGNOSIS — M54.41 ACUTE RIGHT-SIDED LOW BACK PAIN WITH RIGHT-SIDED SCIATICA: ICD-10-CM

## 2019-05-30 PROCEDURE — 97161 PT EVAL LOW COMPLEX 20 MIN: CPT | Mod: GP | Performed by: PHYSICAL THERAPIST

## 2019-05-30 PROCEDURE — 97110 THERAPEUTIC EXERCISES: CPT | Mod: GP | Performed by: PHYSICAL THERAPIST

## 2019-05-30 PROCEDURE — 97530 THERAPEUTIC ACTIVITIES: CPT | Mod: GP | Performed by: PHYSICAL THERAPIST

## 2019-05-30 NOTE — PROGRESS NOTES
[facility] Initial Evaluation -- Lumbar    Date: May 30, 2019  Jack Marrero is a 58 year old male with a lumbar condition.   Referral: 5/13/2019  Work mechanical stresses:  Lifting, bending  Employment status:  [employer]  Leisure mechanical stresses:   Functional disability score (KISHAN/STarT Back):  KISHAN 15.56  VAS score (0-10): 1-2/10 currently, 7-8/10 first thing in AM  Patient goals/expectations:  Decrease LBP    HISTORY:    Present symptoms: R LBP with pain down middle of R leg into heel, tingling in R calf  Pain quality (sharp/shooting/stabbing/aching/burning/cramping):  Aching, dull in back and leg.  Throbbing pain in R heel.   Paresthesia (yes/no):  History of numbness/tingling B feet related to neuropathy    Present since (onset date): Patient reports a history of episodes of LBP for 30 years.  Current episode started about 6 weeks ago.     Symptoms (improving/unchanging/worsening):  Symptoms improved from constant to intermittent after the first week, now unchanging.     Symptoms commenced as a result of: No apparent reason.   Condition occurred in the following environment:   Home     Symptoms at onset (back/thigh/leg): R LB-constant  Constant symptoms (back/thigh/leg): Symptoms no longer constant  Intermittent symptoms (back/thigh/leg): R LB, pain down middle of R leg into the R heel    Symptoms are made worse with the following: Always Sitting too long, Sometimes Standing 1 hour, Time of day - Always AM, sometimes with a quick twist or turn, stretching backward if pain is present  Symptoms are made better with the following: Always Walking and Other - Stretching backward if no pain is present    Disturbed sleep (yes/no):  no Sleeping postures (prone/sup/side R/L):     Previous episodes (0/1-5/6-10/11+): 11+ Year of first episode: 30 years    Previous history: Patient reports episodes of LBP for the past 30 years.  Reports episodes are usually severe and last 3-5 days.  Current  episode feels different.  Previous treatments: Injections, PT.  No relief with injections.      Specific Questions:  Cough/Sneeze/Strain (pos/neg): neg  Bowel/Bladder (normal/abnormal): normal  Gait (normal/abnormal): normal  Medications (nil/NSAIDS/analg/steroids/anticoag/other):  Other - Cholesterol medications  Medical allergies:  no  General health (excellent/good/fair/poor):  good  Pertinent medical history:  High blood pressure  Imaging (None/Xray/MRI/Other):  X-ray, MRI.  No new imaging.  Recent or major surgery (yes/no):  no  Night pain (yes/no): no  Accidents (yes/no): no  Unexplained weight loss (yes/no): no  Barriers at home: no  Other red flags: no    EXAMINATION    Posture:   Sitting (good/fair/poor): fair  Standing (good/fair/poor):good  Lordosis (red/acc/normal): normal  Correction of posture (better/worse/no effect): better    Lateral Shift (right/left/nil): nil  Relevant (yes/no):    Other Observations:     Neurological:    Motor deficit:  Strong and equal B LE  Reflexes:    Sensory deficit:       Dural signs:  Slump R inc R LBP    Movement Loss:   Benjamin Mod Min Nil Pain   Flexion   x  NE   Extension   x  Inc R LBP, R LE pain, R calf tingling   Side Gliding R    x NE   Side Gliding L   x  Inc tingling R calf, prod central LBP     Test Movements:   During: produces, abolishes, increases, decreases, no effect, centralizing, peripheralizing   After: better, worse, no better, no worse, no effect, centralized, peripheralized    Pretest symptoms standin-2/10 R LBP, pain down middle of R leg into heel, tingling R calf   Symptoms During Symptoms After ROM increased ROM decreased No Effect   FIS No Effect    No Effect         Rep FIS          EIS Increases    No Worse         Rep EIS Increases LBP and pain down R LE    No Worse      x   Pretest symptoms lyin-2/10 R LBP, pain down middle of R leg into heel, tingling R calf   Symptoms During Symptoms After ROM increased ROM decreased No Effect   SUZANNE No  Effect    No Effect         Rep SUZANNE No Effect    No Effect    Prod R LE pain  with R SGIS Dec sx with L SGIS x   EIL Increases    No Worse         Rep EIL Increases LBP and pain down R LE    No Worse      x   If required, pretest symptoms:    Symptoms During Symptoms After ROM increased ROM decreased No Effect   SGIS - R          Rep SGIS - R          SGIS - L          Rep SGIS - L            Static Tests:  Sitting slouched:         Sitting erect:    Standing slouched        Standing erect:    Lying prone in extension:  Inc LBP and R LE pain, NW  Long sitting:      Other Tests: FISit-D: NE, A: in R LBP, NE on LE, dec sx with L SGIS    Provisional Classification:  Inconclusive/Other - Mechanically Inconclusive    Principle of Management:  Education:  Posture, how to monitor sx   Equipment provided:  Lumbar roll  Mechanical therapy (Y/N):  Y-assessing   Extension principle:       Lateral Principle:    Flexion principle:  SUZANNE or FISit x10 6-8x/day   Other:      ASSESSMENT/PLAN:    Patient is a 58 year old male with lumbar complaints.    Patient has the following significant findings with corresponding treatment plan.                Diagnosis 1:  LBP  Pain -  manual therapy, directional preference exercise and home program  Decreased ROM/flexibility - manual therapy, therapeutic exercise and home program  Decreased function - therapeutic activities and home program    Therapy Evaluation Codes:   1) History comprised of:   Personal factors that impact the plan of care:      None.    Comorbidity factors that impact the plan of care are:      None.     Medications impacting care: None.  2) Examination of Body Systems comprised of:   Body structures and functions that impact the plan of care:      Lumbar spine.   Activity limitations that impact the plan of care are:      Sitting and Standing.  3) Clinical presentation characteristics are:   Stable/Uncomplicated.  4) Decision-Making    Low complexity using standardized  patient assessment instrument and/or measureable assessment of functional outcome.  Cumulative Therapy Evaluation is: Low complexity.    Previous and current functional limitations:  (See Goal Flow Sheet for this information)    Short term and Long term goals: (See Goal Flow Sheet for this information)     Communication ability:  Patient appears to be able to clearly communicate and understand verbal and written communication and follow directions correctly.  Treatment Explanation - The following has been discussed with the patient:   RX ordered/plan of care  Anticipated outcomes  Possible risks and side effects  This patient would benefit from PT intervention to resume normal activities.   Rehab potential is good.    Frequency:  1 X week, once daily  Duration:  for 8 weeks  Discharge Plan:  Achieve all LTG.  Independent in home treatment program.  Reach maximal therapeutic benefit.    Please refer to the daily flowsheet for treatment today, total treatment time and time spent performing 1:1 timed codes.

## 2019-07-11 PROBLEM — M54.41 RIGHT-SIDED LOW BACK PAIN WITH RIGHT-SIDED SCIATICA: Status: RESOLVED | Noted: 2019-05-30 | Resolved: 2019-07-11

## 2019-08-08 ENCOUNTER — OFFICE VISIT (OUTPATIENT)
Dept: URGENT CARE | Facility: URGENT CARE | Age: 59
End: 2019-08-08
Payer: COMMERCIAL

## 2019-08-08 VITALS
BODY MASS INDEX: 27.42 KG/M2 | DIASTOLIC BLOOD PRESSURE: 97 MMHG | WEIGHT: 207.8 LBS | OXYGEN SATURATION: 97 % | SYSTOLIC BLOOD PRESSURE: 150 MMHG | TEMPERATURE: 98.3 F

## 2019-08-08 DIAGNOSIS — J20.9 ACUTE BRONCHITIS WITH SYMPTOMS > 10 DAYS: Primary | ICD-10-CM

## 2019-08-08 DIAGNOSIS — R06.2 WHEEZING: ICD-10-CM

## 2019-08-08 PROCEDURE — 99214 OFFICE O/P EST MOD 30 MIN: CPT | Performed by: PHYSICIAN ASSISTANT

## 2019-08-08 RX ORDER — PREDNISONE 20 MG/1
40 TABLET ORAL DAILY
Qty: 10 TABLET | Refills: 0 | Status: SHIPPED | OUTPATIENT
Start: 2019-08-08 | End: 2019-08-13

## 2019-08-08 RX ORDER — ALBUTEROL SULFATE 90 UG/1
2 AEROSOL, METERED RESPIRATORY (INHALATION) EVERY 4 HOURS PRN
Qty: 1 INHALER | Refills: 0 | Status: SHIPPED | OUTPATIENT
Start: 2019-08-08 | End: 2019-12-10

## 2019-08-08 RX ORDER — AZITHROMYCIN 250 MG/1
TABLET, FILM COATED ORAL
Qty: 6 TABLET | Refills: 0 | Status: SHIPPED | OUTPATIENT
Start: 2019-08-08 | End: 2019-12-10

## 2019-08-08 ASSESSMENT — ENCOUNTER SYMPTOMS
GASTROINTESTINAL NEGATIVE: 1
EYE DISCHARGE: 0
CHEST TIGHTNESS: 0
SHORTNESS OF BREATH: 0
EYE ITCHING: 0
CHILLS: 0
HEADACHES: 0
DIARRHEA: 0
PALPITATIONS: 0
HEMATURIA: 0
WEAKNESS: 0
ABDOMINAL PAIN: 0
EYE REDNESS: 0
ADENOPATHY: 0
EYES NEGATIVE: 1
POLYDIPSIA: 0
DIZZINESS: 0
CONSTITUTIONAL NEGATIVE: 1
SORE THROAT: 0
LIGHT-HEADEDNESS: 0
RHINORRHEA: 0
FREQUENCY: 0
DIAPHORESIS: 0
DYSURIA: 0
VOMITING: 0
NEUROLOGICAL NEGATIVE: 1
FEVER: 0
MUSCULOSKELETAL NEGATIVE: 1
ENDOCRINE NEGATIVE: 1
COUGH: 1
WHEEZING: 0
MYALGIAS: 0
NAUSEA: 0
CARDIOVASCULAR NEGATIVE: 1

## 2019-08-08 NOTE — PROGRESS NOTES
Chief Complaint:     Chief Complaint   Patient presents with     Cough     Patient complains of cough and congestion        HPI: Jack Marrero is an 58 year old male who presents with dry cough and nasal congestion. It began  2 day(s) ago and has unchanged.  Cough is nonproductive, occasional There is no shortness of breath, wheezing and chest pain.      Recent travel?  no.      ROS:     Review of Systems   Constitutional: Negative.  Negative for chills, diaphoresis and fever.   HENT: Positive for congestion. Negative for ear pain, rhinorrhea and sore throat.    Eyes: Negative.  Negative for discharge, redness and itching.   Respiratory: Positive for cough. Negative for chest tightness, shortness of breath and wheezing.    Cardiovascular: Negative.  Negative for chest pain and palpitations.   Gastrointestinal: Negative.  Negative for abdominal pain, diarrhea, nausea and vomiting.   Endocrine: Negative.  Negative for polydipsia and polyuria.   Genitourinary: Negative for dysuria, frequency, hematuria and urgency.   Musculoskeletal: Negative.  Negative for myalgias.   Skin: Negative for rash.   Allergic/Immunologic: Negative for immunocompromised state.   Neurological: Negative.  Negative for dizziness, weakness, light-headedness and headaches.   Hematological: Negative for adenopathy.        Respiratory History  occasional episodes of bronchitis       Family History   Family History   Problem Relation Age of Onset     Arthritis Mother      Heart Disease Mother      Lipids Mother      Eye Disorder Mother      Gastrointestinal Disease Mother         irritable bowel     Breast Cancer Mother      Osteoporosis Mother      Lipids Father      Heart Disease Father      Psychotic Disorder Father      Depression Father      Eye Disorder Father      C.A.D. Father 56        CABG at age 56     Coronary Artery Disease Father         Heart attack at age 56     Hyperlipidemia Father      Depression/Anxiety Father      Mental  Illness Father      Hypertension Father      Anxiety Disorder Father      Arthritis Maternal Grandmother      Alcohol/Drug Maternal Grandfather      Cancer - colorectal Maternal Grandfather      Hypertension Paternal Grandmother      Breast Cancer Paternal Grandmother      Heart Disease Paternal Grandfather      Hypertension Paternal Grandfather      Coronary Artery Disease Paternal Grandfather          of heart attack at age 59     Hypertension Brother      Depression/Anxiety Brother      Depression Brother      Thyroid Disease Sister      Hypertension Brother      Depression/Anxiety Brother      Hypertension Brother      Gastrointestinal Disease Brother      Prostate Cancer Brother      Mental Illness Brother      Cardiovascular Other      Diabetes Maternal Half-Brother      Thyroid Disease Sister      Thyroid Disease Sister      Other Cancer Other         Lung; Paternal Uncle/Lung; Paternal Uncle     Coronary Artery Disease Other      Hyperlipidemia Other      Other Cancer Other         Lung; Paternal Uncle     Cerebrovascular Disease No family hx of         Problem history  Patient Active Problem List   Diagnosis     Dyslipidemia     Mixed anxiety depressive disorder     HYPERLIPIDEMIA LDL GOAL <130     ADHD (attention deficit hyperactivity disorder)     Erectile dysfunction     Tubular adenoma of colon on colonoscopy     High triglycerides     HDL deficiency     IGT (impaired glucose tolerance)     Sensory polyneuropathy     Diverticulosis of colon     Inflamed seborrheic keratosis     10 year risk of MI or stroke 7.5% or greater, 9.3% in 2016 on atorvastatin 40     Venous lake of lip, left lower     Benign non-nodular prostatic hyperplasia with lower urinary tract symptoms     Tobacco abuse     Restless legs syndrome (RLS)     SHY (obstructive sleep apnea) - Severe     Acute pain of right shoulder     Gallbladder polyp, need fu US yearly     Atherosclerosis of abdominal aorta (H), seen on CT         Allergies  Allergies   Allergen Reactions     Codeine      FELT WIRED ON THIS     Codeine Unknown     FELT WIRED ON THIS          Social History  Social History     Socioeconomic History     Marital status:      Spouse name: Not on file     Number of children: Not on file     Years of education: Not on file     Highest education level: Not on file   Occupational History     Not on file   Social Needs     Financial resource strain: Not on file     Food insecurity:     Worry: Not on file     Inability: Not on file     Transportation needs:     Medical: Not on file     Non-medical: Not on file   Tobacco Use     Smoking status: Current Every Day Smoker     Packs/day: 0.50     Years: 40.00     Pack years: 20.00     Types: Cigarettes     Start date: 10/10/1974     Last attempt to quit: 2014     Years since quittin.4     Smokeless tobacco: Never Used   Substance and Sexual Activity     Alcohol use: Yes     Comment: Occasional; about 1-2 drinks/week     Drug use: No     Sexual activity: Yes     Partners: Female   Lifestyle     Physical activity:     Days per week: Not on file     Minutes per session: Not on file     Stress: Not on file   Relationships     Social connections:     Talks on phone: Not on file     Gets together: Not on file     Attends Yazdanism service: Not on file     Active member of club or organization: Not on file     Attends meetings of clubs or organizations: Not on file     Relationship status: Not on file     Intimate partner violence:     Fear of current or ex partner: Not on file     Emotionally abused: Not on file     Physically abused: Not on file     Forced sexual activity: Not on file   Other Topics Concern     Parent/sibling w/ CABG, MI or angioplasty before 65F 55M? No   Social History Narrative     Not on file        Current Meds    Current Outpatient Medications:      albuterol (PROAIR HFA/PROVENTIL HFA/VENTOLIN HFA) 108 (90 Base) MCG/ACT inhaler, Inhale 2 puffs into the  lungs every 4 hours as needed for shortness of breath / dyspnea or wheezing, Disp: 1 Inhaler, Rfl: 0     amitriptyline (ELAVIL) 75 MG tablet, Take 1 tablet (75 mg) by mouth At Bedtime (Patient taking differently: Take 40 mg by mouth At Bedtime ), Disp: 90 tablet, Rfl: 3     aspirin 81 MG tablet, Take 1 tablet by mouth daily., Disp: , Rfl:      atorvastatin (LIPITOR) 40 MG tablet, Take 1 tablet (40 mg) by mouth daily, Disp: 90 tablet, Rfl: 3     azithromycin (ZITHROMAX) 250 MG tablet, Two tablets first day, then one tablet daily for four days., Disp: 6 tablet, Rfl: 0     cyclobenzaprine (FLEXERIL) 10 MG tablet, Take 1 tablet (10 mg) by mouth 3 times daily as needed for muscle spasms, Disp: 30 tablet, Rfl: 0     dutasteride (AVODART) 0.5 MG capsule, TAKE ONE CAPSULE BY MOUTH DAILY., Disp: 90 capsule, Rfl: 3     escitalopram (LEXAPRO) 10 MG tablet, TAKE ONE TABLET BY MOUTH EVERY MORNING, Disp: 90 tablet, Rfl: 1     order for DME, Equipment ordered: RESMED Auto PAP Mask type: Nasal Settings: 6-12 cm H2O, Disp: , Rfl:      predniSONE (DELTASONE) 20 MG tablet, Take 2 tablets (40 mg) by mouth daily for 5 days, Disp: 10 tablet, Rfl: 0     LORazepam (ATIVAN) 0.5 MG tablet, Take 0.5-1 tablets (0.25-0.5 mg) by mouth every 6 hours as needed for anxiety (Patient not taking: Reported on 5/13/2019), Disp: 30 tablet, Rfl: 0        OBJECTIVE     Vital signs reviewed by Justin Najera  BP (!) 150/97 (BP Location: Left arm, Patient Position: Chair, Cuff Size: Adult Regular)   Temp 98.3  F (36.8  C) (Oral)   Wt 94.3 kg (207 lb 12.8 oz)   SpO2 97%   BMI 27.42 kg/m       Physical Exam   Constitutional: He is oriented to person, place, and time. He appears well-developed and well-nourished. He is cooperative.  Non-toxic appearance. He does not have a sickly appearance. He does not appear ill. No distress.   HENT:   Head: Normocephalic and atraumatic.   Right Ear: Hearing, tympanic membrane, external ear and ear canal normal. Tympanic  membrane is not perforated, not erythematous, not retracted and not bulging.   Left Ear: Hearing, tympanic membrane, external ear and ear canal normal. Tympanic membrane is not perforated, not erythematous, not retracted and not bulging.   Nose: Mucosal edema and rhinorrhea present. Right sinus exhibits no maxillary sinus tenderness and no frontal sinus tenderness. Left sinus exhibits no maxillary sinus tenderness and no frontal sinus tenderness.   Mouth/Throat: Mucous membranes are normal. Posterior oropharyngeal erythema present. No oropharyngeal exudate or posterior oropharyngeal edema. Tonsils are 0 on the right. Tonsils are 0 on the left. No tonsillar exudate.   Eyes: Pupils are equal, round, and reactive to light. Conjunctivae, EOM and lids are normal. Right eye exhibits no discharge and no exudate. Left eye exhibits no discharge and no exudate. Right conjunctiva is not injected. Left conjunctiva is not injected.   Neck: Normal range of motion. Neck supple.   Cardiovascular: Normal rate, regular rhythm, normal heart sounds and intact distal pulses. Exam reveals no gallop and no friction rub.   No murmur heard.  Pulmonary/Chest: Effort normal. No stridor. No respiratory distress. He has no decreased breath sounds. He has wheezes. He has no rhonchi. He has no rales. He exhibits no tenderness.   Abdominal: Soft. Bowel sounds are normal. He exhibits no distension and no mass. There is no tenderness. There is no rigidity, no rebound, no guarding and no CVA tenderness.   Musculoskeletal: Normal range of motion.   Neurological: He is alert and oriented to person, place, and time. He displays normal reflexes. No cranial nerve deficit or sensory deficit. He exhibits normal muscle tone. Coordination normal.   Skin: Skin is warm. Capillary refill takes less than 2 seconds. He is not diaphoretic.   Psychiatric: He has a normal mood and affect. His behavior is normal. Judgment and thought content normal.         Labs:      Results for orders placed or performed in visit on 02/26/19   Basic metabolic panel   Result Value Ref Range    Sodium 141 133 - 144 mmol/L    Potassium 4.9 3.4 - 5.3 mmol/L    Chloride 108 94 - 109 mmol/L    Carbon Dioxide 29 20 - 32 mmol/L    Anion Gap 4 3 - 14 mmol/L    Glucose 111 (H) 70 - 99 mg/dL    Urea Nitrogen 18 7 - 30 mg/dL    Creatinine 0.98 0.66 - 1.25 mg/dL    GFR Estimate 83 >60 mL/min/[1.73_m2]    GFR Estimate If Black >90 >60 mL/min/[1.73_m2]    Calcium 8.8 8.5 - 10.1 mg/dL       Medical Decision Making:    Differential Diagnosis:  URI Adult/Peds:  Bronchiolitis, Pneumonia and Viral upper respiratory illness        ASSESSMENT    1. Acute bronchitis with symptoms > 10 days    2. Wheezing        PLAN    Patient presents with 10 days of dry cough and nasal congestion.  Patient is in no acute distress.  Temp is 98.3 in clinic today.  Lung sounds have wheezing throughout and O2 sats at 97% on RA.  Imaging to rule out pneumonia is not indicated at this time.  Rx for Zithromax, Prednisone, and Albuterol inhaler.  Rest, Push fluids, vaporizer, elevation of head of bed.  Ibuprofen and or Tylenol for any fever or body aches.  Over the counter cough suppressant- PRN- as discussed.   If symptoms worsen, recheck immediately otherwise follow up with your PCP in 1 week if symptoms are not improving.  Worrisome symptoms discussed with instructions to go to the ED.  Patient verbalized understanding and agreed with this plan.         Justin Najera  8/8/2019, 11:38 AM

## 2019-08-26 DIAGNOSIS — F41.1 GAD (GENERALIZED ANXIETY DISORDER): ICD-10-CM

## 2019-08-26 DIAGNOSIS — F41.0 PANIC DISORDER WITHOUT AGORAPHOBIA: ICD-10-CM

## 2019-08-28 DIAGNOSIS — G25.81 RESTLESS LEGS SYNDROME (RLS): ICD-10-CM

## 2019-08-28 DIAGNOSIS — G47.33 OSA (OBSTRUCTIVE SLEEP APNEA): ICD-10-CM

## 2019-08-28 RX ORDER — ESCITALOPRAM OXALATE 10 MG/1
TABLET ORAL
Qty: 90 TABLET | Refills: 1 | Status: SHIPPED | OUTPATIENT
Start: 2019-08-28 | End: 2020-01-31

## 2019-11-06 ENCOUNTER — HEALTH MAINTENANCE LETTER (OUTPATIENT)
Age: 59
End: 2019-11-06

## 2019-11-09 DIAGNOSIS — N40.1 BENIGN NON-NODULAR PROSTATIC HYPERPLASIA WITH LOWER URINARY TRACT SYMPTOMS: ICD-10-CM

## 2019-11-11 RX ORDER — DUTASTERIDE 0.5 MG/1
CAPSULE, LIQUID FILLED ORAL
Qty: 90 CAPSULE | Refills: 0 | Status: SHIPPED | OUTPATIENT
Start: 2019-11-11 | End: 2020-05-22

## 2019-12-09 DIAGNOSIS — E78.5 DYSLIPIDEMIA: ICD-10-CM

## 2019-12-09 RX ORDER — ATORVASTATIN CALCIUM 40 MG/1
TABLET, FILM COATED ORAL
Qty: 90 TABLET | Refills: 0 | Status: SHIPPED | OUTPATIENT
Start: 2019-12-09 | End: 2020-02-20

## 2019-12-10 ENCOUNTER — OFFICE VISIT (OUTPATIENT)
Dept: URGENT CARE | Facility: URGENT CARE | Age: 59
End: 2019-12-10
Payer: COMMERCIAL

## 2019-12-10 VITALS
BODY MASS INDEX: 27.76 KG/M2 | DIASTOLIC BLOOD PRESSURE: 103 MMHG | WEIGHT: 210.4 LBS | HEART RATE: 91 BPM | OXYGEN SATURATION: 96 % | TEMPERATURE: 98.1 F | RESPIRATION RATE: 16 BRPM | SYSTOLIC BLOOD PRESSURE: 164 MMHG

## 2019-12-10 DIAGNOSIS — Z87.898 HISTORY OF PREDIABETES: ICD-10-CM

## 2019-12-10 DIAGNOSIS — L03.031 PARONYCHIA OF TOE, RIGHT: Primary | ICD-10-CM

## 2019-12-10 LAB
ALBUMIN SERPL-MCNC: 3.9 G/DL (ref 3.4–5)
ALP SERPL-CCNC: 107 U/L (ref 40–150)
ALT SERPL W P-5'-P-CCNC: 49 U/L (ref 0–70)
ANION GAP SERPL CALCULATED.3IONS-SCNC: 3 MMOL/L (ref 3–14)
AST SERPL W P-5'-P-CCNC: 26 U/L (ref 0–45)
BASOPHILS # BLD AUTO: 0 10E9/L (ref 0–0.2)
BASOPHILS NFR BLD AUTO: 0.4 %
BILIRUB SERPL-MCNC: 0.5 MG/DL (ref 0.2–1.3)
BUN SERPL-MCNC: 13 MG/DL (ref 7–30)
CALCIUM SERPL-MCNC: 9.4 MG/DL (ref 8.5–10.1)
CHLORIDE SERPL-SCNC: 108 MMOL/L (ref 94–109)
CO2 SERPL-SCNC: 30 MMOL/L (ref 20–32)
CREAT SERPL-MCNC: 0.84 MG/DL (ref 0.66–1.25)
DIFFERENTIAL METHOD BLD: NORMAL
EOSINOPHIL # BLD AUTO: 0.2 10E9/L (ref 0–0.7)
EOSINOPHIL NFR BLD AUTO: 2.6 %
ERYTHROCYTE [DISTWIDTH] IN BLOOD BY AUTOMATED COUNT: 14.9 % (ref 10–15)
GFR SERPL CREATININE-BSD FRML MDRD: >90 ML/MIN/{1.73_M2}
GLUCOSE SERPL-MCNC: 110 MG/DL (ref 70–99)
HBA1C MFR BLD: 5.7 % (ref 0–5.6)
HCT VFR BLD AUTO: 47.4 % (ref 40–53)
HGB BLD-MCNC: 16 G/DL (ref 13.3–17.7)
LYMPHOCYTES # BLD AUTO: 1.1 10E9/L (ref 0.8–5.3)
LYMPHOCYTES NFR BLD AUTO: 13.8 %
MCH RBC QN AUTO: 32 PG (ref 26.5–33)
MCHC RBC AUTO-ENTMCNC: 33.8 G/DL (ref 31.5–36.5)
MCV RBC AUTO: 95 FL (ref 78–100)
MONOCYTES # BLD AUTO: 0.7 10E9/L (ref 0–1.3)
MONOCYTES NFR BLD AUTO: 8.9 %
NEUTROPHILS # BLD AUTO: 6.1 10E9/L (ref 1.6–8.3)
NEUTROPHILS NFR BLD AUTO: 74.3 %
PLATELET # BLD AUTO: 275 10E9/L (ref 150–450)
POTASSIUM SERPL-SCNC: 4.5 MMOL/L (ref 3.4–5.3)
PROT SERPL-MCNC: 7.3 G/DL (ref 6.8–8.8)
RBC # BLD AUTO: 5 10E12/L (ref 4.4–5.9)
SODIUM SERPL-SCNC: 141 MMOL/L (ref 133–144)
WBC # BLD AUTO: 8.2 10E9/L (ref 4–11)

## 2019-12-10 PROCEDURE — 99214 OFFICE O/P EST MOD 30 MIN: CPT | Performed by: NURSE PRACTITIONER

## 2019-12-10 PROCEDURE — 85025 COMPLETE CBC W/AUTO DIFF WBC: CPT | Performed by: NURSE PRACTITIONER

## 2019-12-10 PROCEDURE — 83036 HEMOGLOBIN GLYCOSYLATED A1C: CPT | Performed by: NURSE PRACTITIONER

## 2019-12-10 PROCEDURE — 36415 COLL VENOUS BLD VENIPUNCTURE: CPT | Performed by: NURSE PRACTITIONER

## 2019-12-10 PROCEDURE — 80053 COMPREHEN METABOLIC PANEL: CPT | Performed by: NURSE PRACTITIONER

## 2019-12-10 RX ORDER — CEPHALEXIN 500 MG/1
500 CAPSULE ORAL 3 TIMES DAILY
Qty: 30 CAPSULE | Refills: 0 | Status: SHIPPED | OUTPATIENT
Start: 2019-12-10 | End: 2019-12-20

## 2019-12-10 ASSESSMENT — ENCOUNTER SYMPTOMS
VOMITING: 0
DYSURIA: 0
WEAKNESS: 0
POLYPHAGIA: 0
MYALGIAS: 1
WOUND: 1
NAUSEA: 0
POLYDIPSIA: 0
FREQUENCY: 0
ABDOMINAL PAIN: 0
DIARRHEA: 0
HEADACHES: 0
NUMBNESS: 1
FEVER: 0

## 2019-12-10 NOTE — PATIENT INSTRUCTIONS
Cephalexin three times a day for 10 days  Can do episom salt soaks to help  Tylenol and ibuprofen for pain      Patient Education     Paronychia of the Finger or Toe  Paronychia is an infection near a fingernail or toenail. It usually occurs when an opening in the cuticle or an ingrown toenail lets bacteria under the skin.  The infection will need to be drained if pus is present. If the infection has been caught early, you may need only antibiotic treatment. Healing will take about 1 to 2 weeks.  Home care  Follow these guidelines when caring for yourself at home:    Clean and soak the toe or finger. Do this 2 times a day for the first 3 days. To do so:  ? Soak your foot or hand in a tub of warm water for 5 minutes. Or hold your toe or finger under a faucet of warm running water for 5 minutes.  ? Clean any crust away with soap and water using a cotton swab.  ? Put antibiotic ointment on the infected area.    Change the dressing daily or any time it gets dirty.    If you were given antibiotics, take them as directed until they are all gone.    If your infection is on a toe, wear comfortable shoes with a lot of toe room. You can also wear open-toed sandals while your toe heals.    You may use over-the-counter medicine (acetaminophen or ibuprofen to help with pain, unless another medicine was prescribed. If you have chronic liver or kidney disease, talk with your healthcare provider before using these medicines. Also talk with your provider if you've had a stomach ulcer or GI (gastrointestinal) bleeding.  Prevention  The following can prevent paronychia:    Avoid cutting or playing with your cuticles at home.    Don't bite your nails.    Don't suck on your thumbs or fingers.  Follow-up care  Follow up with your healthcare provider, or as advised.  When to seek medical advice  Call your healthcare provider right away if any of these occur:    Redness, pain, or swelling of the finger or toe gets worse    Red streaks in  the skin leading away from the wound    Pus or fluid draining from the nail area    Fever of 100.4 F (38 C) or higher, or as directed by your provider  Date Last Reviewed: 8/1/2016 2000-2018 [employer]. [address] [place] [address], [place] [address]. All rights reserved. This information is not intended as a substitute for professional medical care. Always follow your healthcare professional's instructions.

## 2019-12-10 NOTE — PROGRESS NOTES
SUBJECTIVE:   Jack Marrero is a 59 year old male presenting with a chief complaint of   Chief Complaint   Patient presents with     Derm Problem     a sore on right big toe x 2 weeks, patient states he is concerning about it because he was diagnosed with prediabetes       He is an established patient of [facility].  Patient presents today with complaints of tenderness and redness to the right big toe that he first noted two weeks ago. He states he initially thought it was athletes foot and treated accordingly however, it did not improved. Denies any known trauma. States there is some red/purple coloration along with swelling. Does also have some numbness, does have baseline neuropathy. Does have throbbing feeling as well especially worse at night. Denies any discharge, fevers, nausea, vomiting, or diarrhea. States does have history of prediabetes, which is why he has some of the neuropathy. Does not check blood sugar at home. He is also a current every day smoker.     Review of Systems   Constitutional: Negative for fever.   Eyes: Negative for visual disturbance.   Gastrointestinal: Negative for abdominal pain, diarrhea, nausea and vomiting.   Endocrine: Negative for cold intolerance, heat intolerance, polydipsia, polyphagia and polyuria.   Genitourinary: Negative for dysuria, frequency and urgency.   Musculoskeletal: Positive for myalgias.   Skin: Positive for wound.   Neurological: Positive for numbness. Negative for weakness and headaches.       Past Medical History:   Diagnosis Date     ADHD (attention deficit hyperactivity disorder)     dx via Dr Olson PhD     Depression, anxiety      Depressive disorder      Diverticulosis of colon 1/11/2016     Dyslipidemia      Erectile dysfunction 11/30/2011     Hyperlipidemia LDL goal <130 10/31/2010     IGT (impaired glucose tolerance) 10/26/2015     Inflamed seborrheic keratosis 1/29/2016     Pain in joint, shoulder region 12/16/2013     Sensory polyneuropathy 11/19/2015      Tubular adenoma of colon on colonoscopy 2012     Family History   Problem Relation Age of Onset     Arthritis Mother      Heart Disease Mother      Lipids Mother      Eye Disorder Mother      Gastrointestinal Disease Mother         irritable bowel     Breast Cancer Mother      Osteoporosis Mother      Lipids Father      Heart Disease Father      Psychotic Disorder Father      Depression Father      Eye Disorder Father      C.A.D. Father 56        CABG at age 56     Coronary Artery Disease Father         Heart attack at age 56     Hyperlipidemia Father      Depression/Anxiety Father      Mental Illness Father      Hypertension Father      Anxiety Disorder Father      Arthritis Maternal Grandmother      Alcohol/Drug Maternal Grandfather      Cancer - colorectal Maternal Grandfather      Hypertension Paternal Grandmother      Breast Cancer Paternal Grandmother      Heart Disease Paternal Grandfather      Hypertension Paternal Grandfather      Coronary Artery Disease Paternal Grandfather          of heart attack at age 59     Hypertension Brother      Depression/Anxiety Brother      Depression Brother      Thyroid Disease Sister      Hypertension Brother      Depression/Anxiety Brother      Hypertension Brother      Gastrointestinal Disease Brother      Prostate Cancer Brother      Mental Illness Brother      Cardiovascular Other      Diabetes Maternal Half-Brother      Thyroid Disease Sister      Thyroid Disease Sister      Other Cancer Other         Lung; Paternal Uncle/Lung; Paternal Uncle     Coronary Artery Disease Other      Hyperlipidemia Other      Other Cancer Other         Lung; Paternal Uncle     Cerebrovascular Disease No family hx of      Current Outpatient Medications   Medication Sig Dispense Refill     amitriptyline (ELAVIL) 75 MG tablet Take 1 tablet (75 mg) by mouth At Bedtime (Patient taking differently: Take 40 mg by mouth At Bedtime ) 90 tablet 3     aspirin 81 MG tablet Take 1 tablet  by mouth daily.       atorvastatin (LIPITOR) 40 MG tablet TAKE 1 TABLET(40 MG) BY MOUTH DAILY 90 tablet 0     cephALEXin (KEFLEX) 500 MG capsule Take 1 capsule (500 mg) by mouth 3 times daily for 10 days 30 capsule 0     dutasteride (AVODART) 0.5 MG capsule TAKE 1 CAPSULE BY MOUTH DAILY 90 capsule 0     escitalopram (LEXAPRO) 10 MG tablet TAKE ONE TABLET BY MOUTH EVERY MORNING 90 tablet 1     order for DME Equipment ordered: RESMED Auto PAP Mask type: Nasal Settings: 6-12 cm H2O       Social History     Tobacco Use     Smoking status: Current Every Day Smoker     Packs/day: 0.50     Years: 40.00     Pack years: 20.00     Types: Cigarettes     Start date: 10/10/1974     Last attempt to quit: 2014     Years since quittin.8     Smokeless tobacco: Never Used   Substance Use Topics     Alcohol use: Yes     Comment: Occasional; about 1-2 drinks/week       OBJECTIVE  BP (!) 164/103 (BP Location: Left arm, Patient Position: Sitting, Cuff Size: Adult Large)   Pulse 91   Temp 98.1  F (36.7  C) (Oral)   Resp 16   Wt 95.4 kg (210 lb 6.4 oz)   SpO2 96%   BMI 27.76 kg/m      Physical Exam  Vitals signs and nursing note reviewed.   Constitutional:       Appearance: Normal appearance. He is well-developed and well-groomed.   Musculoskeletal:      Right foot: Normal range of motion and normal capillary refill. Tenderness and swelling present. No deformity.   Feet:      Right foot:      Skin integrity: Erythema, warmth, callus and dry skin present. No ulcer or blister.      Toenail Condition: Right toenails are abnormally thick.   Skin:     General: Skin is warm and dry.      Capillary Refill: Capillary refill takes 2 to 3 seconds.      Comments: Does have redness, swelling, tenderness to the right great toe.    Neurological:      Mental Status: He is alert.   Psychiatric:         Behavior: Behavior is cooperative.         Labs:  Results for orders placed or performed in visit on 12/10/19 (from the past 24 hour(s))    CBC with platelets and differential   Result Value Ref Range    WBC 8.2 4.0 - 11.0 10e9/L    RBC Count 5.00 4.4 - 5.9 10e12/L    Hemoglobin 16.0 13.3 - 17.7 g/dL    Hematocrit 47.4 40.0 - 53.0 %    MCV 95 78 - 100 fl    MCH 32.0 26.5 - 33.0 pg    MCHC 33.8 31.5 - 36.5 g/dL    RDW 14.9 10.0 - 15.0 %    Platelet Count 275 150 - 450 10e9/L    % Neutrophils 74.3 %    % Lymphocytes 13.8 %    % Monocytes 8.9 %    % Eosinophils 2.6 %    % Basophils 0.4 %    Absolute Neutrophil 6.1 1.6 - 8.3 10e9/L    Absolute Lymphocytes 1.1 0.8 - 5.3 10e9/L    Absolute Monocytes 0.7 0.0 - 1.3 10e9/L    Absolute Eosinophils 0.2 0.0 - 0.7 10e9/L    Absolute Basophils 0.0 0.0 - 0.2 10e9/L    Diff Method Automated Method    Hemoglobin A1c   Result Value Ref Range    Hemoglobin A1C 5.7 (H) 0 - 5.6 %         ASSESSMENT:      ICD-10-CM    1. Paronychia of toe, right L03.031 CBC with platelets and differential     Comprehensive metabolic panel (BMP + Alb, Alk Phos, ALT, AST, Total. Bili, TP)     cephALEXin (KEFLEX) 500 MG capsule   2. History of prediabetes Z87.898 Comprehensive metabolic panel (BMP + Alb, Alk Phos, ALT, AST, Total. Bili, TP)     Hemoglobin A1c        Medical Decision Making:    Differential Diagnosis: paronychia, cellulitis, ingrown toenail, strain/sprain, contusion, diabetic neuropathy    Serious Comorbid Conditions:  Adult:  Diabetes    PLAN:  Discussed with patient that favor a paronychia, his peripheral neuropathy could've been a potential factor of an injury he didn't feel. Will check a CBC to make sure no infection, will also do a CMP to check kidney function and see what his blood sugar is today, will also do a Hgb A1C. CBC is unremarkable. Hgb A1C is unchanged from previous. Will start him on keflex three times a day for 10 days. Additional symptomatic treatment recommendations discussed. Education was added to AVS. Patient was agreeable to plan and verbalized understanding.     Followup:    If not improving in 7 days  or if condition worsens, follow up with your Primary Care Provider    Patient Instructions   Cephalexin three times a day for 10 days  Can do episom salt soaks to help  Tylenol and ibuprofen for pain      Patient Education     Paronychia of the Finger or Toe  Paronychia is an infection near a fingernail or toenail. It usually occurs when an opening in the cuticle or an ingrown toenail lets bacteria under the skin.  The infection will need to be drained if pus is present. If the infection has been caught early, you may need only antibiotic treatment. Healing will take about 1 to 2 weeks.  Home care  Follow these guidelines when caring for yourself at home:    Clean and soak the toe or finger. Do this 2 times a day for the first 3 days. To do so:  ? Soak your foot or hand in a tub of warm water for 5 minutes. Or hold your toe or finger under a faucet of warm running water for 5 minutes.  ? Clean any crust away with soap and water using a cotton swab.  ? Put antibiotic ointment on the infected area.    Change the dressing daily or any time it gets dirty.    If you were given antibiotics, take them as directed until they are all gone.    If your infection is on a toe, wear comfortable shoes with a lot of toe room. You can also wear open-toed sandals while your toe heals.    You may use over-the-counter medicine (acetaminophen or ibuprofen to help with pain, unless another medicine was prescribed. If you have chronic liver or kidney disease, talk with your healthcare provider before using these medicines. Also talk with your provider if you've had a stomach ulcer or GI (gastrointestinal) bleeding.  Prevention  The following can prevent paronychia:    Avoid cutting or playing with your cuticles at home.    Don't bite your nails.    Don't suck on your thumbs or fingers.  Follow-up care  Follow up with your healthcare provider, or as advised.  When to seek medical advice  Call your healthcare provider right away if any of  these occur:    Redness, pain, or swelling of the finger or toe gets worse    Red streaks in the skin leading away from the wound    Pus or fluid draining from the nail area    Fever of 100.4 F (38 C) or higher, or as directed by your provider  Date Last Reviewed: 8/1/2016 2000-2018 [employer]. [address] [place] [address]. All rights reserved. This information is not intended as a substitute for professional medical care. Always follow your healthcare professional's instructions.

## 2020-01-30 DIAGNOSIS — F41.0 PANIC DISORDER WITHOUT AGORAPHOBIA: ICD-10-CM

## 2020-01-30 DIAGNOSIS — F41.1 GAD (GENERALIZED ANXIETY DISORDER): ICD-10-CM

## 2020-01-31 RX ORDER — ESCITALOPRAM OXALATE 10 MG/1
TABLET ORAL
Qty: 30 TABLET | Refills: 0 | Status: SHIPPED | OUTPATIENT
Start: 2020-01-31 | End: 2020-07-30

## 2020-01-31 NOTE — TELEPHONE ENCOUNTER
Please call the patient and make an appointment(s) and then you can refill the medication one time.  Jamshid Miller

## 2020-01-31 NOTE — TELEPHONE ENCOUNTER
Prescription approved per [facility] Refill Protocol #30  APPT NEEDED FOR FURTHER REFILLS  Please help the pt schedule an appointment physical, anxiety.  Health Maintenance Due   Topic Date Due     PHQ-2  01/01/2020     LUNG CANCER SCREENING ANNUAL  02/11/2020     PREVENTIVE CARE VISIT  02/26/2020     Valery Kaba RN

## 2020-02-03 ENCOUNTER — MYC MEDICAL ADVICE (OUTPATIENT)
Dept: FAMILY MEDICINE | Facility: CLINIC | Age: 60
End: 2020-02-03

## 2020-02-20 DIAGNOSIS — E78.5 DYSLIPIDEMIA: ICD-10-CM

## 2020-02-20 DIAGNOSIS — G60.8 SENSORY POLYNEUROPATHY: ICD-10-CM

## 2020-02-20 RX ORDER — ATORVASTATIN CALCIUM 40 MG/1
TABLET, FILM COATED ORAL
Qty: 30 TABLET | Refills: 0 | Status: SHIPPED | OUTPATIENT
Start: 2020-02-20 | End: 2020-03-23

## 2020-02-20 RX ORDER — AMITRIPTYLINE HYDROCHLORIDE 75 MG/1
TABLET ORAL
Qty: 30 TABLET | Refills: 0 | Status: SHIPPED | OUTPATIENT
Start: 2020-02-20 | End: 2020-03-23

## 2020-02-20 NOTE — LETTER
February 20, 2020    Jack Marrero  [id number] [address]        Dear Jack,       We recently received a refill request for AMITRIPTYLINE 75 MG PO tablet and ATORVASTATIN 40 MG PO tablet.  We have refilled these for a one time 30 day supply only because you are due for a:    Annual Physical office visit and fasting lab appointment      Please schedule this lab appointment 4-5 days prior to the office visit.     Please call at your earliest convenience so that there will not be a delay with your future refills.          Thank you,   Your [facility] Team/[facility]  [phone]

## 2020-02-20 NOTE — TELEPHONE ENCOUNTER
See 1/31/2020 refill request.  Medication is being filled for 1 time refill only due to:  Patient needs to be seen because it has been more than one year since last visit.      please call pt and help him set up a fasting lab appointment followed a few days later by an appointment with Dr. Miller.  Yesica PORTILLON, RN

## 2020-03-21 DIAGNOSIS — G60.8 SENSORY POLYNEUROPATHY: ICD-10-CM

## 2020-03-21 DIAGNOSIS — E78.5 DYSLIPIDEMIA: ICD-10-CM

## 2020-03-23 RX ORDER — ATORVASTATIN CALCIUM 40 MG/1
TABLET, FILM COATED ORAL
Qty: 30 TABLET | Refills: 2 | Status: SHIPPED | OUTPATIENT
Start: 2020-03-23 | End: 2020-07-20

## 2020-03-23 RX ORDER — AMITRIPTYLINE HYDROCHLORIDE 75 MG/1
TABLET ORAL
Qty: 30 TABLET | Refills: 2 | Status: SHIPPED | OUTPATIENT
Start: 2020-03-23 | End: 2020-07-20

## 2020-03-23 NOTE — TELEPHONE ENCOUNTER
Prescription approved per [facility] Refill Protocol.    Valeria Bagley, PharmD  PGY1 Medication Therapy Management Resident  Voicemail: [phone]

## 2020-05-07 ENCOUNTER — E-VISIT (OUTPATIENT)
Dept: FAMILY MEDICINE | Facility: CLINIC | Age: 60
End: 2020-05-07
Payer: COMMERCIAL

## 2020-05-07 ENCOUNTER — MYC MEDICAL ADVICE (OUTPATIENT)
Dept: FAMILY MEDICINE | Facility: CLINIC | Age: 60
End: 2020-05-07

## 2020-05-07 DIAGNOSIS — M67.449 DIGITAL MUCINOUS CYST: Primary | ICD-10-CM

## 2020-05-07 PROCEDURE — 99421 OL DIG E/M SVC 5-10 MIN: CPT | Performed by: FAMILY MEDICINE

## 2020-05-20 DIAGNOSIS — N40.1 BENIGN NON-NODULAR PROSTATIC HYPERPLASIA WITH LOWER URINARY TRACT SYMPTOMS: ICD-10-CM

## 2020-05-21 RX ORDER — DUTASTERIDE 0.5 MG/1
CAPSULE, LIQUID FILLED ORAL
Qty: 90 CAPSULE | Refills: 0 | OUTPATIENT
Start: 2020-05-21

## 2020-05-21 NOTE — TELEPHONE ENCOUNTER
Routing refill request to provider for review/approval because:  Patient needs to be seen because it has been more than 1 year since last office visit.  Yesica Brito BSN, RN

## 2020-05-21 NOTE — TELEPHONE ENCOUNTER
Please call the patient and make an appointment(s) in July  for a physical with previsit lab(s) 1 week priorand then you can refill the medication one time.  Jamshid Miller

## 2020-05-22 RX ORDER — DUTASTERIDE 0.5 MG/1
1 CAPSULE, LIQUID FILLED ORAL DAILY
Qty: 90 CAPSULE | Refills: 0 | Status: SHIPPED | OUTPATIENT
Start: 2020-05-22 | End: 2020-07-30

## 2020-05-22 NOTE — TELEPHONE ENCOUNTER
I spoke to the patient and I scheduled 2 appointments for him.  A fasting pvl on 7/22/20 and a physical on 7/29/20.  Refill needed please.  Thank you.  Jen Worley,

## 2020-07-19 DIAGNOSIS — G60.8 SENSORY POLYNEUROPATHY: ICD-10-CM

## 2020-07-19 DIAGNOSIS — E78.5 DYSLIPIDEMIA: ICD-10-CM

## 2020-07-20 RX ORDER — ATORVASTATIN CALCIUM 40 MG/1
TABLET, FILM COATED ORAL
Qty: 30 TABLET | Refills: 0 | Status: SHIPPED | OUTPATIENT
Start: 2020-07-20 | End: 2021-02-24

## 2020-07-20 RX ORDER — AMITRIPTYLINE HYDROCHLORIDE 75 MG/1
TABLET ORAL
Qty: 30 TABLET | Refills: 0 | Status: SHIPPED | OUTPATIENT
Start: 2020-07-20 | End: 2020-07-30

## 2020-07-21 ENCOUNTER — DOCUMENTATION ONLY (OUTPATIENT)
Dept: LAB | Facility: CLINIC | Age: 60
End: 2020-07-21

## 2020-07-21 DIAGNOSIS — E78.1 HIGH TRIGLYCERIDES: ICD-10-CM

## 2020-07-21 DIAGNOSIS — E78.5 DYSLIPIDEMIA: Primary | ICD-10-CM

## 2020-07-21 DIAGNOSIS — R73.02 IGT (IMPAIRED GLUCOSE TOLERANCE): ICD-10-CM

## 2020-07-21 DIAGNOSIS — E78.5 HYPERLIPIDEMIA LDL GOAL <130: ICD-10-CM

## 2020-07-21 DIAGNOSIS — E78.6 HDL DEFICIENCY: ICD-10-CM

## 2020-07-21 NOTE — PROGRESS NOTES
.Please place or confirm pending lab orders for upcoming lab appointment on 7/22/20  Thank you,  Shae

## 2020-07-22 DIAGNOSIS — E78.1 HIGH TRIGLYCERIDES: ICD-10-CM

## 2020-07-22 DIAGNOSIS — E78.6 HDL DEFICIENCY: ICD-10-CM

## 2020-07-22 DIAGNOSIS — E78.5 HYPERLIPIDEMIA LDL GOAL <130: ICD-10-CM

## 2020-07-22 DIAGNOSIS — R73.02 IGT (IMPAIRED GLUCOSE TOLERANCE): ICD-10-CM

## 2020-07-22 DIAGNOSIS — E78.5 DYSLIPIDEMIA: ICD-10-CM

## 2020-07-22 LAB
ALBUMIN SERPL-MCNC: 3.7 G/DL (ref 3.4–5)
ALP SERPL-CCNC: 87 U/L (ref 40–150)
ALT SERPL W P-5'-P-CCNC: 48 U/L (ref 0–70)
ANION GAP SERPL CALCULATED.3IONS-SCNC: 5 MMOL/L (ref 3–14)
AST SERPL W P-5'-P-CCNC: 31 U/L (ref 0–45)
BILIRUB SERPL-MCNC: 0.9 MG/DL (ref 0.2–1.3)
BUN SERPL-MCNC: 11 MG/DL (ref 7–30)
CALCIUM SERPL-MCNC: 8.6 MG/DL (ref 8.5–10.1)
CHLORIDE SERPL-SCNC: 107 MMOL/L (ref 94–109)
CHOLEST SERPL-MCNC: 157 MG/DL
CO2 SERPL-SCNC: 28 MMOL/L (ref 20–32)
CREAT SERPL-MCNC: 0.96 MG/DL (ref 0.66–1.25)
GFR SERPL CREATININE-BSD FRML MDRD: 84 ML/MIN/{1.73_M2}
GLUCOSE SERPL-MCNC: 105 MG/DL (ref 70–99)
HDLC SERPL-MCNC: 40 MG/DL
LDLC SERPL CALC-MCNC: 94 MG/DL
NONHDLC SERPL-MCNC: 117 MG/DL
POTASSIUM SERPL-SCNC: 3.7 MMOL/L (ref 3.4–5.3)
PROT SERPL-MCNC: 7.1 G/DL (ref 6.8–8.8)
SODIUM SERPL-SCNC: 140 MMOL/L (ref 133–144)
TRIGL SERPL-MCNC: 115 MG/DL

## 2020-07-22 PROCEDURE — 36415 COLL VENOUS BLD VENIPUNCTURE: CPT | Performed by: FAMILY MEDICINE

## 2020-07-22 PROCEDURE — 80061 LIPID PANEL: CPT | Performed by: FAMILY MEDICINE

## 2020-07-22 PROCEDURE — 80053 COMPREHEN METABOLIC PANEL: CPT | Performed by: FAMILY MEDICINE

## 2020-07-29 NOTE — PROGRESS NOTES
"  3  SUBJECTIVE:   CC: Jack Marrero is an 59 year old male who presents for preventive health visit.     Healthy Habits:    Do you get at least three servings of calcium containing foods daily (dairy, green leafy vegetables, etc.)? { :125491::\"yes\"}    Amount of exercise or daily activities, outside of work: { :684533}    Problems taking medications regularly { :935535::\"No\"}    Medication side effects: { :793961::\"No\"}    Have you had an eye exam in the past two years? { :082496}    Do you see a dentist twice per year? { :375672}    Do you have sleep apnea, excessive snoring or daytime drowsiness?{ :498095}  {Outside tests to abstract? :441803}    {additional problems to add (Optional):775958}    Today's PHQ-2 Score:   PHQ-2 (  [employer]) 2019   Q1: Little interest or pleasure in doing things 0 0   Q2: Feeling down, depressed or hopeless 0 0   PHQ-2 Score 0 0   Q1: Little interest or pleasure in doing things Not at all -   Q2: Feeling down, depressed or hopeless Not at all -   PHQ-2 Score 0 -     {PHQ-2 LOOK IN ASSESSMENTS (Optional) :630064}  Abuse: Current or Past(Physical, Sexual or Emotional)- {YES/NO/NA:335196}  Do you feel safe in your environment? {YES/NO/NA:968941}        Social History     Tobacco Use     Smoking status: Current Every Day Smoker     Packs/day: 0.50     Years: 40.00     Pack years: 20.00     Types: Cigarettes     Start date: 10/10/1974     Last attempt to quit: 2014     Years since quittin.4     Smokeless tobacco: Never Used   Substance Use Topics     Alcohol use: Yes     Comment: Occasional; about 1-2 drinks/week     If you drink alcohol do you typically have >3 drinks per day or >7 drinks per week? {ETOH :432757}                      Last PSA:   PSA   Date Value Ref Range Status   2019 0.92 0 - 4 ug/L Final     Comment:     Assay Method:  Chemiluminescence using Siemens Vista analyzer       Reviewed orders with patient. Reviewed health maintenance and " "updated orders accordingly - {Yes/No:517320::\"Yes\"}  {Chronicprobdata (Optional):876391}    Reviewed and updated as needed this visit by clinical staff         Reviewed and updated as needed this visit by Provider        {HISTORY OPTIONS (Optional):869379}    ROS:  { :854632::\"CONSTITUTIONAL: NEGATIVE for fever, chills, change in weight\",\"INTEGUMENTARY/SKIN: NEGATIVE for worrisome rashes, moles or lesions\",\"EYES: NEGATIVE for vision changes or irritation\",\"ENT: NEGATIVE for ear, mouth and throat problems\",\"RESP: NEGATIVE for significant cough or SOB\",\"CV: NEGATIVE for chest pain, palpitations or peripheral edema\",\"GI: NEGATIVE for nausea, abdominal pain, heartburn, or change in bowel habits\",\" male: negative for dysuria, hematuria, decreased urinary stream, erectile dysfunction, urethral discharge\",\"MUSCULOSKELETAL: NEGATIVE for significant arthralgias or myalgia\",\"NEURO: NEGATIVE for weakness, dizziness or paresthesias\",\"PSYCHIATRIC: NEGATIVE for changes in mood or affect\"}    OBJECTIVE:   There were no vitals taken for this visit.  EXAM:  {Exam Choices:970487}    {Diagnostic Test Results (Optional):631961::\"Diagnostic Test Results:\",\"Labs reviewed in Epic\"}    ASSESSMENT/PLAN:   {Diag Picklist:448038}    COUNSELING:  {MALE COUNSELING MESSAGES:578529::\"Reviewed preventive health counseling, as reflected in patient instructions\"}    Estimated body mass index is 27.76 kg/m  as calculated from the following:    Height as of 1/28/19: 1.854 m (6' 1\").    Weight as of 12/10/19: 95.4 kg (210 lb 6.4 oz).    {Weight Management Plan (ACO) Complete if BMI is abnormal-  Ages 18-64  BMI >24.9.  Age 65+ with BMI <23 or >30 (Optional):756455}     reports that he has been smoking cigarettes. He started smoking about 45 years ago. He has a 20.00 pack-year smoking history. He has never used smokeless tobacco.  {Tobacco Cessation -- Complete if patient is a smoker (Optional):552224}    Counseling Resources:  ATP IV " Guidelines  Pooled Cohorts Equation Calculator  FRAX Risk Assessment  ICSI Preventive Guidelines  Dietary Guidelines for Americans, 2010  [email or web address]'s MyPlate  ASA Prophylaxis  Lung CA Screening    Jamshid Miller MD  [facility]

## 2020-07-29 NOTE — PATIENT INSTRUCTIONS
Preventive Health Recommendations  Male Ages 50 - 64    Yearly exam:             See your health care provider every year in order to  o   Review health changes.   o   Discuss preventive care.    o   Review your medicines if your doctor has prescribed any.     Have a cholesterol test every 5 years, or more frequently if you are at risk for high cholesterol/heart disease.     Have a diabetes test (fasting glucose) every three years. If you are at risk for diabetes, you should have this test more often.     Have a colonoscopy at age 50, or have a yearly FIT test (stool test). These exams will check for colon cancer.      Talk with your health care provider about whether or not a prostate cancer screening test (PSA) is right for you.    You should be tested each year for STDs (sexually transmitted diseases), if you re at risk.     Shots: Get a flu shot each year. Get a tetanus shot every 10 years.     Nutrition:    Eat at least 5 servings of fruits and vegetables daily.     Eat whole-grain bread, whole-wheat pasta and brown rice instead of white grains and rice.     Get adequate Calcium and Vitamin D.     Lifestyle    Exercise for at least 150 minutes a week (30 minutes a day, 5 days a week). This will help you control your weight and prevent disease.     Limit alcohol to one drink per day.     No smoking.     Wear sunscreen to prevent skin cancer.     See your dentist every six months for an exam and cleaning.     See your eye doctor every 1 to 2 years.      Please call our [employer] [facility] Scheduling Line at [phone] to schedule your:    CT scan

## 2020-07-30 ENCOUNTER — OFFICE VISIT (OUTPATIENT)
Dept: FAMILY MEDICINE | Facility: CLINIC | Age: 60
End: 2020-07-30
Payer: COMMERCIAL

## 2020-07-30 ENCOUNTER — DOCUMENTATION ONLY (OUTPATIENT)
Dept: LAB | Facility: CLINIC | Age: 60
End: 2020-07-30

## 2020-07-30 VITALS
WEIGHT: 207 LBS | DIASTOLIC BLOOD PRESSURE: 88 MMHG | HEART RATE: 84 BPM | TEMPERATURE: 98 F | BODY MASS INDEX: 27.43 KG/M2 | OXYGEN SATURATION: 96 % | HEIGHT: 73 IN | SYSTOLIC BLOOD PRESSURE: 152 MMHG

## 2020-07-30 DIAGNOSIS — I10 HYPERTENSION GOAL BP (BLOOD PRESSURE) < 140/90: ICD-10-CM

## 2020-07-30 DIAGNOSIS — F33.1 DEPRESSION, MAJOR, RECURRENT, MODERATE (H): ICD-10-CM

## 2020-07-30 DIAGNOSIS — Z12.5 SCREENING FOR PROSTATE CANCER: Primary | ICD-10-CM

## 2020-07-30 DIAGNOSIS — E78.5 DYSLIPIDEMIA: ICD-10-CM

## 2020-07-30 DIAGNOSIS — Z87.891 HISTORY OF SMOKING 30 OR MORE PACK YEARS: ICD-10-CM

## 2020-07-30 DIAGNOSIS — Z80.42 FAMILY HISTORY OF PROSTATE CANCER: ICD-10-CM

## 2020-07-30 DIAGNOSIS — Z12.5 SCREENING FOR PROSTATE CANCER: ICD-10-CM

## 2020-07-30 DIAGNOSIS — N40.1 BENIGN NON-NODULAR PROSTATIC HYPERPLASIA WITH LOWER URINARY TRACT SYMPTOMS: ICD-10-CM

## 2020-07-30 DIAGNOSIS — Z00.00 ROUTINE GENERAL MEDICAL EXAMINATION AT A HEALTH CARE FACILITY: ICD-10-CM

## 2020-07-30 DIAGNOSIS — G60.8 SENSORY POLYNEUROPATHY: ICD-10-CM

## 2020-07-30 DIAGNOSIS — I10 HYPERTENSION GOAL BP (BLOOD PRESSURE) < 140/90: Primary | ICD-10-CM

## 2020-07-30 PROCEDURE — 99396 PREV VISIT EST AGE 40-64: CPT | Performed by: FAMILY MEDICINE

## 2020-07-30 RX ORDER — ESCITALOPRAM OXALATE 10 MG/1
10 TABLET ORAL DAILY
Qty: 30 TABLET | Refills: 1 | Status: SHIPPED | OUTPATIENT
Start: 2020-07-30 | End: 2020-12-16

## 2020-07-30 RX ORDER — ATORVASTATIN CALCIUM 40 MG/1
40 TABLET, FILM COATED ORAL DAILY
Qty: 30 TABLET | Refills: 0 | Status: CANCELLED | OUTPATIENT
Start: 2020-07-30

## 2020-07-30 RX ORDER — AMITRIPTYLINE HYDROCHLORIDE 75 MG/1
TABLET ORAL
Qty: 30 TABLET | Refills: 0 | Status: SHIPPED | OUTPATIENT
Start: 2020-07-30 | End: 2020-09-17

## 2020-07-30 RX ORDER — DUTASTERIDE 0.5 MG/1
1 CAPSULE, LIQUID FILLED ORAL DAILY
Qty: 90 CAPSULE | Refills: 3 | Status: SHIPPED | OUTPATIENT
Start: 2020-07-30 | End: 2021-07-19

## 2020-07-30 RX ORDER — ATORVASTATIN CALCIUM 40 MG/1
40 TABLET, FILM COATED ORAL AT BEDTIME
Qty: 90 TABLET | Refills: 3 | Status: SHIPPED | OUTPATIENT
Start: 2020-07-30 | End: 2021-07-19

## 2020-07-30 RX ORDER — LISINOPRIL 10 MG/1
10 TABLET ORAL DAILY
Qty: 30 TABLET | Refills: 1 | Status: SHIPPED | OUTPATIENT
Start: 2020-07-30 | End: 2020-10-19

## 2020-07-30 ASSESSMENT — PAIN SCALES - GENERAL: PAINLEVEL: NO PAIN (0)

## 2020-07-30 ASSESSMENT — ENCOUNTER SYMPTOMS
SORE THROAT: 0
DIARRHEA: 0
FREQUENCY: 0
WEAKNESS: 0
PALPITATIONS: 0
SHORTNESS OF BREATH: 1
EYE PAIN: 0
NAUSEA: 0
ARTHRALGIAS: 0
HEMATURIA: 0
HEMATOCHEZIA: 0
JOINT SWELLING: 0
ABDOMINAL PAIN: 0
DYSURIA: 0
CONSTIPATION: 0
PARESTHESIAS: 0
CHILLS: 0
FEVER: 0
MYALGIAS: 0
NERVOUS/ANXIOUS: 1
DIZZINESS: 0
HEADACHES: 0
HEARTBURN: 1
COUGH: 0

## 2020-07-30 ASSESSMENT — MIFFLIN-ST. JEOR: SCORE: 1797.83

## 2020-07-30 NOTE — PROGRESS NOTES
Your patient did not come to lab today for the blood work you had ordered at today's visit with you.  I canceled today's order and placed a future order for this patient IN TODAY'S ENCOUNTER.  Patient will need to be contacted by your team to return to lab for it if needed.  [facility]wn [facility]ville[facility]

## 2020-07-30 NOTE — NURSING NOTE
"Chief Complaint   Patient presents with     Physical     Health Maintenance     PHQ2       Initial BP (!) 160/86   Pulse 84   Temp 98  F (36.7  C) (Tympanic)   Ht 1.854 m (6' 1\")   Wt 93.9 kg (207 lb)   SpO2 96%   BMI 27.31 kg/m   Estimated body mass index is 27.31 kg/m  as calculated from the following:    Height as of this encounter: 1.854 m (6' 1\").    Weight as of this encounter: 93.9 kg (207 lb).  Medication Reconciliation: complete  Yesenia Kearns, YVONNE  "

## 2020-07-30 NOTE — PROGRESS NOTES
SUBJECTIVE:   CC: Jack Marrero is an 59 year old male who presents for preventative health visit.     Healthy Habits:     Getting at least 3 servings of Calcium per day:  Yes    Bi-annual eye exam:  Yes    Dental care twice a year:  Yes    Sleep apnea or symptoms of sleep apnea:  Sleep apnea    Diet:  Regular (no restrictions)    Frequency of exercise:  None    Taking medications regularly:  Yes    Medication side effects:  None    PHQ-2 Total Score: 1    Additional concerns today:  Yes              Today's PHQ-2 Score:   PHQ-2 (  [employer]) 2020   Q1: Little interest or pleasure in doing things 0   Q2: Feeling down, depressed or hopeless 1   PHQ-2 Score 1   Q1: Little interest or pleasure in doing things Not at all   Q2: Feeling down, depressed or hopeless Several days   PHQ-2 Score 1       Abuse: Current or Past(Physical, Sexual or Emotional)-   Do you feel safe in your environment?         Social History     Tobacco Use     Smoking status: Current Every Day Smoker     Packs/day: 1.00     Years: 46.00     Pack years: 46.00     Types: Cigarettes     Start date: 10/10/1974     Last attempt to quit: 2014     Years since quittin.4     Smokeless tobacco: Never Used   Substance Use Topics     Alcohol use: Yes     Comment: Occasional; about 1-2 drinks/week         Alcohol Use 2020   Prescreen: >3 drinks/day or >7 drinks/week? No   Prescreen: >3 drinks/day or >7 drinks/week? -       Last PSA:   PSA   Date Value Ref Range Status   2019 0.92 0 - 4 ug/L Final     Comment:     Assay Method:  Chemiluminescence using Siemens Vista analyzer       Reviewed orders with patient. Reviewed health maintenance and updated orders accordingly -       Reviewed and updated as needed this visit by clinical staff  Tobacco  Allergies  Meds         Reviewed and updated as needed this visit by Provider  Tobacco            Review of Systems   Constitutional: Negative for chills and fever.   HENT: Negative for  "congestion, ear pain, hearing loss and sore throat.    Eyes: Negative for pain and visual disturbance.   Respiratory: Positive for shortness of breath. Negative for cough.    Cardiovascular: Negative for chest pain, palpitations and peripheral edema.   Gastrointestinal: Positive for heartburn. Negative for abdominal pain, constipation, diarrhea, hematochezia and nausea.   Genitourinary: Negative for discharge, dysuria, frequency, genital sores, hematuria, impotence and urgency.   Musculoskeletal: Negative for arthralgias, joint swelling and myalgias.   Skin: Negative for rash.   Neurological: Negative for dizziness, weakness, headaches and paresthesias.   Psychiatric/Behavioral: Negative for mood changes. The patient is nervous/anxious.          OBJECTIVE:   BP (!) 152/88   Pulse 84   Temp 98  F (36.7  C) (Tympanic)   Ht 1.854 m (6' 1\")   Wt 93.9 kg (207 lb)   SpO2 96%   BMI 27.31 kg/m      Physical Exam      Diagnostic Test Results:  Labs reviewed in Epic    ASSESSMENT/PLAN:       ICD-10-CM    1. Screening for prostate cancer  Z12.5 PSA, screen     CANCELED: PSA, screen   2. Routine general medical examination at a health care facility  Z00.00    3. Family history of prostate cancer, pt would like annual PSA test  Z80.42    4. Dyslipidemia  E78.5 atorvastatin (LIPITOR) 40 MG tablet   5. Benign non-nodular prostatic hyperplasia with lower urinary tract symptoms  N40.1 dutasteride (AVODART) 0.5 MG capsule   6. Sensory polyneuropathy  G60.8 amitriptyline (ELAVIL) 75 MG tablet   7. Hypertension goal BP (blood pressure) < 140/90  I10 lisinopril (ZESTRIL) 10 MG tablet   8. Depression, major, recurrent, moderate (H)  F33.1 escitalopram (LEXAPRO) 10 MG tablet   9. History of smoking 30 or more pack years  Z87.891 CT Chest Lung Cancer Scrn Low Dose wo       COUNSELING:   Reviewed preventive health counseling, as reflected in patient instructions       Regular exercise       Healthy diet/nutrition       Vision " "screening       Aspirin Prophylaxsis       Family planning       Colon cancer screening       Prostate cancer screening       One time pneumovax for smokers  Lung cancer screening    Estimated body mass index is 27.31 kg/m  as calculated from the following:    Height as of this encounter: 1.854 m (6' 1\").    Weight as of this encounter: 93.9 kg (207 lb).     Weight management plan: Discussed healthy diet and exercise guidelines     reports that he has been smoking cigarettes. He started smoking about 45 years ago. He has a 46.00 pack-year smoking history. He has never used smokeless tobacco.  Tobacco Cessation Action Plan: Information offered: Patient not interested at this time    Counseling Resources:  ATP IV Guidelines  Pooled Cohorts Equation Calculator  FRAX Risk Assessment  ICSI Preventive Guidelines  Dietary Guidelines for Americans, 2010  [employer] MyPlate  ASA Prophylaxis  Lung CA Screening    Jamshid Miller MD  [facility]  --------------------------------------------------------------------------------------------------------------------------------------    SUBJECTIVE:  Jack Marrero is a 59 year old male who presents to the clinic today for a routine physical exam.    The patient's last physical was 2-26-19.    Cholesterol   Date Value Ref Range Status   07/22/2020 157 <200 mg/dL Final   01/28/2019 162 <200 mg/dL Final     HDL Cholesterol   Date Value Ref Range Status   07/22/2020 40 >39 mg/dL Final   01/28/2019 44 >39 mg/dL Final     LDL Cholesterol Calculated   Date Value Ref Range Status   07/22/2020 94 <100 mg/dL Final     Comment:     Desirable:       <100 mg/dl   01/28/2019 90 <100 mg/dL Final     Comment:     Desirable:       <100 mg/dl     Triglycerides   Date Value Ref Range Status   07/22/2020 115 <150 mg/dL Final     Comment:     Fasting specimen   01/28/2019 140 <150 mg/dL Final     Comment:     Fasting specimen     Cholesterol/HDL Ratio   Date Value Ref Range Status "   10/26/2015 3.6 0.0 - 5.0 Final   07/21/2015 5.8 (H) 0.0 - 5.0 Final     The patient's last fasting lipid panel was done 8 days ago and the results are listed above.        The 10-year ASCVD risk score (Isis HOLLAND Jr., et al., 2013) is: 18%    Values used to calculate the score:      Age: 59 years      Sex: Male      Is Non- : No      Diabetic: No      Tobacco smoker: Yes      Systolic Blood Pressure: 152 mmHg      Is BP treated: No      HDL Cholesterol: 40 mg/dL      Total Cholesterol: 157 mg/dL      Risk Enhancers:  Family history of premature ASCVD- No  LDL >159- No  Chronic kidney disease- No  Metabolic Syndrome- No  Premature menopause- No  Inflammatory conditions (RA, psoriasis, HIV)- No  SE  Ancestry- No  Triglycerides >174- No      The patient reports that he has never been treated for high blood pressure.    The patient reports that he does take a daily aspirin.    Lab Results   Component Value Date    HCVAB  03/30/2015     Nonreactive   Assay performance characteristics have not been established for newborns,   infants, and children       The patient reports that he has been screened for Hepatitis C    (Screen all baby boomers once per CDC-- the generation born from 1945 through 1965 and per USPTF screen age 19 to 79 especially younger people who have used IV drugs)  He would not like to have an Hepatitis C test today    Lab Results   Component Value Date    HIAGAB  02/10/2015     Nonreactive   HIV-1 p24 Ag & HIV-1/HIV-2 Ab Not Detected       The patient reports that he has been screened for HIV   (Screen all 15 to 64 years old)  He would not like to have an HIV test today      Immunization History   Administered Date(s) Administered     FLU 6-35 months 10/09/2012     Flu, Unspecified 10/18/2007     HEPA 10/18/2007, 06/22/2011     HepA-Adult 10/18/2007, 06/22/2011     Influenza (H1N1) 01/11/2010     Influenza (IIV3) PF 08/30/2011     Influenza Quad, Recombinant, p-free (RIV4)  11/06/2018     Influenza Vaccine IM > 6 months Valent IIV4 10/03/2013, 10/28/2014, 11/02/2015, 10/24/2016, 10/11/2017, 10/29/2019     Pneumococcal 23 valent 06/25/2013     Poliovirus, inactivated (IPV) 06/22/2011     TD (ADULT, 7+) 05/03/2005     TDAP Vaccine (Adacel) 04/06/2011     Typhoid IM 06/22/2011     Typhoid, Unspecified Formulation 10/18/2007     Yellow Fever 06/22/2011     Zoster vaccine recombinant adjuvanted (SHINGRIX) 02/26/2019, 05/21/2019     The patient's believes that his last tetanus shot was given 9 year(s) ago.   The patient believes that he has had a Shingrix in the past  The patient believes that he has had a PPSV23 in the past.  The patient believes that he has not had a PCV13 in the past.  The patient believes that he has had a seasonal flu vaccination this fall or winter.  The patient would like to have a no vaccinations today      Results for orders placed or performed during the hospital encounter of 01/11/16   COLONOSCOPY   Result Value Ref Range    COLONOSCOPY       [facility]  Endoscopy Department-[place]  _______________________________________________________________________________  Patient Name: Jack Marrero           Procedure Date: 1/11/2016 9:54 AM  MRN: [id number]                       YOB: 1960  Admit Type: Outpatient                Age: 55  Gender: Male                          Note Status: Finalized  Attending MD: Nilson Gale MD       _______________________________________________________________________________     Procedure:                Colonoscopy  Indications:              Personal history of colonic polyps  Providers:                Nilson Gale MD, Lima Ordonez RN  Referring MD:             Jamshid Miller MD  Medicines:                Fentanyl 200 micrograms IV, Midazolam 3 mg IV,                             Diphenhydramine 12.5 mg IV  Complications:            No immediate  complications.  ______________________________________________________________________________ _  Procedure:                Pre-Anesthesia Assessment:                            - Prior to the procedure, a History and Physical                             was performed, and patient medications and                             allergies were reviewed. The patient is competent.                             The risks and benefits of the procedure and the                             sedation options and risks were discussed with the                             patient. All questions were answered and informed                             consent was obtained. Patient identification and                             proposed procedure were verified by the physician                             in the procedure room. Mental Status Examination:                             alert and oriented. Airway Examination: normal                             oropharyngeal airway and neck mobility. Respiratory                             Examination: clear to auscultation. CV Examination:                              normal. Prophylactic Antibiotics: The patient does                             not require prophylactic antibiotics. Prior                             Anticoagulants: The patient has taken aspirin, last                             dose was 7 days prior to procedure. ASA Grade                             Assessment: II - A patient with mild systemic                             disease. After reviewing the risks and benefits,                             the patient was deemed in satisfactory condition to                             undergo the procedure. The anesthesia plan was to                             use moderate sedation / analgesia (conscious                             sedation). Immediately prior to administration of                             medications, the patient was re-assessed for                              adequacy to receive sedatives. The heart rate,                             respiratory rate, oxygen saturations, blood                             pres sure, adequacy of pulmonary ventilation, and                             response to care were monitored throughout the                             procedure. The physical status of the patient was                             re-assessed after the procedure.                            After obtaining informed consent, the colonoscope                             was passed under direct vision. Throughout the                             procedure, the patient's blood pressure, pulse, and                             oxygen saturations were monitored continuously. The                             Colonoscope was introduced through the anus and                             advanced to the cecum, identified by appendiceal                             orifice and ileocecal valve. The colonoscopy was                             performed without difficulty. The patient tolerated                             the procedure well. The quality of the bowel                             preparation was go od.                                                                                   Findings:       The perianal and digital rectal examinations were normal.       Multiple small and large-mouthed diverticula were found in the sigmoid        colon.       Four sessile polyps were found in the cecum and at 70 cm proximal to the        anus. The polyps were 1 to 4 mm in size. These polyps were removed with        a cold snare. Resection and retrieval were complete.       The exam was otherwise without abnormality.                                                                                   Impression:               - Diverticulosis in the sigmoid colon.                            - Four 1 to 4 mm polyps in the cecum and at 70 cm                             proximal to the anus.  Resected and retrieved.                            - The examination was otherwise normal.  Recommendation:           - Path report will tell when next colonoscopy                             should be                             For the diverticulosis will need to start on                             Metamucil or its equivalent for more details look                             at patient instructions.                            5 sooner if mroe than tubular like 3. one at 70 cm                             was the largest. ones in cecum were very small                                                                                     ___________________  Nilson Gale MD  1/11/2016 10:31 AM                              Number of Addenda: 0    Note Initiated On: 1/11/2016 9:54 AM  Scope Withdrawal Time: 0 hours 0 minutes 0 seconds   Total Procedure Duration: 0 hours 0 minutes 0 seconds      ]   The patient denies a family history of colon cancer in his brother at age 55.  The patient reports that he has had a colonoscopy. His  last colonoscopy was in 2016 and he  report that is was abnormal. The patient was told to have this repeated in 5 years.    The patient reports that he and his wife or partner are not using contraception as she has gone through menopause.  The patient denies a family history of diabetes.  The patient reports a family history of prostate cancer. After discussing the pros and cons of checking a PSA he  Does want to have this test drawn today.   The patient reports that he eats or drinks 3 servings of dairy products per day.  The patient reports that he has dental appointments approximately every 6 months.  The patient reports that he  has an eye examination approximately every 1.0 year(s).    Do you currently smoke? Yes  How many years have you smoked? 46   How many packs per day did you smoke on average? 1 ppd  (if more than 30 pack year history and the patient is age 55-80 consider ordering  an annual low dose radiation lung CT to screen for cancer)  (Do not order if patient has quit more than 15 years ago or has a health condition that limits life expectancy or could not tolerate curative lung surgery)  Are you interested having a lung CT to screen for lung cancer? Yes: .    If the patient has smoked more that 100 cigarettes, has the patient had an imaging study (US or CT) for an AAA between the ages of 65 and 75? N/A              Patient Active Problem List   Diagnosis     Dyslipidemia     Mixed anxiety depressive disorder     HYPERLIPIDEMIA LDL GOAL <130     ADHD (attention deficit hyperactivity disorder)     Erectile dysfunction     Tubular adenoma of colon on colonoscopy     High triglycerides     HDL deficiency     IGT (impaired glucose tolerance)     Sensory polyneuropathy     Diverticulosis of colon     Inflamed seborrheic keratosis     10 year risk of MI or stroke 7.5% or greater, 9.3% in Nov 2016 on atorvastatin 40     Venous lake of lip, left lower     Benign non-nodular prostatic hyperplasia with lower urinary tract symptoms     Tobacco abuse     Restless legs syndrome (RLS)     SHY (obstructive sleep apnea) - Severe     Acute pain of right shoulder     Gallbladder polyp, need fu US yearly     Atherosclerosis of abdominal aorta (H), seen on CT     Shoulder girdle syndrome       Past Surgical History:   Procedure Laterality Date     BIOPSY      growth on right shin     COLONOSCOPY       COLONOSCOPY WITH CO2 INSUFFLATION N/A 1/11/2016    Procedure: COLONOSCOPY WITH CO2 INSUFFLATION;  Surgeon: Nilson Gale MD;  Location: [facility] OR     HC EXCISION SKIN OF NAIL FOLD  1993    BL GREAT TOE     HC TOOTH EXTRACTION W/FORCEP  1991     ALL 4 WISDOM TEETH EXTRACTED     HERNIORRHAPHY INGUINAL Left 3/11/2015    Procedure: HERNIORRHAPHY INGUINAL;  Surgeon: Nilson Gale MD;  Location: [facility] OR     TONSILLECTOMY & ADENOIDECTOMY         Family History   Problem Relation Age of Onset      Arthritis Mother      Heart Disease Mother      Lipids Mother      Eye Disorder Mother      Gastrointestinal Disease Mother         irritable bowel     Breast Cancer Mother      Osteoporosis Mother      Lipids Father      Heart Disease Father      Psychotic Disorder Father      Depression Father      Eye Disorder Father      C.A.D. Father 56        CABG at age 56     Coronary Artery Disease Father         Heart attack at age 56     Hyperlipidemia Father      Depression/Anxiety Father      Mental Illness Father      Hypertension Father      Anxiety Disorder Father      Arthritis Maternal Grandmother      Alcohol/Drug Maternal Grandfather      Cancer - colorectal Maternal Grandfather      Hypertension Paternal Grandmother      Breast Cancer Paternal Grandmother      Heart Disease Paternal Grandfather      Hypertension Paternal Grandfather      Coronary Artery Disease Paternal Grandfather          of heart attack at age 59     Hypertension Brother      Depression/Anxiety Brother      Depression Brother      Thyroid Disease Sister      Hypertension Brother      Depression/Anxiety Brother      Hypertension Brother      Gastrointestinal Disease Brother      Prostate Cancer Brother      Mental Illness Brother      Cardiovascular Other      Diabetes Maternal Half-Brother      Thyroid Disease Sister      Thyroid Disease Sister      Other Cancer Other         Lung; Paternal Uncle/Lung; Paternal Uncle     Coronary Artery Disease Other      Hyperlipidemia Other      Other Cancer Other         Lung; Paternal Uncle     Cerebrovascular Disease No family hx of        Social History     Socioeconomic History     Marital status:      Spouse name: Not on file     Number of children: Not on file     Years of education: Not on file     Highest education level: Not on file   Occupational History     Not on file   Social Needs     Financial resource strain: Not on file     Food insecurity     Worry: Not on file     Inability:  "Not on file     Transportation needs     Medical: Not on file     Non-medical: Not on file   Tobacco Use     Smoking status: Current Every Day Smoker     Packs/day: 1.00     Years: 46.00     Pack years: 46.00     Types: Cigarettes     Start date: 10/10/1974     Last attempt to quit: 2014     Years since quittin.4     Smokeless tobacco: Never Used   Substance and Sexual Activity     Alcohol use: Yes     Comment: Occasional; about 1-2 drinks/week     Drug use: No     Sexual activity: Not Currently     Partners: Female   Lifestyle     Physical activity     Days per week: Not on file     Minutes per session: Not on file     Stress: Not on file   Relationships     Social connections     Talks on phone: Not on file     Gets together: Not on file     Attends [employer] service: Not on file     Active member of club or organization: Not on file     Attends meetings of clubs or organizations: Not on file     Relationship status: Not on file     Intimate partner violence     Fear of current or ex partner: Not on file     Emotionally abused: Not on file     Physically abused: Not on file     Forced sexual activity: Not on file   Other Topics Concern     Parent/sibling w/ CABG, MI or angioplasty before 65F 55M? No   Social History Narrative     Not on file       Current Outpatient Medications   Medication Sig Dispense Refill     amitriptyline (ELAVIL) 75 MG tablet TAKE 1 TABLET(75 MG) BY MOUTH AT BEDTIME 30 tablet 0     aspirin 81 MG tablet Take 1 tablet by mouth daily.       atorvastatin (LIPITOR) 40 MG tablet TAKE 1 TABLET(40 MG) BY MOUTH DAILY 30 tablet 0     dutasteride (AVODART) 0.5 MG capsule Take 1 capsule (0.5 mg) by mouth daily 90 capsule 0     order for DME Equipment ordered: RESMED Auto PAP Mask type: Nasal Settings: 6-12 cm H2O         PHYSICAL EXAMINATION:  Blood pressure (!) 152/88, pulse 84, temperature 98  F (36.7  C), temperature source Tympanic, height 1.854 m (6' 1\"), weight 93.9 kg (207 lb), SpO2 96 " %.  General appearance - healthy, alert and no distress  Skin - Skin color, texture, turgor normal. No rashes or lesions.  Head - Normocephalic. No masses, lesions, tenderness or abnormalities  Eyes - conjunctivae/corneas clear. PERRL, EOM's intact. Fundi benign  Ears - External ears normal. Canals clear. TM's normal.  Nose/Sinuses - Nares normal. Septum midline. Mucosa normal. No drainage or sinus tenderness.  Oropharynx - Lips, mucosa, and tongue normal. Teeth and gums normal.  Neck - Neck supple. No adenopathy. Thyroid symmetric, normal size,  Lungs - Percussion normal. Good diaphragmatic excursion. Lungs clear  Heart - PMI normal. No lifts, heaves, or thrills. RRR. No murmurs, clicks gallops or rub  Abdomen - Abdomen soft, non-tender. BS normal. No masses, organomegaly  Extremities - Extremities normal. No deformities, edema, or skin discoloration.  Musculoskeletal - Spine ROM normal. Muscular strength intact.  Peripheral pulses - radial=4/4, femoral=4/4, popliteal=4/4, dorsalis pedis=4/4,  Neuro - Gait normal. Reflexes normal and symmetric. Sensation grossly WNL.  Genitalia - Penis normal. No urethral discharge. Scrotum normal to palpation. No hernia.  Rectal - Rectal negative. Prostate palpation negative.  No rectal masses or abnormalities      Orders Only on 07/22/2020   Component Date Value Ref Range Status     Sodium 07/22/2020 140  133 - 144 mmol/L Final     Potassium 07/22/2020 3.7  3.4 - 5.3 mmol/L Final     Chloride 07/22/2020 107  94 - 109 mmol/L Final     Carbon Dioxide 07/22/2020 28  20 - 32 mmol/L Final     Anion Gap 07/22/2020 5  3 - 14 mmol/L Final     Glucose 07/22/2020 105* 70 - 99 mg/dL Final    Fasting specimen     Urea Nitrogen 07/22/2020 11  7 - 30 mg/dL Final     Creatinine 07/22/2020 0.96  0.66 - 1.25 mg/dL Final     GFR Estimate 07/22/2020 84  >60 mL/min/[1.73_m2] Final    Comment: Non  GFR Calc  Starting 12/18/2018, serum creatinine based estimated GFR (eGFR) will be    calculated using the Chronic Kidney Disease Epidemiology Collaboration   (CKD-EPI) equation.       GFR Estimate If Black 07/22/2020 >90  >60 mL/min/[1.73_m2] Final    Comment:  GFR Calc  Starting 12/18/2018, serum creatinine based estimated GFR (eGFR) will be   calculated using the Chronic Kidney Disease Epidemiology Collaboration   (CKD-EPI) equation.       Calcium 07/22/2020 8.6  8.5 - 10.1 mg/dL Final     Bilirubin Total 07/22/2020 0.9  0.2 - 1.3 mg/dL Final     Albumin 07/22/2020 3.7  3.4 - 5.0 g/dL Final     Protein Total 07/22/2020 7.1  6.8 - 8.8 g/dL Final     Alkaline Phosphatase 07/22/2020 87  40 - 150 U/L Final     ALT 07/22/2020 48  0 - 70 U/L Final     AST 07/22/2020 31  0 - 45 U/L Final     Cholesterol 07/22/2020 157  <200 mg/dL Final     Triglycerides 07/22/2020 115  <150 mg/dL Final    Fasting specimen     HDL Cholesterol 07/22/2020 40  >39 mg/dL Final     LDL Cholesterol Calculated 07/22/2020 94  <100 mg/dL Final    Desirable:       <100 mg/dl     Non HDL Cholesterol 07/22/2020 117  <130 mg/dL Final       ASSESSMENT:    ICD-10-CM    1. Routine general medical examination at a health care facility  Z00.00        Well-Adult Physical Exam.  Health Maintenance Due   Topic Date Due     PHQ-2  01/01/2020     LUNG CANCER SCREENING ANNUAL  02/11/2020     PREVENTIVE CARE VISIT  02/26/2020     Health Maintenance   Topic Date Due     PHQ-2  01/01/2020     LUNG CANCER SCREENING ANNUAL  02/11/2020     PREVENTIVE CARE VISIT  02/26/2020     INFLUENZA VACCINE (1) 09/01/2020     COLORECTAL CANCER SCREENING  01/11/2021     DTAP/TDAP/TD IMMUNIZATION (3 - Td) 04/06/2021     ADVANCE CARE PLANNING  01/28/2024     LIPID  07/22/2025     HEPATITIS C SCREENING  Completed     HIV SCREENING  Completed     PNEUMOCOCCAL IMMUNIZATION 19-64 MEDIUM RISK  Completed     ZOSTER IMMUNIZATION  Completed     IPV IMMUNIZATION  Aged Out     MENINGITIS IMMUNIZATION  Aged Out     HEPATITIS B IMMUNIZATION  Aged Out          HEALTH CARE MAINTENENCE: The recommended screening tests and vaccinatons for this patient have been discussed as above.  The appropriate tests and vaccinations  have been ordered or declined by the patient. Please see the orders in EPIC.The patient specifically declines: n/a     Immunization Status:  up to date and documented    Patient Active Problem List   Diagnosis     Dyslipidemia     Mixed anxiety depressive disorder     HYPERLIPIDEMIA LDL GOAL <130     ADHD (attention deficit hyperactivity disorder)     Erectile dysfunction     Tubular adenoma of colon on colonoscopy     High triglycerides     HDL deficiency     IGT (impaired glucose tolerance)     Sensory polyneuropathy     Diverticulosis of colon     Inflamed seborrheic keratosis     10 year risk of MI or stroke 7.5% or greater, 9.3% in Nov 2016 on atorvastatin 40     Venous lake of lip, left lower     Benign non-nodular prostatic hyperplasia with lower urinary tract symptoms     Tobacco abuse     Restless legs syndrome (RLS)     SHY (obstructive sleep apnea) - Severe     Acute pain of right shoulder     Gallbladder polyp, need fu US yearly     Atherosclerosis of abdominal aorta (H), seen on CT     Shoulder girdle syndrome        ATP III Guidelines  ICSI Preventive Guidelines    PLAN:   I recommended continuing to take a daily aspirin (81 to 325 mg)  Flu shot recommended in the fall  Check a PSA today at lab  Discussed calcium intake, vitamins and supplements. Recommended 1000 mg of calcium daily  Sunscreen use was recommended especially in the area of tatoos  Recommended dental exams every 6 months  Recommended eye exam every 1-2 years  Follow up in 1 year for the next preventative medical visit    Start lisinopril 10 mg today and follow up in clinic in 1 month(s) for a hypertension recheck. We also discussed an over the counter nicotine replacement product he used last time to quit and he will use that again.   We will see if with better blood  pressure control and smoking cessation we can lower his cardiac risk    Lung cancer screening ordered    We will also restart his lexapro as he has been struggling during the pandemic     Body mass index is 27.31 kg/m .

## 2020-07-31 NOTE — PROGRESS NOTES
No he is coming back in a month(s) for a blood pressure recheck and we decided to do his blood work then along with other hypertension medication monitoring labs

## 2020-07-31 NOTE — PROGRESS NOTES
To provider, read lab message. Do you want me to contact the patient and have him come back for a lab appointment?  Please advise.  Jen Worley,

## 2020-08-06 ENCOUNTER — ANCILLARY PROCEDURE (OUTPATIENT)
Dept: CT IMAGING | Facility: CLINIC | Age: 60
End: 2020-08-06
Attending: FAMILY MEDICINE
Payer: COMMERCIAL

## 2020-08-06 DIAGNOSIS — Z87.891 HISTORY OF SMOKING 30 OR MORE PACK YEARS: ICD-10-CM

## 2020-08-06 PROCEDURE — G0297 LDCT FOR LUNG CA SCREEN: HCPCS | Performed by: RADIOLOGY

## 2020-08-06 NOTE — LETTER
2020      Jack Marrero  [address] [address] [address]        Jack,   I have reviewed the report of the imaging test or tests that we recently ordered. The results were normal or considered normal for you. It did show the emphysema so smoking cessation is of the utmost importance. Let me know if you need help with that .   Sincerely,   Jamshid Miller MD     Resulted Orders   CT Chest Lung Cancer Scrn Low Dose wo    Narrative    CT Low Dose Lung Cancer Screening    History: Screening for lung cancer, smoking.    Number of packs-year of smokin  Current or former smoker?: Current    Comparison: CT chest 2019, 2017.    Technique: Helical acquisition low dose CT chest. Images reviewed in  lung, soft tissue and bone windows.    Findings:  Scattered pulmonary nodules are unchanged since 2019. For  example, as seen on series 4:  -Subpleural, solid nodule in the right upper lobe measuring 3 mm  (image 15).  -Previously seen right upper lobe solid nodule measuring 2 mm. Not  appreciated on today's exam  -Solid nodule in the lateral left upper lobe measuring 2 mm (image  141).     Emphysema: Mild apical paraseptal emphysematous changes.    Coronary artery calcium: Mild calcification of the left anterior  descending artery.    Additional findings:     Chest:     Thyroid gland appears unremarkable. Heart size is normal. No  pericardial effusion. Thoracic aorta and main pulmonary arteries are  normal in caliber. No mediastinal, hilar, or axillary lymphadenopathy.    Tracheal debris at the thoracic inlet likely adherent secretions.  Central airways are clear. Biapical scarring. No focal airspace  opacity, pleural effusion, or pneumothorax. No abnormal mosaic  attenuation. Moderate bronchial wall thickening. Linear atelectasis in  the inferolateral left upper lobe.    Abdomen: Examination of the upper abdomen is limited. Stable left  hepatic lobe hypodensity (series 2 image 52)  "favored to represent a  cyst.    Bones and soft tissues: No suspicious lytic or sclerotic lesions. No  soft tissue mass. Degenerative changes of the thoracic spine.      Impression    Impression:   1. ACR Assessment Category (v1.1):  Lung-RADS Category 2. Benign  appearance or behavior.    Recommendation:  Lung-RADS Category 2. Benign appearance or behavior.  Recommendation: continue annual screening with Lung cancer screening  CT (please order exam code [id number]).     2. Significant Incidental Finding(s):  Category S: Yes.    a.  Mild coronary artery calcifications.    b. Stable moderate bronchial wall thickening which may be seen in  inflammatory processes such as respiratory bronchiolitis.    3. Mild apical paraseptal emphysema.  4. Avoidance of tobacco smoke is strongly advised. Please consider  referral for smoking cessation to [facility] Medication Therapy Management  (MTM) if clinically appropriate.    Download the \"LungRADS v.1.1 Assessment Categories\" table at this  site:   http://[email or web address].org/Quality-[email or web address]/[email or web address]    I have personally reviewed the examination and initial interpretation  and I agree with the findings.    DAKSHA HENDERSON MD       If you have any questions or concerns, please call the clinic at the number listed above.       Sincerely,    Jamshid Miller MD/sp  "

## 2020-08-07 ENCOUNTER — TELEPHONE (OUTPATIENT)
Dept: FAMILY MEDICINE | Facility: CLINIC | Age: 60
End: 2020-08-07

## 2020-08-07 DIAGNOSIS — I25.10 CALCIFICATION OF CORONARY ARTERY: ICD-10-CM

## 2020-08-07 NOTE — RESULT ENCOUNTER NOTE
Jack,  I have reviewed the report of the imaging test or tests that we recently ordered. The results were normal or considered normal for you. It did show the emphysema so smoking cessation is of the utmost importance. Let me know if you need help with that .  Sincerely,   Jamshid Miller MD

## 2020-08-07 NOTE — TELEPHONE ENCOUNTER
Call from Josiah at [facility] transferred to RN.     Routed to provider to review and advise.  Josiah states that Radiology requires that provider result note attached to CT scan is found, but note should specifically address these incidental findings in provider result note:    2. Significant Incidental Finding(s):  Category S: Yes.    a.  Mild coronary artery calcifications.    b. Stable moderate bronchial wall thickening which may be seen in  inflammatory processes such as respiratory bronchiolitis.      Viewed by Elliot Marrero on 8/6/2020  7:57 PM   Written by Jamshid Miller MD on 8/6/2020  7:48 PM   Jack,   I have reviewed the report of the imaging test or tests that we recently ordered. The results were normal or considered normal for you. It did show the emphysema so smoking cessation is of the utmost importance. Let me know if you need help with that.

## 2020-08-08 NOTE — TELEPHONE ENCOUNTER
The coronary calcification was noted when I first saw the imaging results. It has been noted in his problem list.   Jamshid Miller MD

## 2020-09-17 DIAGNOSIS — G60.8 SENSORY POLYNEUROPATHY: ICD-10-CM

## 2020-09-17 RX ORDER — AMITRIPTYLINE HYDROCHLORIDE 75 MG/1
TABLET ORAL
Qty: 90 TABLET | Refills: 3 | Status: SHIPPED | OUTPATIENT
Start: 2020-09-17 | End: 2021-09-13

## 2020-09-17 NOTE — TELEPHONE ENCOUNTER
Routing refill request to provider for review/approval because:  BP Readings from Last 3 Encounters:   07/30/20 (!) 152/88   12/10/19 (!) 164/103   08/08/19 (!) 150/97     Yesica PORTILLON, RN

## 2020-09-26 DIAGNOSIS — F33.1 DEPRESSION, MAJOR, RECURRENT, MODERATE (H): ICD-10-CM

## 2020-09-26 NOTE — LETTER
September 29, 2020      Jack Marrero  [address] [address] [address]      Dear Jack,    We have recently received a medication request from your pharmacy for ESCITALOPRAM 10MG TABLETS. Unfortunately this was denied by your provider because you are due for:    Depression office/video visit       Please call our clinic at your earliest convenience so there are no future delays for your medication.    [phone]            Thank you,    Your [facility] Care Team/sp

## 2020-09-28 NOTE — TELEPHONE ENCOUNTER
Patient was restarted on lexapro in July and told to return to clinic in 1 month for depression/ BP recheck.   Patient didn't do this.   No appointment pending at this time.  Routing to provider to advise.    Marj PORTILLON, RN

## 2020-09-28 NOTE — TELEPHONE ENCOUNTER
Please call the patient and make a virtual appointment(s) for depression and then you can refill the medication one time. If the patient will have enough medication until this appointment(s) then please do not refill it and I can refill it/them at the time of his appointment.  Jamshid Miller MD

## 2020-09-29 RX ORDER — ESCITALOPRAM OXALATE 10 MG/1
TABLET ORAL
Qty: 30 TABLET | Refills: 1 | OUTPATIENT
Start: 2020-09-29

## 2020-10-17 DIAGNOSIS — I10 HYPERTENSION GOAL BP (BLOOD PRESSURE) < 140/90: ICD-10-CM

## 2020-10-19 RX ORDER — LISINOPRIL 10 MG/1
TABLET ORAL
Qty: 30 TABLET | Refills: 1 | Status: SHIPPED | OUTPATIENT
Start: 2020-10-19 | End: 2020-12-16

## 2020-10-19 NOTE — TELEPHONE ENCOUNTER
Routing refill request to provider for review/approval because:    Patient failed:  Blood pressure under 140/90 in past 12 months  BP Readings from Last 3 Encounters:   07/30/20 (!) 152/88   12/10/19 (!) 164/103   08/08/19 (!) 150/97     Marj Thornton BSN, RN

## 2020-11-13 ENCOUNTER — MYC MEDICAL ADVICE (OUTPATIENT)
Dept: SLEEP MEDICINE | Facility: CLINIC | Age: 60
End: 2020-11-13

## 2020-11-16 PROBLEM — G54.5 SHOULDER GIRDLE SYNDROME: Status: ACTIVE | Noted: 2017-01-05

## 2020-11-16 PROBLEM — I10 ESSENTIAL HYPERTENSION: Chronic | Status: ACTIVE | Noted: 2020-11-16

## 2020-11-17 ENCOUNTER — VIRTUAL VISIT (OUTPATIENT)
Dept: SLEEP MEDICINE | Facility: CLINIC | Age: 60
End: 2020-11-17
Payer: COMMERCIAL

## 2020-11-17 VITALS — BODY MASS INDEX: 28.23 KG/M2 | WEIGHT: 213 LBS | HEIGHT: 73 IN

## 2020-11-17 DIAGNOSIS — G25.81 RESTLESS LEGS SYNDROME (RLS): ICD-10-CM

## 2020-11-17 DIAGNOSIS — G60.8 SENSORY POLYNEUROPATHY: Chronic | ICD-10-CM

## 2020-11-17 DIAGNOSIS — G47.33 OSA (OBSTRUCTIVE SLEEP APNEA): Primary | ICD-10-CM

## 2020-11-17 PROCEDURE — 99204 OFFICE O/P NEW MOD 45 MIN: CPT | Mod: 95 | Performed by: INTERNAL MEDICINE

## 2020-11-17 SDOH — HEALTH STABILITY: MENTAL HEALTH: HOW MANY STANDARD DRINKS CONTAINING ALCOHOL DO YOU HAVE ON A TYPICAL DAY?: NOT ASKED

## 2020-11-17 SDOH — HEALTH STABILITY: MENTAL HEALTH: HOW OFTEN DO YOU HAVE A DRINK CONTAINING ALCOHOL?: 2-4 TIMES A MONTH

## 2020-11-17 SDOH — HEALTH STABILITY: MENTAL HEALTH: HOW OFTEN DO YOU HAVE 6 OR MORE DRINKS ON ONE OCCASION?: NOT ASKED

## 2020-11-17 ASSESSMENT — MIFFLIN-ST. JEOR: SCORE: 1820.04

## 2020-11-17 NOTE — PROGRESS NOTES
"Jack Marrero is a 60 year old male who is being evaluated via a billable video visit.      The patient has been notified of following:     \"This video visit will be conducted via a call between you and your physician/provider. We have found that certain health care needs can be provided without the need for an in-person physical exam.  This service lets us provide the care you need with a video conversation.  If a prescription is necessary we can send it directly to your pharmacy.  If lab work is needed we can place an order for that and you can then stop by our lab to have the test done at a later time.    Video visits are billed at different rates depending on your insurance coverage.  Please reach out to your insurance provider with any questions.    If during the course of the call the physician/provider feels a video visit is not appropriate, you will not be charged for this service.\"    Patient has given verbal consent for Video visit? Yes  How would you like to obtain your AVS? MyChart  If you are dropped from the video visit, the video invite should be resent to: Send to e-mail at: [email or web address]  Will anyone else be joining your video visit? No        Video-Visit Details    Type of service:  Video Visit    Video Start Time: 11:07 AM  Video End Time: 11:34 AM    Originating Location (pt. Location): Home    Distant Location (provider location):  [facility]     Platform used for Video Visit: Well            Sleep Consultation:    Date on this visit: 11/17/2020    Primary Physician: Jamshid Miller     Chief Complaint   Patient presents with     Consult     needs supplies        He initially presented with loud snoring, nocturia 2 - 3 times per night, nocturnal GERD,  morning headaches. ESS 6.   - Restless leg syndrome   - Insomnia with sleep onset difficulties     Split Night:Sleep Study 12/01/2016 - (190.0 lbs)   - AHI 16.3, RDI 55.6, Supine AHI 17.1, REM AHI n/a   - " Low O2% 80.3%, Time Spent <=88% 14.2, Time Spent <=89% 21.5   - Periodic Limb Movement Index 76.6/hour.     - CPAP was titrated to a pressure of 8 with an AHI 0.3. Time spent in REM supine at this pressure was 40.0 minutes.     He was seen by Dr. Rodríguez 12/2016 for cognitive behavioral training     Last visit 4/2017     Overall, he rates the experience with PAP as very good. Its been a life changer.     His PAP interface is nasal pillows.    Total score - Winn: 8 (11/17/2020 10:00 AM)    ResMed   Auto-PAP 6.0 - 12.0 cmH2O 30 day usage data:    93% of days with > 4 hours of use. 0/30 days with no use.   Average use 512 minutes per day.   95%ile Leak 16.53 L/min.   CPAP 95% pressure 11.5 cm.   AHI 1 events per hour.     He is having restless leg syndrome symptoms 1-2 /week. He will get up for a while.   He does not have gabapentin on his list any more    Outside of restless leg syndrome he denies any sleep onset, maintenance difficulties     Jack goes to bed at 12-1 AM during the week. He gets up at 8-9 AM without an alarm. Jack denies difficulty falling asleep except when he has restless leg syndrome symptoms.  He wakes up infrequently.     Patient does not use electronics in bed and watch TV in bed.     Patient does not have a regular bed partner. Patient sleeps on his back and side. He has occasional morning headaches (1-2/week).     Jack denies any sleep walking, sleep talking and dream enactment.    Jack has reflux at night.      Jack has gained 20-25 pounds since his sleep study.    Jack naps 1-2 times per week on CPAP 50%. He takes no inadvertant naps.  He denies dozing while driving. He uses 3-4 cups/day of coffee. Last caffeine intake is usually before noon.      Recent Labs   Lab Test 07/22/20  0846 12/10/19  1243    141   POTASSIUM 3.7 4.5   CHLORIDE 107 108   CO2 28 30   ANIONGAP 5 3   * 110*   BUN 11 13   CR 0.96 0.84   CARLY 8.6 9.4     Component      Latest Ref Rng & Units 6/15/2012    Ferritin      20 - 300 ng/mL 39     Lab Results   Component Value Date    TSH 1.40 02/03/2015       Allergies:    Allergies   Allergen Reactions     Codeine      FELT WIRED ON THIS     Codeine Unknown     FELT WIRED ON THIS         Medications:    Current Outpatient Medications   Medication Sig Dispense Refill     amitriptyline (ELAVIL) 75 MG tablet TAKE 1 TABLET(75 MG) BY MOUTH AT BEDTIME 90 tablet 3     aspirin 81 MG tablet Take 1 tablet by mouth daily.       atorvastatin (LIPITOR) 40 MG tablet Take 1 tablet (40 mg) by mouth At Bedtime 90 tablet 3     atorvastatin (LIPITOR) 40 MG tablet TAKE 1 TABLET(40 MG) BY MOUTH DAILY 30 tablet 0     dutasteride (AVODART) 0.5 MG capsule Take 1 capsule (0.5 mg) by mouth daily 90 capsule 3     escitalopram (LEXAPRO) 10 MG tablet Take 1 tablet (10 mg) by mouth daily 30 tablet 1     lisinopril (ZESTRIL) 10 MG tablet TAKE 1 TABLET(10 MG) BY MOUTH DAILY 30 tablet 1     order for DME Equipment ordered: RESMED Auto PAP Mask type: Nasal Settings: 6-12 cm H2O         Problem List:  Patient Active Problem List    Diagnosis Date Noted     Essential hypertension 11/16/2020     Priority: Medium     SHY (obstructive sleep apnea) - Moderate (AHI 16, RDI 55) 12/13/2016     Priority: Medium     Split Night:Sleep Study 12/01/2016 - (190.0 lbs) AHI 16.3, RDI 55.6, Supine AHI 17.1, REM AHI -, Low O2% 80.3%, Time Spent ?88% 14.2, Time Spent ?89% 21.5, CPAP was titrated to a pressure of 8 with an AHI 0.3. Time spent in REM supine at this pressure was 40.0 minutes.       Tobacco abuse 10/24/2016     Priority: Medium     IGT (impaired glucose tolerance) 10/26/2015     Priority: Medium     High triglycerides 10/22/2015     Priority: Medium     HDL deficiency 10/22/2015     Priority: Medium     Hyperlipidemia LDL goal <130 10/31/2010     Priority: Medium     Mixed anxiety depressive disorder 06/18/2009     Priority: Medium     Calcification of coronary artery/seen incidentally on lung CT 08/07/2020      Priority: Low     Family history of prostate cancer, pt would like annual PSA test 07/30/2020     Priority: Low     Gallbladder polyp, need fu US yearly 05/10/2017     Priority: Low     Atherosclerosis of abdominal aorta (H), seen on CT 05/10/2017     Priority: Low     Shoulder girdle syndrome 01/05/2017     Priority: Low     Restless legs syndrome (RLS) 11/16/2016     Priority: Low     Benign non-nodular prostatic hyperplasia with lower urinary tract symptoms 10/24/2016     Priority: Low     Venous lake of lip, left lower 05/20/2016     Priority: Low     Patient will call for referral if ever needed       10 year risk of MI or stroke 7.5% or greater, 9.3% in Nov 2016 on atorvastatin 40 02/12/2016     Priority: Low     Diverticulosis of colon 01/11/2016     Priority: Low     Sensory polyneuropathy 11/19/2015     Priority: Low     Tubular adenoma of colon on colonoscopy 01/12/2012     Priority: Low        Past Medical/Surgical History:  Past Medical History:   Diagnosis Date     Acute pain of right shoulder 12/23/2016     ADHD (attention deficit hyperactivity disorder)     dx via Dr Olson PhD      Depression, anxiety      Diverticulosis of colon      Dyslipidemia      Essential hypertension      IGT (impaired glucose tolerance) 10/26/2015     Inflamed seborrheic keratosis 01/29/2016     Pain in joint, shoulder region 12/16/2013     Sensory polyneuropathy      Past Surgical History:   Procedure Laterality Date     BIOPSY  2016    growth on right shin     COLONOSCOPY WITH CO2 INSUFFLATION N/A 01/11/2016    Procedure: COLONOSCOPY WITH CO2 INSUFFLATION;  Surgeon: Nilson Gale MD;  Location:  OR     DENTAL SURGERY  1991     ALL 4 WISDOM TEETH EXTRACTED     HC EXCISION SKIN OF NAIL FOLD  1993    BL GREAT TOE     HERNIORRHAPHY INGUINAL Left 03/11/2015    Procedure: HERNIORRHAPHY INGUINAL;  Surgeon: Nilson Gale MD;  Location:  OR     TONSILLECTOMY & ADENOIDECTOMY  1965       Social History:  Social  History     Socioeconomic History     Marital status:      Spouse name: Not on file     Number of children: Not on file     Years of education: Not on file     Highest education level: Not on file   Occupational History     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[employer], 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   Social Needs     Financial resource strain: Not on file     Food insecurity     Worry: Not on file     Inability: Not on file     Transportation needs     Medical: Not on file     Non-medical: Not on file   Tobacco Use     Smoking status: Current Every Day Smoker     Packs/day: 1.00     Years: 46.00     Pack years: 46.00     Types: Cigarettes     Start date: 10/10/1974     Last attempt to quit: 2014     Years since quittin.7     Smokeless tobacco: Never Used   Substance and Sexual Activity     Alcohol use: Yes     Frequency: 2-4 times a month     Comment: Occasional; about 1-2 drinks/week     Drug use: No     Sexual activity: Not Currently     Partners: Female   Lifestyle     Physical activity     Days per week: Not on file     Minutes per session: Not on file     Stress: Not on file   Relationships     Social connections     Talks on phone: Not on file     Gets together: Not on file     Attends Spiritism service: Not on file     Active member of club or organization: Not on file     Attends meetings of clubs or organizations: Not on file     Relationship status: Not on file     Intimate partner violence     Fear of current or ex partner: Not on file     Emotionally abused: Not on file     Physically abused: Not on file     Forced sexual activity: Not on file   Other Topics Concern     Parent/sibling w/ CABG, MI or angioplasty before 65F 55M? No   Social History Narrative     Not on file       Family History:  Family History   Problem Relation Age of Onset     Arthritis Mother      Heart Disease Mother      Lipids Mother      Eye Disorder Mother      Gastrointestinal Disease Mother          irritable bowel     Breast Cancer Mother      Osteoporosis Mother      Lipids Father      Heart Disease Father      Psychotic Disorder Father      Depression Father      Eye Disorder Father      C.A.D. Father 56        CABG at age 56     Coronary Artery Disease Father         Heart attack at age 56     Hyperlipidemia Father      Depression/Anxiety Father      Mental Illness Father      Hypertension Father      Anxiety Disorder Father      Arthritis Maternal Grandmother      Alcohol/Drug Maternal Grandfather      Cancer - colorectal Maternal Grandfather      Hypertension Paternal Grandmother      Breast Cancer Paternal Grandmother      Heart Disease Paternal Grandfather      Hypertension Paternal Grandfather      Coronary Artery Disease Paternal Grandfather          of heart attack at age 59     Hypertension Brother      Depression/Anxiety Brother      Depression Brother      Thyroid Disease Sister      Hypertension Brother      Depression/Anxiety Brother      Hypertension Brother      Gastrointestinal Disease Brother      Prostate Cancer Brother      Mental Illness Brother      Cardiovascular Other      Diabetes Maternal Half-Brother      Thyroid Disease Sister      Thyroid Disease Sister      Other Cancer Other         Lung; Paternal Uncle/Lung; Paternal Uncle     Coronary Artery Disease Other      Hyperlipidemia Other      Other Cancer Other         Lung; Paternal Uncle     Cerebrovascular Disease No family hx of        Review of Systems:  A complete review of systems reviewed by me is negative with the exeption of what has been mentioned in the history of present illness.  CONSTITUTIONAL: NEGATIVE for weight gain/loss, fever, chills, sweats or night sweats, drug allergies.  EYES: NEGATIVE for changes in vision, blind spots, double vision.  ENT: NEGATIVE for ear pain, sore throat, sinus pain, post-nasal drip, runny nose, bloody nose  CARDIAC: NEGATIVE for fast heartbeats or fluttering in chest, chest pain or  "pressure, breathlessness when lying flat, swollen legs or swollen feet.  NEUROLOGIC:  POSITIVE for  headaches and weakness or numbness in the arms or legs  DERMATOLOGIC: NEGATIVE for rashes, new moles or change in mole(s)  PULMONARY:  POSITIVE for  SOB with activity, productive cough and wheezing   GASTROINTESTINAL: NEGATIVE for nausea or vomitting, loose or watery stools, fat or grease in stools, constipation, abdominal pain, bowel movements black in color or blood noted.  GENITOURINARY: NEGATIVE for pain during urination, blood in urine, urinating more frequently than usual, irregular menstrual periods.  MUSCULOSKELETAL: NEGATIVE for muscle pain, bone or joint pain, swollen joints.  ENDOCRINE: NEGATIVE for increased thirst or urination, diabetes.  LYMPHATIC: NEGATIVE for swollen lymph nodes, lumps or bumps in the breasts or nipple discharge.    Physical Examination:  Vitals: Ht 1.854 m (6' 1\")   Wt 96.6 kg (213 lb)   BMI 28.10 kg/m    BMI= Body mass index is 28.1 kg/m .         [place] Total Score 11/17/2020   Total score - [place] 8       PAULETTE Total Score: 0 (11/17/20 1000)    SpO2 Readings from Last 4 Encounters:   07/30/20 96%   12/10/19 96%   08/08/19 97%   05/13/19 97%       GENERAL APPEARANCE: alert and no distress  EYES: Eyes grossly normal to inspection  HENT: mouth without ulcers or lesions  NECK: not overly generous size  LUNGS: no shortness of breath , cough  NEURO: mentation intact, speech normal and cranial nerves 2-12 appear intact  PSYCH: affect normal/bright        Impression/Plan:    Moderate obstructive sleep apnea by sleep study  Tolerating PAP well, good AHI on download. Persistent reflux and morning headaches  - Increase pressures to 8-13 cmH20    Insomnia  Quiescent     Restless leg syndrome, history of low ferritin (39 6/15/2012)  Mild-moderate symptoms, apparently off treatment at this time  Lexapro and elavil can cause or worsen restless leg syndrome symptoms.   - Check ferritin  - " Consider restarting gbapentin    Neuropathy  - Recheck B12  - Consider restarting gbapentin    I spent >30 minutes with patient.

## 2020-11-17 NOTE — PATIENT INSTRUCTIONS

## 2020-12-16 ENCOUNTER — OFFICE VISIT (OUTPATIENT)
Dept: FAMILY MEDICINE | Facility: CLINIC | Age: 60
End: 2020-12-16
Payer: COMMERCIAL

## 2020-12-16 VITALS
BODY MASS INDEX: 29.29 KG/M2 | WEIGHT: 221 LBS | OXYGEN SATURATION: 99 % | HEIGHT: 73 IN | SYSTOLIC BLOOD PRESSURE: 155 MMHG | HEART RATE: 84 BPM | DIASTOLIC BLOOD PRESSURE: 90 MMHG | TEMPERATURE: 97.7 F

## 2020-12-16 DIAGNOSIS — I10 HYPERTENSION GOAL BP (BLOOD PRESSURE) < 140/90: ICD-10-CM

## 2020-12-16 DIAGNOSIS — Z12.11 SCREEN FOR COLON CANCER: Primary | ICD-10-CM

## 2020-12-16 DIAGNOSIS — F33.1 DEPRESSION, MAJOR, RECURRENT, MODERATE (H): ICD-10-CM

## 2020-12-16 PROCEDURE — 99214 OFFICE O/P EST MOD 30 MIN: CPT | Performed by: FAMILY MEDICINE

## 2020-12-16 RX ORDER — LISINOPRIL 10 MG/1
10 TABLET ORAL DAILY
Qty: 30 TABLET | Refills: 1 | Status: CANCELLED | OUTPATIENT
Start: 2020-12-16

## 2020-12-16 RX ORDER — LISINOPRIL 20 MG/1
20 TABLET ORAL DAILY
Qty: 30 TABLET | Refills: 1 | Status: SHIPPED | OUTPATIENT
Start: 2020-12-16 | End: 2021-01-19

## 2020-12-16 RX ORDER — ESCITALOPRAM OXALATE 10 MG/1
10 TABLET ORAL DAILY
Qty: 90 TABLET | Refills: 1 | Status: SHIPPED | OUTPATIENT
Start: 2020-12-16 | End: 2021-02-24

## 2020-12-16 ASSESSMENT — MIFFLIN-ST. JEOR: SCORE: 1856.33

## 2020-12-16 NOTE — PROGRESS NOTES
SUBJECTIVE:  Jack Marrero is an 60 year old male who presents for a follow up evaluation of his hypertension.The patient was started on 10 mg of lisinopril  at the last visit. The patient reports that he IS taking the [id number] as prescribed. He denies side effects of medication.    He quit smoking 16 day(s) ago  He used Zin nicotine pouches and sugar free candy   He reminded me that I promised to buy him a pony if he quit smoking      Patient Active Problem List   Diagnosis     Mixed anxiety depressive disorder     Hyperlipidemia LDL goal <130     Tubular adenoma of colon on colonoscopy     High triglycerides     HDL deficiency     IGT (impaired glucose tolerance)     Sensory polyneuropathy     Diverticulosis of colon     10 year risk of MI or stroke 7.5% or greater, 9.3% in Nov 2016 on atorvastatin 40     Venous lake of lip, left lower     Benign non-nodular prostatic hyperplasia with lower urinary tract symptoms     Tobacco abuse     Restless legs syndrome (RLS)     SHY (obstructive sleep apnea) - Moderate (AHI 16, RDI 55)     Gallbladder polyp, need fu US yearly     Atherosclerosis of abdominal aorta (H), seen on CT     Shoulder girdle syndrome     Family history of prostate cancer, pt would like annual PSA test     Calcification of coronary artery/seen incidentally on lung CT     Essential hypertension       Is the HYPERTENSION goal on the problem list? Yes      Use of agents associated with hypertension: none  Current Outpatient Medications   Medication     amitriptyline (ELAVIL) 75 MG tablet     aspirin 81 MG tablet     atorvastatin (LIPITOR) 40 MG tablet     atorvastatin (LIPITOR) 40 MG tablet     dutasteride (AVODART) 0.5 MG capsule     lisinopril (ZESTRIL) 10 MG tablet     escitalopram (LEXAPRO) 10 MG tablet     order for DME     No current facility-administered medications for this visit.          Allergies   Allergen Reactions     Codeine      FELT WIRED ON THIS     Codeine Unknown     FELT WIRED  "ON THIS         Social History     Tobacco Use     Smoking status: Former Smoker     Packs/day: 1.00     Years: 46.00     Pack years: 46.00     Types: Cigarettes     Start date: 10/10/1974     Quit date: 2020     Years since quittin.0     Smokeless tobacco: Never Used   Substance Use Topics     Alcohol use: Yes     Frequency: 2-4 times a month     Comment: Occasional; about 1-2 drinks/week       OBJECTIVE:  BP (!) 160/110   Pulse 84   Temp 97.7  F (36.5  C) (Tympanic)   Ht 1.854 m (6' 1\")   Wt 100.2 kg (221 lb)   SpO2 99%   BMI 29.16 kg/m            No results found for any visits on 20.    The 10-year ASCVD risk score ([place]is HOLLAND Jr., et al., 2013) is: 16.2%    Values used to calculate the score:      Age: 60 years      Sex: Male      Is Non- : No      Diabetic: No      Tobacco smoker: No      Systolic Blood Pressure: 160 mmHg      Is BP treated: Yes      HDL Cholesterol: 40 mg/dL      Total Cholesterol: 157 mg/dL    ASSESSMENT:  Essential hypertension which is poorly controlled.       Plan:  - Medication:increase the dose of lisinopril  to 20mg.      The patient was advised to do the following therapuetic life style changes  - Dietary sodium restriction and increase potassium and Calcium intake  - Regular aerobic exercise  - Weight loss  - Discontinue smoking if applicable  - Avoid regular NSAID use if applicable  - Avoid regular decongestant use if applicable  - Follow up in clinic in 4 weeks for a recheck  - Check a basic metabolic panel today    Patient Education: Reviewed risks of hypertension and principles of   Treatment.    --------------------------------------------------------------------------------------------------------------------------------------    SUBJECTIVE:  Jack Marrero is a 60 year old male who presents for a follow up evaluation of depression and anxiety. The patient was started on Lexapro (escitalopram) 10  mg daily years ago.     The patient " reports that his symptoms have improved since starting this medication.     He went off of it 2 month(s) ago because he ran out of it. THings have got worse since then. He is somewhat stressed because his mother is not doing well  After getting COVID.       He has had some symptom(s) of anxiety and depression worse       The patient reports that he is not having any side effects from his current medication.      Current Outpatient Medications   Medication     amitriptyline (ELAVIL) 75 MG tablet     aspirin 81 MG tablet     atorvastatin (LIPITOR) 40 MG tablet     atorvastatin (LIPITOR) 40 MG tablet     dutasteride (AVODART) 0.5 MG capsule     lisinopril (ZESTRIL) 10 MG tablet     escitalopram (LEXAPRO) 10 MG tablet     order for DME     No current facility-administered medications for this visit.          Current thoughts of suicide or homicide:No    Last PHQ-9 score on record= 5    PHQ-9 score:    PHQ 2/26/2019   PHQ-9 Total Score 0   Q9: Thoughts of better off dead/self-harm past 2 weeks Not at all                 PHQ-9 SCORE 8/2/2017 9/4/2018 2/26/2019   PHQ-9 Total Score - - -   PHQ-9 Total Score 1 1 0            OBJECTIVE:  General: the patient had a calm and jocular affect during the visit today.    ASSESSMENT: Chronic Depression  Generalized Anxiety Disorder which has worsened    PLAN:  We will restart the patient on the same dose of his antidepressant and have the patient return to clinic for appointment in 1 month(s). He was instructed to return earlier if his depression symptoms return.

## 2020-12-17 ASSESSMENT — PATIENT HEALTH QUESTIONNAIRE - PHQ9
SUM OF ALL RESPONSES TO PHQ QUESTIONS 1-9: 4
5. POOR APPETITE OR OVEREATING: NOT AT ALL

## 2020-12-17 ASSESSMENT — ANXIETY QUESTIONNAIRES
5. BEING SO RESTLESS THAT IT IS HARD TO SIT STILL: NOT AT ALL
2. NOT BEING ABLE TO STOP OR CONTROL WORRYING: SEVERAL DAYS
1. FEELING NERVOUS, ANXIOUS, OR ON EDGE: SEVERAL DAYS
3. WORRYING TOO MUCH ABOUT DIFFERENT THINGS: SEVERAL DAYS
7. FEELING AFRAID AS IF SOMETHING AWFUL MIGHT HAPPEN: SEVERAL DAYS
6. BECOMING EASILY ANNOYED OR IRRITABLE: NOT AT ALL
IF YOU CHECKED OFF ANY PROBLEMS ON THIS QUESTIONNAIRE, HOW DIFFICULT HAVE THESE PROBLEMS MADE IT FOR YOU TO DO YOUR WORK, TAKE CARE OF THINGS AT HOME, OR GET ALONG WITH OTHER PEOPLE: SOMEWHAT DIFFICULT
GAD7 TOTAL SCORE: 4

## 2020-12-18 ASSESSMENT — ANXIETY QUESTIONNAIRES: GAD7 TOTAL SCORE: 4

## 2021-01-16 DIAGNOSIS — I10 HYPERTENSION GOAL BP (BLOOD PRESSURE) < 140/90: ICD-10-CM

## 2021-01-19 RX ORDER — LISINOPRIL 20 MG/1
TABLET ORAL
Qty: 30 TABLET | Refills: 0 | Status: SHIPPED | OUTPATIENT
Start: 2021-01-19 | End: 2021-02-24

## 2021-01-19 NOTE — TELEPHONE ENCOUNTER
Next 5 appointments (look out 90 days)    Jan 20, 2021  5:15 PM  Alexandro Mccarty with Jamshid Miller MD  [facility] ([facility]) [address] [facility] [address] [phone]        Rx refilled.    Marj Thornton BSN, RN

## 2021-01-20 ENCOUNTER — OFFICE VISIT (OUTPATIENT)
Dept: FAMILY MEDICINE | Facility: CLINIC | Age: 61
End: 2021-01-20
Payer: COMMERCIAL

## 2021-01-20 VITALS
OXYGEN SATURATION: 97 % | WEIGHT: 221 LBS | BODY MASS INDEX: 29.29 KG/M2 | DIASTOLIC BLOOD PRESSURE: 90 MMHG | SYSTOLIC BLOOD PRESSURE: 146 MMHG | HEART RATE: 89 BPM | TEMPERATURE: 97.8 F | HEIGHT: 73 IN

## 2021-01-20 DIAGNOSIS — I10 HYPERTENSION GOAL BP (BLOOD PRESSURE) < 140/90: ICD-10-CM

## 2021-01-20 DIAGNOSIS — J43.9 PULMONARY EMPHYSEMA, UNSPECIFIED EMPHYSEMA TYPE (H): ICD-10-CM

## 2021-01-20 DIAGNOSIS — Z12.11 SCREEN FOR COLON CANCER: Primary | ICD-10-CM

## 2021-01-20 PROCEDURE — 99213 OFFICE O/P EST LOW 20 MIN: CPT | Performed by: FAMILY MEDICINE

## 2021-01-20 RX ORDER — LISINOPRIL 10 MG/1
10 TABLET ORAL DAILY
Qty: 90 TABLET | Refills: 1 | Status: SHIPPED | OUTPATIENT
Start: 2021-01-20 | End: 2021-02-24

## 2021-01-20 RX ORDER — NIFEDIPINE 90 MG/1
90 TABLET, FILM COATED, EXTENDED RELEASE ORAL DAILY
Qty: 30 TABLET | Refills: 1 | Status: SHIPPED | OUTPATIENT
Start: 2021-01-20 | End: 2021-01-25

## 2021-01-20 ASSESSMENT — MIFFLIN-ST. JEOR: SCORE: 1856.33

## 2021-01-20 NOTE — PROGRESS NOTES
SUBJECTIVE:  Jack Marrero is an 60 year old male who presents for a follow up evaluation of his hypertension.The patient had the dose of lisinopril  raised to 20mg at the last visit. The patient reports that he IS taking the medication as prescribed. He reports side effects of medication.  These side effects include:   He has noticed some blueness in his fingers and toes.   It also got colder about the same time he had the dose increased.    He has had this symptom(s)  occur very occasionally over many years but it has been much more frequently in the last month(s)     It usually occurs when he is cold.   Today it occurred after he was out of the shower for a little bit.       He also reports that he has been smoke free for 51 day(s)   He still has shortness of breath when he exerts himself.   He is ready to start treatment for his emphysema that was seen on his CT for lung cancer screening      Patient Active Problem List   Diagnosis     Mixed anxiety depressive disorder     Hyperlipidemia LDL goal <130     Tubular adenoma of colon on colonoscopy     High triglycerides     HDL deficiency     IGT (impaired glucose tolerance)     Sensory polyneuropathy     Diverticulosis of colon     10 year risk of MI or stroke 7.5% or greater, 9.3% in Nov 2016 on atorvastatin 40     Venous lake of lip, left lower     Benign non-nodular prostatic hyperplasia with lower urinary tract symptoms     Tobacco abuse     Restless legs syndrome (RLS)     SHY (obstructive sleep apnea) - Moderate (AHI 16, RDI 55)     Gallbladder polyp, need fu US yearly     Atherosclerosis of abdominal aorta (H), seen on CT     Shoulder girdle syndrome     Family history of prostate cancer, pt would like annual PSA test     Calcification of coronary artery/seen incidentally on lung CT     Essential hypertension       Is the HYPERTENSION goal on the problem list? Yes      Use of agents associated with hypertension: none  Current Outpatient Medications  "  Medication     amitriptyline (ELAVIL) 75 MG tablet     aspirin 81 MG tablet     atorvastatin (LIPITOR) 40 MG tablet     atorvastatin (LIPITOR) 40 MG tablet     dutasteride (AVODART) 0.5 MG capsule     escitalopram (LEXAPRO) 10 MG tablet     lisinopril (ZESTRIL) 20 MG tablet     order for DME     No current facility-administered medications for this visit.          Allergies   Allergen Reactions     Codeine      FELT WIRED ON THIS     Codeine Unknown     FELT WIRED ON THIS         Social History     Tobacco Use     Smoking status: Former Smoker     Packs/day: 1.00     Years: 46.00     Pack years: 46.00     Types: Cigarettes     Start date: 10/10/1974     Quit date: 2020     Years since quittin.1     Smokeless tobacco: Never Used   Substance Use Topics     Alcohol use: Yes     Frequency: 2-4 times a month     Comment: Occasional; about 1-2 drinks/week       OBJECTIVE:  BP (!) 146/90   Pulse 89   Temp 97.8  F (36.6  C) (Tympanic)   Ht 1.854 m (6' 1\")   Wt 100.2 kg (221 lb)   SpO2 97%   BMI 29.16 kg/m            No results found for any visits on 21.    The 10-year ASCVD risk score ([place] HOLLAND Jr., et al., 2013) is: 14%    Values used to calculate the score:      Age: 60 years      Sex: Male      Is Non- : No      Diabetic: No      Tobacco smoker: No      Systolic Blood Pressure: 146 mmHg      Is BP treated: Yes      HDL Cholesterol: 40 mg/dL      Total Cholesterol: 157 mg/dL    ASSESSMENT:  Essential hypertension which is marginally controlled.       Plan:  - Medication:start 90 mg of nifedipine. and decrease the dose of lisinopril  to 10 mg.    The patient was advised to do the following therapuetic life style changes  - Dietary sodium restriction and increase potassium and Calcium intake  - Regular aerobic exercise  - Weight loss  - Discontinue smoking if applicable  - Avoid regular NSAID use if applicable  - Avoid regular decongestant use if applicable  - Follow up in " clinic in 4 weeks for a recheck  - Check a basic metabolic panel today    Patient Education: Reviewed risks of hypertension and principles of   Treatment.        (J43.9) Pulmonary emphysema, unspecified emphysema type (H)  Comment:   Plan: fluticasone-vilanterol (BREO ELLIPTA) 100-25         MCG/INH inhaler

## 2021-01-23 ENCOUNTER — MYC MEDICAL ADVICE (OUTPATIENT)
Dept: FAMILY MEDICINE | Facility: CLINIC | Age: 61
End: 2021-01-23

## 2021-01-23 DIAGNOSIS — I10 HYPERTENSION GOAL BP (BLOOD PRESSURE) < 140/90: ICD-10-CM

## 2021-01-25 RX ORDER — NIFEDIPINE 30 MG
30 TABLET, EXTENDED RELEASE ORAL DAILY
Qty: 30 TABLET | Refills: 1 | Status: SHIPPED | OUTPATIENT
Start: 2021-01-25 | End: 2021-02-24

## 2021-02-01 ENCOUNTER — MYC MEDICAL ADVICE (OUTPATIENT)
Dept: FAMILY MEDICINE | Facility: CLINIC | Age: 61
End: 2021-02-01

## 2021-02-02 ENCOUNTER — MYC MEDICAL ADVICE (OUTPATIENT)
Dept: FAMILY MEDICINE | Facility: CLINIC | Age: 61
End: 2021-02-02

## 2021-02-24 ENCOUNTER — OFFICE VISIT (OUTPATIENT)
Dept: FAMILY MEDICINE | Facility: CLINIC | Age: 61
End: 2021-02-24
Payer: COMMERCIAL

## 2021-02-24 VITALS
OXYGEN SATURATION: 98 % | DIASTOLIC BLOOD PRESSURE: 75 MMHG | BODY MASS INDEX: 28.23 KG/M2 | HEIGHT: 73 IN | HEART RATE: 89 BPM | WEIGHT: 213 LBS | SYSTOLIC BLOOD PRESSURE: 122 MMHG | TEMPERATURE: 98.7 F

## 2021-02-24 DIAGNOSIS — I10 ESSENTIAL HYPERTENSION: Chronic | ICD-10-CM

## 2021-02-24 DIAGNOSIS — F33.1 DEPRESSION, MAJOR, RECURRENT, MODERATE (H): ICD-10-CM

## 2021-02-24 DIAGNOSIS — I10 HYPERTENSION GOAL BP (BLOOD PRESSURE) < 140/90: ICD-10-CM

## 2021-02-24 DIAGNOSIS — Z12.11 SCREEN FOR COLON CANCER: Primary | ICD-10-CM

## 2021-02-24 PROCEDURE — 99214 OFFICE O/P EST MOD 30 MIN: CPT | Performed by: FAMILY MEDICINE

## 2021-02-24 RX ORDER — LISINOPRIL 20 MG/1
20 TABLET ORAL DAILY
Qty: 90 TABLET | Refills: 3 | Status: SHIPPED | OUTPATIENT
Start: 2021-02-24 | End: 2022-04-12

## 2021-02-24 RX ORDER — NIFEDIPINE 30 MG
30 TABLET, EXTENDED RELEASE ORAL DAILY
Qty: 90 TABLET | Refills: 3 | Status: SHIPPED | OUTPATIENT
Start: 2021-02-24 | End: 2021-11-30

## 2021-02-24 RX ORDER — ESCITALOPRAM OXALATE 20 MG/1
20 TABLET ORAL DAILY
Qty: 90 TABLET | Refills: 1 | Status: SHIPPED | OUTPATIENT
Start: 2021-02-24 | End: 2021-08-19

## 2021-02-24 ASSESSMENT — PATIENT HEALTH QUESTIONNAIRE - PHQ9
SUM OF ALL RESPONSES TO PHQ QUESTIONS 1-9: 4
5. POOR APPETITE OR OVEREATING: NOT AT ALL

## 2021-02-24 ASSESSMENT — ANXIETY QUESTIONNAIRES
1. FEELING NERVOUS, ANXIOUS, OR ON EDGE: SEVERAL DAYS
GAD7 TOTAL SCORE: 5
7. FEELING AFRAID AS IF SOMETHING AWFUL MIGHT HAPPEN: SEVERAL DAYS
IF YOU CHECKED OFF ANY PROBLEMS ON THIS QUESTIONNAIRE, HOW DIFFICULT HAVE THESE PROBLEMS MADE IT FOR YOU TO DO YOUR WORK, TAKE CARE OF THINGS AT HOME, OR GET ALONG WITH OTHER PEOPLE: NOT DIFFICULT AT ALL
6. BECOMING EASILY ANNOYED OR IRRITABLE: SEVERAL DAYS
2. NOT BEING ABLE TO STOP OR CONTROL WORRYING: SEVERAL DAYS
3. WORRYING TOO MUCH ABOUT DIFFERENT THINGS: SEVERAL DAYS
5. BEING SO RESTLESS THAT IT IS HARD TO SIT STILL: NOT AT ALL

## 2021-02-24 ASSESSMENT — MIFFLIN-ST. JEOR: SCORE: 1820.04

## 2021-02-24 NOTE — PATIENT INSTRUCTIONS
We are working hard to begin vaccinating more people against COVID-19. Currently, we are only vaccinating individuals age 75 and older and Phase 1a workers - healthcare workers who are unable to do their job remotely. Vaccine availability is very limited.    If you are 75 or older, or a healthcare worker who is unable to do your job remotely, please log in to [facility] using this link to see if we have an open appointment and schedule an appointment.  If there are no appointments left, you will be unable to schedule and need to check back later.  If you are a healthcare worker, you will be asked to provide proof of employment at your appointment. If you cannot, you will be turned away.    Vaccine appointments are being added as they become available. Please check your [facility] account frequently for availability. If you have technical difficulty using [facility], call [phone] for assistance.    You can learn more about the [facility]'s phased approach to administering the vaccine, with details on each phase, [email or web address].      As vaccine supply increases and we are able to open appointments to more groups, we will share that information on our website [email or web address]. Check this website to stay up to date on COVID-19 vaccination information.

## 2021-02-24 NOTE — LETTER
" My COPD Action Plan     Name: Jack Marrero    YOB: 1960   Date: 2/23/2021    My doctor: Jamshid Miller MD   My clinic: [address] [address] [phone]  My Controller Medicine: { :962028}   Dose: ***     My Rescue Medicine: { :896958}   Dose: ***     My Flare Up Medicine: { :700313}   Dose: ***     My COPD Severity: { :416218}      Use of Oxygen: { :605003::\"Oxygen Not Prescribed \"}     Make sure you've had your pneumonia   vaccines.          GREEN ZONE       Doing well today      Usual level of activity and exercise    Usual amount of cough and mucus    No shortness of breath    Usual level of health (thinking clearly, sleeping well, feel like eating) Actions:      Take daily medicines    Use oxygen as prescribed    Follow regular exercise and diet plan    Avoid cigarette smoke and other irritants that harm the lungs           YELLOW ZONE          Having a bad day or flare up      Short of breath more than usual    A lot more sputum (mucus) than usual    Sputum looks yellow, green, tan, brown or bloody    More coughing or wheezing    Fever or chills    Less energy; trouble completing activities    Trouble thinking or focusing    Using quick relief inhaler or nebulizer more often    Poor sleep; symptoms wake me up    Do not feel like eating Actions:      Get plenty of rest    Take daily medicines    Use quick relief inhaler every *** hours    If you use oxygen, call you doctor to see if you should adjust your oxygen    Do breathing exercises or other things to help you relax    Let a loved one, friend or neighbor know you are feeling worse    Call your care team if you have 2 or more symptoms.  Start taking steroids or antibiotics if directed by your care team           RED ZONE       Need medical care now      Severe shortness of breath (feel you can't breathe)    Fever, chills    Not enough breath to do any activity    Trouble " coughing up mucus, walking or talking    Blood in mucus    Frequent coughing   Rescue medicines are not working    Not able to sleep because of breathing    Feel confused or drowsy    Chest pain    Actions:      Call your health care team.  If you cannot reach your care team, call 911 or go to the emergency room.        Annual Reminders:  Meet with Care Team, Flu Shot every Fall  Pharmacy: [id number] - [facility] [address] [address]

## 2021-02-24 NOTE — PROGRESS NOTES
SUBJECTIVE:  Jack Marrero is an 60 year old male who presents for a follow up evaluation of his hypertension.The patient had the dose of nfedipine lowered to 30  mg. The patient reports that he IS taking the medication as prescribed. He reports side effects of medication.  These side effects include: flushing in his hands and occasionally feeling his heart beat in his chest. He denies an irregular or fast heart rate. He has some mild edema. However he reports that his hands feel warm and the medication has helped significant(ly) with preventing his cold hand sensations.    Patient Active Problem List   Diagnosis     Mixed anxiety depressive disorder     Hyperlipidemia LDL goal <130     Tubular adenoma of colon on colonoscopy     High triglycerides     HDL deficiency     IGT (impaired glucose tolerance)     Sensory polyneuropathy     Diverticulosis of colon     10 year risk of MI or stroke 7.5% or greater, 9.3% in Nov 2016 on atorvastatin 40     Venous lake of lip, left lower     Benign non-nodular prostatic hyperplasia with lower urinary tract symptoms     Tobacco abuse     Restless legs syndrome (RLS)     SHY (obstructive sleep apnea) - Moderate (AHI 16, RDI 55)     Gallbladder polyp, need fu US yearly     Atherosclerosis of abdominal aorta (H), seen on CT     Shoulder girdle syndrome     Family history of prostate cancer, pt would like annual PSA test     Calcification of coronary artery/seen incidentally on lung CT     Essential hypertension       Is the HYPERTENSION goal on the problem list? Yes      Use of agents associated with hypertension: none  Current Outpatient Medications   Medication     amitriptyline (ELAVIL) 75 MG tablet     aspirin 81 MG tablet     atorvastatin (LIPITOR) 40 MG tablet     dutasteride (AVODART) 0.5 MG capsule     escitalopram (LEXAPRO) 10 MG tablet     fluticasone-vilanterol (BREO ELLIPTA) 100-25 MCG/INH inhaler     lisinopril (ZESTRIL) 20 MG tablet     NIFEdipine ER (ADALAT CC)  "30 MG 24 hr tablet     order for DME     No current facility-administered medications for this visit.          Allergies   Allergen Reactions     Codeine      FELT WIRED ON THIS     Codeine Unknown     FELT WIRED ON THIS         Social History     Tobacco Use     Smoking status: Former Smoker     Packs/day: 0.00     Years: 46.00     Pack years: 0.00     Types: Cigarettes     Start date: 10/10/1974     Quit date: 2020     Years since quittin.2     Smokeless tobacco: Never Used   Substance Use Topics     Alcohol use: Yes     Frequency: 2-4 times a month     Comment: Occasional; about 1-2 drinks/week       OBJECTIVE:  /87   Pulse 89   Temp 98.7  F (37.1  C) (Oral)   Ht 1.854 m (6' 1\")   Wt 96.6 kg (213 lb)   SpO2 98%   BMI 28.10 kg/m      Heart: negative, PMI normal. No lifts, heaves, or thrills. RRR. No murmurs, clicks gallops or rub  Lower Extremities:Trace edema on right and Trace  edema on the left        No results found for any visits on 21.    The 10-year ASCVD risk score ([place] SKYLER Jr., et al., 2013) is: 12.4%    Values used to calculate the score:      Age: 60 years      Sex: Male      Is Non- : No      Diabetic: No      Tobacco smoker: No      Systolic Blood Pressure: 136 mmHg      Is BP treated: Yes      HDL Cholesterol: 40 mg/dL      Total Cholesterol: 157 mg/dL    ASSESSMENT:  Essential hypertension which is very well controlled.       Plan:  - Medication:continue the current doses of medication.    The patient reported that he did not need any refills at this time.  The patient was advised to do the following therapuetic life style changes  - Dietary sodium restriction and increase potassium and Calcium intake  - Regular aerobic exercise  - Weight loss  - Discontinue smoking if applicable  - Avoid regular NSAID use if applicable  - Avoid regular decongestant use if applicable  - Follow up in clinic in 6 months for a recheck  - Check a basic metabolic panel " "today    Patient Education: Reviewed risks of hypertension and principles of   Treatment.    --------------------------------------------------------------------------------------------------------------------------------------  SUBJECTIVE:  Jack Marrero is a 60 year old male who presents for a follow up evaluation of depression. The patient was started on Lexapro (escitalopram) 10  mg daily and has been on it for over a year.   The patient reports that his symptoms have improved since starting this medication.   The patient reports that he does have any persistant symptoms of depression. Lately he has been feeling \"blue\" and melancholy.       The patient reports that he is not having any side effects from his current medication.       Current Outpatient Medications   Medication     amitriptyline (ELAVIL) 75 MG tablet     aspirin 81 MG tablet     atorvastatin (LIPITOR) 40 MG tablet     dutasteride (AVODART) 0.5 MG capsule     escitalopram (LEXAPRO) 10 MG tablet     fluticasone-vilanterol (BREO ELLIPTA) 100-25 MCG/INH inhaler     lisinopril (ZESTRIL) 20 MG tablet     NIFEdipine ER (ADALAT CC) 30 MG 24 hr tablet     order for DME     No current facility-administered medications for this visit.              Current thoughts of suicide or homicide:No    Last PHQ-9 score on record= 5    PHQ-9 score:    PHQ 12/17/2020   PHQ-9 Total Score 4   Q9: Thoughts of better off dead/self-harm past 2 weeks Not at all                 PHQ-9 SCORE 9/4/2018 2/26/2019 12/17/2020   PHQ-9 Total Score - - -   PHQ-9 Total Score 1 0 4            OBJECTIVE:  General: the patient had a calm but serious affect during the visit today.    ASSESSMENT: Exacerbation of chronic depression     PLAN:      We will increase the dose of the Lexapro (escitalopram) 10 mg daily to 20 mg  for a week.   We will see him in 6 month(s) for hos complete physical exam. If he is feeling much better in the spring with the better weather he can go back down to 10 mg " on his own.

## 2021-02-25 ASSESSMENT — ANXIETY QUESTIONNAIRES: GAD7 TOTAL SCORE: 5

## 2021-03-01 DIAGNOSIS — Z11.59 ENCOUNTER FOR SCREENING FOR OTHER VIRAL DISEASES: ICD-10-CM

## 2021-03-12 DIAGNOSIS — Z11.59 ENCOUNTER FOR SCREENING FOR OTHER VIRAL DISEASES: ICD-10-CM

## 2021-03-12 LAB
LABORATORY COMMENT REPORT: NORMAL
SARS-COV-2 RNA RESP QL NAA+PROBE: NEGATIVE
SARS-COV-2 RNA RESP QL NAA+PROBE: NORMAL
SPECIMEN SOURCE: NORMAL
SPECIMEN SOURCE: NORMAL

## 2021-03-12 PROCEDURE — U0003 INFECTIOUS AGENT DETECTION BY NUCLEIC ACID (DNA OR RNA); SEVERE ACUTE RESPIRATORY SYNDROME CORONAVIRUS 2 (SARS-COV-2) (CORONAVIRUS DISEASE [COVID-19]), AMPLIFIED PROBE TECHNIQUE, MAKING USE OF HIGH THROUGHPUT TECHNOLOGIES AS DESCRIBED BY CMS-2020-01-R: HCPCS | Performed by: SURGERY

## 2021-03-12 PROCEDURE — U0005 INFEC AGEN DETEC AMPLI PROBE: HCPCS | Performed by: SURGERY

## 2021-03-16 ENCOUNTER — HOSPITAL ENCOUNTER (OUTPATIENT)
Facility: AMBULATORY SURGERY CENTER | Age: 61
Discharge: HOME | End: 2021-03-16
Attending: SURGERY | Admitting: SURGERY
Payer: COMMERCIAL

## 2021-03-16 VITALS
DIASTOLIC BLOOD PRESSURE: 84 MMHG | OXYGEN SATURATION: 96 % | RESPIRATION RATE: 16 BRPM | TEMPERATURE: 97.2 F | SYSTOLIC BLOOD PRESSURE: 129 MMHG | HEART RATE: 76 BPM

## 2021-03-16 LAB — COLONOSCOPY: NORMAL

## 2021-03-16 PROCEDURE — 88305 TISSUE EXAM BY PATHOLOGIST: CPT | Performed by: PATHOLOGY

## 2021-03-16 PROCEDURE — 99153 MOD SED SAME PHYS/QHP EA: CPT | Mod: 59 | Performed by: SURGERY

## 2021-03-16 PROCEDURE — 99152 MOD SED SAME PHYS/QHP 5/>YRS: CPT | Mod: 59 | Performed by: SURGERY

## 2021-03-16 PROCEDURE — 45385 COLONOSCOPY W/LESION REMOVAL: CPT | Mod: PT | Performed by: SURGERY

## 2021-03-16 PROCEDURE — G8918 PT W/O PREOP ORDER IV AB PRO: HCPCS

## 2021-03-16 PROCEDURE — 45385 COLONOSCOPY W/LESION REMOVAL: CPT

## 2021-03-16 PROCEDURE — G8907 PT DOC NO EVENTS ON DISCHARG: HCPCS

## 2021-03-16 RX ORDER — ONDANSETRON 2 MG/ML
4 INJECTION INTRAMUSCULAR; INTRAVENOUS EVERY 6 HOURS PRN
Status: DISCONTINUED | OUTPATIENT
Start: 2021-03-16 | End: 2021-03-17 | Stop reason: HOSPADM

## 2021-03-16 RX ORDER — ONDANSETRON 4 MG/1
4 TABLET, ORALLY DISINTEGRATING ORAL EVERY 6 HOURS PRN
Status: DISCONTINUED | OUTPATIENT
Start: 2021-03-16 | End: 2021-03-17 | Stop reason: HOSPADM

## 2021-03-16 RX ORDER — NALOXONE HYDROCHLORIDE 0.4 MG/ML
0.2 INJECTION, SOLUTION INTRAMUSCULAR; INTRAVENOUS; SUBCUTANEOUS
Status: DISCONTINUED | OUTPATIENT
Start: 2021-03-16 | End: 2021-03-17 | Stop reason: HOSPADM

## 2021-03-16 RX ORDER — FLUMAZENIL 0.1 MG/ML
0.2 INJECTION, SOLUTION INTRAVENOUS
Status: ACTIVE | OUTPATIENT
Start: 2021-03-16 | End: 2021-03-16

## 2021-03-16 RX ORDER — FENTANYL CITRATE 50 UG/ML
INJECTION, SOLUTION INTRAMUSCULAR; INTRAVENOUS PRN
Status: DISCONTINUED | OUTPATIENT
Start: 2021-03-16 | End: 2021-03-16 | Stop reason: HOSPADM

## 2021-03-16 RX ORDER — NALOXONE HYDROCHLORIDE 0.4 MG/ML
0.4 INJECTION, SOLUTION INTRAMUSCULAR; INTRAVENOUS; SUBCUTANEOUS
Status: DISCONTINUED | OUTPATIENT
Start: 2021-03-16 | End: 2021-03-17 | Stop reason: HOSPADM

## 2021-03-16 RX ORDER — ONDANSETRON 2 MG/ML
4 INJECTION INTRAMUSCULAR; INTRAVENOUS
Status: DISCONTINUED | OUTPATIENT
Start: 2021-03-16 | End: 2021-03-17 | Stop reason: HOSPADM

## 2021-03-16 RX ORDER — PROCHLORPERAZINE MALEATE 10 MG
10 TABLET ORAL EVERY 6 HOURS PRN
Status: DISCONTINUED | OUTPATIENT
Start: 2021-03-16 | End: 2021-03-17 | Stop reason: HOSPADM

## 2021-03-16 RX ORDER — LIDOCAINE 40 MG/G
CREAM TOPICAL
Status: DISCONTINUED | OUTPATIENT
Start: 2021-03-16 | End: 2021-03-17 | Stop reason: HOSPADM

## 2021-03-17 DIAGNOSIS — I10 HYPERTENSION GOAL BP (BLOOD PRESSURE) < 140/90: ICD-10-CM

## 2021-03-18 LAB — COPATH REPORT: NORMAL

## 2021-03-21 RX ORDER — NIFEDIPINE 90 MG/1
TABLET, FILM COATED, EXTENDED RELEASE ORAL
Qty: 30 TABLET | Refills: 1 | OUTPATIENT
Start: 2021-03-21

## 2021-03-21 NOTE — TELEPHONE ENCOUNTER
Per chart review, patient is currently on 30 mg of Nifedipine. Pharmacy notified.    Shara Ayala RN, BSN, PHN  [facility]

## 2021-04-14 ENCOUNTER — OFFICE VISIT (OUTPATIENT)
Dept: NURSING | Facility: CLINIC | Age: 61
End: 2021-04-14
Payer: COMMERCIAL

## 2021-04-14 PROCEDURE — 91300 PR COVID VAC PFIZER DIL RECON 30 MCG/0.3 ML IM: CPT

## 2021-04-14 PROCEDURE — 0001A PR COVID VAC PFIZER DIL RECON 30 MCG/0.3 ML IM: CPT

## 2021-05-05 ENCOUNTER — IMMUNIZATION (OUTPATIENT)
Dept: NURSING | Facility: CLINIC | Age: 61
End: 2021-05-05
Attending: INTERNAL MEDICINE
Payer: COMMERCIAL

## 2021-05-05 PROCEDURE — 91300 PR COVID VAC PFIZER DIL RECON 30 MCG/0.3 ML IM: CPT

## 2021-05-05 PROCEDURE — 0002A PR COVID VAC PFIZER DIL RECON 30 MCG/0.3 ML IM: CPT

## 2021-08-19 ENCOUNTER — OFFICE VISIT (OUTPATIENT)
Dept: FAMILY MEDICINE | Facility: CLINIC | Age: 61
End: 2021-08-19
Payer: COMMERCIAL

## 2021-08-19 VITALS
DIASTOLIC BLOOD PRESSURE: 82 MMHG | SYSTOLIC BLOOD PRESSURE: 129 MMHG | BODY MASS INDEX: 28.49 KG/M2 | TEMPERATURE: 97.7 F | HEART RATE: 87 BPM | OXYGEN SATURATION: 96 % | WEIGHT: 215 LBS | HEIGHT: 73 IN

## 2021-08-19 DIAGNOSIS — F33.1 DEPRESSION, MAJOR, RECURRENT, MODERATE (H): ICD-10-CM

## 2021-08-19 DIAGNOSIS — L57.0 ACTINIC KERATOSIS: ICD-10-CM

## 2021-08-19 DIAGNOSIS — B07.9 VIRAL WARTS, UNSPECIFIED TYPE: Primary | ICD-10-CM

## 2021-08-19 PROCEDURE — 99214 OFFICE O/P EST MOD 30 MIN: CPT | Mod: 25 | Performed by: FAMILY MEDICINE

## 2021-08-19 PROCEDURE — 90471 IMMUNIZATION ADMIN: CPT | Performed by: FAMILY MEDICINE

## 2021-08-19 PROCEDURE — 17110 DESTRUCTION B9 LES UP TO 14: CPT | Performed by: FAMILY MEDICINE

## 2021-08-19 PROCEDURE — 90715 TDAP VACCINE 7 YRS/> IM: CPT | Performed by: FAMILY MEDICINE

## 2021-08-19 RX ORDER — ESCITALOPRAM OXALATE 10 MG/1
10 TABLET ORAL DAILY
Qty: 90 TABLET | Refills: 1 | Status: SHIPPED | OUTPATIENT
Start: 2021-08-19 | End: 2021-11-18

## 2021-08-19 ASSESSMENT — MIFFLIN-ST. JEOR: SCORE: 1829.11

## 2021-08-19 NOTE — PROGRESS NOTES
"SUBJECTIVE:  Jack Marrero is a 60 year old male who scheduled an appointment to discuss the following issues:  Heartburn and a funny taste in his mouth #2 -   he reports that he started having heartburn and then the unusual taste in his mouth started.  He reports that he was drinking a lot of coffee and using nicotine patches to quit smoking may have caused the heartburn.  He has cut down on caffeine and has discontinued the nicotine patches and things are much better             Past Medical, social, family histories, medications, and allergies reviewed and updated   ROS: other than that noted above all other review of systems was negative    ROS:       Current Outpatient Medications:      amitriptyline (ELAVIL) 75 MG tablet, TAKE 1 TABLET(75 MG) BY MOUTH AT BEDTIME, Disp: 90 tablet, Rfl: 3     aspirin 81 MG tablet, Take 1 tablet by mouth daily., Disp: , Rfl:      atorvastatin (LIPITOR) 40 MG tablet, TAKE 1 TABLET(40 MG) BY MOUTH AT BEDTIME, Disp: 90 tablet, Rfl: 0     dutasteride (AVODART) 0.5 MG capsule, TAKE 1 CAPSULE(0.5 MG) BY MOUTH DAILY, Disp: 90 capsule, Rfl: 0     fluticasone-vilanterol (BREO ELLIPTA) 100-25 MCG/INH inhaler, Inhale 1 puff into the lungs daily, Disp: 3 each, Rfl: 3     lisinopril (ZESTRIL) 20 MG tablet, Take 1 tablet (20 mg) by mouth daily, Disp: 90 tablet, Rfl: 3     NIFEdipine ER (ADALAT CC) 30 MG 24 hr tablet, Take 1 tablet (30 mg) by mouth daily, Disp: 90 tablet, Rfl: 3     order for DME, Equipment ordered: RESMED Auto PAP Mask type: Nasal Settings: 6-12 cm H2O, Disp: , Rfl:      escitalopram (LEXAPRO) 20 MG tablet, Take 1 tablet (20 mg) by mouth daily (Patient not taking: Reported on 8/19/2021), Disp: 90 tablet, Rfl: 1    OBJECTIVE:  /82   Pulse 87   Temp 97.7  F (36.5  C) (Tympanic)   Ht 1.854 m (6' 1\")   Wt 97.5 kg (215 lb)   SpO2 96%   BMI 28.37 kg/m      EXAM:  GENERAL APPEARANCE: healthy, alert and no distress  EYES: EOMI,  PERRL  HENT: ear canals and TM's normal " and nose and mouth without ulcers or lesions  RESP: lungs clear to auscultation - no rales, rhonchi or wheezes  CV: regular rates and rhythm, normal S1 S2, no S3 or S4 and no murmur, click or rub -  ABDOMEN:  soft, nontender, no HSM or masses and bowel sounds normal    No results found for any visits on 08/19/21.      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  Continue to reduce caffeine intake and avoid late night eating        --------------------------------------------------------------------------------------------------------------------------------------  SUBJECTIVE: Jack Marrero is 60 year old male who is complains of skin lesions on his  arm, face and hand .   The lesion(s) have (have) been present for month(s).    On the left forearm the lesion has been flaky and grows a little a flakes off.     OBJECTIVE:   Filamentous lesion on the central aspect of the upper lip   A similar but smaller lesion on the right side of his forehead    One on the left middle finger there was a firm papule with a central core.  No verrucous changes    On the proximal area of the left forearm there was a white roughened circular raised lesion     ASSESSMENT: filiform Wart on lip and forehead    ACTINIC KERATOSIS on the left forearm    Callus on the left finger        PLAN: The viral etiology and natural history of warts were discussed.   We discussed the various treatment methods, the side effects and the need for subsequent visits and treatments. A choice of liquid nitrogenwas made. The potential pain and blistering was discussed.     Liquid nitrogen was applied to the lesions for 4 freeze and thaw cycles using the cryo gun.        The patient will return in approximately 2 weeks for evaluation and repeat treatment if needed.     left forearm:  Liquid nitrogen was applied to the lesions for 4 freeze and thaw cycles using the cryo gun. Return to clinic for reassessment/retreatment in 1 month.     The lesion  on the finger was parred down to flat with a 15 blade scalpel and antibiotic(s) ointment and a bandage were applied.     --------------------------------------------------------------------------------------------------------------------------------------    SUBJECTIVE:  Jack Marrero is a 60 year old male who presents for a follow up evaluation of depression. The patient weaned hiimself off the lexapro    He reports that he generally does not feel as well since doing that ,     Current Outpatient Medications   Medication     amitriptyline (ELAVIL) 75 MG tablet     aspirin 81 MG tablet     atorvastatin (LIPITOR) 40 MG tablet     dutasteride (AVODART) 0.5 MG capsule     escitalopram (LEXAPRO) 10 MG tablet     fluticasone-vilanterol (BREO ELLIPTA) 100-25 MCG/INH inhaler     lisinopril (ZESTRIL) 20 MG tablet     NIFEdipine ER (ADALAT CC) 30 MG 24 hr tablet     order for DME     No current facility-administered medications for this visit.         Last PHQ-9 score on record= 7    PHQ-9 score:    PHQ 2/24/2021   PHQ-9 Total Score 4   Q9: Thoughts of better off dead/self-harm past 2 weeks Not at all                  OBJECTIVE:  General: the patient had a calm and pleasant affect during the visit today.    ASSESSMENT: Chronic Depression which has worsened      PLAN:  We will have the patient start back on  Lexapro (escitalopram) 10  mg daily . I asked the patient to return to clinic for appointment in 4 weeks for reevaluation.    The patient was advised of the potential side effects of the medication and was asked to call if any of them persist past 7 days of starting it or starting on a new dose.

## 2021-08-19 NOTE — LETTER
" My COPD Action Plan     Name: Jack Marrero    YOB: 1960   Date: 8/16/2021    My doctor: Jamshid Miller MD   My clinic: [address] [address] [phone]  My Controller Medicine: { :627587}   Dose: ***     My Rescue Medicine: { :967320}   Dose: ***     My Flare Up Medicine: { :036899}   Dose: ***     My COPD Severity: { :968127}      Use of Oxygen: { :817295::\"Oxygen Not Prescribed \"}     Make sure you've had your pneumonia   vaccines.          GREEN ZONE       Doing well today      Usual level of activity and exercise    Usual amount of cough and mucus    No shortness of breath    Usual level of health (thinking clearly, sleeping well, feel like eating) Actions:      Take daily medicines    Use oxygen as prescribed    Follow regular exercise and diet plan    Avoid cigarette smoke and other irritants that harm the lungs           YELLOW ZONE          Having a bad day or flare up      Short of breath more than usual    A lot more sputum (mucus) than usual    Sputum looks yellow, green, tan, brown or bloody    More coughing or wheezing    Fever or chills    Less energy; trouble completing activities    Trouble thinking or focusing    Using quick relief inhaler or nebulizer more often    Poor sleep; symptoms wake me up    Do not feel like eating Actions:      Get plenty of rest    Take daily medicines    Use quick relief inhaler every *** hours    If you use oxygen, call you doctor to see if you should adjust your oxygen    Do breathing exercises or other things to help you relax    Let a loved one, friend or neighbor know you are feeling worse    Call your care team if you have 2 or more symptoms.  Start taking steroids or antibiotics if directed by your care team           RED ZONE       Need medical care now      Severe shortness of breath (feel you can't breathe)    Fever, chills    Not enough breath to do any activity    Trouble " coughing up mucus, walking or talking    Blood in mucus    Frequent coughing   Rescue medicines are not working    Not able to sleep because of breathing    Feel confused or drowsy    Chest pain    Actions:      Call your health care team.  If you cannot reach your care team, call 911 or go to the emergency room.        Annual Reminders:  Meet with Care Team, Flu Shot every Fall  Pharmacy: [id number] - [facility] [address] [address]

## 2021-08-20 ENCOUNTER — MYC MEDICAL ADVICE (OUTPATIENT)
Dept: FAMILY MEDICINE | Facility: CLINIC | Age: 61
End: 2021-08-20

## 2021-08-20 NOTE — TELEPHONE ENCOUNTER
Routed to provider to review and advise regarding resulting blister following 8/19/21 treatment.    BANDAR LopezN, RN

## 2021-08-24 ASSESSMENT — ANXIETY QUESTIONNAIRES
2. NOT BEING ABLE TO STOP OR CONTROL WORRYING: NOT AT ALL
6. BECOMING EASILY ANNOYED OR IRRITABLE: NOT AT ALL
7. FEELING AFRAID AS IF SOMETHING AWFUL MIGHT HAPPEN: NOT AT ALL
IF YOU CHECKED OFF ANY PROBLEMS ON THIS QUESTIONNAIRE, HOW DIFFICULT HAVE THESE PROBLEMS MADE IT FOR YOU TO DO YOUR WORK, TAKE CARE OF THINGS AT HOME, OR GET ALONG WITH OTHER PEOPLE: NOT DIFFICULT AT ALL
GAD7 TOTAL SCORE: 2
1. FEELING NERVOUS, ANXIOUS, OR ON EDGE: SEVERAL DAYS
3. WORRYING TOO MUCH ABOUT DIFFERENT THINGS: SEVERAL DAYS
5. BEING SO RESTLESS THAT IT IS HARD TO SIT STILL: NOT AT ALL

## 2021-08-24 ASSESSMENT — PATIENT HEALTH QUESTIONNAIRE - PHQ9
5. POOR APPETITE OR OVEREATING: NOT AT ALL
SUM OF ALL RESPONSES TO PHQ QUESTIONS 1-9: 7

## 2021-08-24 NOTE — ADDENDUM NOTE
Addended by: DANY FORRESTER on: 8/24/2021 01:18 PM     Modules accepted: Orders, Level of Service

## 2021-08-25 ASSESSMENT — ANXIETY QUESTIONNAIRES: GAD7 TOTAL SCORE: 2

## 2021-09-12 DIAGNOSIS — G60.8 SENSORY POLYNEUROPATHY: ICD-10-CM

## 2021-09-13 RX ORDER — AMITRIPTYLINE HYDROCHLORIDE 75 MG/1
TABLET ORAL
Qty: 90 TABLET | Refills: 3 | Status: SHIPPED | OUTPATIENT
Start: 2021-09-13 | End: 2022-01-21

## 2021-09-13 NOTE — TELEPHONE ENCOUNTER
Prescription approved per [facility] Refill Protocol.    Aleksandra Sandoval RN, BSN   [facility] [place]

## 2021-09-19 ENCOUNTER — HEALTH MAINTENANCE LETTER (OUTPATIENT)
Age: 61
End: 2021-09-19

## 2021-10-16 DIAGNOSIS — E78.5 DYSLIPIDEMIA: ICD-10-CM

## 2021-10-16 DIAGNOSIS — N40.1 BENIGN NON-NODULAR PROSTATIC HYPERPLASIA WITH LOWER URINARY TRACT SYMPTOMS: ICD-10-CM

## 2021-10-18 RX ORDER — ATORVASTATIN CALCIUM 40 MG/1
TABLET, FILM COATED ORAL
Qty: 90 TABLET | Refills: 0 | Status: SHIPPED | OUTPATIENT
Start: 2021-10-18 | End: 2022-04-12

## 2021-10-18 RX ORDER — DUTASTERIDE 0.5 MG/1
CAPSULE, LIQUID FILLED ORAL
Qty: 90 CAPSULE | Refills: 0 | Status: SHIPPED | OUTPATIENT
Start: 2021-10-18 | End: 2022-04-07

## 2021-10-18 NOTE — TELEPHONE ENCOUNTER
Routing refill request to provider for review/approval because:  Labs not current:  Lipids    Marj Thornton BSN, RN

## 2021-11-14 ASSESSMENT — ENCOUNTER SYMPTOMS
SORE THROAT: 0
NERVOUS/ANXIOUS: 1
DIZZINESS: 0
NAUSEA: 0
FEVER: 0
COUGH: 0
CONSTIPATION: 0
EYE PAIN: 0
SHORTNESS OF BREATH: 1
ABDOMINAL PAIN: 0
DIARRHEA: 0
HEMATURIA: 0
DYSURIA: 0
CHILLS: 0
HEARTBURN: 0
PALPITATIONS: 1
ARTHRALGIAS: 0
HEADACHES: 0
JOINT SWELLING: 0
WEAKNESS: 0
FREQUENCY: 0
MYALGIAS: 0
HEMATOCHEZIA: 0
PARESTHESIAS: 0

## 2021-11-14 ASSESSMENT — PATIENT HEALTH QUESTIONNAIRE - PHQ9
SUM OF ALL RESPONSES TO PHQ QUESTIONS 1-9: 10
10. IF YOU CHECKED OFF ANY PROBLEMS, HOW DIFFICULT HAVE THESE PROBLEMS MADE IT FOR YOU TO DO YOUR WORK, TAKE CARE OF THINGS AT HOME, OR GET ALONG WITH OTHER PEOPLE: SOMEWHAT DIFFICULT
SUM OF ALL RESPONSES TO PHQ QUESTIONS 1-9: 10

## 2021-11-15 ASSESSMENT — PATIENT HEALTH QUESTIONNAIRE - PHQ9: SUM OF ALL RESPONSES TO PHQ QUESTIONS 1-9: 10

## 2021-11-17 NOTE — PATIENT INSTRUCTIONS
Preventive Health Recommendations  Male Ages 50 - 64    Yearly exam:             See your health care provider every year in order to  o   Review health changes.   o   Discuss preventive care.    o   Review your medicines if your doctor has prescribed any.     Have a cholesterol test every 5 years, or more frequently if you are at risk for high cholesterol/heart disease.     Have a diabetes test (fasting glucose) every three years. If you are at risk for diabetes, you should have this test more often.     Have a colonoscopy at age 50, or have a yearly FIT test (stool test). These exams will check for colon cancer.      Talk with your health care provider about whether or not a prostate cancer screening test (PSA) is right for you.    You should be tested each year for STDs (sexually transmitted diseases), if you re at risk.     Shots: Get a flu shot each year. Get a tetanus shot every 10 years.     Nutrition:    Eat at least 5 servings of fruits and vegetables daily.     Eat whole-grain bread, whole-wheat pasta and brown rice instead of white grains and rice.     Get adequate Calcium and Vitamin D.     Lifestyle    Exercise for at least 150 minutes a week (30 minutes a day, 5 days a week). This will help you control your weight and prevent disease.     Limit alcohol to one drink per day.     No smoking.     Wear sunscreen to prevent skin cancer.     See your dentist every six months for an exam and cleaning.     See your eye doctor every 1 to 2 years.  Please schedule a future laboratory appointment(s) and be sure to have fasted for 10 hours prior to arrival          Patient Education     Preventing Osteoporosis: Meeting Your Calcium Needs    Your body needs calcium to build and repair bones. But it can't make calcium on its own. That's why it's important to eat calcium-rich foods. Some foods are naturally rich in calcium. Others have calcium added (fortified). It's best to get calcium from the foods you eat. But  if you can't get enough, you may want to take calcium supplements. To meet your daily calcium needs, try the foods listed below.  Dairy Fish & beans Other sources   Source   Calcium (mg) per serving   Source   Calcium (mg) per serving   Source   Calcium (mg) per serving   Low-fat yogurt, plain   415 mg/8 oz.   Sardines, Atlantic, canned, with bones   351 mg/3 oz.   Oatmeal, instant, fortified   215 mg/1 cup   Nonfat milk   302 mg/1 cup   [place], sockeye, canned, with bones   239 mg/3 oz.   Tofu made with calcium sulfate   204 mg/3 oz.   Low-fat milk   297 mg/1 cup   Soybeans, fresh, boiled   131 mg/1/2 cup   Collards   179 mg/1/2 cup   Swiss cheese   272 mg/1 oz.   White beans, cooked   81 mg/1/2 cup   English muffin, whole wheat   175 mg/1 muffin   Cheddar cheese   205 mg/1 oz.   Navy beans, cooked   79 mg/1/2 cup   Kale   90 mg/1/2 cup   Ice cream strawberry   79 mg/1/2 cup           Orange, navel   56 mg/1 medium   Note: Calcium levels may vary depending on brand and size.  Daily calcium needs  14 to 18 years old: 1,300 mg  19 to 30 years old: 1,000 mg  31 to 50 years old: 1,000 mg  51 to 70 years old, women: 1,200 mg  51 to 70 years old, men: 1,000 mg  Pregnant or nursin to 18 years old: 1,300 mg, 19 to 50 years old: 1,000 mg  Older than 70 (women and men): 1,200 mg   [employer] last reviewed this educational content on 2018-2021 [employer]. All rights reserved. This information is not intended as a substitute for professional medical care. Always follow your healthcare professional's instructions.         Please call our [facility] Scheduling Line at [phone] to schedule your:    CT scan   ultrasound

## 2021-11-17 NOTE — PROGRESS NOTES
SUBJECTIVE:   CC: Jack Marrero is an 61 year old male who presents for preventative health visit.       Patient has been advised of split billing requirements and indicates understanding: Yes  Healthy Habits:     Getting at least 3 servings of Calcium per day:  NO    Bi-annual eye exam:  Yes    Dental care twice a year:  Yes    Sleep apnea or symptoms of sleep apnea:  Daytime drowsiness, Excessive snoring and Sleep apnea    Diet:  Regular (no restrictions)    Frequency of exercise:  1 day/week    Duration of exercise:  Less than 15 minutes    Taking medications regularly:  Yes    Medication side effects:  Significant flushing    PHQ-2 Total Score: 4    Additional concerns today:  Yes              Today's PHQ-2 Score:   PHQ-2 (  [employer]) 2021   Q1: Little interest or pleasure in doing things 3   Q2: Feeling down, depressed or hopeless 1   PHQ-2 Score 4   PHQ-2 Total Score (12-17 Years)- Positive if 3 or more points; Administer PHQ-A if positive -   Q1: Little interest or pleasure in doing things Nearly every day   Q2: Feeling down, depressed or hopeless Several days   PHQ-2 Score 4       Abuse: Current or Past(Physical, Sexual or Emotional)- No  Do you feel safe in your environment? Yes        Social History     Tobacco Use     Smoking status: Current Every Day Smoker     Packs/day: 0.00     Years: 46.00     Pack years: 0.00     Types: Cigarettes     Start date: 10/10/1974     Last attempt to quit: 2020     Years since quittin.9     Smokeless tobacco: Never Used   Substance Use Topics     Alcohol use: Yes     Comment: Occasional; about 1-2 drinks/week     If you drink alcohol do you typically have >3 drinks per day or >7 drinks per week? No    Alcohol Use 2021   Prescreen: >3 drinks/day or >7 drinks/week? -   Prescreen: >3 drinks/day or >7 drinks/week? No       Last PSA:   PSA   Date Value Ref Range Status   2019 0.92 0 - 4 ug/L Final     Comment:     Assay Method:   Chemiluminescence using Siemens Vista analyzer       Reviewed orders with patient. Reviewed health maintenance and updated orders accordingly -       Reviewed and updated as needed this visit by clinical staff  Tobacco  Allergies  Meds   Med Hx  Surg Hx  Fam Hx  Soc Hx       Reviewed and updated as needed this visit by Provider  Tobacco                  Review of Systems   Constitutional: Negative for chills and fever.   HENT: Negative for congestion, ear pain, hearing loss and sore throat.    Eyes: Negative for pain and visual disturbance.   Respiratory: Positive for shortness of breath. Negative for cough.    Cardiovascular: Positive for palpitations and peripheral edema. Negative for chest pain.   Gastrointestinal: Negative for abdominal pain, constipation, diarrhea, heartburn, hematochezia and nausea.   Genitourinary: Negative for dysuria, frequency, genital sores, hematuria, impotence, penile discharge and urgency.   Musculoskeletal: Negative for arthralgias, joint swelling and myalgias.   Skin: Negative for rash.   Neurological: Negative for dizziness, weakness, headaches and paresthesias.   Psychiatric/Behavioral: Negative for mood changes. The patient is nervous/anxious.          OBJECTIVE:   /64   Pulse 87   Temp 97  F (36.1  C) (Tympanic)   Resp 18   Wt 100.7 kg (222 lb)   SpO2 97%   BMI 29.29 kg/m      Physical Exam      Diagnostic Test Results:  Labs reviewed in Epic    ASSESSMENT/PLAN:       ICD-10-CM    1. Routine general medical examination at a health care facility  Z00.00    2. Need for prophylactic vaccination and inoculation against influenza  Z23    3. High priority for 2019-nCoV vaccine  Z23    4. Depression, major, recurrent, moderate (H)  F33.1 escitalopram (LEXAPRO) 20 MG tablet   5. Gallbladder polyp, need fu US yearly  K82.4 US Abdomen Limited   6. Personal history of tobacco use, presenting hazards to health  Z87.891 CT Chest Lung Cancer Scrn Low Dose wo   7. Raynaud's  "phenomenon without gangrene  I73.00 sildenafil (REVATIO) 20 MG tablet   8. IGT (impaired glucose tolerance)  R73.02 Comprehensive metabolic panel (BMP + Alb, Alk Phos, ALT, AST, Total. Bili, TP)   9. Hyperlipidemia LDL goal <130  E78.5 Lipid panel reflex to direct LDL Fasting     Comprehensive metabolic panel (BMP + Alb, Alk Phos, ALT, AST, Total. Bili, TP)   10. High triglycerides  E78.1 Lipid panel reflex to direct LDL Fasting     Comprehensive metabolic panel (BMP + Alb, Alk Phos, ALT, AST, Total. Bili, TP)   11. HDL deficiency  E78.6 Lipid panel reflex to direct LDL Fasting     Comprehensive metabolic panel (BMP + Alb, Alk Phos, ALT, AST, Total. Bili, TP)   12. Family history of prostate cancer, pt would like annual PSA test  Z80.42 PSA, screen       Patient has been advised of split billing requirements and indicates understanding: Yes  COUNSELING:   Reviewed preventive health counseling, as reflected in patient instructions       Regular exercise       Healthy diet/nutrition       Vision screening       Aspirin prophylaxis        Family planning       HIV screeninx in teen years, 1x in adult years, and at intervals if high risk       Colon cancer screening       Prostate cancer screening       Osteoporosis prevention/bone health       One time pneumovax for smokers    Estimated body mass index is 29.29 kg/m  as calculated from the following:    Height as of 21: 1.854 m (6' 1\").    Weight as of this encounter: 100.7 kg (222 lb).     Weight management plan: Discussed healthy diet and exercise guidelines    He reports that he has been smoking cigarettes. He started smoking about 47 years ago. He has been smoking about 0.00 packs per day for the past 46.00 years. He has never used smokeless tobacco.      Counseling Resources:  ATP IV Guidelines  Pooled Cohorts Equation Calculator  FRAX Risk Assessment  ICSI Preventive Guidelines  Dietary Guidelines for Americans,   [employer]'s MyPlate  ASA " Prophylaxis  Lung CA Screening    Jamshid Miller MD  [facility]  Answers for HPI/ROS submitted by the patient on 11/14/2021  If you checked off any problems, how difficult have these problems made it for you to do your work, take care of things at home, or get along with other people?: Somewhat difficult  PHQ9 TOTAL SCORE: 10  --------------------------------------------------------------------------------------------------------------------------------------  SUBJECTIVE:  Jack Marrero is a 61 year old male who presents to the clinic today for a routine physical exam.    The patient's last physical was  7-30-20    Cholesterol   Date Value Ref Range Status   07/22/2020 157 <200 mg/dL Final   01/28/2019 162 <200 mg/dL Final     HDL Cholesterol   Date Value Ref Range Status   07/22/2020 40 >39 mg/dL Final   01/28/2019 44 >39 mg/dL Final     LDL Cholesterol Calculated   Date Value Ref Range Status   07/22/2020 94 <100 mg/dL Final     Comment:     Desirable:       <100 mg/dl   01/28/2019 90 <100 mg/dL Final     Comment:     Desirable:       <100 mg/dl     Triglycerides   Date Value Ref Range Status   07/22/2020 115 <150 mg/dL Final     Comment:     Fasting specimen   01/28/2019 140 <150 mg/dL Final     Comment:     Fasting specimen     Cholesterol/HDL Ratio   Date Value Ref Range Status   10/26/2015 3.6 0.0 - 5.0 Final   07/21/2015 5.8 (H) 0.0 - 5.0 Final     The patient's last fasting lipid panel was done 1 years ago and the results are listed above.      The 10-year ASCVD risk score (Isis HOLLAND Jr., et al., 2013) is: 15.8%    Values used to calculate the score:      Age: 61 years      Sex: Male      Is Non- : No      Diabetic: No      Tobacco smoker: Yes      Systolic Blood Pressure: 120 mmHg      Is BP treated: Yes      HDL Cholesterol: 40 mg/dL      Total Cholesterol: 157 mg/dL      Risk Enhancers:  Family history of premature ASCVD- No  LDL >159- Unknown  Chronic  kidney disease- No  Metabolic Syndrome- No  Premature menopause- No  Inflammatory conditions (RA, psoriasis, HIV)- No  SE  Ancestry- No  Triglycerides >174- No      The patient reports that he has been treated for high blood pressure.  Nifedipine was prescribed because of his cold hands and feet. This unfortunately  has caused swelling and shakes in his hands    The patient reports that he does take a daily aspirin.    Lab Results   Component Value Date    HCVAB  03/30/2015     Nonreactive   Assay performance characteristics have not been established for newborns,   infants, and children       The patient reports that he has been screened for Hepatitis C    (Screen all baby boomers once per CDC-- the generation born from 1945 through 1965 and per USPTF screen age 19 to 79 especially younger people who have used IV drugs)  He would not like to have an Hepatitis C test today    Lab Results   Component Value Date    HIAGAB  02/10/2015     Nonreactive   HIV-1 p24 Ag & HIV-1/HIV-2 Ab Not Detected       The patient reports that he has been screened for HIV   (Screen all 15 to 64 years old)  He would not like to have an HIV test today      Immunization History   Administered Date(s) Administered     COVID-19,PF,Pfizer (12+ Yrs) 04/14/2021, 05/05/2021     FLU 6-35 months 10/09/2012     Flu, Unspecified 10/18/2007     HEPA 10/18/2007, 06/22/2011     HepA-Adult 10/18/2007, 06/22/2011     Influenza (H1N1) 01/11/2010     Influenza (IIV3) PF 08/30/2011     Influenza Quad, Recombinant, pf(RIV4) (Flublok) 11/06/2018     Influenza Vaccine IM > 6 months Valent IIV4 (Alfuria,Fluzone) 10/03/2013, 10/28/2014, 11/02/2015, 10/24/2016, 10/11/2017, 10/29/2019, 10/22/2020     Pneumococcal 23 valent 06/25/2013     Poliovirus, inactivated (IPV) 06/22/2011     TD (ADULT, 7+) 05/03/2005     TDAP Vaccine (Adacel) 04/06/2011     Tdap (Adacel,Boostrix) 08/19/2021     Typhoid IM 06/22/2011     Typhoid, Unspecified Formulation 10/18/2007      Yellow Fever 06/22/2011     Zoster vaccine recombinant adjuvanted (SHINGRIX) 02/26/2019, 05/21/2019     The patient's believes that his last tetanus shot was given 1 year(s) ago.   The patient believes that he has had a Shingrix in the past  The patient believes that he has had a PPSV23 in the past.  The patient believes that he has not had a PCV13 in the past.  The patient believes that he has not had a seasonal flu vaccination this fall or winter.  The patient would like to have a Influenza and covid vaccinations today      Results for orders placed or performed during the hospital encounter of 03/16/21   COLONOSCOPY   Result Value Ref Range    COLONOSCOPY       [facility]  Endoscopy Department-[place]  _______________________________________________________________________________  Patient Name: Jack Marrero           Procedure Date: 3/16/2021 9:20 AM  MRN: [id number]                       YOB: 1960  Admit Type: Outpatient                Age: 60  Gender: Male                          Note Status: Finalized  Attending MD: Jean Pierre Gillespie DO  Instrument Name: [id number]   [id number]  _______________________________________________________________________________     Procedure:                Colonoscopy  Indications:              High risk colon cancer surveillance: Personal                             history of colonic polyps  Providers:                Jean Pierre Gillespie DO, Kali Magnuson  Patient Profile:          This is a 60 year old male.  Referring MD:             Jamshid Miller MD  Medicines:                Fentanyl 125 micrograms IV, Midazolam 3 mg IV  Complications :            No immediate complications. Estimated blood loss:                             Minimal.  _______________________________________________________________________________  Procedure:                Pre-Anesthesia Assessment:                            - Prior to  the procedure, a History and Physical                             was performed, and patient medications and                             allergies were reviewed. The risks and benefits of                             the procedure and the sedation options and risks                             were discussed with the patient. All questions were                             answered and informed consent was obtained. Patient                             identification and proposed procedure were verified                             by the physician in the pre-procedure area. Mental                             Status Examination: alert and oriented. Airway                             Examination: normal oropharyngeal airway  and neck                             mobility. Respiratory Examination: clear to                             auscultation. CV Examination: normal. Prophylactic                             Antibiotics: The patient does not require                             prophylactic antibiotics. Prior Anticoagulants: The                             patient has taken no previous anticoagulant or                             antiplatelet agents. ASA Grade Assessment: II - A                             patient with mild systemic disease. After reviewing                             the risks and benefits, the patient was deemed in                             satisfactory condition to undergo the procedure.                             The anesthesia plan was to use moderate sedation /                             analgesia (conscious sedation). This assessment was                             completed before the administration of sedation at                             09:35 AM.                             After obtaining informed consent, the colonoscope                             was passed under direct vision. Throughout the                             procedure, the patient's blood pressure, pulse, and                              oxygen saturations were monitored continuously. The                             was introduced through the anus and advanced to the                             terminal ileum, with identification of the                             appendiceal orifice and IC valve. The terminal                             ileum, ileocecal valve, appendiceal orifice, and                             rectum were photographed. The entire colon was                             examined. The colonoscopy was performed without                             difficulty. The patient tolerated the procedure                             well. The quality of the bowel preparation was                             fair. Scope insertion time was 18 minutes. Scope                             wit hdrawal time was 21 minutes. The total duration                             of the procedure was 41 minutes.                                                                                   Findings:       Hemorrhoids were found on perianal exam.       The digital rectal exam was normal. Pertinent negatives include normal        sphincter tone, no palpable rectal lesions and normal prostate (size,        shape, and consistency).       Two sessile polyps were found in the descending colon and transverse        colon. The polyps were 4 to 5 mm in size. These polyps were removed with        a cold snare. Resection and retrieval were complete. Estimated blood        loss was minimal.       Multiple small-mouthed diverticula were found in the sigmoid colon and        descending colon. Estimated blood loss: none.       A moderate amount of liquid stool was found in the entire colon, making        visualization difficult. Lavage of the area was performed using a        moderate amount of shelly rile water, resulting in clearance with fair        visualization. Estimated blood loss: none.       No additional abnormalities were found on retroflexion.                                                                                    Moderate Sedation:       Moderate (conscious) sedation was administered by the endoscopy nurse        and supervised by the endoscopist. The following parameters were        monitored: oxygen saturation, heart rate, blood pressure, and response        to care. Total physician intraservice time was 41 minutes.  Impression:               - Preparation of the colon was fair.                            - Hemorrhoids found on perianal exam.                            - Two 4 to 5 mm polyps in the descending colon and                             in the transverse colon, removed with a cold snare.                             Resected and retrieved.                            - Diverticulosis in the sigmoid colon and in the                             descend ing colon.                            - Stool in the entire examined colon.  Recommendation:           - Patient has a contact number available for                             emergencies. The signs and symptoms of potential                             delayed complications were discussed with the                             patient. Return to normal activities tomorrow.                             Written discharge instructions were provided to the                             patient.                            - High fiber diet indefinitely.                            - Await pathology results.                            - Repeat colonoscopy in 5 years for surveillance.                                                                                     Signed by Jean Pierre Gillespie  ________________________________  Jean Pierre Gillespie,   3/16/2021 10:30:25 AM  I was physically present for the entire viewing portion of the exam.Jean Pierre Gillespie DO  Number of  Addenda: 0    Note Initiated On: 3/16/2021 9:20 AM  Scope In:  Scope Out:     ]   The patient denies a family  history of colon cancer.  The patient reports that he has had a colonoscopy. His  last colonoscopy was in March of 2021 and he  report that is was abnormal. The patient was told to have this repeated in 5 years.      His currently used contraception is a tubal ligation . He is not interested in a vasectomy in the near future.  The patient reports a family history of diabetes in his uncle.  The patient reports a family history of prostate cancer in his brother. After discussing the pros and cons of checking a PSA he  Does want to have this test drawn today.   The patient reports that he eats or drinks 4 servings of dairy products per day.   The patient reports that he has dental appointments approximately every 6 months.  The patient reports that he  has an eye examination approximately every 2 year(s).    Do you currently smoke? Yes. He quit but restarted in June   He used the nicotine   How many years have you smoked? 46   How many packs per day did you smoke on average? 1 ppd   (if more than 30 pack year history and the patient is age 55-80 consider ordering an annual low dose radiation lung CT to screen for cancer)  (Do not order if patient has quit more than 15 years ago or has a health condition that limits life expectancy or could not tolerate curative lung surgery)  Are you interested having a lung CT to screen for lung cancer? Yes: .    If the patient has smoked more that 100 cigarettes, has the patient had an imaging study (US or CT) for an AAA between the ages of 65 and 75? N/A        Patient Active Problem List   Diagnosis     Mixed anxiety depressive disorder     Hyperlipidemia LDL goal <130     Tubular adenoma of colon on colonoscopy     High triglycerides     HDL deficiency     IGT (impaired glucose tolerance)     Sensory polyneuropathy     Diverticulosis of colon     10 year risk of MI or stroke 7.5% or greater, 9.3% in Nov 2016 on atorvastatin 40     Venous lake of lip, left lower     Benign  non-nodular prostatic hyperplasia with lower urinary tract symptoms     Tobacco abuse     Restless legs syndrome (RLS)     SHY (obstructive sleep apnea) - Moderate (AHI 16, RDI 55)     Gallbladder polyp, need fu US yearly     Atherosclerosis of abdominal aorta (H), seen on CT     Shoulder girdle syndrome     Family history of prostate cancer, pt would like annual PSA test     Calcification of coronary artery/seen incidentally on lung CT     Essential hypertension       Past Surgical History:   Procedure Laterality Date     BIOPSY  2016    growth on right shin     COLONOSCOPY WITH CO2 INSUFFLATION N/A 01/11/2016    Procedure: COLONOSCOPY WITH CO2 INSUFFLATION;  Surgeon: Nilson Gale MD;  Location: [facility] OR     COLONOSCOPY WITH CO2 INSUFFLATION N/A 3/16/2021    Procedure: COLONOSCOPY, WITH CO2 INSUFFLATION;  Surgeon: Jean Pierre Gillespie DO;  Location: [facility] OR     DENTAL SURGERY  1991     ALL 4 WISDOM TEETH EXTRACTED     HC EXCISION SKIN OF NAIL FOLD  1993    BL GREAT TOE     HERNIORRHAPHY INGUINAL Left 03/11/2015    Procedure: HERNIORRHAPHY INGUINAL;  Surgeon: Nilson Gale MD;  Location: [facility] OR     TONSILLECTOMY & ADENOIDECTOMY  1965       Family History   Problem Relation Age of Onset     Arthritis Mother      Heart Disease Mother      Lipids Mother      Eye Disorder Mother      Gastrointestinal Disease Mother         irritable bowel     Breast Cancer Mother      Osteoporosis Mother      Lipids Father      Heart Disease Father      Psychotic Disorder Father      Depression Father      Eye Disorder Father      C.A.D. Father 56        CABG at age 56     Coronary Artery Disease Father         Heart attack at age 56     Hyperlipidemia Father      Depression/Anxiety Father      Mental Illness Father      Hypertension Father      Anxiety Disorder Father      Arthritis Maternal Grandmother      Alcohol/Drug Maternal Grandfather      Cancer - colorectal Maternal Grandfather      Hypertension  Paternal Grandmother      Breast Cancer Paternal Grandmother      Heart Disease Paternal Grandfather      Hypertension Paternal Grandfather      Coronary Artery Disease Paternal Grandfather          of heart attack at age 59     Hypertension Brother      Depression/Anxiety Brother      Depression Brother      Thyroid Disease Sister      Hypertension Brother      Depression/Anxiety Brother      Hypertension Brother      Gastrointestinal Disease Brother      Prostate Cancer Brother      Mental Illness Brother      Cardiovascular Other      Diabetes Maternal Half-Brother      Thyroid Disease Sister      Thyroid Disease Sister      Other Cancer Other         Lung; Paternal Uncle/Lung; Paternal Uncle     Coronary Artery Disease Other      Hyperlipidemia Other      Other Cancer Other         Lung; Paternal Uncle     Cerebrovascular Disease No family hx of        Social History     Socioeconomic History     Marital status:      Spouse name: Not on file     Number of children: Not on file     Years of education: Not on file     Highest education level: Not on file   Occupational History     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 No   Social History Narrative     Not on file     Social Determinants of Health     Financial Resource Strain: Not on file   Food Insecurity: Not on file   Transportation Needs: Not on file    Physical Activity: Not on file   Stress: Not on file   Social Connections: Not on file   Intimate Partner Violence: Not on file   Housing Stability: Not on file       Current Outpatient Medications   Medication Sig Dispense Refill     amitriptyline (ELAVIL) 75 MG tablet TAKE 1 TABLET(75 MG) BY MOUTH AT BEDTIME 90 tablet 3     aspirin 81 MG tablet Take 1 tablet by mouth daily.       atorvastatin (LIPITOR) 40 MG tablet TAKE 1 TABLET(40 MG) BY MOUTH AT BEDTIME 90 tablet 0     dutasteride (AVODART) 0.5 MG capsule TAKE 1 CAPSULE(0.5 MG) BY MOUTH DAILY 90 capsule 0     escitalopram (LEXAPRO) 10 MG tablet Take 1 tablet (10 mg) by mouth daily 90 tablet 1     fluticasone-vilanterol (BREO ELLIPTA) 100-25 MCG/INH inhaler Inhale 1 puff into the lungs daily 3 each 3     lisinopril (ZESTRIL) 20 MG tablet Take 1 tablet (20 mg) by mouth daily 90 tablet 3     Multiple Vitamins-Minerals (MULTIVITAMIN ADULT) CHEW        NIFEdipine ER (ADALAT CC) 30 MG 24 hr tablet Take 1 tablet (30 mg) by mouth daily 90 tablet 3     order for DME Equipment ordered: RESMED Auto PAP Mask type: Nasal Settings: 6-12 cm H2O         Review Of Systems    Psychiatric: positive for negative, depression and anhedonia      PHYSICAL EXAMINATION:  Blood pressure 120/64, pulse 87, temperature 97  F (36.1  C), temperature source Tympanic, resp. rate 18, weight 100.7 kg (222 lb), SpO2 97 %.  General appearance - healthy, alert and no distress  Skin - Skin color, texture, turgor normal. No rashes or lesions.  Head - Normocephalic. No masses, lesions, tenderness or abnormalities  Eyes - conjunctivae/corneas clear. PERRL, EOM's intact. Fundi benign  Ears - External ears normal. Canals clear. TM's normal.  Nose/Sinuses - Nares normal. Septum midline. Mucosa normal. No drainage or sinus tenderness.  Oropharynx - Lips, mucosa, and tongue normal. Teeth and gums normal.  Neck - Neck supple. No adenopathy. Thyroid symmetric, normal size,  Lungs - Percussion normal. Good  "diaphragmatic excursion. Lungs clear  Heart - PMI normal. No lifts, heaves, or thrills. RRR. No murmurs, clicks gallops or rub  Abdomen - Abdomen soft, non-tender. BS normal. No masses, organomegaly  Extremities - Extremities normal. No deformities, edema, or skin discoloration.  Musculoskeletal - Spine ROM normal. Muscular strength intact.  Peripheral pulses - radial=4/4, femoral=4/4, popliteal=4/4, dorsalis pedis=4/4,  Neuro - Gait normal. Reflexes normal and symmetric. Sensation grossly WNL.  Genitalia - Penis normal. No urethral discharge. Scrotum normal to palpation. No hernia.  Rectal - Rectal negative. Prostate palpation negative.  No rectal masses or abnormalities      Hospital Outpatient Visit on 03/16/2021   Component Date Value Ref Range Status     Copath Report 03/16/2021    Final                    Value:Patient Name: RENAE PATTERSON  MR#: [id number]  Specimen #: [id number]  Collected: 3/16/2021  Received: 3/17/2021  Reported: 3/18/2021 10:33  Ordering Phy(s): IDALIA LONDON    For improved result formatting, select 'View Enhanced Report Format' under   Linked Documents section.    SPECIMEN(S):  A: Colon polyp, transverse  B: Colon polyp, left descending    FINAL DIAGNOSIS:  A. COLON POLYP, TRANSVERSE, POLYPECTOMY:  - Tubular adenoma, negative for high-grade dysplasia    B. COLON POLYP, LEFT DESCENDING, POLYPECTOMY:  - Tubular adenoma, negative for high-grade dysplasia    I have personally reviewed all specimens and/or slides, including the   listed special stains, and used them  with my medical judgement to determine or confirm the final diagnosis.    Electronically signed out by:    Harlan Pryor M.D., [facility]    CLINICAL HISTORY:  Screening    GROSS:  A:  The specimen is received in formalin with proper patient   identification, labeled \"transverse polyp\".  The                            specimen consists of a 1.1 x 0.3 x 0.2 similar tan pink piece of tissue   submitted in toto " "in cassette A1.    B:  The specimen is received in formalin with proper patient   identification, labeled \"left descending polyp\".  The specimen consists of a 0.4 x 0.3 x 0.2 cm tan-pink piece of tissue   submitted in toto in cassette B1.  (Dictated by: LANDEN 3/17/2021 08:45 AM)    MICROSCOPIC:  Microscopic exam was performed    The technical component of this testing was completed at the [facility], with the professional component performed   at the [facility], [address] [place] [id number] ([phone])    CPT Codes:  A: 20452-IW3  B: 73720-CZ6    COLLECTION SITE:  Client: [facility]  Location: [facility] (B)         COLONOSCOPY 03/16/2021    Final                    Value:[facility]  Endoscopy Department-[place]  _______________________________________________________________________________  Patient Name: Jack Marrero           Procedure Date: 3/16/2021 9:20 AM  MRN: [id number]                       YOB: 1960  Admit Type: Outpatient                Age: 60  Gender: Male                          Note Status: Finalized  Attending MD: Jean Pierre Gillespie DO  Instrument Name: [id number]   [id number]  _______________________________________________________________________________     Procedure:                Colonoscopy  Indications:              High risk colon cancer surveillance: Personal                             history of colonic polyps  Providers:                Jean Pierre Gillespie DO, Kali Magnuson  Patient Profile:          This is a 60 year old male.  Referring MD:             Jamshid Miller MD  Medicines:                Fentanyl 125 micrograms IV, Midazolam 3 mg IV  Complications                          :            No immediate complications. Estimated blood loss:               "               Minimal.  _______________________________________________________________________________  Procedure:                Pre-Anesthesia Assessment:                            - Prior to the procedure, a History and Physical                             was performed, and patient medications and                             allergies were reviewed. The risks and benefits of                             the procedure and the sedation options and risks                             were discussed with the patient. All questions were                             answered and informed consent was obtained. Patient                             identification and proposed procedure were verified                             by the physician in the pre-procedure area. Mental                             Status Examination: alert and oriented. Airway                             Examination: normal oropharyngeal airway                           and neck                             mobility. Respiratory Examination: clear to                             auscultation. CV Examination: normal. Prophylactic                             Antibiotics: The patient does not require                             prophylactic antibiotics. Prior Anticoagulants: The                             patient has taken no previous anticoagulant or                             antiplatelet agents. ASA Grade Assessment: II - A                             patient with mild systemic disease. After reviewing                             the risks and benefits, the patient was deemed in                             satisfactory condition to undergo the procedure.                             The anesthesia plan was to use moderate sedation /                             analgesia (conscious sedation). This assessment was                             completed before the administration of sedation at                             09:35 AM.                                                       After obtaining informed consent, the colonoscope                             was passed under direct vision. Throughout the                             procedure, the patient's blood pressure, pulse, and                             oxygen saturations were monitored continuously. The                             was introduced through the anus and advanced to the                             terminal ileum, with identification of the                             appendiceal orifice and IC valve. The terminal                             ileum, ileocecal valve, appendiceal orifice, and                             rectum were photographed. The entire colon was                             examined. The colonoscopy was performed without                             difficulty. The patient tolerated the procedure                             well. The quality of the bowel preparation was                             fair. Scope insertion time was 18 minutes. Scope                             wit                          hdrawal time was 21 minutes. The total duration                             of the procedure was 41 minutes.                                                                                   Findings:       Hemorrhoids were found on perianal exam.       The digital rectal exam was normal. Pertinent negatives include normal        sphincter tone, no palpable rectal lesions and normal prostate (size,        shape, and consistency).       Two sessile polyps were found in the descending colon and transverse        colon. The polyps were 4 to 5 mm in size. These polyps were removed with        a cold snare. Resection and retrieval were complete. Estimated blood        loss was minimal.       Multiple small-mouthed diverticula were found in the sigmoid colon and        descending colon. Estimated blood loss: none.       A moderate amount of liquid stool was found in the entire colon, making         visualization difficult. Lavage of the area was performed using a        moderate amount of shelly                          rile water, resulting in clearance with fair        visualization. Estimated blood loss: none.       No additional abnormalities were found on retroflexion.                                                                                   Moderate Sedation:       Moderate (conscious) sedation was administered by the endoscopy nurse        and supervised by the endoscopist. The following parameters were        monitored: oxygen saturation, heart rate, blood pressure, and response        to care. Total physician intraservice time was 41 minutes.  Impression:               - Preparation of the colon was fair.                            - Hemorrhoids found on perianal exam.                            - Two 4 to 5 mm polyps in the descending colon and                             in the transverse colon, removed with a cold snare.                             Resected and retrieved.                            - Diverticulosis in the sigmoid colon and in the                             descend                          ing colon.                            - Stool in the entire examined colon.  Recommendation:           - Patient has a contact number available for                             emergencies. The signs and symptoms of potential                             delayed complications were discussed with the                             patient. Return to normal activities tomorrow.                             Written discharge instructions were provided to the                             patient.                            - High fiber diet indefinitely.                            - Await pathology results.                            - Repeat colonoscopy in 5 years for surveillance.                                                                                     Signed by Jean Pierre BARRIENTOS  Jose Miguel  ________________________________  Jean Pierre Gillespie, DO  3/16/2021 10:30:25 AM  I was physically present for the entire viewing portion of the exam.Jean Pierre Gillespie , DO  Number of                           Addenda: 0    Note Initiated On: 3/16/2021 9:20 AM  Scope In:  Scope Out:         ASSESSMENT:    ICD-10-CM    1. Routine general medical examination at a health care facility  Z00.00    2. Need for prophylactic vaccination and inoculation against influenza  Z23    3. High priority for 2019-nCoV vaccine  Z23        Well-Adult Physical Exam.  Health Maintenance Due   Topic Date Due     SPIROMETRY  Never done     ANNUAL REVIEW OF HM ORDERS  Never done     COPD ACTION PLAN  Never done     LUNG CANCER SCREENING  08/06/2021     INFLUENZA VACCINE (1) 09/01/2021     COVID-19 Vaccine (3 - Booster for Pfizer series) 11/05/2021     Health Maintenance   Topic Date Due     SPIROMETRY  Never done     ANNUAL REVIEW OF HM ORDERS  Never done     COPD ACTION PLAN  Never done     LUNG CANCER SCREENING  08/06/2021     INFLUENZA VACCINE (1) 09/01/2021     COVID-19 Vaccine (3 - Booster for Pfizer series) 11/05/2021     PHQ-9  05/18/2022     PREVENTIVE CARE VISIT  11/18/2022     ADVANCE CARE PLANNING  01/28/2024     LIPID  07/22/2025     COLORECTAL CANCER SCREENING  03/16/2026     DTAP/TDAP/TD IMMUNIZATION (4 - Td or Tdap) 08/19/2031     HEPATITIS C SCREENING  Completed     HIV SCREENING  Completed     DEPRESSION ACTION PLAN  Completed     ZOSTER IMMUNIZATION  Completed     Pneumococcal Vaccine: Pediatrics (0 to 5 Years) and At-Risk Patients (6 to 64 Years)  Aged Out     IPV IMMUNIZATION  Aged Out     MENINGITIS IMMUNIZATION  Aged Out     HEPATITIS B IMMUNIZATION  Aged Out         HEALTH CARE MAINTENENCE: The recommended screening tests and vaccinatons for this patient have been discussed as above.  The appropriate tests and vaccinations  have been ordered or declined by the patient. Please see the  orders in [facility].The patient specifically declines: n/a     Immunization Status:  up to date and documented except for COVID and influenza     Patient Active Problem List   Diagnosis     Mixed anxiety depressive disorder     Hyperlipidemia LDL goal <130     Tubular adenoma of colon on colonoscopy     High triglycerides     HDL deficiency     IGT (impaired glucose tolerance)     Sensory polyneuropathy     Diverticulosis of colon     10 year risk of MI or stroke 7.5% or greater, 9.3% in Nov 2016 on atorvastatin 40     Venous lake of lip, left lower     Benign non-nodular prostatic hyperplasia with lower urinary tract symptoms     Tobacco abuse     Restless legs syndrome (RLS)     SHY (obstructive sleep apnea) - Moderate (AHI 16, RDI 55)     Gallbladder polyp, need fu US yearly     Atherosclerosis of abdominal aorta (H), seen on CT     Shoulder girdle syndrome     Family history of prostate cancer, pt would like annual PSA test     Calcification of coronary artery/seen incidentally on lung CT     Essential hypertension        ATP III Guidelines  ICSI Preventive Guidelines    PLAN:   The patient will make a future lab appointment for all bloodwork as they are not fasting today  Check a fasting lipid profile  I recommended continuing to take a daily aspirin (81 to 325 mg)  Flu shot recommended  COVID vaccine recommended  Check a fasting glucose  Check a PSA  Discussed calcium intake, vitamins and supplements. Recommended 1000 mg of calcium daily  Weight loss through diet and exercise was recommended  Sunscreen use was recommended especially in the area of tatoos  Refills on chronic medication given  Recommended dental exams every 6 months  Recommended eye exam every 1-2 years  The patient is an appropriate candidate lung cancer screening and wishes to pursue the recommended chest CT  Follow up in 1 year for the next preventative medical visit      Body mass index is 29.29 kg/m .        (F33.1) Depression, major, recurrent,  moderate (H)  Comment: increase the dose  Plan: escitalopram (LEXAPRO) 20 MG tablet        Follow up in 4 week     (K82.4) Gallbladder polyp, need fu US yearly  Comment:   Plan: US Abdomen Limited            (Z87.891) Personal history of tobacco use, presenting hazards to health  Comment:   Plan: CT Chest Lung Cancer Scrn Low Dose wo            (I73.00) Raynaud's phenomenon without gangrene  Comment: discontinue nifedipine  Plan: sildenafil (REVATIO) 20 MG tablet        Follow up 4 week for a recheck     (R73.02) IGT (impaired glucose tolerance)  Comment:   Plan: Comprehensive metabolic panel (BMP + Alb, Alk         Phos, ALT, AST, Total. Bili, TP)

## 2021-11-18 ENCOUNTER — OFFICE VISIT (OUTPATIENT)
Dept: FAMILY MEDICINE | Facility: CLINIC | Age: 61
End: 2021-11-18
Payer: COMMERCIAL

## 2021-11-18 VITALS
RESPIRATION RATE: 18 BRPM | OXYGEN SATURATION: 97 % | HEART RATE: 87 BPM | SYSTOLIC BLOOD PRESSURE: 120 MMHG | DIASTOLIC BLOOD PRESSURE: 64 MMHG | BODY MASS INDEX: 29.29 KG/M2 | TEMPERATURE: 97 F | WEIGHT: 222 LBS

## 2021-11-18 DIAGNOSIS — Z87.891 PERSONAL HISTORY OF TOBACCO USE, PRESENTING HAZARDS TO HEALTH: ICD-10-CM

## 2021-11-18 DIAGNOSIS — I73.00 RAYNAUD'S PHENOMENON WITHOUT GANGRENE: ICD-10-CM

## 2021-11-18 DIAGNOSIS — E78.5 HYPERLIPIDEMIA LDL GOAL <130: ICD-10-CM

## 2021-11-18 DIAGNOSIS — Z00.00 ROUTINE GENERAL MEDICAL EXAMINATION AT A HEALTH CARE FACILITY: Primary | ICD-10-CM

## 2021-11-18 DIAGNOSIS — Z80.42 FAMILY HISTORY OF PROSTATE CANCER: ICD-10-CM

## 2021-11-18 DIAGNOSIS — Z23 NEED FOR PROPHYLACTIC VACCINATION AND INOCULATION AGAINST INFLUENZA: ICD-10-CM

## 2021-11-18 DIAGNOSIS — E78.1 HIGH TRIGLYCERIDES: ICD-10-CM

## 2021-11-18 DIAGNOSIS — R73.02 IGT (IMPAIRED GLUCOSE TOLERANCE): ICD-10-CM

## 2021-11-18 DIAGNOSIS — K82.4 GALLBLADDER POLYP: ICD-10-CM

## 2021-11-18 DIAGNOSIS — F33.1 DEPRESSION, MAJOR, RECURRENT, MODERATE (H): ICD-10-CM

## 2021-11-18 DIAGNOSIS — Z23 HIGH PRIORITY FOR 2019-NCOV VACCINE: ICD-10-CM

## 2021-11-18 DIAGNOSIS — E78.6 HDL DEFICIENCY: ICD-10-CM

## 2021-11-18 PROCEDURE — 0004A COVID-19,PF,PFIZER (12+ YRS): CPT | Performed by: FAMILY MEDICINE

## 2021-11-18 PROCEDURE — 99396 PREV VISIT EST AGE 40-64: CPT | Mod: 25 | Performed by: FAMILY MEDICINE

## 2021-11-18 PROCEDURE — 90682 RIV4 VACC RECOMBINANT DNA IM: CPT | Performed by: FAMILY MEDICINE

## 2021-11-18 PROCEDURE — 90471 IMMUNIZATION ADMIN: CPT | Performed by: FAMILY MEDICINE

## 2021-11-18 PROCEDURE — 91300 COVID-19,PF,PFIZER (12+ YRS): CPT | Performed by: FAMILY MEDICINE

## 2021-11-18 RX ORDER — ESCITALOPRAM OXALATE 20 MG/1
20 TABLET ORAL DAILY
Qty: 30 TABLET | Refills: 1 | Status: SHIPPED | OUTPATIENT
Start: 2021-11-18 | End: 2022-01-20

## 2021-11-18 RX ORDER — SILDENAFIL CITRATE 20 MG/1
20 TABLET ORAL 2 TIMES DAILY
Qty: 60 TABLET | Refills: 1 | Status: SHIPPED | OUTPATIENT
Start: 2021-11-18 | End: 2022-01-21

## 2021-11-18 RX ORDER — BIOTIN 10 MG
TABLET ORAL
COMMUNITY

## 2021-11-18 ASSESSMENT — ENCOUNTER SYMPTOMS
HEMATOCHEZIA: 0
WEAKNESS: 0
CONSTIPATION: 0
NERVOUS/ANXIOUS: 1
EYE PAIN: 0
SHORTNESS OF BREATH: 1
DYSURIA: 0
HEARTBURN: 0
DIARRHEA: 0
ARTHRALGIAS: 0
COUGH: 0
JOINT SWELLING: 0
ABDOMINAL PAIN: 0
CHILLS: 0
NAUSEA: 0
FEVER: 0
HEADACHES: 0
PALPITATIONS: 1
HEMATURIA: 0
DIZZINESS: 0
PARESTHESIAS: 0
MYALGIAS: 0
SORE THROAT: 0
FREQUENCY: 0

## 2021-11-18 ASSESSMENT — PAIN SCALES - GENERAL: PAINLEVEL: NO PAIN (0)

## 2021-11-18 NOTE — NURSING NOTE
"Chief Complaint   Patient presents with     Physical     Flu Shot     Imm/Inj     COVID-19 VACCINE       Initial /79   Pulse 87   Temp 97  F (36.1  C) (Tympanic)   Resp 18   Wt 100.7 kg (222 lb)   SpO2 97%   BMI 29.29 kg/m   Estimated body mass index is 29.29 kg/m  as calculated from the following:    Height as of 8/19/21: 1.854 m (6' 1\").    Weight as of this encounter: 100.7 kg (222 lb).  Medication Reconciliation: complete  Yesenia Kearns CMA  "

## 2021-11-18 NOTE — LETTER
My COPD Action Plan     Name: Jack Marrero    YOB: 1960   Date: 11/17/2021    My doctor: Jamshid Miller MD   My clinic: [address] [address] [phone]  My Controller Medicine: Vilanterol/fluticasone (Breo Ellipta)   Dose: 100/25 1puff daily     My Rescue Medicine: Albuterol (Proair/Ventolin/Proventil) inhaler   Dose:      My Flare Up Medicine: none   Dose:           Use of Oxygen: Oxygen Not Prescribed      Make sure you've had your pneumonia   vaccines.          GREEN ZONE       Doing well today      Usual level of activity and exercise    Usual amount of cough and mucus    No shortness of breath    Usual level of health (thinking clearly, sleeping well, feel like eating) Actions:      Take daily medicines    Use oxygen as prescribed    Follow regular exercise and diet plan    Avoid cigarette smoke and other irritants that harm the lungs           YELLOW ZONE          Having a bad day or flare up      Short of breath more than usual    A lot more sputum (mucus) than usual    Sputum looks yellow, green, tan, brown or bloody    More coughing or wheezing    Fever or chills    Less energy; trouble completing activities    Trouble thinking or focusing    Using quick relief inhaler or nebulizer more often    Poor sleep; symptoms wake me up    Do not feel like eating Actions:      Get plenty of rest    Take daily medicines    Use quick relief inhaler every 6 hours    If you use oxygen, call you doctor to see if you should adjust your oxygen    Do breathing exercises or other things to help you relax    Let a loved one, friend or neighbor know you are feeling worse    Call your care team if you have 2 or more symptoms.  Start taking steroids or antibiotics if directed by your care team           RED ZONE       Need medical care now      Severe shortness of breath (feel you can't breathe)    Fever, chills    Not enough breath to do any  activity    Trouble coughing up mucus, walking or talking    Blood in mucus    Frequent coughing   Rescue medicines are not working    Not able to sleep because of breathing    Feel confused or drowsy    Chest pain    Actions:      Call your health care team.  If you cannot reach your care team, call 911 or go to the emergency room.        Annual Reminders:  Meet with Care Team, Flu Shot every Fall  Pharmacy: [id number] [id number] [address]

## 2021-11-18 NOTE — NURSING NOTE
"Chief Complaint   Patient presents with     Physical       Initial There were no vitals taken for this visit. Estimated body mass index is 28.37 kg/m  as calculated from the following:    Height as of 8/19/21: 1.854 m (6' 1\").    Weight as of 8/19/21: 97.5 kg (215 lb).  Medication Reconciliation: complete  Yesenia Kearns CMA    "

## 2021-11-26 ENCOUNTER — ANCILLARY PROCEDURE (OUTPATIENT)
Dept: CT IMAGING | Facility: CLINIC | Age: 61
End: 2021-11-26
Attending: FAMILY MEDICINE
Payer: COMMERCIAL

## 2021-11-26 ENCOUNTER — ANCILLARY PROCEDURE (OUTPATIENT)
Dept: ULTRASOUND IMAGING | Facility: CLINIC | Age: 61
End: 2021-11-26
Attending: FAMILY MEDICINE
Payer: COMMERCIAL

## 2021-11-26 ENCOUNTER — LAB (OUTPATIENT)
Dept: LAB | Facility: CLINIC | Age: 61
End: 2021-11-26

## 2021-11-26 DIAGNOSIS — E78.5 HYPERLIPIDEMIA LDL GOAL <130: ICD-10-CM

## 2021-11-26 DIAGNOSIS — Z87.891 PERSONAL HISTORY OF TOBACCO USE, PRESENTING HAZARDS TO HEALTH: ICD-10-CM

## 2021-11-26 DIAGNOSIS — E78.6 HDL DEFICIENCY: ICD-10-CM

## 2021-11-26 DIAGNOSIS — E78.1 HIGH TRIGLYCERIDES: ICD-10-CM

## 2021-11-26 DIAGNOSIS — Z80.42 FAMILY HISTORY OF PROSTATE CANCER: ICD-10-CM

## 2021-11-26 DIAGNOSIS — K82.4 GALLBLADDER POLYP: ICD-10-CM

## 2021-11-26 DIAGNOSIS — R73.02 IGT (IMPAIRED GLUCOSE TOLERANCE): ICD-10-CM

## 2021-11-26 LAB
ALBUMIN SERPL-MCNC: 3.7 G/DL (ref 3.4–5)
ALP SERPL-CCNC: 81 U/L (ref 40–150)
ALT SERPL W P-5'-P-CCNC: 50 U/L (ref 0–70)
ANION GAP SERPL CALCULATED.3IONS-SCNC: 2 MMOL/L (ref 3–14)
AST SERPL W P-5'-P-CCNC: 31 U/L (ref 0–45)
BILIRUB SERPL-MCNC: 0.5 MG/DL (ref 0.2–1.3)
BUN SERPL-MCNC: 13 MG/DL (ref 7–30)
CALCIUM SERPL-MCNC: 8.6 MG/DL (ref 8.5–10.1)
CHLORIDE BLD-SCNC: 109 MMOL/L (ref 94–109)
CHOLEST SERPL-MCNC: 138 MG/DL
CO2 SERPL-SCNC: 28 MMOL/L (ref 20–32)
CREAT SERPL-MCNC: 1.03 MG/DL (ref 0.66–1.25)
FASTING STATUS PATIENT QL REPORTED: YES
GFR SERPL CREATININE-BSD FRML MDRD: 77 ML/MIN/1.73M2
GLUCOSE BLD-MCNC: 102 MG/DL (ref 70–99)
HDLC SERPL-MCNC: 35 MG/DL
LDLC SERPL CALC-MCNC: 74 MG/DL
NONHDLC SERPL-MCNC: 103 MG/DL
POTASSIUM BLD-SCNC: 4 MMOL/L (ref 3.4–5.3)
PROT SERPL-MCNC: 7.1 G/DL (ref 6.8–8.8)
PSA SERPL-MCNC: 1.02 UG/L (ref 0–4)
SODIUM SERPL-SCNC: 139 MMOL/L (ref 133–144)
TRIGL SERPL-MCNC: 145 MG/DL

## 2021-11-26 PROCEDURE — 76705 ECHO EXAM OF ABDOMEN: CPT

## 2021-11-26 PROCEDURE — 80053 COMPREHEN METABOLIC PANEL: CPT

## 2021-11-26 PROCEDURE — 80061 LIPID PANEL: CPT

## 2021-11-26 PROCEDURE — 71271 CT THORAX LUNG CANCER SCR C-: CPT | Performed by: RADIOLOGY

## 2021-11-26 PROCEDURE — G0103 PSA SCREENING: HCPCS

## 2021-11-26 PROCEDURE — 36415 COLL VENOUS BLD VENIPUNCTURE: CPT

## 2021-11-28 NOTE — RESULT ENCOUNTER NOTE
Jack,  I have reviewed the report of the imaging test or tests that we recently ordered. The results were normal or considered normal for you.  Sincerely,   Jamshid Miller MD

## 2021-11-29 ENCOUNTER — MYC MEDICAL ADVICE (OUTPATIENT)
Dept: FAMILY MEDICINE | Facility: CLINIC | Age: 61
End: 2021-11-29
Payer: COMMERCIAL

## 2021-11-29 DIAGNOSIS — I10 HYPERTENSION GOAL BP (BLOOD PRESSURE) < 140/90: ICD-10-CM

## 2021-11-30 RX ORDER — NIFEDIPINE 30 MG
30 TABLET, EXTENDED RELEASE ORAL DAILY
Qty: 90 TABLET | Refills: 3 | Status: SHIPPED | OUTPATIENT
Start: 2021-11-30 | End: 2022-01-21 | Stop reason: SINTOL

## 2022-01-20 DIAGNOSIS — F33.1 DEPRESSION, MAJOR, RECURRENT, MODERATE (H): ICD-10-CM

## 2022-01-20 RX ORDER — ESCITALOPRAM OXALATE 20 MG/1
TABLET ORAL
Qty: 30 TABLET | Refills: 1 | Status: SHIPPED | OUTPATIENT
Start: 2022-01-20 | End: 2022-03-17

## 2022-01-20 NOTE — TELEPHONE ENCOUNTER
"Requested Prescriptions   Pending Prescriptions Disp Refills    escitalopram (LEXAPRO) 20 MG tablet [Pharmacy Med Name: ESCITALOPRAM 20MG TABLETS] 30 tablet 1     Sig: TAKE 1 TABLET(20 MG) BY MOUTH DAILY        SSRIs Protocol Failed - 1/20/2022 12:56 PM        Failed - PHQ-9 score less than 5 in past 6 months     Please review last PHQ-9 score.           Passed - Medication is active on med list        Passed - Patient is age 18 or older        Passed - Recent (6 mo) or future (30 days) visit within the authorizing provider's specialty     Patient had office visit in the last 6 months or has a visit in the next 30 days with authorizing provider or within the authorizing provider's specialty.  See \"Patient Info\" tab in inbasket, or \"Choose Columns\" in Meds & Orders section of the refill encounter.                  "

## 2022-01-21 ENCOUNTER — OFFICE VISIT (OUTPATIENT)
Dept: FAMILY MEDICINE | Facility: CLINIC | Age: 62
End: 2022-01-21
Payer: COMMERCIAL

## 2022-01-21 VITALS
HEIGHT: 73 IN | TEMPERATURE: 97 F | OXYGEN SATURATION: 100 % | BODY MASS INDEX: 29.29 KG/M2 | SYSTOLIC BLOOD PRESSURE: 114 MMHG | WEIGHT: 221 LBS | RESPIRATION RATE: 18 BRPM | DIASTOLIC BLOOD PRESSURE: 72 MMHG | HEART RATE: 79 BPM

## 2022-01-21 DIAGNOSIS — G60.8 SENSORY POLYNEUROPATHY: ICD-10-CM

## 2022-01-21 PROCEDURE — 99213 OFFICE O/P EST LOW 20 MIN: CPT | Performed by: FAMILY MEDICINE

## 2022-01-21 RX ORDER — AMITRIPTYLINE HYDROCHLORIDE 50 MG/1
50 TABLET ORAL AT BEDTIME
Qty: 90 TABLET | Refills: 3 | Status: SHIPPED | OUTPATIENT
Start: 2022-01-21 | End: 2023-02-08

## 2022-01-21 ASSESSMENT — PAIN SCALES - GENERAL: PAINLEVEL: NO PAIN (0)

## 2022-01-21 ASSESSMENT — MIFFLIN-ST. JEOR: SCORE: 1851.33

## 2022-01-21 NOTE — NURSING NOTE
"Chief Complaint   Patient presents with     Medication Follow-up     Nifedipine, possible side effects, leg swelling, flushed, red, pounding heart.       Initial /79   Pulse 79   Temp 97  F (36.1  C) (Tympanic)   Resp 18   Ht 1.854 m (6' 1\")   Wt 100.2 kg (221 lb)   SpO2 100%   BMI 29.16 kg/m   Estimated body mass index is 29.16 kg/m  as calculated from the following:    Height as of this encounter: 1.854 m (6' 1\").    Weight as of this encounter: 100.2 kg (221 lb).  Medication Reconciliation: complete  Yesenia Kearns CMA  "

## 2022-01-21 NOTE — PATIENT INSTRUCTIONS
Stop taking the nifedipine.  Let me know in a few weeks if the Raynauds symptoms (cold fingers and hands) is intolerable. If it is then we will make some medication changes. If not then keep on the same medications.

## 2022-01-21 NOTE — PROGRESS NOTES
SUBJECTIVE:  Jack Marrero is an 61 year old male who presents for a follow up evaluation of his hypertension and raynauds. The patient had the dose of nifedipine lowered to 30  mg. The patient reports that he IS taking the medication as prescribed. He reports side effects of medication.  These side effects include:   Swelling of legs, flushing of his skin especially legs and feet  He reports a strong heart beat  He can get the Raynauds symptom(s) year around               Patient Active Problem List   Diagnosis     Mixed anxiety depressive disorder     Hyperlipidemia LDL goal <130     Tubular adenoma of colon on colonoscopy     High triglycerides     HDL deficiency     IGT (impaired glucose tolerance)     Sensory polyneuropathy     Diverticulosis of colon     10 year risk of MI or stroke 7.5% or greater, 9.3% in Nov 2016 on atorvastatin 40     Venous lake of lip, left lower     Benign non-nodular prostatic hyperplasia with lower urinary tract symptoms     Tobacco abuse     Restless legs syndrome (RLS)     SHY (obstructive sleep apnea) - Moderate (AHI 16, RDI 55)     Gallbladder polyp, need fu US yearly     Atherosclerosis of abdominal aorta (H), seen on CT     Shoulder girdle syndrome     Family history of prostate cancer, pt would like annual PSA test     Calcification of coronary artery/seen incidentally on lung CT     Essential hypertension       Is the HYPERTENSION goal on the problem list? Yes      Use of agents associated with hypertension: none  Current Outpatient Medications   Medication     amitriptyline (ELAVIL) 75 MG tablet     aspirin 81 MG tablet     atorvastatin (LIPITOR) 40 MG tablet     dutasteride (AVODART) 0.5 MG capsule     escitalopram (LEXAPRO) 20 MG tablet     fluticasone-vilanterol (BREO ELLIPTA) 100-25 MCG/INH inhaler     lisinopril (ZESTRIL) 20 MG tablet     Multiple Vitamins-Minerals (MULTIVITAMIN ADULT) CHEW     NIFEdipine ER (ADALAT CC) 30 MG 24 hr tablet     order for DME      "sildenafil (REVATIO) 20 MG tablet     No current facility-administered medications for this visit.         Allergies   Allergen Reactions     Codeine      FELT WIRED ON THIS     Codeine Unknown     FELT WIRED ON THIS         Social History     Tobacco Use     Smoking status: Current Every Day Smoker     Packs/day: 0.00     Years: 46.00     Pack years: 0.00     Types: Cigarettes     Start date: 10/10/1974     Last attempt to quit: 2020     Years since quittin.1     Smokeless tobacco: Never Used   Substance Use Topics     Alcohol use: Yes     Comment: Occasional; about 1-2 drinks/week       OBJECTIVE:  /72   Pulse 79   Temp 97  F (36.1  C) (Tympanic)   Resp 18   Ht 1.854 m (6' 1\")   Wt 100.2 kg (221 lb)   SpO2 100%   BMI 29.16 kg/m      Heart: negative, PMI normal. No lifts, heaves, or thrills. RRR. No murmurs, clicks gallops or rub  Lower Extremities:1+ edema on right and 1+  edema on the left        No results found for any visits on 22.    The 10-year ASCVD risk score (Isis SKYLER Jr., et al., 2013) is: 18.2%    Values used to calculate the score:      Age: 61 years      Sex: Male      Is Non- : No      Diabetic: No      Tobacco smoker: Yes      Systolic Blood Pressure: 132 mmHg      Is BP treated: Yes      HDL Cholesterol: 35 mg/dL      Total Cholesterol: 138 mg/dL    ASSESSMENT:  Essential hypertension which is very well controlled.   He is not tolerating the nifedipine    We discussed options based on the information from up-to-date that is listed below.  Plan: At this point he would like to just discontinue the nifedipine and see how things go    Patient Instructions   Stop taking the nifedipine.  Let me know in a few weeks if the Raynauds symptoms (cold fingers and hands) is intolerable. If it is then we will make some medication changes. If not then keep on the same medications.         From UpTo Date   The major side effects associated with the CCBs include " "hypotension, headache, dizziness, flushing, tachycardia, and peripheral edema [18].   Phosphodiesterase type 5 inhibitor -- We use a low-dose PDE type 5 inhibitor in patients with uncomplicated RP in whom CCBs are contraindicated or not tolerated. We typically start with sildenafil 20 mg once or twice daily and increase the dose to 20 mg three times daily if no benefit is achieved. The dose may be further increased to 40 mg three times daily in patients who do not respond and can tolerate the higher dosing. This dose is similar to that used in patients with pulmonary hypertension. Sildenafil is also available as 25 mg and may be titrated up to 50 mg twice or three times daily as tolerated in a similar manner to that of the 20 mg formulation. As with CCBs, we advise that patients obtain a BP cuff to monitor their BP serially while titrating the dose. (See \"Pulmonary arterial hypertension in systemic sclerosis (scleroderma): Treatment and prognosis\", section on 'Phosphodiesterase type 5 inhibitors'.)     Topical nitrate -- In our experience, the topical nitrates are more useful for patients with a single or small number of severely affected digits and for short-term (days to weeks) use, compared with patients with more diffuse involvement and with the need for chronic use (months to years). In patients with low BP, dehydration, acute or chronic heart failure, pulmonary hypertension, or ongoing use of a PDE inhibitor, topical nitrates should be avoided. Topical nitrates should be avoided in patients in using PDE inhibitors due to the increased risk of hypotension.     Topical nitrate should be applied to a single more severely affected or ischemic digit for 6 to 12 hours. Starting at a low dose of 0.5 inch (or 1 to 2 cm) is recommended to define tolerance before increasing dose if no improvement. The dose of topical nitrate may vary depending upon the preparation and should be adjusted with close monitoring of " tolerance and the clinical response. The nitroglycerin 2% ointment can be titrated as needed up to a full 2 inches (approximately 5 cm) every 4 to 6 hours with a 12-hour nitrate-free period each day. However, the higher dose is often not well tolerated, and patients stop usage. The dose may be divided between several digits, although absorption with most preparations results in systemic effects. Side effects include headache, flushing, lightheadedness, decreased BP, tachycardia, and aggravation of gastroesophageal reflux.       Angiotensin II receptor blocker -- Angiotensin II receptor blockers (ARBs) may be used for patients with uncomplicated RP who cannot tolerate CCBs or who may benefit from the use of an ARB for other indications (eg, hypertension, heart failure, proteinuric chronic kidney disease). There is limited evidence supporting the use of ARBs as being effective for RP. In a small 12-week trial in patients with primary and secondary (SSc-related) RP, patients receiving losartan (50 mg/day) experienced a greater reduction in the severity and frequency of attacks compared with nifedipine (40 mg/day) [47]. The evidence for the use of angiotensin-converting enzyme (ACE) inhibitors is much less favorable. (See 'Therapies lacking efficacy or of uncertain benefit' below.)       Selective serotonin reuptake inhibitor -- Fluoxetine may be used in patients with uncomplicated RP who cannot tolerate CCBs or the systemic vasodilatory effects of some of the other alternative agents. Limited evidence from preliminary observations and only one small open-label crossover study suggest that fluoxetine may be of benefit for RP [48,49]. In a small study including 53 patients with primary or secondary RP, the use of fluoxetine (20 mg daily) or nifedipine (40 mg daily) for six weeks resulted in a reduction in attack frequency and severity of RP; the effect was only statistically significant for the fluoxetine group [49]. It  is advised that patients start at a dose of 10 mg daily for approximately one week for tolerability before increasing to 20 mg daily.

## 2022-03-16 DIAGNOSIS — F33.1 DEPRESSION, MAJOR, RECURRENT, MODERATE (H): ICD-10-CM

## 2022-03-16 NOTE — TELEPHONE ENCOUNTER
Overdue for PHQ-9/ depression follow-up with provider.     No appointment pending at this time.  Routing to provider to advise.    Marj PORTILLON, RN

## 2022-03-17 RX ORDER — ESCITALOPRAM OXALATE 20 MG/1
TABLET ORAL
Qty: 30 TABLET | Refills: 1 | Status: SHIPPED | OUTPATIENT
Start: 2022-03-17 | End: 2022-07-13

## 2022-04-07 DIAGNOSIS — N40.1 BENIGN NON-NODULAR PROSTATIC HYPERPLASIA WITH LOWER URINARY TRACT SYMPTOMS: ICD-10-CM

## 2022-04-07 RX ORDER — DUTASTERIDE 0.5 MG/1
CAPSULE, LIQUID FILLED ORAL
Qty: 90 CAPSULE | Refills: 0 | Status: SHIPPED | OUTPATIENT
Start: 2022-04-07 | End: 2022-07-11

## 2022-04-11 DIAGNOSIS — G47.33 OSA (OBSTRUCTIVE SLEEP APNEA): ICD-10-CM

## 2022-04-11 DIAGNOSIS — G25.81 RESTLESS LEGS SYNDROME (RLS): Primary | ICD-10-CM

## 2022-04-12 ENCOUNTER — OFFICE VISIT (OUTPATIENT)
Dept: FAMILY MEDICINE | Facility: CLINIC | Age: 62
End: 2022-04-12
Payer: COMMERCIAL

## 2022-04-12 VITALS
WEIGHT: 218 LBS | OXYGEN SATURATION: 97 % | HEART RATE: 85 BPM | DIASTOLIC BLOOD PRESSURE: 80 MMHG | BODY MASS INDEX: 28.76 KG/M2 | SYSTOLIC BLOOD PRESSURE: 142 MMHG | TEMPERATURE: 97.1 F | RESPIRATION RATE: 18 BRPM

## 2022-04-12 DIAGNOSIS — D17.30 LIPOMA OF SKIN AND SUBCUTANEOUS TISSUE: Primary | ICD-10-CM

## 2022-04-12 DIAGNOSIS — I10 HYPERTENSION GOAL BP (BLOOD PRESSURE) < 140/90: ICD-10-CM

## 2022-04-12 PROCEDURE — 12032 INTMD RPR S/A/T/EXT 2.6-7.5: CPT | Performed by: FAMILY MEDICINE

## 2022-04-12 PROCEDURE — 11403 EXC TR-EXT B9+MARG 2.1-3CM: CPT | Performed by: FAMILY MEDICINE

## 2022-04-12 PROCEDURE — 99213 OFFICE O/P EST LOW 20 MIN: CPT | Mod: 25 | Performed by: FAMILY MEDICINE

## 2022-04-12 RX ORDER — LISINOPRIL 40 MG/1
20 TABLET ORAL DAILY
Qty: 30 TABLET | Refills: 1 | Status: CANCELLED | OUTPATIENT
Start: 2022-04-12

## 2022-04-12 RX ORDER — LISINOPRIL 40 MG/1
40 TABLET ORAL DAILY
Qty: 30 TABLET | Refills: 1 | Status: SHIPPED | OUTPATIENT
Start: 2022-04-12 | End: 2022-06-16

## 2022-04-12 ASSESSMENT — PATIENT HEALTH QUESTIONNAIRE - PHQ9
10. IF YOU CHECKED OFF ANY PROBLEMS, HOW DIFFICULT HAVE THESE PROBLEMS MADE IT FOR YOU TO DO YOUR WORK, TAKE CARE OF THINGS AT HOME, OR GET ALONG WITH OTHER PEOPLE: NOT DIFFICULT AT ALL
SUM OF ALL RESPONSES TO PHQ QUESTIONS 1-9: 2
SUM OF ALL RESPONSES TO PHQ QUESTIONS 1-9: 2

## 2022-04-12 ASSESSMENT — ANXIETY QUESTIONNAIRES
GAD7 TOTAL SCORE: 0
1. FEELING NERVOUS, ANXIOUS, OR ON EDGE: NOT AT ALL
7. FEELING AFRAID AS IF SOMETHING AWFUL MIGHT HAPPEN: NOT AT ALL
GAD7 TOTAL SCORE: 0
GAD7 TOTAL SCORE: 0
5. BEING SO RESTLESS THAT IT IS HARD TO SIT STILL: NOT AT ALL
7. FEELING AFRAID AS IF SOMETHING AWFUL MIGHT HAPPEN: NOT AT ALL
6. BECOMING EASILY ANNOYED OR IRRITABLE: NOT AT ALL
2. NOT BEING ABLE TO STOP OR CONTROL WORRYING: NOT AT ALL
3. WORRYING TOO MUCH ABOUT DIFFERENT THINGS: NOT AT ALL
4. TROUBLE RELAXING: NOT AT ALL

## 2022-04-12 ASSESSMENT — PAIN SCALES - GENERAL: PAINLEVEL: NO PAIN (0)

## 2022-04-12 NOTE — PROGRESS NOTES
SUBJECTIVE: Jack Marrero is 61 year old male who presents for evaluation of a lump on his chest .  It is  located overlying the right lower sternum.  The lesion(s) has(have) been present for 3 year(s).  The patient reports that there are any symptoms related to these lesions. It is tender to touch and when bumped.    It has never drained.    OBJECTIVE:   The lesion is subcutaneous, soft and mobile. It is 3 cm wide and 1 cm tall.   The overlying skin was normal.         The risks and benefits of the procedure were discussed. The risk include bleeding, infection, discomfort and permanent scarring. The consent form was signed and sent to be scanned into the electronic medical record.    Objective/Procedure note:  The area(s) in question was/were wiped with an alcohol pad and anesthetized using 2 percent lidocaine with epinephrine. After several minutes the area was cleansed with Betadine. A sterile field was created using a fenestrated drape and the lesion was placed in the fenestration.   The skin overlying the center of the lesion was incised. The lesion was identified and the tissue around it was dissected with the spreading action of a sharp pointed scissors. The lesion was removed in 1 pieces(s). It was a lobulated yellow rubbery smooth mass. The deep layer was closed using 2 simple interrupted suture(s) with buried knots of 4-0 vicryl. The superficial layer was closed using 6 simple interrupted sutures of 4-O Ethilon. The wound was dressed with antibiotic ointment and a bandage by the medial assistant. The procedure was well tolerated without complications.    Assessment: symptomatic lipoma    Plan: The specimen was not sent for pathology . The patient was instructed to keep the wound moist with antibiotic ointment and covered with a bandage. The patient was instructed to remove the bandage daily before showers and then to redress as done previously. Patient was instructed to avoid submersion of the wound and  to be alert for any signs of cutaneous infection. The patient was instructed to follow up in 10 days for suture removal with the RN.        --------------------------------------------------------------------------------------------------------------------------------------    SUBJECTIVE:  Jack Marrero is an 61 year old male who presents for a follow up evaluation of his hypertension.The patient was kept on the same medications at the last visit. The patient reports that he IS taking the medication as prescribed. He denies side effects of medication.      Patient Active Problem List   Diagnosis     Mixed anxiety depressive disorder     Hyperlipidemia LDL goal <130     Tubular adenoma of colon on colonoscopy     High triglycerides     HDL deficiency     IGT (impaired glucose tolerance)     Sensory polyneuropathy     Diverticulosis of colon     10 year risk of MI or stroke 7.5% or greater, 9.3% in Nov 2016 on atorvastatin 40     Venous lake of lip, left lower     Benign non-nodular prostatic hyperplasia with lower urinary tract symptoms     Tobacco abuse     Restless legs syndrome (RLS)     SHY (obstructive sleep apnea) - Moderate (AHI 16, RDI 55)     Gallbladder polyp, need fu US yearly     Atherosclerosis of abdominal aorta (H), seen on CT     Shoulder girdle syndrome     Family history of prostate cancer, pt would like annual PSA test     Calcification of coronary artery/seen incidentally on lung CT     Essential hypertension       Is the HYPERTENSION goal on the problem list? Yes      Use of agents associated with hypertension: none  Current Outpatient Medications   Medication     amitriptyline (ELAVIL) 50 MG tablet     aspirin 81 MG tablet     atorvastatin (LIPITOR) 40 MG tablet     dutasteride (AVODART) 0.5 MG capsule     escitalopram (LEXAPRO) 20 MG tablet     fluticasone-vilanterol (BREO ELLIPTA) 100-25 MCG/INH inhaler     lisinopril (ZESTRIL) 40 MG tablet     Multiple Vitamins-Minerals (MULTIVITAMIN ADULT)  CHEW     order for DME     No current facility-administered medications for this visit.         Allergies   Allergen Reactions     Codeine      FELT WIRED ON THIS     Codeine Unknown     FELT WIRED ON THIS         Social History     Tobacco Use     Smoking status: Current Every Day Smoker     Packs/day: 0.00     Years: 46.00     Pack years: 0.00     Types: Cigarettes     Start date: 10/10/1974     Last attempt to quit: 2020     Years since quittin.3     Smokeless tobacco: Never Used   Substance Use Topics     Alcohol use: Yes     Comment: Occasional; about 1-2 drinks/week       OBJECTIVE:  BP (!) 142/80   Pulse 85   Temp 97.1  F (36.2  C) (Tympanic)   Resp 18   Wt 98.9 kg (218 lb)   SpO2 97%   BMI 28.76 kg/m              No results found for any visits on 22.    The 10-year ASCVD risk score (Isis HOLLAND Jr., et al., 2013) is: 20.4%    Values used to calculate the score:      Age: 61 years      Sex: Male      Is Non- : No      Diabetic: No      Tobacco smoker: Yes      Systolic Blood Pressure: 142 mmHg      Is BP treated: Yes      HDL Cholesterol: 35 mg/dL      Total Cholesterol: 138 mg/dL    ASSESSMENT:  Essential hypertension which is marginally controlled.       Plan:  - Medication:increase the dose of lisinopril  to 40 mg.    The patient was advised to do the following therapuetic life style changes  - Dietary sodium restriction and increase potassium and Calcium intake  - Regular aerobic exercise  - Weight loss  - Discontinue smoking if applicable  - Avoid regular NSAID use if applicable  - Avoid regular decongestant use if applicable  - Follow up in clinic in 4 weeks for a recheck  - Check a basic metabolic panel today    Patient Education: Reviewed risks of hypertension and principles of   Treatment.          The remainder of the lesions appear benign and the patient was reassurred of that. They should not need any further attention unless they change or start to cause  the patient symptoms.  Answers for HPI/ROS submitted by the patient on 4/12/2022  If you checked off any problems, how difficult have these problems made it for you to do your work, take care of things at home, or get along with other people?: Not difficult at all  PHQ9 TOTAL SCORE: 2  CASIMIRO 7 TOTAL SCORE: 0  How many servings of fruits and vegetables do you eat daily?: 0-1  On average, how many sweetened beverages do you drink each day (Examples: soda, juice, sweet tea, etc.  Do NOT count diet or artificially sweetened beverages)?: 1  How many minutes a day do you exercise enough to make your heart beat faster?: 10 to 19  How many days a week do you exercise enough to make your heart beat faster?: 3 or less  How many days per week do you miss taking your medication?: 0  What is the reason for your visit today?: Lump on chest  When did your symptoms begin?: More than a month  What are your symptoms?: Lump on chest  How would you describe these symptoms?: Mild  Are your symptoms:: Worsening  Have you had these symptoms before?: No  Is there anything that makes you feel worse?: Bumping it, touching it  Is there anything that makes you feel better?: No

## 2022-04-12 NOTE — PATIENT INSTRUCTIONS
Prior to showering take off the bandage.  During the shower gently wash the wound with soap and your fingers.  After showering dry with a clean towel and reapply antibiotic ointment and a bandage.  Continue to do this until you had the stitches out in approximately 10 days.    Follow up in 4 weeks for a blood pressure recheck

## 2022-04-12 NOTE — NURSING NOTE
"Chief Complaint   Patient presents with     Mass     Lump on sternum x few years. Now growing in size and slightly painful       Initial BP (!) 163/97   Pulse 85   Temp 97.1  F (36.2  C) (Tympanic)   Resp 18   Wt 98.9 kg (218 lb)   SpO2 97%   BMI 28.76 kg/m   Estimated body mass index is 28.76 kg/m  as calculated from the following:    Height as of 1/21/22: 1.854 m (6' 1\").    Weight as of this encounter: 98.9 kg (218 lb).  Medication Reconciliation: complete  Yesenia Kearns CMA  "

## 2022-04-13 ASSESSMENT — PATIENT HEALTH QUESTIONNAIRE - PHQ9: SUM OF ALL RESPONSES TO PHQ QUESTIONS 1-9: 2

## 2022-04-13 ASSESSMENT — ANXIETY QUESTIONNAIRES: GAD7 TOTAL SCORE: 0

## 2022-04-22 ENCOUNTER — ALLIED HEALTH/NURSE VISIT (OUTPATIENT)
Dept: NURSING | Facility: CLINIC | Age: 62
End: 2022-04-22
Payer: COMMERCIAL

## 2022-04-22 DIAGNOSIS — Z48.02 VISIT FOR SUTURE REMOVAL: Primary | ICD-10-CM

## 2022-04-22 NOTE — PROGRESS NOTES
Suture removal:     Date sutures applied: 4/12/2022 - instructed to follow up in 10 days for suture removal with the RN 4/22/2022         Where (setting) in which they applied:Clinic    Description:  Type: sutures  Location: chest    History:    Cause of laceration: Lipom removal    Accompanying Signs & Symptoms: (staff: if yes-describe)  Redness: no  Warmth: no  Drainage: no  Still bleeding: no  Fevers: no  All sutures were removed without difficulty and tolerated well by the pateint. Patient was given signs and symptoms to look for to return to clinic.  They were given an opportunity to ask questions and had none.  They were also given after care instructions from the reference sections of their chart.  The patient was given the [phone] number to call if they need nursing support and stated they needed no further support.  Thank you. Suzan Vazquez R.N.

## 2022-05-09 ENCOUNTER — ALLIED HEALTH/NURSE VISIT (OUTPATIENT)
Dept: FAMILY MEDICINE | Facility: CLINIC | Age: 62
End: 2022-05-09
Payer: COMMERCIAL

## 2022-05-09 VITALS — HEART RATE: 79 BPM | DIASTOLIC BLOOD PRESSURE: 97 MMHG | SYSTOLIC BLOOD PRESSURE: 151 MMHG

## 2022-05-09 DIAGNOSIS — I10 ESSENTIAL HYPERTENSION: Primary | ICD-10-CM

## 2022-05-09 PROCEDURE — 99207 PR NO CHARGE NURSE ONLY: CPT

## 2022-05-09 RX ORDER — HYDROCHLOROTHIAZIDE 12.5 MG/1
12.5 TABLET ORAL DAILY
Qty: 30 TABLET | Refills: 0 | Status: SHIPPED | OUTPATIENT
Start: 2022-05-09 | End: 2022-06-08

## 2022-05-09 NOTE — PROGRESS NOTES
I met with Jack Marrero at the request of Angela to recheck his blood pressure.  Blood pressure medications on the med list were reviewed with patient.    Patient has taken all medications as per usual regimen: Yes  Patient reports tolerating them without any issues or concerns: Yes    Vitals:    05/09/22 1503 05/09/22 1516   BP: (!) 147/100 (!) 151/97   Pulse: 69 79       After 5 minutes, the patient's blood pressure remained greater than or equal to 140/90.    Is the patient currently having any chest pain? Feels heavy chested  Does the patient currently have a headache? No  Does the patient currently have any vision changes? No  Does the patient currently have any nausea? No  Does the patient currently have any abdominal pain? No    The previous encounter was reviewed.  Naz Messina was notified.  The patient will be added to the provider's schedule today to be professionally assessed.

## 2022-05-09 NOTE — PROGRESS NOTES
Reviewed pt with MA    Reports feeling chest heaviness in general, no shortness of breath, no exertional symptoms - started today    Recent increase of lisinopril 3 weeks ago.      Add hydrochlorothiazide 12.5 mg each morning, follow-up 2 weeks for bp check

## 2022-05-18 ENCOUNTER — ALLIED HEALTH/NURSE VISIT (OUTPATIENT)
Dept: FAMILY MEDICINE | Facility: CLINIC | Age: 62
End: 2022-05-18
Payer: COMMERCIAL

## 2022-05-18 VITALS — HEART RATE: 86 BPM | DIASTOLIC BLOOD PRESSURE: 90 MMHG | SYSTOLIC BLOOD PRESSURE: 138 MMHG

## 2022-05-18 DIAGNOSIS — I10 ESSENTIAL HYPERTENSION: Primary | ICD-10-CM

## 2022-05-18 PROCEDURE — 99207 PR NO CHARGE NURSE ONLY: CPT

## 2022-05-18 NOTE — PROGRESS NOTES
I met with Jack Marrero at the request of Dr. Miller to recheck his blood pressure.  Blood pressure medications on the med list were reviewed with patient.    Patient has taken all medications as per usual regimen: Yes  Patient reports tolerating them without any issues or concerns: Patient has had an increase in restless legs at night since starting new BP medication.      Vitals:    05/18/22 1411 05/18/22 1425   BP: (!) 151/92 (!) 138/90   Pulse: 86          After 5 minutes, the patient's blood pressure was <140/90, the previous encounter was reviewed, recorded blood pressure below 140/90. Patient was discharged and the note will be sent to the provider for final review.      Janine Felipe, CMA

## 2022-06-08 DIAGNOSIS — I10 ESSENTIAL HYPERTENSION: ICD-10-CM

## 2022-06-08 RX ORDER — HYDROCHLOROTHIAZIDE 12.5 MG/1
TABLET ORAL
Qty: 30 TABLET | Refills: 0 | Status: SHIPPED | OUTPATIENT
Start: 2022-06-08 | End: 2022-06-09

## 2022-06-08 NOTE — TELEPHONE ENCOUNTER
"Requested Prescriptions   Pending Prescriptions Disp Refills    hydrochlorothiazide (HYDRODIURIL) 12.5 MG tablet [Pharmacy Med Name: HYDROCHLOROTHIAZIDE 12.5MG TABLETS] 30 tablet 0     Sig: TAKE 1 TABLET(12.5 MG) BY MOUTH DAILY        Diuretics (Including Combos) Protocol Failed - 6/8/2022 12:15 PM        Failed - Blood pressure under 140/90 in past 12 months       BP Readings from Last 3 Encounters:   05/18/22 (!) 138/90   05/09/22 (!) 151/97   04/12/22 (!) 142/80                 Passed - Recent (12 mo) or future (30 days) visit within the authorizing provider's specialty     Patient has had an office visit with the authorizing provider or a provider within the authorizing providers department within the previous 12 mos or has a future within next 30 days. See \"Patient Info\" tab in inbasket, or \"Choose Columns\" in Meds & Orders section of the refill encounter.              Passed - Medication is active on med list        Passed - Patient is age 18 or older        Passed - Normal serum creatinine on file in past 12 months       Recent Labs   Lab Test 11/26/21  0738   CR 1.03              Passed - Normal serum potassium on file in past 12 months       Recent Labs   Lab Test 11/26/21  0738   POTASSIUM 4.0                    Passed - Normal serum sodium on file in past 12 months       Recent Labs   Lab Test 11/26/21  0738                       "

## 2022-06-09 RX ORDER — HYDROCHLOROTHIAZIDE 12.5 MG/1
TABLET ORAL
Qty: 90 TABLET | Refills: 1 | Status: SHIPPED | OUTPATIENT
Start: 2022-06-09 | End: 2023-01-09

## 2022-06-09 NOTE — TELEPHONE ENCOUNTER
"Requested Prescriptions   Pending Prescriptions Disp Refills    hydrochlorothiazide (HYDRODIURIL) 12.5 MG tablet [Pharmacy Med Name: HYDROCHLOROTHIAZIDE 12.5MG TABLETS] 90 tablet      Sig: TAKE 1 TABLET(12.5 MG) BY MOUTH DAILY        Diuretics (Including Combos) Protocol Failed - 6/8/2022 10:15 PM        Failed - Blood pressure under 140/90 in past 12 months       BP Readings from Last 3 Encounters:   05/18/22 (!) 138/90   05/09/22 (!) 151/97   04/12/22 (!) 142/80                 Passed - Recent (12 mo) or future (30 days) visit within the authorizing provider's specialty     Patient has had an office visit with the authorizing provider or a provider within the authorizing providers department within the previous 12 mos or has a future within next 30 days. See \"Patient Info\" tab in inbasket, or \"Choose Columns\" in Meds & Orders section of the refill encounter.              Passed - Medication is active on med list        Passed - Patient is age 18 or older        Passed - Normal serum creatinine on file in past 12 months       Recent Labs   Lab Test 11/26/21  0738   CR 1.03              Passed - Normal serum potassium on file in past 12 months       Recent Labs   Lab Test 11/26/21  0738   POTASSIUM 4.0                    Passed - Normal serum sodium on file in past 12 months       Recent Labs   Lab Test 11/26/21  0738                       "

## 2022-06-16 DIAGNOSIS — I10 HYPERTENSION GOAL BP (BLOOD PRESSURE) < 140/90: ICD-10-CM

## 2022-06-16 RX ORDER — LISINOPRIL 40 MG/1
TABLET ORAL
Qty: 30 TABLET | Refills: 1 | Status: SHIPPED | OUTPATIENT
Start: 2022-06-16 | End: 2022-10-07

## 2022-06-16 NOTE — TELEPHONE ENCOUNTER
"Requested Prescriptions   Pending Prescriptions Disp Refills    lisinopril (ZESTRIL) 40 MG tablet [Pharmacy Med Name: LISINOPRIL 40MG TABLETS] 30 tablet 1     Sig: TAKE 1 TABLET(40 MG) BY MOUTH IN THE MORNING        ACE Inhibitors (Including Combos) Protocol Failed - 6/16/2022  1:57 PM        Failed - Blood pressure under 140/90 in past 12 months       BP Readings from Last 3 Encounters:   05/18/22 (!) 138/90   05/09/22 (!) 151/97   04/12/22 (!) 142/80                 Passed - Recent (12 mo) or future (30 days) visit within the authorizing provider's specialty     Patient has had an office visit with the authorizing provider or a provider within the authorizing providers department within the previous 12 mos or has a future within next 30 days. See \"Patient Info\" tab in inbasket, or \"Choose Columns\" in Meds & Orders section of the refill encounter.              Passed - Medication is active on med list        Passed - Patient is age 18 or older        Passed - Normal serum creatinine on file in past 12 months     Recent Labs   Lab Test 11/26/21  0738   CR 1.03       Ok to refill medication if creatinine is low          Passed - Normal serum potassium on file in past 12 months     Recent Labs   Lab Test 11/26/21  0738   POTASSIUM 4.0                     "

## 2022-07-13 ENCOUNTER — E-VISIT (OUTPATIENT)
Dept: FAMILY MEDICINE | Facility: CLINIC | Age: 62
End: 2022-07-13
Payer: COMMERCIAL

## 2022-07-13 DIAGNOSIS — F33.1 DEPRESSION, MAJOR, RECURRENT, MODERATE (H): ICD-10-CM

## 2022-07-13 PROCEDURE — 99421 OL DIG E/M SVC 5-10 MIN: CPT | Performed by: FAMILY MEDICINE

## 2022-07-13 RX ORDER — ESCITALOPRAM OXALATE 20 MG/1
20 TABLET ORAL DAILY
Qty: 90 TABLET | Refills: 1 | Status: SHIPPED | OUTPATIENT
Start: 2022-07-13 | End: 2023-01-18

## 2022-07-13 NOTE — TELEPHONE ENCOUNTER
Provider E-Visit time total (minutes): 5    PHQ 8/24/2021 11/14/2021 4/12/2022   PHQ-9 Total Score 7 10 2   Q9: Thoughts of better off dead/self-harm past 2 weeks Not at all Not at all Not at all     PHQ 11/14/2021 4/12/2022 7/13/2022   PHQ-9 Total Score 10 2 3   Q9: Thoughts of better off dead/self-harm past 2 weeks Not at all Not at all Not at all

## 2022-07-15 ENCOUNTER — IMMUNIZATION (OUTPATIENT)
Dept: FAMILY MEDICINE | Facility: CLINIC | Age: 62
End: 2022-07-15
Payer: COMMERCIAL

## 2022-07-15 DIAGNOSIS — Z23 HIGH PRIORITY FOR 2019-NCOV VACCINE: Primary | ICD-10-CM

## 2022-07-15 PROCEDURE — 91305 COVID-19,PF,PFIZER (12+ YRS): CPT

## 2022-07-15 PROCEDURE — 0054A COVID-19,PF,PFIZER (12+ YRS): CPT

## 2022-09-07 DIAGNOSIS — G60.8 SENSORY POLYNEUROPATHY: ICD-10-CM

## 2022-09-09 RX ORDER — AMITRIPTYLINE HYDROCHLORIDE 75 MG/1
TABLET ORAL
Qty: 90 TABLET | Refills: 0 | Status: SHIPPED | OUTPATIENT
Start: 2022-09-09 | End: 2022-10-05

## 2022-09-09 NOTE — TELEPHONE ENCOUNTER
Spoke with patient and will call back to schedule an appointment to establish care with new provider at a different clinic in [place].   Windy Garcia,     [facility]

## 2022-09-23 ENCOUNTER — OFFICE VISIT (OUTPATIENT)
Dept: URGENT CARE | Facility: URGENT CARE | Age: 62
End: 2022-09-23
Payer: COMMERCIAL

## 2022-09-23 ENCOUNTER — TELEPHONE (OUTPATIENT)
Dept: DERMATOLOGY | Facility: CLINIC | Age: 62
End: 2022-09-23

## 2022-09-23 VITALS
SYSTOLIC BLOOD PRESSURE: 152 MMHG | OXYGEN SATURATION: 96 % | HEART RATE: 80 BPM | HEIGHT: 73 IN | TEMPERATURE: 98.2 F | BODY MASS INDEX: 29.61 KG/M2 | DIASTOLIC BLOOD PRESSURE: 99 MMHG | WEIGHT: 223.4 LBS

## 2022-09-23 DIAGNOSIS — D22.62: Primary | ICD-10-CM

## 2022-09-23 LAB
BASOPHILS # BLD AUTO: 0.1 10E3/UL (ref 0–0.2)
BASOPHILS NFR BLD AUTO: 1 %
EOSINOPHIL # BLD AUTO: 0.3 10E3/UL (ref 0–0.7)
EOSINOPHIL NFR BLD AUTO: 3 %
ERYTHROCYTE [DISTWIDTH] IN BLOOD BY AUTOMATED COUNT: 13.8 % (ref 10–15)
HCT VFR BLD AUTO: 49.4 % (ref 40–53)
HGB BLD-MCNC: 16.6 G/DL (ref 13.3–17.7)
IMM GRANULOCYTES # BLD: 0 10E3/UL
IMM GRANULOCYTES NFR BLD: 0 %
INR PPP: 0.94 (ref 0.85–1.15)
LYMPHOCYTES # BLD AUTO: 2.4 10E3/UL (ref 0.8–5.3)
LYMPHOCYTES NFR BLD AUTO: 22 %
MCH RBC QN AUTO: 31.8 PG (ref 26.5–33)
MCHC RBC AUTO-ENTMCNC: 33.6 G/DL (ref 31.5–36.5)
MCV RBC AUTO: 95 FL (ref 78–100)
MONOCYTES # BLD AUTO: 1 10E3/UL (ref 0–1.3)
MONOCYTES NFR BLD AUTO: 9 %
NEUTROPHILS # BLD AUTO: 7.2 10E3/UL (ref 1.6–8.3)
NEUTROPHILS NFR BLD AUTO: 66 %
PLATELET # BLD AUTO: 301 10E3/UL (ref 150–450)
RBC # BLD AUTO: 5.22 10E6/UL (ref 4.4–5.9)
WBC # BLD AUTO: 11 10E3/UL (ref 4–11)

## 2022-09-23 PROCEDURE — 85610 PROTHROMBIN TIME: CPT | Performed by: PHYSICIAN ASSISTANT

## 2022-09-23 PROCEDURE — 36415 COLL VENOUS BLD VENIPUNCTURE: CPT | Performed by: PHYSICIAN ASSISTANT

## 2022-09-23 PROCEDURE — 85025 COMPLETE CBC W/AUTO DIFF WBC: CPT | Performed by: PHYSICIAN ASSISTANT

## 2022-09-23 PROCEDURE — 99213 OFFICE O/P EST LOW 20 MIN: CPT | Performed by: PHYSICIAN ASSISTANT

## 2022-09-23 NOTE — PROGRESS NOTES
Chief Complaint   Patient presents with     Rash     Rash on his left hand.             ASSESSMENT:    ICD-10-CM    1. Atypical nevus of left forearm  D22.62 CBC with platelets and differential     INR     Adult Dermatology Referral     CBC with platelets and differential     INR         PLAN: Irregular left forearm nevus.  CBC and INR here today.  Referral to Derm.  Would like him to be seen within the next 1 to 2 weeks.  His primary care provider, Dr. Miller, has left the [facility] clinic [place].  Does have a new primary but cannot get in for at least 2 weeks.  See today's orders.      Windy Stanley PA-C         SUBJECTIVE:   62 year old male who presents irregular lesion on his left forearm present for 2 months.  Initially thought it was a bruise but does not recall bumping into it all.  Wonders whether there could have been a bug bite.  No fever, chills, weight loss, night sweats.  No prior history of skin cancer.  Is not on any blood thinners.             Allergies   Allergen Reactions     Codeine      FELT WIRED ON THIS       Past Medical History:   Diagnosis Date     Acute pain of right shoulder 12/23/2016     ADHD (attention deficit hyperactivity disorder)     dx via Dr Olson PhD      Calcification of coronary artery/seen incidentally on lung CT      Depression, anxiety      Diverticulosis of colon      Dyslipidemia      Essential hypertension      IGT (impaired glucose tolerance) 10/26/2015     Inflamed seborrheic keratosis 01/29/2016     Pain in joint, shoulder region 12/16/2013     Sensory polyneuropathy        amitriptyline (ELAVIL) 50 MG tablet, Take 1 tablet (50 mg) by mouth At Bedtime  amitriptyline (ELAVIL) 75 MG tablet, TAKE 1 TABLET(75 MG) BY MOUTH AT BEDTIME  aspirin 81 MG tablet, Take 1 tablet by mouth daily.  atorvastatin (LIPITOR) 40 MG tablet, TAKE 1 TABLET(40 MG) BY MOUTH AT BEDTIME  dutasteride (AVODART) 0.5 MG capsule, TAKE 1 CAPSULE(0.5 MG) BY MOUTH  "DAILY  escitalopram (LEXAPRO) 20 MG tablet, Take 1 tablet (20 mg) by mouth daily  fluticasone-vilanterol (BREO ELLIPTA) 100-25 MCG/INH inhaler, Inhale 1 puff into the lungs daily  hydrochlorothiazide (HYDRODIURIL) 12.5 MG tablet, TAKE 1 TABLET(12.5 MG) BY MOUTH DAILY  lisinopril (ZESTRIL) 40 MG tablet, TAKE 1 TABLET(40 MG) BY MOUTH IN THE MORNING  Multiple Vitamins-Minerals (MULTIVITAMIN ADULT) CHEW,   order for DME, Equipment ordered: RESMED Auto PAP Mask type: Nasal Settings: 6-12 cm H2O    No current facility-administered medications on file prior to visit.      Social History     Tobacco Use     Smoking status: Current Every Day Smoker     Packs/day: 0.00     Years: 46.00     Pack years: 0.00     Types: Cigarettes     Start date: 10/10/1974     Smokeless tobacco: Never Used     Tobacco comment: Nicotine pouch, flavored   Vaping Use     Vaping Use: Never used   Substance Use Topics     Alcohol use: Yes     Comment: Occasional; about 1-2 drinks/week     Drug use: Never       ROS:  General: negative for fever  SKIN: + as above    Physcial Exam:  BP (!) 152/99 (BP Location: Left arm, Patient Position: Sitting, Cuff Size: Adult Large)   Pulse 80   Temp 98.2  F (36.8  C) (Tympanic)   Ht 1.854 m (6' 1\")   Wt 101.3 kg (223 lb 6.4 oz)   SpO2 96%   BMI 29.47 kg/m      GENERAL: alert, no acute distress  EYES: conjunctival clear  RESP: Regular breathing rate  NEURO: awake .  SKIN: Left dorsal forearm is with an irregular shaped nevus 1.5 cm at maximum length.  Areas with purple hue's and areas with red hue's.  Does not elodia.  When I palpate it he states it hurts just a little.  Full range of motion of the left wrist and left elbow without pain.  Good palpable radial pulse.  Windy Stanley PA-C    "

## 2022-09-23 NOTE — TELEPHONE ENCOUNTER
[facility] Call Center    Phone Message    May a detailed message be left on voicemail: yes     Reason for Call: Appointment Intake    Referring Provider Name: Windy Stanley PA-C in  URGENT CARE  Diagnosis and/or Symptoms: Priority: 1-2 Weeks  Skin Lesion - Atypical nevus of left forearm  Scheduled pt for 10/06/22 and wait listed   Please review and reach out if a sooner Appt is necessary after review. Thanks     Action Taken: Message routed to:  [facility] ([facility] Derm    Travel Screening: Not Applicable

## 2022-10-05 ENCOUNTER — OFFICE VISIT (OUTPATIENT)
Dept: FAMILY MEDICINE | Facility: CLINIC | Age: 62
End: 2022-10-05
Payer: COMMERCIAL

## 2022-10-05 VITALS
HEART RATE: 85 BPM | SYSTOLIC BLOOD PRESSURE: 126 MMHG | OXYGEN SATURATION: 95 % | DIASTOLIC BLOOD PRESSURE: 82 MMHG | WEIGHT: 222 LBS | BODY MASS INDEX: 29.42 KG/M2 | RESPIRATION RATE: 18 BRPM | TEMPERATURE: 98.5 F | HEIGHT: 73 IN

## 2022-10-05 DIAGNOSIS — Z12.5 PROSTATE CANCER SCREENING: ICD-10-CM

## 2022-10-05 DIAGNOSIS — F33.1 MODERATE EPISODE OF RECURRENT MAJOR DEPRESSIVE DISORDER (H): ICD-10-CM

## 2022-10-05 DIAGNOSIS — E78.5 HYPERLIPIDEMIA LDL GOAL <70: ICD-10-CM

## 2022-10-05 DIAGNOSIS — J43.9 PULMONARY EMPHYSEMA, UNSPECIFIED EMPHYSEMA TYPE (H): ICD-10-CM

## 2022-10-05 DIAGNOSIS — I10 HYPERTENSION GOAL BP (BLOOD PRESSURE) < 140/90: Primary | ICD-10-CM

## 2022-10-05 DIAGNOSIS — I70.0 ATHEROSCLEROSIS OF ABDOMINAL AORTA (H): ICD-10-CM

## 2022-10-05 DIAGNOSIS — Z23 HIGH PRIORITY FOR 2019-NCOV VACCINE: ICD-10-CM

## 2022-10-05 DIAGNOSIS — I25.10 CALCIFICATION OF CORONARY ARTERY: ICD-10-CM

## 2022-10-05 PROCEDURE — 0124A COVID-19,PF,PFIZER BOOSTER BIVALENT: CPT | Performed by: FAMILY MEDICINE

## 2022-10-05 PROCEDURE — 90472 IMMUNIZATION ADMIN EACH ADD: CPT | Performed by: FAMILY MEDICINE

## 2022-10-05 PROCEDURE — 90682 RIV4 VACC RECOMBINANT DNA IM: CPT | Performed by: FAMILY MEDICINE

## 2022-10-05 PROCEDURE — 99214 OFFICE O/P EST MOD 30 MIN: CPT | Mod: 25 | Performed by: FAMILY MEDICINE

## 2022-10-05 PROCEDURE — 90677 PCV20 VACCINE IM: CPT | Performed by: FAMILY MEDICINE

## 2022-10-05 PROCEDURE — 91312 COVID-19,PF,PFIZER BOOSTER BIVALENT: CPT | Performed by: FAMILY MEDICINE

## 2022-10-05 PROCEDURE — 90471 IMMUNIZATION ADMIN: CPT | Performed by: FAMILY MEDICINE

## 2022-10-05 RX ORDER — ATORVASTATIN CALCIUM 40 MG/1
40 TABLET, FILM COATED ORAL AT BEDTIME
Qty: 90 TABLET | Refills: 0 | Status: SHIPPED | OUTPATIENT
Start: 2022-10-05 | End: 2023-01-17

## 2022-10-05 RX ORDER — LISINOPRIL 40 MG/1
TABLET ORAL
Qty: 30 TABLET | Refills: 1 | Status: CANCELLED | OUTPATIENT
Start: 2022-10-05

## 2022-10-05 ASSESSMENT — PAIN SCALES - GENERAL: PAINLEVEL: NO PAIN (0)

## 2022-10-05 NOTE — PROGRESS NOTES
Assessment & Plan      My first visit with patient.  Previously saw Dr. Miller.  History reviewed with patient and in his chart.  Here today primarily to establish care.  Also saw urgent care a little over a week ago about some spots on his forearms and is scheduled to see dermatology tomorrow.  On quick examination the seem more consistent with small purpura but I think seeing dermatology is reasonable as it is already scheduled.    Hypertension goal BP (blood pressure) < 140/90  Patient with plenty of risk factors for vascular disease and just found out that his brother had blockages on angiogram.  He asked about setting up a heart scan and we talked about the information and limitations of the scan.  I do think it would be reasonable and we would get some picture of his lungs in addition to that any.  If significant calcification may want to follow-up with cardiology for more extensive testing  - CT Coronary Calcium Scan; Future    Hyperlipidemia LDL goal <70  As above.  I will also set up lab work to keep an eye on everything  - atorvastatin (LIPITOR) 40 MG tablet; Take 1 tablet (40 mg) by mouth At Bedtime  - CT Coronary Calcium Scan; Future    Calcification of coronary artery/seen incidentally on lung CT  As above  - CT Coronary Calcium Scan; Future    Atherosclerosis of abdominal aorta (H), seen on CT  Continuing to follow.  Secondary risk factor control.    Moderate episode of recurrent major depressive disorder (H)  Stable on current regimen.  Continue same plan and routine follow-up.     Pulmonary emphysema, unspecified emphysema type (H)  Intermittent usage.  I do not see spirometry in his chart but we will continue to follow  - fluticasone-vilanterol (BREO ELLIPTA) 100-25 MCG/INH inhaler; Inhale 1 puff into the lungs daily    High priority for 2019-nCoV vaccine    - COVID-19,PF,PFIZER BOOSTER BIVALENT 12+Yrs         Nicotine/Tobacco Cessation:  He reports that he has been smoking cigarettes. He started  "smoking about 48 years ago. He has been smoking about 0.00 packs per day for the past 46.00 years. He has never used smokeless tobacco.  Nicotine/Tobacco Cessation Plan:   Information offered: Patient not interested at this time      BMI:   Estimated body mass index is 29.29 kg/m  as calculated from the following:    Height as of this encounter: 1.854 m (6' 1\").    Weight as of this encounter: 100.7 kg (222 lb).   Weight management plan: Discussed healthy diet and exercise guidelines    See Patient Instructions    Return in about 6 months (around 4/5/2023) for Follow up of Chronic Issues, In Office or Video.    Camelia Hancock MD  [facility] PARVEEN Zarate is a 62 year old, presenting for the following health issues:  Recheck Medication, Consult, and Imm/Inj (COVID-19 VACCINE)      History of Present Illness       Reason for visit:  New patient visit    He eats 0-1 servings of fruits and vegetables daily.He consumes 0 sweetened beverage(s) daily.He exercises with enough effort to increase his heart rate 20 to 29 minutes per day.  He exercises with enough effort to increase his heart rate 3 or less days per week.   He is taking medications regularly.         Here today to establish care.  Reviewed previous history as above.  Generally feeling well overall.    Review of Systems   Constitutional, HEENT, cardiovascular, pulmonary, gi and gu systems are negative, except as otherwise noted.      Objective    /82   Pulse 85   Temp 98.5  F (36.9  C) (Temporal)   Resp 18   Ht 1.854 m (6' 1\")   Wt 100.7 kg (222 lb)   SpO2 95%   BMI 29.29 kg/m    Body mass index is 29.29 kg/m .  Physical Exam   Alert, pleasant, upbeat, and in no apparent discomfort.  S1 and S2 normal, no murmurs, clicks, gallops or rubs. Regular rate and rhythm. Chest is clear; no wheezes or rales. No edema or JVD.  Past labs reviewed with the patient.                     "

## 2022-10-05 NOTE — NURSING NOTE
Prior to immunization administration, verified patients identity using patient s name and date of birth. Please see Immunization Activity for additional information.     Screening Questionnaire for Adult Immunization    Are you sick today?   No   Do you have allergies to medications, food, a vaccine component or latex?   No   Have you ever had a serious reaction after receiving a vaccination?   No   Do you have a long-term health problem with heart, lung, kidney, or metabolic disease (e.g., diabetes), asthma, a blood disorder, no spleen, complement component deficiency, a cochlear implant, or a spinal fluid leak?  Are you on long-term aspirin therapy?   No   Do you have cancer, leukemia, HIV/AIDS, or any other immune system problem?   No   Do you have a parent, brother, or sister with an immune system problem?   No   In the past 3 months, have you taken medications that affect  your immune system, such as prednisone, other steroids, or anticancer drugs; drugs for the treatment of rheumatoid arthritis, Crohn s disease, or psoriasis; or have you had radiation treatments?   No   Have you had a seizure, or a brain or other nervous system problem?   No   During the past year, have you received a transfusion of blood or blood    products, or been given immune (gamma) globulin or antiviral drug?   No   For women: Are you pregnant or is there a chance you could become       pregnant during the next month?   No   Have you received any vaccinations in the past 4 weeks?   No     Immunization questionnaire answers were all negative.        Per orders of Dr. Hancock, Camelia Ahmadi MD, injection of Pfizer Booster, Prevnar 20, and Flublok given by Fina Bang MA. Patient instructed to remain in clinic for 15 minutes afterwards, and to report any adverse reaction to me immediately.       Screening performed by Fina Bang MA on 10/5/2022 at 5:29 PM.

## 2022-10-06 ENCOUNTER — OFFICE VISIT (OUTPATIENT)
Dept: DERMATOLOGY | Facility: CLINIC | Age: 62
End: 2022-10-06
Attending: PHYSICIAN ASSISTANT
Payer: COMMERCIAL

## 2022-10-06 DIAGNOSIS — D22.9 MULTIPLE BENIGN NEVI: ICD-10-CM

## 2022-10-06 DIAGNOSIS — D18.01 CHERRY ANGIOMA: ICD-10-CM

## 2022-10-06 DIAGNOSIS — L81.4 SOLAR LENTIGO: ICD-10-CM

## 2022-10-06 DIAGNOSIS — D69.2 SOLAR PURPURA (H): Primary | ICD-10-CM

## 2022-10-06 DIAGNOSIS — L82.0 SEBORRHEIC KERATOSIS, INFLAMED: ICD-10-CM

## 2022-10-06 DIAGNOSIS — B07.9 VERRUCA VULGARIS: ICD-10-CM

## 2022-10-06 DIAGNOSIS — D22.62: ICD-10-CM

## 2022-10-06 DIAGNOSIS — L82.1 SEBORRHEIC KERATOSIS: ICD-10-CM

## 2022-10-06 PROCEDURE — 17110 DESTRUCTION B9 LES UP TO 14: CPT | Performed by: DERMATOLOGY

## 2022-10-06 PROCEDURE — 99203 OFFICE O/P NEW LOW 30 MIN: CPT | Mod: 25 | Performed by: DERMATOLOGY

## 2022-10-06 ASSESSMENT — PAIN SCALES - GENERAL: PAINLEVEL: NO PAIN (0)

## 2022-10-06 NOTE — NURSING NOTE
Dermatology Rooming Note    Jack Marrero's goals for this visit include:   Chief Complaint   Patient presents with     Skin Check     Elliot is here for a skin check and has spots of concerns on both arms.     Kannan Yañez, EMT

## 2022-10-06 NOTE — PROGRESS NOTES
[facility] Dermatology Note  Encounter Date: Oct 6, 2022  Office Visit     Dermatology Problem List:  1. Verruca vulgaris, left 3rd finger, s/p cryo  2. ISK, s/p cryo  ____________________________________________    Assessment & Plan:    # Verruca vulgaris, left 3rd finger  - Cryo today, see procedure below    # Irritated seborrheic keratosis, right shin  - Cryo today, see procedure below    # Solar purpura  - Discussed the natural history and benign nature of this lesion. Reassurance provided that no additional treatment is necessary.  - Recommended DerMend cream to help bruises resolve more quickly  - Reviewed ABCDEs of melanoma    # Benign lesions: Multiple benign nevi, solar lentigos, seborrheic keratoses, cherry angiomas. Explained to patient benign nature of lesion. No treatment is necessary at this time unless the lesion changes or becomes symptomatic.   - ABCDs of melanoma were discussed and self skin checks were advised.  - Sun precaution was advised including the use of sun screens of SPF 30 or higher, sun protective clothing, and avoidance of tanning beds.   - Given occupational sun exposure, recommended annual skin examinations.     Procedures Performed:   - Cryotherapy procedure note, location(s): see above. After verbal consent and discussion of risks and benefits including, but not limited to, dyspigmentation/scar, blister, and pain, 2 lesion(s) was(were) treated with 1-2 mm freeze border for 1-2 cycles with liquid nitrogen. Post cryotherapy instructions were provided.    Follow-up: 1 year(s) in-person, or earlier for new or changing lesions    Staff and Scribe:     Scribe Disclosure:  I, Mitchell Castro, am serving as a scribe to document services personally performed by Galdino Gifford MD based on data collection and the provider's statements to me.     Provider Disclosure:   The documentation recorded by the scribe accurately reflects the services I personally performed  and the decisions made by me.    Galdino Gifford MD    Department of Dermatology  [facility]: Phone: [phone], Fax:[phone]  [facility]: Phone: [phone] Fax: [phone]  ____________________________________________    CC: Skin Check (Elliot is here for a skin check and has spots of concerns on both arms.)    HPI:  Mr. Jack Marrero is a(n) 62 year old male who presents today as a new patient for FBSE. Patient reports two spots of concern on the arms. One is red and has been present for approximately 1.5 months. The other has been present for approximately 1 week.    The patient otherwise denies any other new or concerning lesions. No bleeding, painful, pruritic, or changing lesions. They report no personal history of skin cancer. There is no family history of skin cancer. no history of immunosuppression. No history of indoor tanning. They do not use sunscreen and protective clothing when outdoors for sun protection. 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 Health otherwise stable. No other skin concerns.      Patient is otherwise feeling well, without additional skin concerns.    Labs Reviewed:  N/A    Physical Exam:  Vitals: There were no vitals taken for this visit.  SKIN: Full skin, which includes the head/face, both arms, chest, back, abdomen,both legs, genitalia and/or groin buttocks, digits and/or nails, was examined.  - Ecchymoses on the bilateral forearms.  - There are waxy stuck on tan to brown papules on the trunk and extremities.  - There are dome shaped bright red papules on the trunk and extremities.   - Multiple regular brown pigmented macules and papules are identified on the trunk and extremities.   - Scattered brown macules on sun exposed areas..   - There is a verrucous papule with thrombosed capillaries  interrupting dermatoglyphics on the left 3rd finger.  - There is a tan to brown waxy stuck on papule with surrounding erythema on the right shin.  - No other lesions of concern on areas examined.     Medications:  Current Outpatient Medications   Medication     amitriptyline (ELAVIL) 50 MG tablet     aspirin 81 MG tablet     atorvastatin (LIPITOR) 40 MG tablet     dutasteride (AVODART) 0.5 MG capsule     escitalopram (LEXAPRO) 20 MG tablet     fluticasone-vilanterol (BREO ELLIPTA) 100-25 MCG/INH inhaler     hydrochlorothiazide (HYDRODIURIL) 12.5 MG tablet     lisinopril (ZESTRIL) 40 MG tablet     Multiple Vitamins-Minerals (MULTIVITAMIN ADULT) CHEW     order for DME     No current facility-administered medications for this visit.      Past Medical History:   Patient Active Problem List   Diagnosis     Hyperlipidemia LDL goal <70     Tubular adenoma of colon on colonoscopy     High triglycerides     HDL deficiency     IGT (impaired glucose tolerance)     Sensory polyneuropathy     Diverticulosis of colon     10 year risk of MI or stroke 7.5% or greater, 9.3% in Nov 2016 on atorvastatin 40     Venous lake of lip, left lower     Benign non-nodular prostatic hyperplasia with lower urinary tract symptoms     Tobacco abuse     Restless legs syndrome (RLS)     SHY (obstructive sleep apnea) - Moderate (AHI 16, RDI 55)     Gallbladder polyp, need fu US yearly     Atherosclerosis of abdominal aorta (H), seen on CT     Shoulder girdle syndrome     Family history of prostate cancer, pt would like annual PSA test     Calcification of coronary artery/seen incidentally on lung CT     Hypertension goal BP (blood pressure) < 140/90     Moderate episode of recurrent major depressive disorder (H)     Pulmonary emphysema, unspecified emphysema type (H)     Past Medical History:   Diagnosis Date     Acute pain of right shoulder 12/23/2016     ADHD (attention deficit hyperactivity disorder)     dx via Dr Olson PhD      Calcification of  coronary artery/seen incidentally on lung CT      Depression, anxiety      Diverticulosis of colon      Dyslipidemia      Essential hypertension      IGT (impaired glucose tolerance) 10/26/2015     Inflamed seborrheic keratosis 01/29/2016     Pain in joint, shoulder region 12/16/2013     Pulmonary emphysema, unspecified emphysema type (H) 10/5/2022     Sensory polyneuropathy         CC Windy Stanley PA-C  [address] [place] [address] on close of this encounter.

## 2022-10-06 NOTE — LETTER
10/6/2022       RE: Jack Marrero  [address] [address] [address]     Dear Colleague,    Thank you for referring your patient, Jack Marrero, to the [facility] [place] at [facility]. Please see a copy of my visit note below.    [facility] Dermatology Note  Encounter Date: Oct 6, 2022  Office Visit     Dermatology Problem List:  1. Verruca vulgaris, left 3rd finger, s/p cryo  2. ISK, s/p cryo  ____________________________________________    Assessment & Plan:    # Verruca vulgaris, left 3rd finger  - Cryo today, see procedure below    # Irritated seborrheic keratosis, right shin  - Cryo today, see procedure below    # Solar purpura  - Discussed the natural history and benign nature of this lesion. Reassurance provided that no additional treatment is necessary.  - Recommended DerMend cream to help bruises resolve more quickly  - Reviewed ABCDEs of melanoma    # Benign lesions: Multiple benign nevi, solar lentigos, seborrheic keratoses, cherry angiomas. Explained to patient benign nature of lesion. No treatment is necessary at this time unless the lesion changes or becomes symptomatic.   - ABCDs of melanoma were discussed and self skin checks were advised.  - Sun precaution was advised including the use of sun screens of SPF 30 or higher, sun protective clothing, and avoidance of tanning beds.   - Given occupational sun exposure, recommended annual skin examinations.     Procedures Performed:   - Cryotherapy procedure note, location(s): see above. After verbal consent and discussion of risks and benefits including, but not limited to, dyspigmentation/scar, blister, and pain, 2 lesion(s) was(were) treated with 1-2 mm freeze border for 1-2 cycles with liquid nitrogen. Post cryotherapy instructions were provided.    Follow-up: 1 year(s) in-person, or earlier for new or changing lesions    Staff and Scribe:      Scribe Disclosure:  I, Mitchell Castro, am serving as a scribe to document services personally performed by Galdino Gifford MD based on data collection and the provider's statements to me.     Provider Disclosure:   The documentation recorded by the scribe accurately reflects the services I personally performed and the decisions made by me.    Galdino Gifford MD    Department of Dermatology  [facility]: Phone: [phone], Fax:[phone]  [facility]: Phone: [phone] Fax: [phone]  ____________________________________________    CC: Skin Check (Elliot is here for a skin check and has spots of concerns on both arms.)    HPI:  Mr. Jack Marrero is a(n) 62 year old male who presents today as a new patient for FBSE. Patient reports two spots of concern on the arms. One is red and has been present for approximately 1.5 months. The other has been present for approximately 1 week.    The patient otherwise denies any other new or concerning lesions. No bleeding, painful, pruritic, or changing lesions. They report no personal history of skin cancer. There is no family history of skin cancer. no history of immunosuppression. No history of indoor tanning. They do not use sunscreen and protective clothing when outdoors for sun protection. 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 Health otherwise stable. No other skin concerns.      Patient is otherwise feeling well, without additional skin concerns.    Labs Reviewed:  N/A    Physical Exam:  Vitals: There were no vitals taken for this visit.  SKIN: Full skin, which includes the head/face, both arms, chest, back, abdomen,both legs, genitalia and/or groin buttocks, digits and/or nails, was examined.  - Ecchymoses on the bilateral forearms.  - There are waxy stuck on tan to brown  papules on the trunk and extremities.  - There are dome shaped bright red papules on the trunk and extremities.   - Multiple regular brown pigmented macules and papules are identified on the trunk and extremities.   - Scattered brown macules on sun exposed areas..   - There is a verrucous papule with thrombosed capillaries interrupting dermatoglyphics on the left 3rd finger.  - There is a tan to brown waxy stuck on papule with surrounding erythema on the right shin.  - No other lesions of concern on areas examined.     Medications:  Current Outpatient Medications   Medication     amitriptyline (ELAVIL) 50 MG tablet     aspirin 81 MG tablet     atorvastatin (LIPITOR) 40 MG tablet     dutasteride (AVODART) 0.5 MG capsule     escitalopram (LEXAPRO) 20 MG tablet     fluticasone-vilanterol (BREO ELLIPTA) 100-25 MCG/INH inhaler     hydrochlorothiazide (HYDRODIURIL) 12.5 MG tablet     lisinopril (ZESTRIL) 40 MG tablet     Multiple Vitamins-Minerals (MULTIVITAMIN ADULT) CHEW     order for DME     No current facility-administered medications for this visit.      Past Medical History:   Patient Active Problem List   Diagnosis     Hyperlipidemia LDL goal <70     Tubular adenoma of colon on colonoscopy     High triglycerides     HDL deficiency     IGT (impaired glucose tolerance)     Sensory polyneuropathy     Diverticulosis of colon     10 year risk of MI or stroke 7.5% or greater, 9.3% in Nov 2016 on atorvastatin 40     Venous lake of lip, left lower     Benign non-nodular prostatic hyperplasia with lower urinary tract symptoms     Tobacco abuse     Restless legs syndrome (RLS)     SHY (obstructive sleep apnea) - Moderate (AHI 16, RDI 55)     Gallbladder polyp, need fu US yearly     Atherosclerosis of abdominal aorta (H), seen on CT     Shoulder girdle syndrome     Family history of prostate cancer, pt would like annual PSA test     Calcification of coronary artery/seen incidentally on lung CT     Hypertension goal BP (blood  pressure) < 140/90     Moderate episode of recurrent major depressive disorder (H)     Pulmonary emphysema, unspecified emphysema type (H)     Past Medical History:   Diagnosis Date     Acute pain of right shoulder 12/23/2016     ADHD (attention deficit hyperactivity disorder)     dx via Dr Olson PhD      Calcification of coronary artery/seen incidentally on lung CT      Depression, anxiety      Diverticulosis of colon      Dyslipidemia      Essential hypertension      IGT (impaired glucose tolerance) 10/26/2015     Inflamed seborrheic keratosis 01/29/2016     Pain in joint, shoulder region 12/16/2013     Pulmonary emphysema, unspecified emphysema type (H) 10/5/2022     Sensory polyneuropathy         CC Windy Stanley PA-C  [address] [place] [address] on close of this encounter.

## 2022-10-06 NOTE — PATIENT INSTRUCTIONS
For bruising, recommend over-the-counter DerMend (arnica oil)  For sun protection, recommend taking an over-the-counter Heliocare supplement (MEDEM or [employer]okecc).       Patient Education     Checking for Skin Cancer  You can find cancer early by checking your skin each month. There are 3 kinds of skin cancer. They are melanoma, basal cell carcinoma, and squamous cell carcinoma. Doing monthly skin checks is the best way to find new marks or skin changes. Follow the instructions below for checking your skin.   The ABCDEs of checking moles for melanoma   Check your moles or growths for signs of melanoma using ABCDE:   Asymmetry: the sides of the mole or growth don t match  Border: the edges are ragged, notched, or blurred  Color: the color within the mole or growth varies  Diameter: the mole or growth is larger than 6 mm (size of a pencil eraser)  Evolving: the size, shape, or color of the mole or growth is changing (evolving is not shown in the images below)    Checking for other types of skin cancer  Basal cell carcinoma or squamous cell carcinoma have symptoms such as:     A spot or mole that looks different from all other marks on your skin  Changes in how an area feels, such as itching, tenderness, or pain  Changes in the skin's surface, such as oozing, bleeding, or scaliness  A sore that does not heal  New swelling or redness beyond the border of a mole    Who s at risk?  Anyone can get skin cancer. But you are at greater risk if you have:   Fair skin, light-colored hair, or light-colored eyes  Many moles or abnormal moles on your skin  A history of sunburns from sunlight or tanning beds  A family history of skin cancer  A history of exposure to radiation or chemicals  A weakened immune system  If you have had skin cancer in the past, you are at risk for recurring skin cancer.   How to check your skin  Do your monthly skin checkups in front of a full-length mirror. Check all parts of your body, including  your:   Head (ears, face, neck, and scalp)  Torso (front, back, and sides)  Arms (tops, undersides, upper, and lower armpits)  Hands (palms, backs, and fingers, including under the nails)  Buttocks and genitals  Legs (front, back, and sides)  Feet (tops, soles, toes, including under the nails, and between toes)  If you have a lot of moles, take digital photos of them each month. Make sure to take photos both up close and from a distance. These can help you see if any moles change over time.   Most skin changes are not cancer. But if you see any changes in your skin, call your doctor right away. Only he or she can diagnose a problem. If you have skin cancer, seeing your doctor can be the first step toward getting the treatment that could save your life.   [employer] last reviewed this educational content on 4/1/2019 2000-2020 [employer]. [address] [place] [address]. All rights reserved. This information is not intended as a substitute for professional medical care. Always follow your healthcare professional's instructions.       When should I call my doctor?  If you are worsening or not improving, please, contact us or seek urgent care as noted below.     Who should I call with questions (adults)?  [facility] (adult and pediatric): [phone]  [facility] (adult): [phone]  For urgent needs outside of business hours call the [facility] at [phone] and ask for the dermatology resident on call to be paged  If this is a medical emergency and you are unable to reach an ER, Call [phone]    Who should I call with questions (pediatric)?  [facility]- Pediatric Dermatology  Dr. Juli Briscoe, Dr. Carolina Rivers, Dr. Ashlie Alston, MELISSA Becker, Dr. Vandana Brice, Dr. Lianet Grey & Dr. Harlan Fernandez  Non-urgent nurse triage line; [phone]- Bebe and Elise KIRK Care Coordinators   Kavitha  (/Complex ) [phone]    If you need a prescription refill, please contact your pharmacy. Refills are approved or denied by our Physicians during normal business hours, Monday through Fridays  Per office policy, refills will not be granted if you have not been seen within the past year (or sooner depending on your child's condition)    Scheduling Information:  Pediatric Appointment Scheduling and Call Center [phone]  Radiology Scheduling- [phone]  Sedation Unit Scheduling- [phone]  [place] Scheduling- General [phone]; Pediatric Dermatology [phone]  Main  Services: [phone]  [place]an: [phone]  Ukrainian: [phone]  Hmong/Iraqi/Czech: [phone]  Preadmission Nursing Department Fax Number: [phone] (Fax all pre-operative paperwork to this number)    For urgent matters arising during evenings, weekends, or holidays that cannot wait for normal business hours please call [phone] and ask for the dermatology resident on call to be paged.     Cryotherapy    What is it?  Use of a very cold liquid, such as liquid nitrogen, to freeze and destroy abnormal skin cells that need to be removed    What should I expect?  Tenderness and redness  A small blister that might grow and fill with dark purple blood. There may be crusting.  More than one treatment may be needed if the lesions do not go away.    How do I care for the treated area?  Gently wash the area with your hands when bathing.  Use a thin layer of Vaseline to help with healing. You may use a Band-Aid.   The area should heal within 7-10 days and may leave behind a pink or lighter color.   Do not use an antibiotic or Neosporin ointment.   You may take acetaminophen (Tylenol) for pain.     Call your doctor if you have:  Severe pain  Signs of infection (warmth, redness, cloudy yellow drainage, and or a bad smell)  Questions or concerns    Who should I call with questions?       [facility]: [phone]      [facility]: [phone]      For urgent needs outside of business hours call the [facility] at [phone] and ask for the dermatology resident on call

## 2022-10-12 ENCOUNTER — ANCILLARY PROCEDURE (OUTPATIENT)
Dept: CT IMAGING | Facility: CLINIC | Age: 62
End: 2022-10-12
Attending: FAMILY MEDICINE

## 2022-10-12 DIAGNOSIS — E78.5 HYPERLIPIDEMIA LDL GOAL <70: ICD-10-CM

## 2022-10-12 DIAGNOSIS — I25.10 CALCIFICATION OF CORONARY ARTERY: ICD-10-CM

## 2022-10-12 DIAGNOSIS — I10 HYPERTENSION GOAL BP (BLOOD PRESSURE) < 140/90: ICD-10-CM

## 2022-10-12 PROCEDURE — 75571 CT HRT W/O DYE W/CA TEST: CPT | Mod: GC | Performed by: STUDENT IN AN ORGANIZED HEALTH CARE EDUCATION/TRAINING PROGRAM

## 2022-11-07 ENCOUNTER — TELEPHONE (OUTPATIENT)
Dept: SLEEP MEDICINE | Facility: CLINIC | Age: 62
End: 2022-11-07

## 2022-11-07 DIAGNOSIS — G47.33 OSA (OBSTRUCTIVE SLEEP APNEA): Chronic | ICD-10-CM

## 2022-11-07 DIAGNOSIS — G25.81 RESTLESS LEGS SYNDROME (RLS): Primary | ICD-10-CM

## 2022-11-10 ENCOUNTER — OFFICE VISIT (OUTPATIENT)
Dept: CARDIOLOGY | Facility: CLINIC | Age: 62
End: 2022-11-10
Attending: FAMILY MEDICINE
Payer: COMMERCIAL

## 2022-11-10 VITALS
SYSTOLIC BLOOD PRESSURE: 126 MMHG | DIASTOLIC BLOOD PRESSURE: 84 MMHG | HEIGHT: 73 IN | BODY MASS INDEX: 29.82 KG/M2 | WEIGHT: 225 LBS | OXYGEN SATURATION: 100 % | HEART RATE: 86 BPM

## 2022-11-10 DIAGNOSIS — I10 HYPERTENSION GOAL BP (BLOOD PRESSURE) < 140/90: ICD-10-CM

## 2022-11-10 DIAGNOSIS — G47.33 OSA (OBSTRUCTIVE SLEEP APNEA): Chronic | ICD-10-CM

## 2022-11-10 DIAGNOSIS — E78.5 HYPERLIPIDEMIA LDL GOAL <70: ICD-10-CM

## 2022-11-10 DIAGNOSIS — Z71.6 ENCOUNTER FOR SMOKING CESSATION COUNSELING: ICD-10-CM

## 2022-11-10 DIAGNOSIS — I25.10 CALCIFICATION OF CORONARY ARTERY: ICD-10-CM

## 2022-11-10 DIAGNOSIS — J43.9 PULMONARY EMPHYSEMA, UNSPECIFIED EMPHYSEMA TYPE (H): ICD-10-CM

## 2022-11-10 DIAGNOSIS — E78.6 HDL DEFICIENCY: ICD-10-CM

## 2022-11-10 DIAGNOSIS — I77.810 DILATION OF THORACIC AORTA (H): ICD-10-CM

## 2022-11-10 DIAGNOSIS — I25.10 CORONARY ARTERY DISEASE INVOLVING NATIVE CORONARY ARTERY OF NATIVE HEART WITHOUT ANGINA PECTORIS: Primary | ICD-10-CM

## 2022-11-10 DIAGNOSIS — E78.1 HIGH TRIGLYCERIDES: ICD-10-CM

## 2022-11-10 PROCEDURE — 99204 OFFICE O/P NEW MOD 45 MIN: CPT | Performed by: INTERNAL MEDICINE

## 2022-11-10 NOTE — LETTER
11/10/2022      RE: Jack Marrero  [address] [address] [address]       Dear Colleague,    Thank you for the opportunity to participate in the care of your patient, Jack Marrero, at the [facility]. Please see a copy of my visit note below.    I am delighted to see Jack Marrero in consultation.The primary encounter diagnosis was Coronary artery disease involving native coronary artery of native heart without angina pectoris. Diagnoses of Calcification of coronary artery/seen incidentally on lung CT, Hyperlipidemia LDL goal <70, Hypertension goal BP (blood pressure) < 140/90, SHY (obstructive sleep apnea) - Moderate (AHI 16, RDI 55), Pulmonary emphysema, unspecified emphysema type (H), High triglycerides, HDL deficiency, and Dilation of thoracic aorta (H) were also pertinent to this visit.   As you know, the patient is a 62 year old  male. He   has a past medical history of Acute pain of right shoulder (12/23/2016), ADHD (attention deficit hyperactivity disorder), Calcification of coronary artery/seen incidentally on lung CT, Depression, anxiety, Diverticulosis of colon, Dyslipidemia, Essential hypertension, Hyperlipidemia, IGT (impaired glucose tolerance) (10/26/2015), Inflamed seborrheic keratosis (01/29/2016), Pain in joint, shoulder region (12/16/2013), Palpitations, Pulmonary emphysema, unspecified emphysema type (H) (10/05/2022), Sensory polyneuropathy, and Sleep apnea..    On this visit, the patient states that he has an abnormal calcium score but good exercise tolerance.  The patient denies chest pressure/discomfort, dyspnea, near-syncope, syncope, orthopnea, paroxysmal nocturnal dyspnea and lower extermity edema.    The patient's cardiovascular risk factors include known cardiac disease, smoker, COPD, hypertension and high cholesterol.    The following portions of the patient's history were  reviewed and updated as appropriate: allergies, current medications, past family history, past medical history, past social history, past surgical history, and the problem list.    PMH: The patient's past medical history includes:    Past Medical History:   Diagnosis Date     Acute pain of right shoulder 12/23/2016     ADHD (attention deficit hyperactivity disorder)     dx via Dr Olson PhD      Calcification of coronary artery/seen incidentally on lung CT      Depression, anxiety      Diverticulosis of colon      Dyslipidemia      Essential hypertension      Hyperlipidemia      IGT (impaired glucose tolerance) 10/26/2015     Inflamed seborrheic keratosis 01/29/2016     Pain in joint, shoulder region 12/16/2013     Palpitations      Pulmonary emphysema, unspecified emphysema type (H) 10/05/2022     Sensory polyneuropathy      Sleep apnea       Past Surgical History:   Procedure Laterality Date     BIOPSY  2016    growth on right shin     COLONOSCOPY WITH CO2 INSUFFLATION N/A 01/11/2016    Procedure: COLONOSCOPY WITH CO2 INSUFFLATION;  Surgeon: Nilson Gale MD;  Location: [facility] OR     COLONOSCOPY WITH CO2 INSUFFLATION N/A 3/16/2021    Procedure: COLONOSCOPY, WITH CO2 INSUFFLATION;  Surgeon: Jean Pierre Gillespie DO;  Location: [facility] OR     DENTAL SURGERY  1991     ALL 4 WISDOM TEETH EXTRACTED     HERNIORRHAPHY INGUINAL Left 03/11/2015    Procedure: HERNIORRHAPHY INGUINAL;  Surgeon: Nilson Gale MD;  Location: [facility] OR     TONSILLECTOMY & ADENOIDECTOMY  1965     [facility] EXCISION SKIN OF NAIL FOLD  1993    BL GREAT TOE       The patient's medications as of the current encounter are:     Current Outpatient Medications   Medication Sig Dispense Refill     amitriptyline (ELAVIL) 50 MG tablet Take 1 tablet (50 mg) by mouth At Bedtime 90 tablet 3     aspirin 81 MG tablet Take 1 tablet by mouth daily.       atorvastatin (LIPITOR) 40 MG tablet Take 1 tablet (40 mg) by mouth At Bedtime 90 tablet 0      dutasteride (AVODART) 0.5 MG capsule TAKE 1 CAPSULE(0.5 MG) BY MOUTH DAILY 90 capsule 2     escitalopram (LEXAPRO) 20 MG tablet Take 1 tablet (20 mg) by mouth daily 90 tablet 1     fluticasone-vilanterol (BREO ELLIPTA) 100-25 MCG/INH inhaler Inhale 1 puff into the lungs daily 3 each 3     hydrochlorothiazide (HYDRODIURIL) 12.5 MG tablet TAKE 1 TABLET(12.5 MG) BY MOUTH DAILY 90 tablet 1     lisinopril (ZESTRIL) 40 MG tablet TAKE 1 TABLET(40 MG) BY MOUTH IN THE MORNING 90 tablet 1     Multiple Vitamins-Minerals (MULTIVITAMIN ADULT) CHEW        order for DME Equipment ordered: RESMED Auto PAP Mask type: Nasal Settings: 6-12 cm H2O         Labs:     Office Visit on 09/23/2022   Component Date Value Ref Range Status     INR 09/23/2022 0.94  0.85 - 1.15 Final     WBC Count 09/23/2022 11.0  4.0 - 11.0 10e3/uL Final     RBC Count 09/23/2022 5.22  4.40 - 5.90 10e6/uL Final     Hemoglobin 09/23/2022 16.6  13.3 - 17.7 g/dL Final     Hematocrit 09/23/2022 49.4  40.0 - 53.0 % Final     MCV 09/23/2022 95  78 - 100 fL Final     MCH 09/23/2022 31.8  26.5 - 33.0 pg Final     MCHC 09/23/2022 33.6  31.5 - 36.5 g/dL Final     RDW 09/23/2022 13.8  10.0 - 15.0 % Final     Platelet Count 09/23/2022 301  150 - 450 10e3/uL Final     % Neutrophils 09/23/2022 66  % Final     % Lymphocytes 09/23/2022 22  % Final     % Monocytes 09/23/2022 9  % Final     % Eosinophils 09/23/2022 3  % Final     % Basophils 09/23/2022 1  % Final     % Immature Granulocytes 09/23/2022 0  % Final     Absolute Neutrophils 09/23/2022 7.2  1.6 - 8.3 10e3/uL Final     Absolute Lymphocytes 09/23/2022 2.4  0.8 - 5.3 10e3/uL Final     Absolute Monocytes 09/23/2022 1.0  0.0 - 1.3 10e3/uL Final     Absolute Eosinophils 09/23/2022 0.3  0.0 - 0.7 10e3/uL Final     Absolute Basophils 09/23/2022 0.1  0.0 - 0.2 10e3/uL Final     Absolute Immature Granulocytes 09/23/2022 0.0  <=0.4 10e3/uL Final       Allergies:    Allergies   Allergen Reactions     Codeine      FELT WIRED ON  THIS       Family History:   Family History   Problem Relation Age of Onset     Arthritis Mother      Heart Disease Mother      Lipids Mother      Eye Disorder Mother      Gastrointestinal Disease Mother         irritable bowel     Breast Cancer Mother      Osteoporosis Mother      Lipids Father      Heart Disease Father      Psychotic Disorder Father      Depression Father      Eye Disorder Father      C.A.D. Father 56        CABG at age 56     Coronary Artery Disease Father         Heart attack at age 56     Hyperlipidemia Father      Depression/Anxiety Father      Mental Illness Father      Hypertension Father      Anxiety Disorder Father      Arthritis Maternal Grandmother      Alcohol/Drug Maternal Grandfather      Cancer - colorectal Maternal Grandfather      Hypertension Paternal Grandmother      Breast Cancer Paternal Grandmother      Heart Disease Paternal Grandfather      Hypertension Paternal Grandfather      Coronary Artery Disease Paternal Grandfather          of heart attack at age 59     Hypertension Brother      Depression/Anxiety Brother      Depression Brother      Coronary Artery Disease Brother      Hypertension Brother      Depression/Anxiety Brother      Coronary Stenting Brother      Hypertension Brother      Gastrointestinal Disease Brother      Prostate Cancer Brother      Mental Illness Brother      Thyroid Disease Sister      No Known Problems Son      No Known Problems Son      No Known Problems Daughter      Diabetes Maternal Half-Brother      Cardiovascular Other      Other Cancer Other         Lung; Paternal Uncle/Lung; Paternal Uncle     Coronary Artery Disease Other      Hyperlipidemia Other      Other Cancer Other         Lung; Paternal Uncle     Cerebrovascular Disease No family hx of      Skin Cancer No family hx of      Melanoma No family hx of        Psychosocial history:  reports that he has been smoking cigarettes. He started smoking about 48 years ago. He has never used  "smokeless tobacco. He reports that he does not currently use alcohol. He reports that he does not use drugs.    Review of systems: negative for, exertional chest pain or pressure, paroxysmal nocturnal dyspnea, dyspnea on exertion, orthopnea, lower extremity edema, syncope or near-syncope, claudication and exercise intolerance    In addition,   General: No change in weight, sleep or appetite.  Normal energy.  No fever or chills  Eyes: Negative for vision changes or eye problems  ENT: No problems with ears, nose or throat.  No difficulty swallowing.  Resp: No coughing, wheezing or shortness of breath, eliseo  GI: No nausea, vomiting,  heartburn, abdominal pain, diarrhea, constipation or change in bowel habits, adenoma  : No urinary frequency or dysuria, bladder or kidney problems  Musculoskeletal: No significant muscle or joint pains  Neurologic: Neuropathy  Psychiatric: Stable depression  Heme/immune/allergy: No history of bleeding or clotting problems or anemia.  No allergies or immune system problems  Endocrine: No history of thyroid disease, diabetes or other endocrine disorders  Skin: No rashes,worrisome lesions or skin problems  Vascular:  No claudication, lifestyle limiting or otherwise; no ischemic rest pain; no non-healing ulcers. No weakness, No loss of sensation        Physical examination  Vitals: /84   Pulse 86   Ht 1.854 m (6' 1\")   Wt 102.1 kg (225 lb)   SpO2 100%   BMI 29.69 kg/m    BMI= Body mass index is 29.69 kg/m .    In general, the patient is a pleasant male in no apparent distress.    HEENT: Normiocephalic and atraumatic.  PERRLA.  EOMI.  Sclerae white, not injected.  Nares clear.  Pharynx without erythema or exudate.  Dentition intact.    Neck: No adenopathy.  No thyromegaly. Carotids +2/2 bilaterally without bruits.  No jugular venous distension.   Heart:  The PMI is in the 5th ICS in the midclavicular line. There is no heave. Regular rate and rhythm. Normal S1, S2 splits " physiologically. No murmur, rub, click, or gallop.    Lungs: Clear to asculation.  No ronchi, wheezes, rales.  No dullness to percussion.   Abdomen: Soft, nontender, nondistended. No organomegaly. No AAA.  No bruits.   Extremities: No clubbing, cyanosis, or edema. The pulses were intact bilaterally.   Neurological: The neurological examination reveal a patient who was oriented to person, place, and time.      Cardiac tests include:    CAC - calcium score 202    Assessment and Plan    1. CAD - on ASA, statin  - consider high dose statin  - smoking cessation  - weight loss  2. HTN - controlled on ACEI, hydrochlorothiazide  3. HL - on statin  4. Smoking - cessation counseling    The patient is to return prn. The patient understood the treatment plan as outlined above.  There were no barriers to learning.      uSdhir Richardson MD

## 2022-11-10 NOTE — PROGRESS NOTES
I am delighted to see Jack Marrero in consultation.The primary encounter diagnosis was Coronary artery disease involving native coronary artery of native heart without angina pectoris. Diagnoses of Calcification of coronary artery/seen incidentally on lung CT, Hyperlipidemia LDL goal <70, Hypertension goal BP (blood pressure) < 140/90, SHY (obstructive sleep apnea) - Moderate (AHI 16, RDI 55), Pulmonary emphysema, unspecified emphysema type (H), High triglycerides, HDL deficiency, and Dilation of thoracic aorta (H) were also pertinent to this visit.   As you know, the patient is a 62 year old  male. He   has a past medical history of Acute pain of right shoulder (12/23/2016), ADHD (attention deficit hyperactivity disorder), Calcification of coronary artery/seen incidentally on lung CT, Depression, anxiety, Diverticulosis of colon, Dyslipidemia, Essential hypertension, Hyperlipidemia, IGT (impaired glucose tolerance) (10/26/2015), Inflamed seborrheic keratosis (01/29/2016), Pain in joint, shoulder region (12/16/2013), Palpitations, Pulmonary emphysema, unspecified emphysema type (H) (10/05/2022), Sensory polyneuropathy, and Sleep apnea..    On this visit, the patient states that he has an abnormal calcium score but good exercise tolerance.  The patient denies chest pressure/discomfort, dyspnea, near-syncope, syncope, orthopnea, paroxysmal nocturnal dyspnea and lower extermity edema.    The patient's cardiovascular risk factors include known cardiac disease, smoker, COPD, hypertension and high cholesterol.    The following portions of the patient's history were reviewed and updated as appropriate: allergies, current medications, past family history, past medical history, past social history, past surgical history, and the problem list.    PMH: The patient's past medical history includes:    Past Medical History:   Diagnosis Date     Acute pain of right shoulder 12/23/2016     ADHD (attention deficit  hyperactivity disorder)     dx via Dr Olson PhD      Calcification of coronary artery/seen incidentally on lung CT      Depression, anxiety      Diverticulosis of colon      Dyslipidemia      Essential hypertension      Hyperlipidemia      IGT (impaired glucose tolerance) 10/26/2015     Inflamed seborrheic keratosis 01/29/2016     Pain in joint, shoulder region 12/16/2013     Palpitations      Pulmonary emphysema, unspecified emphysema type (H) 10/05/2022     Sensory polyneuropathy      Sleep apnea       Past Surgical History:   Procedure Laterality Date     BIOPSY  2016    growth on right shin     COLONOSCOPY WITH CO2 INSUFFLATION N/A 01/11/2016    Procedure: COLONOSCOPY WITH CO2 INSUFFLATION;  Surgeon: Nilson Gale MD;  Location: [facility] OR     COLONOSCOPY WITH CO2 INSUFFLATION N/A 3/16/2021    Procedure: COLONOSCOPY, WITH CO2 INSUFFLATION;  Surgeon: Jean Pierre Gillespie DO;  Location: [facility] OR     DENTAL SURGERY  1991     ALL 4 WISDOM TEETH EXTRACTED     HERNIORRHAPHY INGUINAL Left 03/11/2015    Procedure: HERNIORRHAPHY INGUINAL;  Surgeon: Nilson Gale MD;  Location: [facility] OR     TONSILLECTOMY & ADENOIDECTOMY  1965     [facility] EXCISION SKIN OF NAIL FOLD  1993    BL GREAT TOE       The patient's medications as of the current encounter are:     Current Outpatient Medications   Medication Sig Dispense Refill     amitriptyline (ELAVIL) 50 MG tablet Take 1 tablet (50 mg) by mouth At Bedtime 90 tablet 3     aspirin 81 MG tablet Take 1 tablet by mouth daily.       atorvastatin (LIPITOR) 40 MG tablet Take 1 tablet (40 mg) by mouth At Bedtime 90 tablet 0     dutasteride (AVODART) 0.5 MG capsule TAKE 1 CAPSULE(0.5 MG) BY MOUTH DAILY 90 capsule 2     escitalopram (LEXAPRO) 20 MG tablet Take 1 tablet (20 mg) by mouth daily 90 tablet 1     fluticasone-vilanterol (BREO ELLIPTA) 100-25 MCG/INH inhaler Inhale 1 puff into the lungs daily 3 each 3     hydrochlorothiazide (HYDRODIURIL) 12.5 MG tablet TAKE 1  TABLET(12.5 MG) BY MOUTH DAILY 90 tablet 1     lisinopril (ZESTRIL) 40 MG tablet TAKE 1 TABLET(40 MG) BY MOUTH IN THE MORNING 90 tablet 1     Multiple Vitamins-Minerals (MULTIVITAMIN ADULT) CHEW        order for DME Equipment ordered: RESMED Auto PAP Mask type: Nasal Settings: 6-12 cm H2O         Labs:     Office Visit on 09/23/2022   Component Date Value Ref Range Status     INR 09/23/2022 0.94  0.85 - 1.15 Final     WBC Count 09/23/2022 11.0  4.0 - 11.0 10e3/uL Final     RBC Count 09/23/2022 5.22  4.40 - 5.90 10e6/uL Final     Hemoglobin 09/23/2022 16.6  13.3 - 17.7 g/dL Final     Hematocrit 09/23/2022 49.4  40.0 - 53.0 % Final     MCV 09/23/2022 95  78 - 100 fL Final     MCH 09/23/2022 31.8  26.5 - 33.0 pg Final     MCHC 09/23/2022 33.6  31.5 - 36.5 g/dL Final     RDW 09/23/2022 13.8  10.0 - 15.0 % Final     Platelet Count 09/23/2022 301  150 - 450 10e3/uL Final     % Neutrophils 09/23/2022 66  % Final     % Lymphocytes 09/23/2022 22  % Final     % Monocytes 09/23/2022 9  % Final     % Eosinophils 09/23/2022 3  % Final     % Basophils 09/23/2022 1  % Final     % Immature Granulocytes 09/23/2022 0  % Final     Absolute Neutrophils 09/23/2022 7.2  1.6 - 8.3 10e3/uL Final     Absolute Lymphocytes 09/23/2022 2.4  0.8 - 5.3 10e3/uL Final     Absolute Monocytes 09/23/2022 1.0  0.0 - 1.3 10e3/uL Final     Absolute Eosinophils 09/23/2022 0.3  0.0 - 0.7 10e3/uL Final     Absolute Basophils 09/23/2022 0.1  0.0 - 0.2 10e3/uL Final     Absolute Immature Granulocytes 09/23/2022 0.0  <=0.4 10e3/uL Final       Allergies:    Allergies   Allergen Reactions     Codeine      FELT WIRED ON THIS       Family History:   Family History   Problem Relation Age of Onset     Arthritis Mother      Heart Disease Mother      Lipids Mother      Eye Disorder Mother      Gastrointestinal Disease Mother         irritable bowel     Breast Cancer Mother      Osteoporosis Mother      Lipids Father      Heart Disease Father      Psychotic Disorder  Father      Depression Father      Eye Disorder Father      C.A.D. Father 56        CABG at age 56     Coronary Artery Disease Father         Heart attack at age 56     Hyperlipidemia Father      Depression/Anxiety Father      Mental Illness Father      Hypertension Father      Anxiety Disorder Father      Arthritis Maternal Grandmother      Alcohol/Drug Maternal Grandfather      Cancer - colorectal Maternal Grandfather      Hypertension Paternal Grandmother      Breast Cancer Paternal Grandmother      Heart Disease Paternal Grandfather      Hypertension Paternal Grandfather      Coronary Artery Disease Paternal Grandfather          of heart attack at age 59     Hypertension Brother      Depression/Anxiety Brother      Depression Brother      Coronary Artery Disease Brother      Hypertension Brother      Depression/Anxiety Brother      Coronary Stenting Brother      Hypertension Brother      Gastrointestinal Disease Brother      Prostate Cancer Brother      Mental Illness Brother      Thyroid Disease Sister      No Known Problems Son      No Known Problems Son      No Known Problems Daughter      Diabetes Maternal Half-Brother      Cardiovascular Other      Other Cancer Other         Lung; Paternal Uncle/Lung; Paternal Uncle     Coronary Artery Disease Other      Hyperlipidemia Other      Other Cancer Other         Lung; Paternal Uncle     Cerebrovascular Disease No family hx of      Skin Cancer No family hx of      Melanoma No family hx of        Psychosocial history:  reports that he has been smoking cigarettes. He started smoking about 48 years ago. He has never used smokeless tobacco. He reports that he does not currently use alcohol. He reports that he does not use drugs.    Review of systems: negative for, exertional chest pain or pressure, paroxysmal nocturnal dyspnea, dyspnea on exertion, orthopnea, lower extremity edema, syncope or near-syncope, claudication and exercise intolerance    In addition,  "  General: No change in weight, sleep or appetite.  Normal energy.  No fever or chills  Eyes: Negative for vision changes or eye problems  ENT: No problems with ears, nose or throat.  No difficulty swallowing.  Resp: No coughing, wheezing or shortness of breath, eliseo  GI: No nausea, vomiting,  heartburn, abdominal pain, diarrhea, constipation or change in bowel habits, adenoma  : No urinary frequency or dysuria, bladder or kidney problems  Musculoskeletal: No significant muscle or joint pains  Neurologic: Neuropathy  Psychiatric: Stable depression  Heme/immune/allergy: No history of bleeding or clotting problems or anemia.  No allergies or immune system problems  Endocrine: No history of thyroid disease, diabetes or other endocrine disorders  Skin: No rashes,worrisome lesions or skin problems  Vascular:  No claudication, lifestyle limiting or otherwise; no ischemic rest pain; no non-healing ulcers. No weakness, No loss of sensation        Physical examination  Vitals: /84   Pulse 86   Ht 1.854 m (6' 1\")   Wt 102.1 kg (225 lb)   SpO2 100%   BMI 29.69 kg/m    BMI= Body mass index is 29.69 kg/m .    In general, the patient is a pleasant male in no apparent distress.    HEENT: Normiocephalic and atraumatic.  PERRLA.  EOMI.  Sclerae white, not injected.  Nares clear.  Pharynx without erythema or exudate.  Dentition intact.    Neck: No adenopathy.  No thyromegaly. Carotids +2/2 bilaterally without bruits.  No jugular venous distension.   Heart:  The PMI is in the 5th ICS in the midclavicular line. There is no heave. Regular rate and rhythm. Normal S1, S2 splits physiologically. No murmur, rub, click, or gallop.    Lungs: Clear to asculation.  No ronchi, wheezes, rales.  No dullness to percussion.   Abdomen: Soft, nontender, nondistended. No organomegaly. No AAA.  No bruits.   Extremities: No clubbing, cyanosis, or edema. The pulses were intact bilaterally.   Neurological: The neurological examination reveal a " patient who was oriented to person, place, and time.      Cardiac tests include:    CAC - calcium score 202    Assessment and Plan    1. CAD - on ASA, statin  - consider high dose statin  - smoking cessation  - weight loss  2. HTN - controlled on ACEI, hydrochlorothiazide  3. HL - on statin  4. Smoking - cessation counseling    The patient is to return prn. The patient understood the treatment plan as outlined above.  There were no barriers to learning.      Sudhir Richardson MD

## 2022-11-10 NOTE — PATIENT INSTRUCTIONS
Thank you for coming to the [facility] at [place]; please note the following instructions:    1.  You are scheduled for an echocardiogram on Monday.     2. Continue all medications - you have coronary artery disease: you should remain on a daily aspirin and take statin (Atorvastatin)    3. You need stop smoking- keep trying.     4. Exercise as tolerated. Increase gradually.     5. Lose weight    6. Dilated aorta. (5 cm is the time to discuss with surgery)- try to keep blood pressure lower than higher.  Should be 130/80  The echocardiogram will confirm the measurement.         If you have any questions regarding your visit, please contact your care team:     CARDIOLOGY  TELEPHONE NUMBER   Carolyn GRIMES., Registered Nurse  Samreen JIMÉNEZ, Registered Nurse  Lakeisha CHRISTOPHER, Registered Medical Assistant  Margarita ROJAS, Certified Medical Assistant  Noy AMBRIZ, Visit Facilitator [phone] (select option 1)    *After hours: [phone]   For Scheduling Appts:     [phone] (select option 1)    *After hours: [phone]   For the Device Clinic (Pacemakers and ICD's)  Roula REYES, Registered Nurse   During business hours: [phone]    *After business hours:  [phone] (select option 4)      Normal test result notifications will be released via [email or web address] or mailed within 7 business days.  All other test results, will be communicated via telephone once reviewed by your cardiologist.    If you need a medication refill, please contact your pharmacy.  Please allow 3 business days for your refill to be completed.    As always, thank you for trusting us with your health care needs!

## 2022-11-15 ENCOUNTER — ANCILLARY PROCEDURE (OUTPATIENT)
Dept: CARDIOLOGY | Facility: CLINIC | Age: 62
End: 2022-11-15
Attending: INTERNAL MEDICINE
Payer: COMMERCIAL

## 2022-11-15 DIAGNOSIS — E78.1 HIGH TRIGLYCERIDES: ICD-10-CM

## 2022-11-15 DIAGNOSIS — I25.10 CALCIFICATION OF CORONARY ARTERY: ICD-10-CM

## 2022-11-15 DIAGNOSIS — G47.33 OSA (OBSTRUCTIVE SLEEP APNEA): Chronic | ICD-10-CM

## 2022-11-15 DIAGNOSIS — E78.6 HDL DEFICIENCY: ICD-10-CM

## 2022-11-15 DIAGNOSIS — E78.5 HYPERLIPIDEMIA LDL GOAL <70: ICD-10-CM

## 2022-11-15 DIAGNOSIS — I77.810 DILATION OF THORACIC AORTA (H): ICD-10-CM

## 2022-11-15 DIAGNOSIS — I25.10 CORONARY ARTERY DISEASE INVOLVING NATIVE CORONARY ARTERY OF NATIVE HEART WITHOUT ANGINA PECTORIS: ICD-10-CM

## 2022-11-15 DIAGNOSIS — J43.9 PULMONARY EMPHYSEMA, UNSPECIFIED EMPHYSEMA TYPE (H): ICD-10-CM

## 2022-11-15 DIAGNOSIS — I10 HYPERTENSION GOAL BP (BLOOD PRESSURE) < 140/90: ICD-10-CM

## 2022-11-15 LAB — LVEF ECHO: NORMAL

## 2022-11-15 PROCEDURE — 93306 TTE W/DOPPLER COMPLETE: CPT | Performed by: INTERNAL MEDICINE

## 2022-12-25 ENCOUNTER — HEALTH MAINTENANCE LETTER (OUTPATIENT)
Age: 62
End: 2022-12-25

## 2022-12-29 ENCOUNTER — MYC MEDICAL ADVICE (OUTPATIENT)
Dept: SLEEP MEDICINE | Facility: CLINIC | Age: 62
End: 2022-12-29

## 2022-12-31 DIAGNOSIS — E78.5 HYPERLIPIDEMIA LDL GOAL <70: ICD-10-CM

## 2023-01-02 PROBLEM — J43.9 PULMONARY EMPHYSEMA, UNSPECIFIED EMPHYSEMA TYPE (H): Status: ACTIVE | Noted: 2022-10-05

## 2023-01-02 PROBLEM — I77.810 DILATION OF THORACIC AORTA (H): Status: ACTIVE | Noted: 2022-11-10

## 2023-01-02 PROBLEM — Z71.6 ENCOUNTER FOR SMOKING CESSATION COUNSELING: Status: ACTIVE | Noted: 2022-11-10

## 2023-01-02 PROBLEM — I25.10 CORONARY ARTERY CALCIFICATION: Status: ACTIVE | Noted: 2020-08-07

## 2023-01-02 NOTE — TELEPHONE ENCOUNTER
Needs to have lab work done for monitoring.  This was ordered in October but patient has not yet made an appointment.  So if he schedules a lab visit I will send in the refill

## 2023-01-09 DIAGNOSIS — I10 ESSENTIAL HYPERTENSION: ICD-10-CM

## 2023-01-09 RX ORDER — HYDROCHLOROTHIAZIDE 12.5 MG/1
TABLET ORAL
Qty: 90 TABLET | Refills: 1 | Status: SHIPPED | OUTPATIENT
Start: 2023-01-09 | End: 2023-07-17

## 2023-01-16 DIAGNOSIS — F33.1 DEPRESSION, MAJOR, RECURRENT, MODERATE (H): ICD-10-CM

## 2023-01-16 DIAGNOSIS — E78.5 HYPERLIPIDEMIA LDL GOAL <70: ICD-10-CM

## 2023-01-17 RX ORDER — ATORVASTATIN CALCIUM 40 MG/1
TABLET, FILM COATED ORAL
Qty: 90 TABLET | Refills: 0 | Status: SHIPPED | OUTPATIENT
Start: 2023-01-17 | End: 2023-03-31

## 2023-01-18 ENCOUNTER — LAB (OUTPATIENT)
Dept: LAB | Facility: CLINIC | Age: 63
End: 2023-01-18
Payer: COMMERCIAL

## 2023-01-18 DIAGNOSIS — E78.5 HYPERLIPIDEMIA LDL GOAL <70: ICD-10-CM

## 2023-01-18 DIAGNOSIS — Z12.5 PROSTATE CANCER SCREENING: ICD-10-CM

## 2023-01-18 LAB
ALBUMIN SERPL-MCNC: 3.6 G/DL (ref 3.4–5)
ALP SERPL-CCNC: 81 U/L (ref 40–150)
ALT SERPL W P-5'-P-CCNC: 64 U/L (ref 0–70)
ANION GAP SERPL CALCULATED.3IONS-SCNC: 9 MMOL/L (ref 3–14)
AST SERPL W P-5'-P-CCNC: 35 U/L (ref 0–45)
BILIRUB SERPL-MCNC: 0.7 MG/DL (ref 0.2–1.3)
BUN SERPL-MCNC: 11 MG/DL (ref 7–30)
CALCIUM SERPL-MCNC: 9 MG/DL (ref 8.5–10.1)
CHLORIDE BLD-SCNC: 103 MMOL/L (ref 94–109)
CHOLEST SERPL-MCNC: 167 MG/DL
CO2 SERPL-SCNC: 29 MMOL/L (ref 20–32)
CREAT SERPL-MCNC: 1.03 MG/DL (ref 0.66–1.25)
FASTING STATUS PATIENT QL REPORTED: YES
GFR SERPL CREATININE-BSD FRML MDRD: 81 ML/MIN/1.73M2
GLUCOSE BLD-MCNC: 113 MG/DL (ref 70–99)
HDLC SERPL-MCNC: 41 MG/DL
LDLC SERPL CALC-MCNC: 101 MG/DL
NONHDLC SERPL-MCNC: 126 MG/DL
POTASSIUM BLD-SCNC: 4.3 MMOL/L (ref 3.4–5.3)
PROT SERPL-MCNC: 6.9 G/DL (ref 6.8–8.8)
PSA SERPL-MCNC: 1.44 UG/L (ref 0–4)
SODIUM SERPL-SCNC: 141 MMOL/L (ref 133–144)
TRIGL SERPL-MCNC: 127 MG/DL

## 2023-01-18 PROCEDURE — G0103 PSA SCREENING: HCPCS

## 2023-01-18 PROCEDURE — 36415 COLL VENOUS BLD VENIPUNCTURE: CPT

## 2023-01-18 PROCEDURE — 80053 COMPREHEN METABOLIC PANEL: CPT

## 2023-01-18 PROCEDURE — 80061 LIPID PANEL: CPT

## 2023-01-18 RX ORDER — ESCITALOPRAM OXALATE 20 MG/1
TABLET ORAL
Qty: 90 TABLET | Refills: 1 | Status: SHIPPED | OUTPATIENT
Start: 2023-01-18 | End: 2023-07-25

## 2023-01-23 ENCOUNTER — TELEPHONE (OUTPATIENT)
Dept: SLEEP MEDICINE | Facility: CLINIC | Age: 63
End: 2023-01-23

## 2023-01-23 DIAGNOSIS — Z87.891 PERSONAL HISTORY OF TOBACCO USE: Primary | ICD-10-CM

## 2023-01-23 PROCEDURE — G0296 VISIT TO DETERM LDCT ELIG: HCPCS | Performed by: FAMILY MEDICINE

## 2023-01-23 NOTE — TELEPHONE ENCOUNTER
Reason for Call:  Other call back    Detailed comments: Pt wanted Dr. Alejandro to know that his cpap machine has stopped sending reports to him and the provider.  Pt is now unsure whether he's getting a good sleep or not.  Pt would like for someone to call him back to discuss his options and if he can go somewhere else to get a different machine    Phone Number Patient can be reached at: Cell number on file:    Telephone Information:   Mobile [phone]       Best Time: anytime    Can we leave a detailed message on this number? YES    Call taken on 1/23/2023 at 4:22 PM by Smiley Dietz

## 2023-01-23 NOTE — PATIENT INSTRUCTIONS
Lung Cancer Screening   Frequently Asked Questions  If you are at high-risk for lung cancer, getting screened with low-dose computed tomography (LDCT) every year can help save your life. This handout offers answers to some of the most common questions about lung cancer screening. If you have other questions, please call [phone] ([phone]).     What is it?  Lung cancer screening uses special X-ray technology to create an image of your lung tissue. The exam is quick and easy and takes less than 10 seconds. We don t give you any medicine or use any needles. You can eat before and after the exam. You don t need to change your clothes as long as the clothing on your chest doesn t contain metal. But, you do need to be able to hold your breath for at least 6 seconds during the exam.    What is the goal of lung cancer screening?  The goal of lung cancer screening is to save lives. Many times, lung cancer is not found until a person starts having physical symptoms. Lung cancer screening can help detect lung cancer in the earliest stages when it may be easier to treat.    Who should be screened for lung cancer?  We suggest lung cancer screening for anyone who is at high-risk for lung cancer. You are in the high-risk group if you:      are between the ages of 55 and 79, and    have smoked at least 1 pack of cigarettes a day for 20 or more years, and    still smoke or have quit within the past 15 years.    However, if you have a new cough or shortness of breath, you should talk to your doctor before being screened.    Why does it matter if I have symptoms?  Certain symptoms can be a sign that you have a condition in your lungs that should be checked and treated by your doctor. These symptoms include fever, chest pain, a new or changing cough, shortness of breath that you have never felt before, coughing up blood or unexplained weight loss. Having any of these symptoms can greatly affect the results of lung  cancer screening.       Should all smokers get an LDCT lung cancer screening exam?  It depends. Lung cancer screening is for a very specific group of men and women who have a history of heavy smoking over a long period of time (see  Who should be screened for lung cancer  above).  I am in the high-risk group, but have been diagnosed with cancer in the past. Is LDCT lung cancer screening right for me?  In some cases, you should not have LDCT lung screening, such as when your doctor is already following your cancer with CT scan studies. Your doctor will help you decide if LDCT lung screening is right for you.  Do I need to have a screening exam every year?  Yes. If you are in the high-risk group described earlier, you should get an LDCT lung cancer screening exam every year until you are 79, or are no longer willing or able to undergo screening and possible procedures to diagnose and treat lung cancer.  How effective is LDCT at preventing death from lung cancer?  Studies have shown that LDCT lung cancer screening can lower the risk of death from lung cancer by 20 percent in people who are at high-risk.  What are the risks?  There are some risks and limitations of LDCT lung cancer screening. We want to make sure you understand the risks and benefits, so please let us know if you have any questions. Your doctor may want to talk with you more about these risks.    Radiation exposure: As with any exam that uses radiation, there is a very small increased risk of cancer. The amount of radiation in LDCT is small--about the same amount a person would get from a mammogram. Your doctor orders the exam when he or she feels the potential benefits outweigh the risks.    False negatives: No test is perfect, including LDCT. It is possible that you may have a medical condition, including lung cancer, that is not found during your exam. This is called a false negative result.    False positives and more testing: LDCT very often finds  something in the lung that could be cancer, but in fact is not. This is called a false positive result. False positive tests often cause anxiety. To make sure these findings are not cancer, you may need to have more tests. These tests will be done only if you give us permission. Sometimes patients need a treatment that can have side effects, such as a biopsy. For more information on false positives, see  What can I expect from the results?     Findings not related to lung cancer: Your LDCT exam also takes pictures of areas of your body next to your lungs. In a very small number of cases, the CT scan will show an abnormal finding in one of these areas, such as your kidneys, adrenal glands, liver or thyroid. This finding may not be serious, but you may need more tests. Your doctor can help you decide what other tests you may need, if any.  What can I expect from the results?  About 1 out of 4 LDCT exams will find something that may need more tests. Most of the time, these findings are lung nodules. Lung nodules are very small collections of tissue in the lung. These nodules are very common, and the vast majority--more than 97 percent--are not cancer (benign). Most are normal lymph nodes or small areas of scarring from past infections.  But, if a small lung nodule is found to be cancer, the cancer can be cured more than 90 percent of the time. To know if the nodule is cancer, we may need to get more images before your next yearly screening exam. If the nodule has suspicious features (for example, it is large, has an odd shape or grows over time), we will refer you to a specialist for further testing.  Will my doctor also get the results?  Yes. Your doctor will get a copy of your results.  Is it okay to keep smoking now that there s a cancer screening exam?  No. Tobacco is one of the strongest cancer-causing agents. It causes not only lung cancer, but other cancers and cardiovascular (heart) diseases as well. The damage  caused by smoking builds over time. This means that the longer you smoke, the higher your risk of disease. While it is never too late to quit, the sooner you quit, the better.  Where can I find help to quit smoking?  The best way to prevent lung cancer is to stop smoking. If you have already quit smoking, congratulations and keep it up! For help on quitting smoking, please call [employer] at 6-956-QUITNOW (1-808.768.3096) or the [employer] at 1-293.588.6100 to find local resources near you.  One-on-one health coaching:  If you d prefer to work individually with a health care provider on tobacco cessation, we offer:      Medication Therapy Management:  Our specially trained pharmacists work closely with you and your doctor to help you quit smoking.  Call [phone] or [phone] (toll free).

## 2023-01-27 NOTE — TELEPHONE ENCOUNTER
Returning phone call to patient in regards to inactivated modem. Patient left clinic phone number to call back and discuss.   Roxie Odom MA

## 2023-01-27 NOTE — TELEPHONE ENCOUNTER
Spoke with patient, I informed patient of the 5G upgrade. All 3G devices have been deactivated from the cloud due to this upgrade. Patient is scheduled to be seen in May, patient has been informed he will need to bring his device in for a manual download.

## 2023-02-02 ENCOUNTER — ANCILLARY PROCEDURE (OUTPATIENT)
Dept: CT IMAGING | Facility: CLINIC | Age: 63
End: 2023-02-02
Attending: FAMILY MEDICINE
Payer: COMMERCIAL

## 2023-02-02 DIAGNOSIS — Z87.891 PERSONAL HISTORY OF TOBACCO USE: ICD-10-CM

## 2023-02-02 PROCEDURE — 71271 CT THORAX LUNG CANCER SCR C-: CPT | Mod: GC | Performed by: RADIOLOGY

## 2023-02-02 NOTE — RESULT ENCOUNTER NOTE
Elliot,  Screening CT looked good.  They noted a couple of benign appearing nodules.  We can plan a follow up CT in a year.  PB Hancock M.D.

## 2023-02-03 RX ORDER — ATORVASTATIN CALCIUM 40 MG/1
TABLET, FILM COATED ORAL
Qty: 90 TABLET | Refills: 0 | OUTPATIENT
Start: 2023-02-03

## 2023-02-03 NOTE — TELEPHONE ENCOUNTER
This was refilled on 1/17  Please remove pended med and close encounter. I do not have security to close the encounter.Thanks!

## 2023-02-07 DIAGNOSIS — G60.8 SENSORY POLYNEUROPATHY: ICD-10-CM

## 2023-02-08 RX ORDER — AMITRIPTYLINE HYDROCHLORIDE 50 MG/1
TABLET ORAL
Qty: 90 TABLET | Refills: 3 | Status: SHIPPED | OUTPATIENT
Start: 2023-02-08 | End: 2023-08-21

## 2023-03-31 DIAGNOSIS — N40.1 BENIGN NON-NODULAR PROSTATIC HYPERPLASIA WITH LOWER URINARY TRACT SYMPTOMS: ICD-10-CM

## 2023-03-31 RX ORDER — DUTASTERIDE 0.5 MG/1
CAPSULE, LIQUID FILLED ORAL
Qty: 90 CAPSULE | Refills: 2 | Status: SHIPPED | OUTPATIENT
Start: 2023-03-31 | End: 2024-01-02

## 2023-04-22 DIAGNOSIS — I10 HYPERTENSION GOAL BP (BLOOD PRESSURE) < 140/90: ICD-10-CM

## 2023-04-24 RX ORDER — LISINOPRIL 40 MG/1
TABLET ORAL
Qty: 90 TABLET | Refills: 1 | Status: SHIPPED | OUTPATIENT
Start: 2023-04-24 | End: 2023-04-24

## 2023-04-24 RX ORDER — LISINOPRIL 40 MG/1
TABLET ORAL
Qty: 30 TABLET | Refills: 0 | Status: SHIPPED | OUTPATIENT
Start: 2023-04-24 | End: 2023-06-16

## 2023-05-11 NOTE — TELEPHONE ENCOUNTER
"Maximo Ospina is a 65 y.o. male here for a non-provider visit for:   DTaP  1 of 1  HEPATITIS B 1 of 3    Reason for immunization: Overdue/Provider Recommended  Immunization records indicate need for vaccine: Yes, confirmed with Epic  Minimum interval has been met for this vaccine: Yes  ABN completed: Yes    VIS Dated  08/23/2022 was given to patient: Yes  All IAC Questionnaire questions were answered \"No.\"    Patient tolerated injection and no adverse effects were observed or reported: Yes    Pt scheduled for next dose in series: Yes  " Routing refill request to provider for review/approval because:  Drug not on the [facility] refill protocol   Valery Kaba RN

## 2023-05-30 ENCOUNTER — VIRTUAL VISIT (OUTPATIENT)
Dept: SLEEP MEDICINE | Facility: CLINIC | Age: 63
End: 2023-05-30
Payer: COMMERCIAL

## 2023-05-30 VITALS — WEIGHT: 224 LBS | HEIGHT: 73 IN | BODY MASS INDEX: 29.69 KG/M2

## 2023-05-30 DIAGNOSIS — G60.8 SENSORY POLYNEUROPATHY: ICD-10-CM

## 2023-05-30 DIAGNOSIS — G25.81 RESTLESS LEGS SYNDROME (RLS): ICD-10-CM

## 2023-05-30 DIAGNOSIS — G47.33 OSA (OBSTRUCTIVE SLEEP APNEA): Primary | ICD-10-CM

## 2023-05-30 PROCEDURE — 99214 OFFICE O/P EST MOD 30 MIN: CPT | Mod: VID | Performed by: INTERNAL MEDICINE

## 2023-05-30 ASSESSMENT — SLEEP AND FATIGUE QUESTIONNAIRES
HOW LIKELY ARE YOU TO NOD OFF OR FALL ASLEEP WHILE SITTING QUIETLY AFTER LUNCH WITHOUT ALCOHOL: WOULD NEVER DOZE
HOW LIKELY ARE YOU TO NOD OFF OR FALL ASLEEP WHILE SITTING AND TALKING TO SOMEONE: WOULD NEVER DOZE
HOW LIKELY ARE YOU TO NOD OFF OR FALL ASLEEP WHILE SITTING INACTIVE IN A PUBLIC PLACE: SLIGHT CHANCE OF DOZING
HOW LIKELY ARE YOU TO NOD OFF OR FALL ASLEEP WHILE SITTING AND READING: SLIGHT CHANCE OF DOZING
HOW LIKELY ARE YOU TO NOD OFF OR FALL ASLEEP WHILE LYING DOWN TO REST IN THE AFTERNOON WHEN CIRCUMSTANCES PERMIT: HIGH CHANCE OF DOZING
HOW LIKELY ARE YOU TO NOD OFF OR FALL ASLEEP IN A CAR, WHILE STOPPED FOR A FEW MINUTES IN TRAFFIC: WOULD NEVER DOZE
HOW LIKELY ARE YOU TO NOD OFF OR FALL ASLEEP WHEN YOU ARE A PASSENGER IN A CAR FOR AN HOUR WITHOUT A BREAK: SLIGHT CHANCE OF DOZING
HOW LIKELY ARE YOU TO NOD OFF OR FALL ASLEEP WHILE WATCHING TV: SLIGHT CHANCE OF DOZING

## 2023-05-30 ASSESSMENT — PAIN SCALES - GENERAL: PAINLEVEL: NO PAIN (0)

## 2023-05-30 NOTE — PROGRESS NOTES
Virtual Visit Details    Type of service:  Video Visit     Originating Location (pt. Location): Home    Distant Location (provider location):  Off-site  Platform used for Video Visit: Carolyn

## 2023-05-30 NOTE — PROGRESS NOTES
Sleep Apnea - Follow-up Visit:    Impression/Plan:     Moderate Sleep apnea.  Tolerating PAP well.  - Continue current treatments.     Restless leg syndrome, mild symptoms   History of Low ferritin  SSRIS (lexapro), antihistamines/ tricyclic antidepressants (amitryptillline), antipsychotics, and low iron levels can cause or worsen restless leg syndrome symptoms   - Consider changing amitriptyline to gabapentin  - Check B12, iron levels      Insomnia  Quiescent     Jack Marrero will follow up in about 2 year(s).       I spent 15 minutes with patient including counseling, and 15 minutes with chart review, and documentation         History of Present Illness:  Chief Complaint   Patient presents with     RECHECK       Jack Marrero presents for follow-up of their moderate sleep apnea, managed with CPAP.     [facility]    He initially presented with loud snoring, nocturia 2 - 3 times per night, nocturnal GERD,  morning headaches. ESS 6.   - Restless leg syndrome   - Insomnia with sleep onset difficulties     Split Night:Sleep Study 12/01/2016 - (190.0 lbs)   - AHI 16.3, RDI 55.6, Supine AHI 17.1, REM AHI n/a   - Low O2% 80.3%, Time Spent <=88% 14.2, Time Spent <=89% 21.5   - Periodic Limb Movement Index 76.6/hour.     - CPAP was titrated to a pressure of 8 with an AHI 0.3. Time spent in REM supine at this pressure was 40.0 minutes.     He was seen by Dr. Rodríguez 12/2016 for cognitive behavioral training    He represented to [facility] 2020 tolerating PAP 6-12 cmH2o well, good AHI on download, persistent reflux and morning headaches. Increase pressures to 8-13 cmH20   - Insomnia quiescent   - Restless leg syndrome, history of low ferritin (39 6/15/2012), history of neuropathy, previously managed with gabapentin. Mild-moderate symptoms, apparently off treatment at this time. A iron level was ordered but not completed.     He has lost wi-fi connection   Machine is 7 years old  Increased  pressure helped AM headaches and nocturnal reflux     Do you use a CPAP Machine at home: Yes  Overall, on a scale of 0-10 how would you rate your CPAP (0 poor, 10 great): 8    What type of mask do you use: Nasal Pillow  Is your mask comfortable: Yes    Is your mask leaking: No    Do you notice snoring with mask on: No  Do you notice gasping arousals with mask on: No  Are you having significant oral or nasal dryness: No  Is the pressure setting comfortable: Yes    What is your typical bedtime: 2 AM  How long does it take you to go to sleep on PAP therapy: 5-20 minutes  What time do you typically get out of bed for the day: 9 AM  How many hours on average per night are you using PAP therapy: 7  How many hours are you sleeping per night: 7  Do you feel well rested in the morning: Yes     Naps most days with PAP.   He denies sleep onset, maintenance difficulties   He awakens with restless leg syndrome symptoms 2/week   He has restless leg syndrome symptoms at bedtime 2/week. Takes 20-30 minutes to fall back asleep   Also has daytime symptoms   Has sharp sudden pains he attributes to neuropathy      ResMed   Auto-PAP 8.0 - 13.0 cmH2O 30 day usage data:    100% of days with > 4 hours of use. 0/30 days with no use.   Average use 531 minutes per day.   95%ile Leak 27.17 L/min.   CPAP 95% pressure 12.4 cm.   AHI 0.53 events per hour.       EPWORTH SLEEPINESS SCALE         5/30/2023     8:22 AM    [place] Sleepiness Scale ( NALDO Fletcher  1990-1997[facility] [place]/English - Final version - 21 Nov 07 - [facility].)   Sitting and reading Slight chance of dozing   Watching TV Slight chance of dozing   Sitting, inactive in a public place (e.g. a theatre or a meeting) Slight chance of dozing   As a passenger in a car for an hour without a break Slight chance of dozing   Lying down to rest in the afternoon when circumstances permit High chance of dozing   Sitting and talking to someone Would never doze   Sitting quietly after a  lunch without alcohol Would never doze   In a car, while stopped for a few minutes in traffic Would never doze   Todd Score (MC) 7   Todd Score (Sleep) 7       INSOMNIA SEVERITY INDEX (PAULETTE)          5/30/2023     8:20 AM   Insomnia Severity Index (PAULETTE)   Difficulty falling asleep 1   Difficulty staying asleep 0   Problems waking up too early 0   How SATISFIED/DISSATISFIED are you with your CURRENT sleep pattern? 0   How NOTICEABLE to others do you think your sleep problem is in terms of impairing the quality of your life? 0   How WORRIED/DISTRESSED are you about your current sleep problem? 0   To what extent do you consider your sleep problem to INTERFERE with your daily functioning (e.g. daytime fatigue, mood, ability to function at work/daily chores, concentration, memory, mood, etc.) CURRENTLY? 0   PAULETTE Total Score 1       Guidelines for Scoring/Interpretation:  Total score categories:  0-7 = No clinically significant insomnia   8-14 = Subthreshold insomnia   15-21 = Clinical insomnia (moderate severity)  22-28 = Clinical insomnia (severe)  Used via courtesy of www.[employer].va.gov with permission from Desean Metcalf PhD., [facility]        Past medical/surgical history, family history, social history, medications and allergies were reviewed.        Problem List:  Patient Active Problem List    Diagnosis Date Noted     Moderate episode of recurrent major depressive disorder (H) 10/05/2022     Priority: Medium     Pulmonary emphysema, unspecified emphysema type (H) 10/05/2022     Priority: Medium     Mild centrilobular emphysema on CT 2021  No PFTS in EPIC       Hypertension goal BP (blood pressure) < 140/90 11/16/2020     Priority: Medium     SHY (obstructive sleep apnea) - Moderate (AHI 16, RDI 55) 12/13/2016     Priority: Medium     Split Night:Sleep Study 12/01/2016 - (190.0 lbs) AHI 16.3, RDI 55.6, Supine AHI 17.1, REM AHI -, Low O2% 80.3%, Time Spent ?88% 14.2, Time Spent ?89% 21.5, CPAP was  "titrated to a pressure of 8 with an AHI 0.3. Time spent in REM supine at this pressure was 40.0 minutes.       Tobacco abuse 10/24/2016     Priority: Medium     IGT (impaired glucose tolerance) 10/26/2015     Priority: Medium     Hyperlipidemia LDL goal <70 10/31/2010     Priority: Medium     Dilation of thoracic aorta (H) 11/10/2022     Priority: Low     Encounter for smoking cessation counseling 11/10/2022     Priority: Low     Coronary artery calcification 08/07/2020     Priority: Low     CT 2022- Moderate coronary calcifications.  The total Agatston calcium score is 202 placing the patient in the 70th percentile when compared to age and gender matched control group.       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

## 2023-05-30 NOTE — NURSING NOTE
Has patient had flu shot for current/most recent flu season? If so, when? Yes: 10/5/2022      Is the patient currently in the state of [place]? YES    Visit mode:VIDEO    If the visit is dropped, the patient can be reconnected by: VIDEO VISIT: Text to cell phone: [phone]    Will anyone else be joining the visit? NO      How would you like to obtain your AVS? MyChart    Are changes needed to the allergy or medication list? NO    Reason for visit: RECHECK    Jonna Shaw

## 2023-06-02 ENCOUNTER — TELEPHONE (OUTPATIENT)
Dept: SLEEP MEDICINE | Facility: CLINIC | Age: 63
End: 2023-06-02
Payer: COMMERCIAL

## 2023-06-02 NOTE — TELEPHONE ENCOUNTER
LVM explaining need to schedule lab work and left the number to the [place] lab. Told pt he is welcome to do this at his primary care clinic as well (if they have a lab). Asked him to call me back if he has any questions.

## 2023-06-09 ENCOUNTER — LAB (OUTPATIENT)
Dept: LAB | Facility: CLINIC | Age: 63
End: 2023-06-09
Payer: COMMERCIAL

## 2023-06-09 DIAGNOSIS — G25.81 RESTLESS LEGS SYNDROME (RLS): ICD-10-CM

## 2023-06-09 DIAGNOSIS — G60.8 SENSORY POLYNEUROPATHY: ICD-10-CM

## 2023-06-09 LAB
FERRITIN SERPL-MCNC: 88 NG/ML (ref 31–409)
IRON BINDING CAPACITY (ROCHE): 296 UG/DL (ref 240–430)
IRON SATN MFR SERPL: 26 % (ref 15–46)
IRON SERPL-MCNC: 77 UG/DL (ref 61–157)
VIT B12 SERPL-MCNC: 598 PG/ML (ref 232–1245)

## 2023-06-09 PROCEDURE — 36415 COLL VENOUS BLD VENIPUNCTURE: CPT

## 2023-06-09 PROCEDURE — 82728 ASSAY OF FERRITIN: CPT

## 2023-06-09 PROCEDURE — 83550 IRON BINDING TEST: CPT

## 2023-06-09 PROCEDURE — 82607 VITAMIN B-12: CPT

## 2023-06-09 PROCEDURE — 83540 ASSAY OF IRON: CPT

## 2023-07-18 ENCOUNTER — OFFICE VISIT (OUTPATIENT)
Dept: URGENT CARE | Facility: URGENT CARE | Age: 63
End: 2023-07-18
Payer: COMMERCIAL

## 2023-07-18 VITALS
HEART RATE: 85 BPM | RESPIRATION RATE: 16 BRPM | DIASTOLIC BLOOD PRESSURE: 88 MMHG | WEIGHT: 222.5 LBS | OXYGEN SATURATION: 95 % | BODY MASS INDEX: 29.36 KG/M2 | TEMPERATURE: 98.2 F | SYSTOLIC BLOOD PRESSURE: 141 MMHG

## 2023-07-18 DIAGNOSIS — K40.91 UNILATERAL RECURRENT INGUINAL HERNIA WITHOUT OBSTRUCTION OR GANGRENE: Primary | ICD-10-CM

## 2023-07-18 PROCEDURE — 99213 OFFICE O/P EST LOW 20 MIN: CPT | Performed by: PHYSICIAN ASSISTANT

## 2023-07-18 ASSESSMENT — ENCOUNTER SYMPTOMS
FEVER: 0
PARESTHESIAS: 0
WEAKNESS: 0
WOUND: 0
HEMATOCHEZIA: 0
CHILLS: 0
LIGHT-HEADEDNESS: 0
CHOKING: 0
DIAPHORESIS: 0
COLOR CHANGE: 0
SHORTNESS OF BREATH: 0
FATIGUE: 0
PALPITATIONS: 0
ABDOMINAL PAIN: 0
DIZZINESS: 0
WHEEZING: 0
DIARRHEA: 0
VOMITING: 0
ABDOMINAL DISTENTION: 0
BACK PAIN: 0
MYALGIAS: 0
ANAL BLEEDING: 0
CONSTIPATION: 0
ROS GI COMMENTS: POSITIVE FOR HERNIA
COUGH: 0
ARTHRALGIAS: 0
NAUSEA: 0
NUMBNESS: 0
HEADACHES: 0

## 2023-07-18 NOTE — PROGRESS NOTES
"  Subjective   Elliot is a 62 year old, presenting for the following health issues:  Urgent Care and Hernia (\"Possible hernia\")  HPI   Concern - Possible hernia  Onset: about 2 weeks ago  Description: \"egg shape\" around L inguinal area. It does not appear to be stuck in place.  Intensity: mild - 2-3/10 aching pain at times. Worse when arching his back.  Has been doing some lifting recently.   Progression of Symptoms:  same  Accompanying Signs & Symptoms: No abdominal pain, n/v, constipation, diarrhea, bloody or black tarry stools.  No fever, chills or sweats.  Previous history of similar problem: YES - had hernia repaired in this same location 5-7 years ago  Precipitating factors:        Worsened by: arching back  Alleviating factors:        Improved by: Reduction  Therapies tried and outcome: None    Patient Active Problem List   Diagnosis     Hyperlipidemia LDL goal <70     Tubular adenoma of colon on colonoscopy     IGT (impaired glucose tolerance)     Sensory polyneuropathy     Diverticulosis of colon     Venous lake of lip, left lower     Benign non-nodular prostatic hyperplasia with lower urinary tract symptoms     Tobacco abuse     Restless legs syndrome (RLS)     SHY (obstructive sleep apnea) - Moderate (AHI 16, RDI 55)     Gallbladder polyp, need fu US yearly     Atherosclerosis of abdominal aorta (H), seen on CT     Shoulder girdle syndrome     Family history of prostate cancer, pt would like annual PSA test     Coronary artery calcification     Hypertension goal BP (blood pressure) < 140/90     Moderate episode of recurrent major depressive disorder (H)     Pulmonary emphysema, unspecified emphysema type (H)     Dilation of thoracic aorta (H)     Encounter for smoking cessation counseling     Current Outpatient Medications   Medication     amitriptyline (ELAVIL) 50 MG tablet     aspirin 81 MG tablet     atorvastatin (LIPITOR) 40 MG tablet     dutasteride (AVODART) 0.5 MG capsule     escitalopram (LEXAPRO) 20 " MG tablet     fluticasone-vilanterol (BREO ELLIPTA) 100-25 MCG/INH inhaler     hydrochlorothiazide (HYDRODIURIL) 12.5 MG tablet     lisinopril (ZESTRIL) 40 MG tablet     Multiple Vitamins-Minerals (MULTIVITAMIN ADULT) CHEW     order for DME     No current facility-administered medications for this visit.        Allergies   Allergen Reactions     Morphine And Related      FELT WIRED ON THIS       Review of Systems   Constitutional: Negative for chills, diaphoresis, fatigue and fever.   Respiratory: Negative for cough, choking, shortness of breath and wheezing.    Cardiovascular: Negative for chest pain and palpitations.   Gastrointestinal: Negative for abdominal distention, abdominal pain, anal bleeding, constipation, diarrhea, hematochezia, nausea and vomiting.        Positive for hernia   Genitourinary: Negative for scrotal swelling and testicular pain.   Musculoskeletal: Negative for arthralgias, back pain and myalgias.   Skin: Negative for color change, pallor, rash and wound.   Neurological: Negative for dizziness, weakness, light-headedness, numbness, headaches and paresthesias.            Objective    BP (!) 141/88 (BP Location: Left arm, Patient Position: Sitting, Cuff Size: Adult Regular)   Pulse 85   Temp 98.2  F (36.8  C) (Tympanic)   Resp 16   Wt 100.9 kg (222 lb 8 oz)   SpO2 95%   BMI 29.36 kg/m    Body mass index is 29.36 kg/m .  Physical Exam  Vitals and nursing note reviewed.   Constitutional:       General: He is not in acute distress.     Appearance: Normal appearance. He is normal weight. He is not ill-appearing, toxic-appearing or diaphoretic.   HENT:      Head: Normocephalic and atraumatic.   Cardiovascular:      Rate and Rhythm: Normal rate and regular rhythm.      Pulses: Normal pulses.      Heart sounds: Normal heart sounds, S1 normal and S2 normal.   Pulmonary:      Effort: Pulmonary effort is normal.      Breath sounds: Normal breath sounds and air entry. No wheezing or rales.    Abdominal:      General: Abdomen is flat. Bowel sounds are normal. There is no distension.      Palpations: Abdomen is soft.      Tenderness: There is no abdominal tenderness. There is no right CVA tenderness, left CVA tenderness, guarding or rebound. Negative signs include Corado's sign, Rovsing's sign and McBurney's sign.      Hernia: No hernia (no inguinal hernia appreciated on exam) is present. There is no hernia in the umbilical area, ventral area, left inguinal area, right femoral area, left femoral area or right inguinal area.   Skin:     General: Skin is warm and dry.      Capillary Refill: Capillary refill takes less than 2 seconds.   Neurological:      General: No focal deficit present.      Mental Status: He is alert and oriented to person, place, and time.   Psychiatric:         Mood and Affect: Mood normal.         Behavior: Behavior normal.         Thought Content: Thought content normal.         Judgment: Judgment normal.         Assessment & Plan:  Unilateral recurrent inguinal hernia without obstruction or gangrene:Patient in no acute distress. Slightly hypertensive, otherwise vitals stable. No hernia appreciated on exam. Reassuring that this is easily reducible, not producing a lot of pain, and that there is no discoloration of the area.  No signs of incarceration or strangulation. Referral placed to general surgery. Discussed red flag symptoms warranting emergent medical attention. Patient voices understanding and agreement with the plan as above.   -     Adult General Surg Referral; Future      Physician Attestation   Domitila MIMS PA-C, was present with the medical/CARLOS EDUARDO student, Ally MCCRACKEN, who participated in the service and in the documentation of the note.  I have verified the history and personally performed the physical exam and medical decision making.  I agree with the assessment and plan of care as documented in the note.        Domitila Dinero PA-C

## 2023-07-24 DIAGNOSIS — F33.1 DEPRESSION, MAJOR, RECURRENT, MODERATE (H): ICD-10-CM

## 2023-07-24 DIAGNOSIS — I10 HYPERTENSION GOAL BP (BLOOD PRESSURE) < 140/90: ICD-10-CM

## 2023-07-25 RX ORDER — LISINOPRIL 40 MG/1
TABLET ORAL
Qty: 90 TABLET | Refills: 0 | Status: SHIPPED | OUTPATIENT
Start: 2023-07-25 | End: 2023-08-21

## 2023-07-25 RX ORDER — ESCITALOPRAM OXALATE 20 MG/1
TABLET ORAL
Qty: 90 TABLET | Refills: 1 | Status: SHIPPED | OUTPATIENT
Start: 2023-07-25 | End: 2024-01-22

## 2023-08-20 ASSESSMENT — ENCOUNTER SYMPTOMS
NAUSEA: 0
EYE PAIN: 0
DIZZINESS: 0
CHILLS: 0
HEMATOCHEZIA: 0
HEADACHES: 0
NERVOUS/ANXIOUS: 0
SHORTNESS OF BREATH: 0
ABDOMINAL PAIN: 0
FEVER: 0
PALPITATIONS: 0
ARTHRALGIAS: 0
FREQUENCY: 0
JOINT SWELLING: 0
MYALGIAS: 0
HEARTBURN: 1
HEMATURIA: 0
CONSTIPATION: 0
DYSURIA: 0
COUGH: 0
DIARRHEA: 0
SORE THROAT: 0
WEAKNESS: 0
PARESTHESIAS: 1

## 2023-08-20 ASSESSMENT — PATIENT HEALTH QUESTIONNAIRE - PHQ9
SUM OF ALL RESPONSES TO PHQ QUESTIONS 1-9: 6
10. IF YOU CHECKED OFF ANY PROBLEMS, HOW DIFFICULT HAVE THESE PROBLEMS MADE IT FOR YOU TO DO YOUR WORK, TAKE CARE OF THINGS AT HOME, OR GET ALONG WITH OTHER PEOPLE: SOMEWHAT DIFFICULT
SUM OF ALL RESPONSES TO PHQ QUESTIONS 1-9: 6

## 2023-08-21 ENCOUNTER — OFFICE VISIT (OUTPATIENT)
Dept: FAMILY MEDICINE | Facility: CLINIC | Age: 63
End: 2023-08-21
Payer: COMMERCIAL

## 2023-08-21 VITALS
TEMPERATURE: 98.6 F | DIASTOLIC BLOOD PRESSURE: 78 MMHG | HEART RATE: 91 BPM | RESPIRATION RATE: 21 BRPM | SYSTOLIC BLOOD PRESSURE: 124 MMHG | WEIGHT: 223.1 LBS | OXYGEN SATURATION: 93 % | HEIGHT: 70 IN | BODY MASS INDEX: 31.94 KG/M2

## 2023-08-21 DIAGNOSIS — I10 HYPERTENSION GOAL BP (BLOOD PRESSURE) < 140/90: ICD-10-CM

## 2023-08-21 DIAGNOSIS — I77.810 DILATION OF THORACIC AORTA (H): ICD-10-CM

## 2023-08-21 DIAGNOSIS — Z00.00 ROUTINE GENERAL MEDICAL EXAMINATION AT A HEALTH CARE FACILITY: Primary | ICD-10-CM

## 2023-08-21 DIAGNOSIS — G60.8 SENSORY POLYNEUROPATHY: ICD-10-CM

## 2023-08-21 DIAGNOSIS — Z12.5 PROSTATE CANCER SCREENING: ICD-10-CM

## 2023-08-21 DIAGNOSIS — F33.1 MODERATE EPISODE OF RECURRENT MAJOR DEPRESSIVE DISORDER (H): ICD-10-CM

## 2023-08-21 DIAGNOSIS — D69.2 SOLAR PURPURA (H): ICD-10-CM

## 2023-08-21 DIAGNOSIS — E78.5 HYPERLIPIDEMIA LDL GOAL <70: ICD-10-CM

## 2023-08-21 DIAGNOSIS — J43.9 PULMONARY EMPHYSEMA, UNSPECIFIED EMPHYSEMA TYPE (H): ICD-10-CM

## 2023-08-21 DIAGNOSIS — N52.9 ERECTILE DYSFUNCTION, UNSPECIFIED ERECTILE DYSFUNCTION TYPE: ICD-10-CM

## 2023-08-21 PROCEDURE — 99396 PREV VISIT EST AGE 40-64: CPT | Performed by: FAMILY MEDICINE

## 2023-08-21 RX ORDER — HYDROCHLOROTHIAZIDE 12.5 MG/1
12.5 TABLET ORAL DAILY
Qty: 90 TABLET | Refills: 3 | Status: SHIPPED | OUTPATIENT
Start: 2023-08-21 | End: 2024-08-16

## 2023-08-21 RX ORDER — AMITRIPTYLINE HYDROCHLORIDE 10 MG/1
TABLET ORAL
Qty: 30 TABLET | Refills: 0 | Status: SHIPPED | OUTPATIENT
Start: 2023-08-21 | End: 2023-11-24

## 2023-08-21 RX ORDER — TADALAFIL 20 MG/1
20 TABLET ORAL DAILY PRN
Qty: 12 TABLET | Refills: 11 | Status: SHIPPED | OUTPATIENT
Start: 2023-08-21

## 2023-08-21 RX ORDER — FLUTICASONE FUROATE AND VILANTEROL 100; 25 UG/1; UG/1
1 POWDER RESPIRATORY (INHALATION) DAILY
Qty: 60 EACH | Refills: 5 | Status: SHIPPED | OUTPATIENT
Start: 2023-08-21 | End: 2024-05-06

## 2023-08-21 RX ORDER — ATORVASTATIN CALCIUM 40 MG/1
40 TABLET, FILM COATED ORAL AT BEDTIME
Qty: 90 TABLET | Refills: 3 | Status: SHIPPED | OUTPATIENT
Start: 2023-08-21 | End: 2024-08-16

## 2023-08-21 RX ORDER — LISINOPRIL 40 MG/1
TABLET ORAL
Qty: 90 TABLET | Refills: 3 | Status: SHIPPED | OUTPATIENT
Start: 2023-08-21 | End: 2024-08-12

## 2023-08-21 ASSESSMENT — ENCOUNTER SYMPTOMS
HEMATURIA: 0
NAUSEA: 0
HEADACHES: 0
JOINT SWELLING: 0
PALPITATIONS: 0
NERVOUS/ANXIOUS: 0
ABDOMINAL PAIN: 0
PARESTHESIAS: 1
DIZZINESS: 0
MYALGIAS: 0
COUGH: 0
FEVER: 0
CONSTIPATION: 0
DYSURIA: 0
CHILLS: 0
WEAKNESS: 0
EYE PAIN: 0
SORE THROAT: 0
ARTHRALGIAS: 0
DIARRHEA: 0
SHORTNESS OF BREATH: 0
HEARTBURN: 1
FREQUENCY: 0
HEMATOCHEZIA: 0

## 2023-08-21 ASSESSMENT — PAIN SCALES - GENERAL: PAINLEVEL: NO PAIN (0)

## 2023-08-21 NOTE — PROGRESS NOTES
SUBJECTIVE:   CC: Elliot is an 62 year old who presents for preventative health visit.       8/21/2023     4:15 PM   Additional Questions   Roomed by Jossy   Accompanied by Self         8/21/2023     4:15 PM   Patient Reported Additional Medications   Patient reports taking the following new medications None       Healthy Habits:     Getting at least 3 servings of Calcium per day:  NO    Bi-annual eye exam:  Yes    Dental care twice a year:  NO    Sleep apnea or symptoms of sleep apnea:  Sleep apnea    Diet:  Regular (no restrictions)    Frequency of exercise:  None    Taking medications regularly:  Yes    Medication side effects:  Other    Additional concerns today:  Yes      Today's PHQ-9 Score:       8/20/2023     9:46 PM   PHQ-9 SCORE   PHQ-9 Total Score MyChart 6 (Mild depression)   PHQ-9 Total Score 6             Social History     Tobacco Use    Smoking status: Every Day     Packs/day: 1.00     Years: 46.00     Pack years: 46.00     Types: Cigarettes     Start date: 10/10/1974     Last attempt to quit: 8/20/2023     Passive exposure: Current    Smokeless tobacco: Never    Tobacco comments:     Wants to quit by 9/6/2023   Substance Use Topics    Alcohol use: Not Currently     Comment: Occasional; about 1-2 drinks/week             8/20/2023    10:12 PM   Alcohol Use   Prescreen: >3 drinks/day or >7 drinks/week? No          No data to display                Last PSA:   PSA   Date Value Ref Range Status   01/28/2019 0.92 0 - 4 ug/L Final     Comment:     Assay Method:  Chemiluminescence using Siemens Vista analyzer     Prostate Specific Antigen Screen   Date Value Ref Range Status   01/18/2023 1.44 0.00 - 4.00 ug/L Final       Reviewed orders with patient. Reviewed health maintenance and updated orders accordingly - Yes  Lab work is in process  Labs reviewed in EPIC  BP Readings from Last 3 Encounters:   08/21/23 124/78   07/18/23 (!) 141/88   11/10/22 126/84    Wt Readings from Last 3 Encounters:   08/21/23 101.2  "kg (223 lb 1.6 oz)   07/18/23 100.9 kg (222 lb 8 oz)   05/30/23 101.6 kg (224 lb)                    Reviewed and updated as needed this visit by clinical staff   Tobacco  Allergies  Meds  Problems  Med Hx  Surg Hx  Fam Hx          Reviewed and updated as needed this visit by Provider   Tobacco  Allergies  Meds  Problems  Med Hx  Surg Hx  Fam Hx         Here today for routine wellness exam and follow-up on stable chronic issues.    Review of Systems   Constitutional:  Negative for chills and fever.   HENT:  Negative for congestion, ear pain, hearing loss and sore throat.    Eyes:  Negative for pain and visual disturbance.   Respiratory:  Negative for cough and shortness of breath.    Cardiovascular:  Negative for chest pain, palpitations and peripheral edema.   Gastrointestinal:  Positive for heartburn. Negative for abdominal pain, constipation, diarrhea, hematochezia and nausea.   Genitourinary:  Negative for dysuria, frequency, genital sores, hematuria, impotence, penile discharge and urgency.   Musculoskeletal:  Negative for arthralgias, joint swelling and myalgias.   Skin:  Negative for rash.   Neurological:  Positive for paresthesias. Negative for dizziness, weakness and headaches.   Psychiatric/Behavioral:  Negative for mood changes. The patient is not nervous/anxious.        OBJECTIVE:   /78 (BP Location: Right arm, Patient Position: Sitting, Cuff Size: Adult Large)   Pulse 91   Temp 98.6  F (37  C) (Oral)   Resp 21   Ht 1.765 m (5' 9.5\")   Wt 101.2 kg (223 lb 1.6 oz)   SpO2 93%   BMI 32.47 kg/m      Physical Exam  GENERAL: healthy, alert and no distress  EYES: Eyes grossly normal to inspection, PERRL and conjunctivae and sclerae normal  HENT: ear canals and TM's normal, nose and mouth without ulcers or lesions  NECK: no adenopathy, no asymmetry, masses, or scars and thyroid normal to palpation  RESP: lungs clear to auscultation - no rales, rhonchi or wheezes  CV: regular rate and " rhythm, normal S1 S2, no S3 or S4, no murmur, click or rub, no peripheral edema and peripheral pulses strong  ABDOMEN: soft, nontender, no hepatosplenomegaly, no masses and bowel sounds normal  MS: no gross musculoskeletal defects noted, no edema  SKIN: no suspicious lesions or rashes  NEURO: Normal strength and tone, mentation intact and speech normal  PSYCH: mentation appears normal, affect normal/bright    Diagnostic Test Results:  Labs reviewed in Epic    ASSESSMENT/PLAN:   (Z00.00) Routine general medical examination at a health care facility  (primary encounter diagnosis)  Comment: Routine health maintenance otherwise up-to-date  Plan:     (I10) Hypertension goal BP (blood pressure) < 140/90  Comment: Stable on current regimen.  Continue same plan and routine follow-up.   Plan: hydrochlorothiazide (HYDRODIURIL) 12.5 MG         tablet, lisinopril (ZESTRIL) 40 MG tablet            (E78.5) Hyperlipidemia LDL goal <70  Comment: Recheck and adjust as needed  Plan: atorvastatin (LIPITOR) 40 MG tablet, Lipid         panel reflex to direct LDL Fasting,         Comprehensive metabolic panel            (J43.9) Pulmonary emphysema, unspecified emphysema type (H)  Comment: Stable on current regimen.  Continue same plan and routine follow-up.   Plan: fluticasone-vilanterol (BREO ELLIPTA) 100-25         MCG/ACT inhaler            (G60.8) Sensory polyneuropathy  Comment: Patient would like to taper off amitriptyline as it is not helping and he wonders if it is complicating things a bit.  Plan: amitriptyline (ELAVIL) 10 MG tablet            (Z12.5) Prostate cancer screening  Comment:   Plan: Prostate Specific Antigen Screen            (N52.9) Erectile dysfunction, unspecified erectile dysfunction type  Comment: Discussed mechanism of action of the proposed medication, as well as potential effects, both good and bad.  Patient expressed understanding and agreed with treatment.   Plan: tadalafil (CIALIS) 20 MG tablet    Dilated  "thoracic aorta -has followed up with cardiology and we will work on secondary risk factors    Recurrent depression - Stable on current regimen.  Continue same plan and routine follow-up.     Solar purpura -met with dermatology last year and this will be followed.            Patient has been advised of split billing requirements and indicates understanding: Yes      COUNSELING:   Reviewed preventive health counseling, as reflected in patient instructions       Regular exercise       Healthy diet/nutrition       Vision screening       Colorectal cancer screening       Prostate cancer screening      BMI:   Estimated body mass index is 32.47 kg/m  as calculated from the following:    Height as of this encounter: 1.765 m (5' 9.5\").    Weight as of this encounter: 101.2 kg (223 lb 1.6 oz).   Weight management plan: Discussed healthy diet and exercise guidelines      He reports that he has been smoking cigarettes. He started smoking about 48 years ago. He has a 46.00 pack-year smoking history. He has been exposed to tobacco smoke. He has never used smokeless tobacco.  Nicotine/Tobacco Cessation Plan:   Self help information given to patient            Camelia Hancock MD  [facility] submitted by the patient for this visit:  Patient Health Questionnaire (Submitted on 8/20/2023)  If you checked off any problems, how difficult have these problems made it for you to do your work, take care of things at home, or get along with other people?: Somewhat difficult  PHQ9 TOTAL SCORE: 6    "

## 2023-08-29 ENCOUNTER — OFFICE VISIT (OUTPATIENT)
Dept: URGENT CARE | Facility: URGENT CARE | Age: 63
End: 2023-08-29
Payer: COMMERCIAL

## 2023-08-29 VITALS
WEIGHT: 220 LBS | TEMPERATURE: 98 F | BODY MASS INDEX: 32.02 KG/M2 | RESPIRATION RATE: 17 BRPM | OXYGEN SATURATION: 95 % | DIASTOLIC BLOOD PRESSURE: 83 MMHG | HEART RATE: 83 BPM | SYSTOLIC BLOOD PRESSURE: 136 MMHG

## 2023-08-29 DIAGNOSIS — S80.861A INSECT BITE OF RIGHT LOWER LEG, INITIAL ENCOUNTER: ICD-10-CM

## 2023-08-29 DIAGNOSIS — J02.9 SORETHROAT: Primary | ICD-10-CM
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
## 2023-08-29 LAB
DEPRECATED S PYO AG THROAT QL EIA: NEGATIVE
GROUP A STREP BY PCR: NOT DETECTED

## 2023-08-29 PROCEDURE — 87651 STREP A DNA AMP PROBE: CPT

## 2023-08-29 PROCEDURE — 99213 OFFICE O/P EST LOW 20 MIN: CPT

## 2023-08-29 NOTE — PROGRESS NOTES
ASSESSMENT:   (J02.9) Sorethroat  (primary encounter diagnosis)  Plan: Streptococcus A Rapid Screen w/Reflex to PCR -         Clinic Collect, Group A Streptococcus PCR         Throat Swab    (S80.861A,  W57.XXXA) Insect bite of right lower leg, initial encounter    PLAN:  Informed the patient that the strep test is negative for strep throat.  I recommended to the patient that he perform an at-home COVID test to rule this out as well.  We discussed the need for him to get plenty of rest, drink fluids and use Tylenol as needed for pain with the maximum dose being 4000 mg in a 24-hour period of time.  We also discussed trying warm salt water gargles, hot/warm water or tea with honey under lemon and or Cepacol lozenges or spray for his sore throat.  For the insect bite we discussed using hydrocortisone cream up to 2.5% as needed for the itching and inflammation.  Informed the patient to return to clinic with any new or worsening symptoms.  Patient acknowledged his understanding of the above plan.    The use of [employer] dictation services may have been used to construct the content in this note; any grammatical or spelling errors are non-intentional. Please contact the author of this note directly if you are in need of any clarification.      Myke Dixon, ALDEN CNP      SUBJECTIVE:   Jack Marrero  is a 62 year old male who is here today because of: Sore Throat.  The patient has had symptoms of nasal congestion and body aches.   Onset of symptoms was 2 days ago. Course of illness is same.  Patient denies exposure to illness at home or work.   Patient denies fever, cough, earache, vomiting, and diarrhea  Treatment measures tried include Emergen-C and Tylenol.  The patient reports having an insect bite on Saturday.      The patient did not do an at home COVID test.      ROS:  Negative except noted above.      OBJECTIVE:   /83   Pulse 83   Temp 98  F (36.7  C) (Tympanic)   Resp 17   Wt 99.8 kg  (220 lb)   SpO2 95%   BMI 32.02 kg/m    General: healthy, alert and no distress  Eyes - conjunctivae clear.  Ears - External ears normal. Canals clear. TM's normal.  Nose/Sinuses - Nares normal.Mucosa normal. No drainage or sinus tenderness.  Oropharynx - Lips, mucosa, oropharynx, tonsils and tongue normal  Neck - Neck supple; no lymphadenopathy  Lungs - Lungs clear; no wheezing or rales.  Heart - regular rate and rhythm. No murmurs, rub.   Skin - 1cm in diameter area of edema and erythema on the medial aspect of the right lower leg just below the knee.  No bleeding or drainage noted.    Labs:  Rapid Strep test is negative; await throat culture results.

## 2023-08-29 NOTE — PATIENT INSTRUCTIONS
Strep test is negative for strep throat.   I would recommend performing an at home COVID test to rule this out as well.  Get plenty of rest and drink fluids.  Can use Tylenol as needed for pain.  Maximum dose of Tylenol is 4000mg in a 24 hour period of time.  You can also try warm salt water gargles, hot/warm water or tea with honey and/or lemon and/or Cepacol lozenges or spray for your sore throat. For the insect bite; use hydrocortisone cream up to 2.5% as needed for the itching and inflammation.

## 2023-10-10 ENCOUNTER — TELEPHONE (OUTPATIENT)
Dept: FAMILY MEDICINE | Facility: CLINIC | Age: 63
End: 2023-10-10

## 2023-10-10 NOTE — TELEPHONE ENCOUNTER
Reason for Call:  Appointment Request    Patient requesting this type of appt:  Lump in groin area    Requested provider: Camelia Hancock    Reason patient unable to be scheduled: Not within requested timeframe    When does patient want to be seen/preferred time:  asap    Comments: Patient missed today's appointment due to going to the wrong clinic location. He would like to reschedule for asap. Unable to find available appointment before November. Patient prefers to still come in today if possible and would be willing to schedule with a different provider if able to be seen today.     Could we send this information to you in [employer] or would you prefer to receive a phone call?:   Patient would prefer a phone call   Okay to leave a detailed message?: Yes at Cell number on file:    Telephone Information:   Mobile [phone]       Call taken on 10/10/2023 at 9:34 AM by Haley King

## 2023-10-10 NOTE — TELEPHONE ENCOUNTER
I do not have any availability today.  So we can look to see if somebody else has availability or perhaps urgent care?

## 2023-10-10 NOTE — TELEPHONE ENCOUNTER
Is this ok to use a SD appt for? There is also virtual open on Fri however not sure if this is appropriate for virtual due to nature of visit.

## 2023-10-11 NOTE — TELEPHONE ENCOUNTER
Yes, same day is fine.  I do not think virtual would be as good because he wants us to take a look at an area of swelling.  So should be in person

## 2023-10-11 NOTE — TELEPHONE ENCOUNTER
Spoke to patient - no providers available today at [facility] - suggested [facility] for possible openings today. Otherwise we have a couple openings on Friday but patient mentioned he was going to be out of town. He will call back if need to postpone until he gets back.

## 2023-10-23 ENCOUNTER — OFFICE VISIT (OUTPATIENT)
Dept: URGENT CARE | Facility: URGENT CARE | Age: 63
End: 2023-10-23
Payer: COMMERCIAL

## 2023-10-23 VITALS
DIASTOLIC BLOOD PRESSURE: 79 MMHG | OXYGEN SATURATION: 95 % | SYSTOLIC BLOOD PRESSURE: 129 MMHG | BODY MASS INDEX: 30.74 KG/M2 | HEART RATE: 72 BPM | WEIGHT: 211.2 LBS | TEMPERATURE: 98.7 F

## 2023-10-23 DIAGNOSIS — R39.11 URINARY HESITANCY: ICD-10-CM

## 2023-10-23 DIAGNOSIS — R10.32 LEFT INGUINAL PAIN: Primary | ICD-10-CM

## 2023-10-23 LAB
ALBUMIN SERPL BCG-MCNC: 4.3 G/DL (ref 3.5–5.2)
ALBUMIN UR-MCNC: NEGATIVE MG/DL
ALP SERPL-CCNC: 82 U/L (ref 40–129)
ALT SERPL W P-5'-P-CCNC: 61 U/L (ref 0–70)
ANION GAP SERPL CALCULATED.3IONS-SCNC: 10 MMOL/L (ref 7–15)
APPEARANCE UR: CLEAR
AST SERPL W P-5'-P-CCNC: 50 U/L (ref 0–45)
BASOPHILS # BLD AUTO: 0.1 10E3/UL (ref 0–0.2)
BASOPHILS NFR BLD AUTO: 1 %
BILIRUB SERPL-MCNC: 0.8 MG/DL
BILIRUB UR QL STRIP: NEGATIVE
BUN SERPL-MCNC: 12.9 MG/DL (ref 8–23)
CALCIUM SERPL-MCNC: 9.3 MG/DL (ref 8.8–10.2)
CHLORIDE SERPL-SCNC: 104 MMOL/L (ref 98–107)
COLOR UR AUTO: YELLOW
CREAT SERPL-MCNC: 1.08 MG/DL (ref 0.67–1.17)
DEPRECATED HCO3 PLAS-SCNC: 26 MMOL/L (ref 22–29)
EGFRCR SERPLBLD CKD-EPI 2021: 76 ML/MIN/1.73M2
EOSINOPHIL # BLD AUTO: 0.3 10E3/UL (ref 0–0.7)
EOSINOPHIL NFR BLD AUTO: 3 %
ERYTHROCYTE [DISTWIDTH] IN BLOOD BY AUTOMATED COUNT: 14.2 % (ref 10–15)
GLUCOSE SERPL-MCNC: 102 MG/DL (ref 70–99)
GLUCOSE UR STRIP-MCNC: NEGATIVE MG/DL
HCT VFR BLD AUTO: 46 % (ref 40–53)
HGB BLD-MCNC: 15.5 G/DL (ref 13.3–17.7)
HGB UR QL STRIP: NEGATIVE
IMM GRANULOCYTES # BLD: 0 10E3/UL
IMM GRANULOCYTES NFR BLD: 0 %
KETONES UR STRIP-MCNC: NEGATIVE MG/DL
LEUKOCYTE ESTERASE UR QL STRIP: NEGATIVE
LYMPHOCYTES # BLD AUTO: 1.4 10E3/UL (ref 0.8–5.3)
LYMPHOCYTES NFR BLD AUTO: 16 %
MCH RBC QN AUTO: 32.1 PG (ref 26.5–33)
MCHC RBC AUTO-ENTMCNC: 33.7 G/DL (ref 31.5–36.5)
MCV RBC AUTO: 95 FL (ref 78–100)
MONOCYTES # BLD AUTO: 0.6 10E3/UL (ref 0–1.3)
MONOCYTES NFR BLD AUTO: 7 %
NEUTROPHILS # BLD AUTO: 6.3 10E3/UL (ref 1.6–8.3)
NEUTROPHILS NFR BLD AUTO: 74 %
NITRATE UR QL: NEGATIVE
PH UR STRIP: 7 [PH] (ref 5–7)
PLATELET # BLD AUTO: 274 10E3/UL (ref 150–450)
POTASSIUM SERPL-SCNC: 5 MMOL/L (ref 3.4–5.3)
PROT SERPL-MCNC: 6.9 G/DL (ref 6.4–8.3)
PSA SERPL DL<=0.01 NG/ML-MCNC: 1.69 NG/ML (ref 0–4.5)
RBC # BLD AUTO: 4.83 10E6/UL (ref 4.4–5.9)
RBC #/AREA URNS AUTO: NORMAL /HPF
SODIUM SERPL-SCNC: 140 MMOL/L (ref 135–145)
SP GR UR STRIP: 1.02 (ref 1–1.03)
UROBILINOGEN UR STRIP-ACNC: 1 E.U./DL
WBC # BLD AUTO: 8.5 10E3/UL (ref 4–11)
WBC #/AREA URNS AUTO: NORMAL /HPF

## 2023-10-23 PROCEDURE — 36415 COLL VENOUS BLD VENIPUNCTURE: CPT | Performed by: FAMILY MEDICINE

## 2023-10-23 PROCEDURE — G0103 PSA SCREENING: HCPCS | Performed by: FAMILY MEDICINE

## 2023-10-23 PROCEDURE — 85025 COMPLETE CBC W/AUTO DIFF WBC: CPT | Performed by: FAMILY MEDICINE

## 2023-10-23 PROCEDURE — 99214 OFFICE O/P EST MOD 30 MIN: CPT | Performed by: FAMILY MEDICINE

## 2023-10-23 PROCEDURE — 80053 COMPREHEN METABOLIC PANEL: CPT | Performed by: FAMILY MEDICINE

## 2023-10-23 PROCEDURE — 81001 URINALYSIS AUTO W/SCOPE: CPT | Performed by: FAMILY MEDICINE

## 2023-10-23 NOTE — PROGRESS NOTES
There are no exam notes on file for this visit.  Nursing note reviewed with patient.  Accurracy and completeness verified.    Chief Complaint   Patient presents with    Groin Swelling     Pt noticed a lump in groin has been for  while. In the same location where he had hernia surgery. Has deisi causing pain, pain is dull mostly on the left side for 2 months. Nausea occasionally mild causing pt not to want to eat. Slow urine - mile discomfort while urinating, low back pain wraps around to groin area, dark stool often. Pt is  not taking any mediation for sx        HPI:    A lump/egg shaped, oval and this seems to be in the area that he had a hernia surgery, about 5 years ago  Left groin lump  Saw a previous provider and they felt like it was another hernia,     Some nausea (loss of 12 pounds) , lower back pain that wraps around and some slower urination    Also some sharp pain in the area  Has been concerned about it for a while    ROS: As per HPI.  10 point ROS of systems including Constitutional, Eyes, Respiratory, Cardiovascular, Gastroenterology, Genitourinary, Integumentary, Muscularskeletal, Psychiatric were all negative except for pertinent positives noted in my HPI.    I have reviewed and updated the patient's past medical history in the EMR. Current problems are:  Patient Active Problem List   Diagnosis    Hyperlipidemia LDL goal <70    Tubular adenoma of colon on colonoscopy    IGT (impaired glucose tolerance)    Sensory polyneuropathy    Diverticulosis of colon    Venous lake of lip, left lower    Benign non-nodular prostatic hyperplasia with lower urinary tract symptoms    Tobacco abuse    Restless legs syndrome (RLS)    SHY (obstructive sleep apnea) - Moderate (AHI 16, RDI 55)    Gallbladder polyp, need fu US yearly    Atherosclerosis of abdominal aorta (H), seen on CT    Shoulder girdle syndrome    Family history of prostate cancer, pt would like annual PSA test    Coronary artery calcification     Hypertension goal BP (blood pressure) < 140/90    Moderate episode of recurrent major depressive disorder (H)    Pulmonary emphysema, unspecified emphysema type (H)    Dilation of thoracic aorta (H24)    Encounter for smoking cessation counseling    Solar purpura (H24)       Allergies, reviewed:     Allergies   Allergen Reactions    Morphine And Related      FELT WIRED ON THIS       Current Outpatient Medications   Medication Sig Dispense Refill    aspirin 81 MG tablet Take 1 tablet by mouth daily.      atorvastatin (LIPITOR) 40 MG tablet Take 1 tablet (40 mg) by mouth At Bedtime 90 tablet 3    dutasteride (AVODART) 0.5 MG capsule TAKE 1 CAPSULE(0.5 MG) BY MOUTH DAILY 90 capsule 2    escitalopram (LEXAPRO) 20 MG tablet TAKE 1 TABLET(20 MG) BY MOUTH DAILY 90 tablet 1    fluticasone-vilanterol (BREO ELLIPTA) 100-25 MCG/ACT inhaler Inhale 1 puff into the lungs daily 60 each 5    hydrochlorothiazide (HYDRODIURIL) 12.5 MG tablet Take 1 tablet (12.5 mg) by mouth daily 90 tablet 3    lisinopril (ZESTRIL) 40 MG tablet TAKE 1 TABLET(40 MG) BY MOUTH IN THE MORNING 90 tablet 3    Multiple Vitamins-Minerals (MULTIVITAMIN ADULT) CHEW       order for DME Equipment ordered: RESMED Auto PAP Mask type: Nasal Settings: 6-12 cm H2O      tadalafil (CIALIS) 20 MG tablet Take 1 tablet (20 mg) by mouth daily as needed (ED) 12 tablet 11    amitriptyline (ELAVIL) 10 MG tablet Take 4 tablets (40 mg) by mouth At Bedtime for 3 days, THEN 3 tablets (30 mg) At Bedtime for 3 days, THEN 2 tablets (20 mg) At Bedtime for 3 days, THEN 1 tablet (10 mg) At Bedtime for 3 days. 30 tablet 0       Objective:  /79   Pulse 72   Temp 98.7  F (37.1  C) (Tympanic)   Wt 95.8 kg (211 lb 3.2 oz)   SpO2 95%   BMI 30.74 kg/m    General Appearance: Pleasant, alert, WN/WD in no acute respiratory distress.  Skin: no rash, warm and dry.  Mild left inguinal tenderness, no hernia  Neurologic Exam: Nonfocal, no tremor. Normal gait.  Psychiatric Exam: Alert -  appropriate, normal affect    Results for orders placed or performed in visit on 10/23/23   UA with Microscopic reflex to Culture - lab collect     Status: Normal    Specimen: Urine, Midstream   Result Value Ref Range    Color Urine Yellow Colorless, Straw, Light Yellow, Yellow    Appearance Urine Clear Clear    Glucose Urine Negative Negative mg/dL    Bilirubin Urine Negative Negative    Ketones Urine Negative Negative mg/dL    Specific Gravity Urine 1.020 1.003 - 1.035    Blood Urine Negative Negative    pH Urine 7.0 5.0 - 7.0    Protein Albumin Urine Negative Negative mg/dL    Urobilinogen Urine 1.0 0.2, 1.0 E.U./dL    Nitrite Urine Negative Negative    Leukocyte Esterase Urine Negative Negative   CBC with platelets and differential     Status: None   Result Value Ref Range    WBC Count 8.5 4.0 - 11.0 10e3/uL    RBC Count 4.83 4.40 - 5.90 10e6/uL    Hemoglobin 15.5 13.3 - 17.7 g/dL    Hematocrit 46.0 40.0 - 53.0 %    MCV 95 78 - 100 fL    MCH 32.1 26.5 - 33.0 pg    MCHC 33.7 31.5 - 36.5 g/dL    RDW 14.2 10.0 - 15.0 %    Platelet Count 274 150 - 450 10e3/uL    % Neutrophils 74 %    % Lymphocytes 16 %    % Monocytes 7 %    % Eosinophils 3 %    % Basophils 1 %    % Immature Granulocytes 0 %    Absolute Neutrophils 6.3 1.6 - 8.3 10e3/uL    Absolute Lymphocytes 1.4 0.8 - 5.3 10e3/uL    Absolute Monocytes 0.6 0.0 - 1.3 10e3/uL    Absolute Eosinophils 0.3 0.0 - 0.7 10e3/uL    Absolute Basophils 0.1 0.0 - 0.2 10e3/uL    Absolute Immature Granulocytes 0.0 <=0.4 10e3/uL   UA Microscopic with Reflex to Culture     Status: Normal   Result Value Ref Range    RBC Urine None Seen 0-2 /HPF /HPF    WBC Urine None Seen 0-5 /HPF /HPF    Narrative    Urine Culture not indicated   CBC with platelets and differential     Status: None    Narrative    The following orders were created for panel order CBC with platelets and differential.  Procedure                               Abnormality         Status                     ---------                                -----------         ------                     CBC with platelets and d...[[id number]]                      Final result                 Please view results for these tests on the individual orders.       ASSESSMENT/PLAN:    ICD-10-CM    1. Left inguinal pain  R10.32 CBC with platelets and differential     Comprehensive metabolic panel (BMP + Alb, Alk Phos, ALT, AST, Total. Bili, TP)     UA with Microscopic reflex to Culture - lab collect     PSA, screen     CBC with platelets and differential     Comprehensive metabolic panel (BMP + Alb, Alk Phos, ALT, AST, Total. Bili, TP)     UA with Microscopic reflex to Culture - lab collect     PSA, screen     UA Microscopic with Reflex to Culture     Adult Urology  Referral      2. Urinary hesitancy  R39.11 Comprehensive metabolic panel (BMP + Alb, Alk Phos, ALT, AST, Total. Bili, TP)     UA with Microscopic reflex to Culture - lab collect     PSA, screen     Adult Urology  Referral        No obvious cause today  Not a recurrent hernia    Follow up with urology given urinary symptoms. Normal UA       Maulik Escobar MD MPH

## 2023-11-08 ENCOUNTER — PATIENT OUTREACH (OUTPATIENT)
Dept: GASTROENTEROLOGY | Facility: CLINIC | Age: 63
End: 2023-11-08
Payer: MEDICARE

## 2023-11-13 ENCOUNTER — IMMUNIZATION (OUTPATIENT)
Dept: FAMILY MEDICINE | Facility: CLINIC | Age: 63
End: 2023-11-13
Payer: COMMERCIAL

## 2023-11-13 DIAGNOSIS — Z23 ENCOUNTER FOR IMMUNIZATION: Primary | ICD-10-CM

## 2023-11-13 PROCEDURE — 90678 RSV VACC PREF BIVALENT IM: CPT

## 2023-11-13 PROCEDURE — 99207 PR NO CHARGE NURSE ONLY: CPT

## 2023-11-13 PROCEDURE — 90480 ADMN SARSCOV2 VAC 1/ONLY CMP: CPT

## 2023-11-13 PROCEDURE — 90471 IMMUNIZATION ADMIN: CPT

## 2023-11-13 PROCEDURE — 90472 IMMUNIZATION ADMIN EACH ADD: CPT

## 2023-11-13 PROCEDURE — 90662 IIV NO PRSV INCREASED AG IM: CPT

## 2023-11-13 PROCEDURE — 91320 SARSCV2 VAC 30MCG TRS-SUC IM: CPT

## 2023-11-13 NOTE — PROGRESS NOTES
Prior to immunization administration, verified patients identity using patient s name and date of birth. Please see Immunization Activity for additional information.     Screening Questionnaire for Adult Immunization    Are you sick today?   No   Do you have allergies to medications, food, a vaccine component or latex?   No   Have you ever had a serious reaction after receiving a vaccination?   No   Do you have a long-term health problem with heart, lung, kidney, or metabolic disease (e.g., diabetes), asthma, a blood disorder, no spleen, complement component deficiency, a cochlear implant, or a spinal fluid leak?  Are you on long-term aspirin therapy?   No   Do you have cancer, leukemia, HIV/AIDS, or any other immune system problem?   No   Do you have a parent, brother, or sister with an immune system problem?   No   In the past 3 months, have you taken medications that affect  your immune system, such as prednisone, other steroids, or anticancer drugs; drugs for the treatment of rheumatoid arthritis, Crohn s disease, or psoriasis; or have you had radiation treatments?   No   Have you had a seizure, or a brain or other nervous system problem?   No   During the past year, have you received a transfusion of blood or blood    products, or been given immune (gamma) globulin or antiviral drug?   No   For women: Are you pregnant or is there a chance you could become       pregnant during the next month?   No   Have you received any vaccinations in the past 4 weeks?   No     Immunization questionnaire answers were all negative.    I have reviewed the following standing orders:   This patient is due and qualifies for the Covid-19 vaccine.     Click here for COVID-19 Standing Order    I have reviewed the vaccines inclusion and exclusion criteria; No concerns regarding eligibility.     Influenza -   RSV  -     Patient instructed to remain in clinic for 15 minutes afterwards, and to report any adverse reactions.     Screening  performed by Mimi Chairez MA on 11/13/2023 at 2:03 PM.

## 2023-11-13 NOTE — PROGRESS NOTES
Prior to immunization administration, verified patients identity using patient s name and date of birth. Please see Immunization Activity for additional information.     Screening Questionnaire for Adult Immunization    Are you sick today?   No   Do you have allergies to medications, food, a vaccine component or latex?   No   Have you ever had a serious reaction after receiving a vaccination?   No   Do you have a long-term health problem with heart, lung, kidney, or metabolic disease (e.g., diabetes), asthma, a blood disorder, no spleen, complement component deficiency, a cochlear implant, or a spinal fluid leak?  Are you on long-term aspirin therapy?   No   Do you have cancer, leukemia, HIV/AIDS, or any other immune system problem?   No   Do you have a parent, brother, or sister with an immune system problem?   No   In the past 3 months, have you taken medications that affect  your immune system, such as prednisone, other steroids, or anticancer drugs; drugs for the treatment of rheumatoid arthritis, Crohn s disease, or psoriasis; or have you had radiation treatments?   No   Have you had a seizure, or a brain or other nervous system problem?   No   During the past year, have you received a transfusion of blood or blood    products, or been given immune (gamma) globulin or antiviral drug?   No   For women: Are you pregnant or is there a chance you could become       pregnant during the next month?   No   Have you received any vaccinations in the past 4 weeks?   No     Immunization questionnaire answers were all negative.    I have reviewed the following standing orders:   This patient is due and qualifies for the Covid-19 vaccine.     Click here for COVID-19 Standing Order    I have reviewed the vaccines inclusion and exclusion criteria; No concerns regarding eligibility.     This patient is due and qualifies for the Influenza vaccine.    Click here for Influenza Vaccine Standing Order    I have reviewed the  vaccines inclusion and exclusion criteria; No concerns regarding eligibility.         This patient is due and qualifies for the RSV vaccine.    Click here for RSV Vaccine Standing Order    I have reviewed the vaccines inclusion and exclusion criteria; No concerns regarding eligibility.     Patient instructed to remain in clinic for 15 minutes afterwards, and to report any adverse reactions.     Screening performed by Mimi Chairez MA on 11/13/2023 at 2:21 PM.

## 2023-11-22 ENCOUNTER — OFFICE VISIT (OUTPATIENT)
Dept: SURGERY | Facility: CLINIC | Age: 63
End: 2023-11-22
Payer: COMMERCIAL

## 2023-11-22 VITALS
HEART RATE: 67 BPM | WEIGHT: 215 LBS | DIASTOLIC BLOOD PRESSURE: 80 MMHG | BODY MASS INDEX: 31.29 KG/M2 | SYSTOLIC BLOOD PRESSURE: 119 MMHG

## 2023-11-22 DIAGNOSIS — R10.32 LEFT GROIN PAIN: Primary | ICD-10-CM

## 2023-11-22 PROCEDURE — 99203 OFFICE O/P NEW LOW 30 MIN: CPT | Performed by: SURGERY

## 2023-11-22 NOTE — LETTER
11/22/2023         RE: Jack Marrero  [id number] [address] [address]        Dear Colleague,    Thank you for referring your patient, Jack Marrero, to the [facility]. Please see a copy of my visit note below.    Patient seen in consultation for left inguinal pain by Camelia Hancock    HPI:  Patient is a 63 year old male  with complaints of pain in the left groin  Had hernia repair with Dr Gale in 2015. Did have some pain some moths after but seemed to have healed OK at that time.  Does see a lump there and gets some electric shock type pain even if sitting still  Pain can come and go, the lump seems to remain  The patient noticed the symptoms about few years ago.    Had recent urgent care visit for this and no hernia noted  nothing makes the episode better.  Right side feels normal    Review Of Systems    Skin: negative  Ears/Nose/Throat: negative  Respiratory: No shortness of breath, dyspnea on exertion, cough, or hemoptysis  Cardiovascular: negative  Gastrointestinal: diarrhea  Genitourinary: negative  Musculoskeletal: restless legs  Neurologic: negative  Hematologic/Lymphatic/Immunologic: negative  Endocrine: negative      Past Medical History:   Diagnosis Date     Acute pain of right shoulder 12/23/2016     ADHD (attention deficit hyperactivity disorder)     dx via Dr Olson PhD      Calcification of coronary artery/seen incidentally on lung CT      Depression, anxiety      Depressive disorder      Diverticulosis of colon      Dyslipidemia      Essential hypertension      Hyperlipidemia      Inflamed seborrheic keratosis 01/29/2016     Pain in joint, shoulder region 12/16/2013     Pulmonary emphysema, unspecified emphysema type (H)      Sensory polyneuropathy      Sleep apnea        Past Surgical History:   Procedure Laterality Date     BIOPSY  2016    growth on right shin     COLONOSCOPY WITH CO2 INSUFFLATION N/A 01/11/2016    Procedure: COLONOSCOPY WITH  CO2 INSUFFLATION;  Surgeon: Nilson Gale MD;  Location: [facility] OR     COLONOSCOPY WITH CO2 INSUFFLATION N/A 3/16/2021    Procedure: COLONOSCOPY, WITH CO2 INSUFFLATION;  Surgeon: Jean Pierre Gillespie DO;  Location: [facility] OR     DENTAL SURGERY       ALL 4 WISDOM TEETH EXTRACTED     HERNIORRHAPHY INGUINAL Left 2015    Procedure: HERNIORRHAPHY INGUINAL;  Surgeon: Nilson Gale MD;  Location: [facility] OR     TONSILLECTOMY & ADENOIDECTOMY       ZZHC EXCISION SKIN OF NAIL FOLD      BL GREAT TOE       Social History     Socioeconomic History     Marital status:      Spouse name: Not on file     Number of children: Not on file     Years of education: Not on file     Highest education level: Not on file   Occupational History     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, 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 [employer]   Tobacco Use     Smoking status: Every Day     Packs/day: 1.00     Years: 46.00     Additional pack years: 0.00     Total pack years: 46.00     Types: Cigarettes     Start date: 10/10/1974     Last attempt to quit: 2023     Years since quittin.2     Passive exposure: Current     Smokeless tobacco: Never     Tobacco comments:     Wants to quit by 2023   Vaping Use     Vaping Use: Never used   Substance and Sexual Activity     Alcohol use: Not Currently     Comment: Occasional; about 1-2 drinks/week     Drug use: Never     Sexual activity: Yes     Partners: Female   Other Topics Concern     Parent/sibling w/ CABG, MI or angioplasty before 65F 55M? No   Social History Narrative     Not on file     Social Determinants of Health     Financial Resource Strain: Not on file   Food Insecurity: Not on file   Transportation Needs: Not on file   Physical Activity: Not on file   Stress: Not on file   Social Connections: Not on file   Interpersonal Safety: Not on file   Housing Stability: Not on file       Current Outpatient Medications   Medication Sig Dispense Refill      amitriptyline (ELAVIL) 10 MG tablet Take 4 tablets (40 mg) by mouth At Bedtime for 3 days, THEN 3 tablets (30 mg) At Bedtime for 3 days, THEN 2 tablets (20 mg) At Bedtime for 3 days, THEN 1 tablet (10 mg) At Bedtime for 3 days. 30 tablet 0     aspirin 81 MG tablet Take 1 tablet by mouth daily.       atorvastatin (LIPITOR) 40 MG tablet Take 1 tablet (40 mg) by mouth At Bedtime 90 tablet 3     dutasteride (AVODART) 0.5 MG capsule TAKE 1 CAPSULE(0.5 MG) BY MOUTH DAILY 90 capsule 2     escitalopram (LEXAPRO) 20 MG tablet TAKE 1 TABLET(20 MG) BY MOUTH DAILY 90 tablet 1     fluticasone-vilanterol (BREO ELLIPTA) 100-25 MCG/ACT inhaler Inhale 1 puff into the lungs daily 60 each 5     hydrochlorothiazide (HYDRODIURIL) 12.5 MG tablet Take 1 tablet (12.5 mg) by mouth daily 90 tablet 3     lisinopril (ZESTRIL) 40 MG tablet TAKE 1 TABLET(40 MG) BY MOUTH IN THE MORNING 90 tablet 3     Multiple Vitamins-Minerals (MULTIVITAMIN ADULT) CHEW        order for DME Equipment ordered: RESMED Auto PAP Mask type: Nasal Settings: 6-12 cm H2O       tadalafil (CIALIS) 20 MG tablet Take 1 tablet (20 mg) by mouth daily as needed (ED) 12 tablet 11       Medications and history reviewed    Physical exam:  Vitals: /80   Pulse 67   Wt 97.5 kg (215 lb)   BMI 31.29 kg/m    BMI= Body mass index is 31.29 kg/m .    Constitutional: healthy, alert, and no distress  Head: Normocephalic. No masses, lesions, tenderness or abnormalities  Gastrointestinal: Abdomen soft, non-tender. BS normal. No masses, organomegaly  : Normal external genitalia without lesions. No hernia felt or other mass  Musculoskeletal: extremities normal- no gross deformities noted, gait normal, and normal muscle tone  Skin: no suspicious lesions or rashes  Psychiatric: mentation appears normal and affect normal/bright      Assessment:     ICD-10-CM    1. Left groin pain  R10.32 Adult General Surg Referral     CT Abdomen Pelvis w/o Contrast        Plan: Pain and apparently  lump in the left groin per patient's though nothing felt here on clinical exam.  Will go forward with a CT for better look.  If no recurrence of hernia or other cause found may be pain related to scarring or mesh with the old repair at which point we have to decide if it is worthwhile to go when she previously operated area to remove that versus trial things like injections or medications to help with the pain.  Will discuss more once CT completed.    Eduardo Nolan MD      Again, thank you for allowing me to participate in the care of your patient.        Sincerely,        Eduardo Nolan MD

## 2023-11-22 NOTE — PROGRESS NOTES
Patient seen in consultation for left inguinal pain by Camelia Hancock    HPI:  Patient is a 63 year old male  with complaints of pain in the left groin  Had hernia repair with Dr Gale in 2015. Did have some pain some moths after but seemed to have healed OK at that time.  Does see a lump there and gets some electric shock type pain even if sitting still  Pain can come and go, the lump seems to remain  The patient noticed the symptoms about few years ago.    Had recent urgent care visit for this and no hernia noted  nothing makes the episode better.  Right side feels normal    Review Of Systems    Skin: negative  Ears/Nose/Throat: negative  Respiratory: No shortness of breath, dyspnea on exertion, cough, or hemoptysis  Cardiovascular: negative  Gastrointestinal: diarrhea  Genitourinary: negative  Musculoskeletal: restless legs  Neurologic: negative  Hematologic/Lymphatic/Immunologic: negative  Endocrine: negative      Past Medical History:   Diagnosis Date    Acute pain of right shoulder 12/23/2016    ADHD (attention deficit hyperactivity disorder)     dx via Dr Olson PhD     Calcification of coronary artery/seen incidentally on lung CT     Depression, anxiety     Depressive disorder     Diverticulosis of colon     Dyslipidemia     Essential hypertension     Hyperlipidemia     Inflamed seborrheic keratosis 01/29/2016    Pain in joint, shoulder region 12/16/2013    Pulmonary emphysema, unspecified emphysema type (H)     Sensory polyneuropathy     Sleep apnea        Past Surgical History:   Procedure Laterality Date    BIOPSY  2016    growth on right shin    COLONOSCOPY WITH CO2 INSUFFLATION N/A 01/11/2016    Procedure: COLONOSCOPY WITH CO2 INSUFFLATION;  Surgeon: Nilson Gale MD;  Location:  OR    COLONOSCOPY WITH CO2 INSUFFLATION N/A 3/16/2021    Procedure: COLONOSCOPY, WITH CO2 INSUFFLATION;  Surgeon: Jean Pierre Gillespie DO;  Location:  OR    DENTAL SURGERY  1991     ALL 4  WISDOM TEETH EXTRACTED    HERNIORRHAPHY INGUINAL Left 2015    Procedure: HERNIORRHAPHY INGUINAL;  Surgeon: Nilson Gale MD;  Location: [facility] OR    TONSILLECTOMY & ADENOIDECTOMY      [facility] EXCISION SKIN OF NAIL FOLD      BL GREAT TOE       Social History     Socioeconomic History    Marital status:      Spouse name: Not on file    Number of children: Not on file    Years of education: Not on file    Highest education level: Not on file   Occupational History    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, 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 [employer]   Tobacco Use    Smoking status: Every Day     Packs/day: 1.00     Years: 46.00     Additional pack years: 0.00     Total pack years: 46.00     Types: Cigarettes     Start date: 10/10/1974     Last attempt to quit: 2023     Years since quittin.2     Passive exposure: Current    Smokeless tobacco: Never    Tobacco comments:     Wants to quit by 2023   Vaping Use    Vaping Use: Never used   Substance and Sexual Activity    Alcohol use: Not Currently     Comment: Occasional; about 1-2 drinks/week    Drug use: Never    Sexual activity: Yes     Partners: Female   Other Topics Concern    Parent/sibling w/ CABG, MI or angioplasty before 65F 55M? No   Social History Narrative    Not on file     Social Determinants of Health     Financial Resource Strain: Not on file   Food Insecurity: Not on file   Transportation Needs: Not on file   Physical Activity: Not on file   Stress: Not on file   Social Connections: Not on file   Interpersonal Safety: Not on file   Housing Stability: Not on file       Current Outpatient Medications   Medication Sig Dispense Refill    amitriptyline (ELAVIL) 10 MG tablet Take 4 tablets (40 mg) by mouth At Bedtime for 3 days, THEN 3 tablets (30 mg) At Bedtime for 3 days, THEN 2 tablets (20 mg) At Bedtime for 3 days, THEN 1 tablet (10 mg) At Bedtime for 3 days. 30 tablet 0    aspirin 81 MG tablet Take 1 tablet by mouth  daily.      atorvastatin (LIPITOR) 40 MG tablet Take 1 tablet (40 mg) by mouth At Bedtime 90 tablet 3    dutasteride (AVODART) 0.5 MG capsule TAKE 1 CAPSULE(0.5 MG) BY MOUTH DAILY 90 capsule 2    escitalopram (LEXAPRO) 20 MG tablet TAKE 1 TABLET(20 MG) BY MOUTH DAILY 90 tablet 1    fluticasone-vilanterol (BREO ELLIPTA) 100-25 MCG/ACT inhaler Inhale 1 puff into the lungs daily 60 each 5    hydrochlorothiazide (HYDRODIURIL) 12.5 MG tablet Take 1 tablet (12.5 mg) by mouth daily 90 tablet 3    lisinopril (ZESTRIL) 40 MG tablet TAKE 1 TABLET(40 MG) BY MOUTH IN THE MORNING 90 tablet 3    Multiple Vitamins-Minerals (MULTIVITAMIN ADULT) CHEW       order for DME Equipment ordered: When You Wish Auto PAP Mask type: Nasal Settings: 6-12 cm H2O      tadalafil (CIALIS) 20 MG tablet Take 1 tablet (20 mg) by mouth daily as needed (ED) 12 tablet 11       Medications and history reviewed    Physical exam:  Vitals: /80   Pulse 67   Wt 97.5 kg (215 lb)   BMI 31.29 kg/m    BMI= Body mass index is 31.29 kg/m .    Constitutional: healthy, alert, and no distress  Head: Normocephalic. No masses, lesions, tenderness or abnormalities  Gastrointestinal: Abdomen soft, non-tender. BS normal. No masses, organomegaly  : Normal external genitalia without lesions. No hernia felt or other mass  Musculoskeletal: extremities normal- no gross deformities noted, gait normal, and normal muscle tone  Skin: no suspicious lesions or rashes  Psychiatric: mentation appears normal and affect normal/bright      Assessment:     ICD-10-CM    1. Left groin pain  R10.32 Adult General Surg Referral     CT Abdomen Pelvis w/o Contrast        Plan: Pain and apparently lump in the left groin per patient's though nothing felt here on clinical exam.  Will go forward with a CT for better look.  If no recurrence of hernia or other cause found may be pain related to scarring or mesh with the old repair at which point we have to decide if it is worthwhile to go when she  previously operated area to remove that versus trial things like injections or medications to help with the pain.  Will discuss more once CT completed.    Eduardo Nolan MD

## 2023-11-24 ENCOUNTER — VIRTUAL VISIT (OUTPATIENT)
Dept: FAMILY MEDICINE | Facility: CLINIC | Age: 63
End: 2023-11-24
Payer: COMMERCIAL

## 2023-11-24 DIAGNOSIS — R19.7 ACUTE DIARRHEA: Primary | ICD-10-CM

## 2023-11-24 DIAGNOSIS — G60.8 SENSORY POLYNEUROPATHY: ICD-10-CM

## 2023-11-24 PROCEDURE — 99214 OFFICE O/P EST MOD 30 MIN: CPT | Mod: VID | Performed by: FAMILY MEDICINE

## 2023-11-24 ASSESSMENT — ENCOUNTER SYMPTOMS: DIARRHEA: 1

## 2023-11-24 ASSESSMENT — PATIENT HEALTH QUESTIONNAIRE - PHQ9
SUM OF ALL RESPONSES TO PHQ QUESTIONS 1-9: 4
SUM OF ALL RESPONSES TO PHQ QUESTIONS 1-9: 4
10. IF YOU CHECKED OFF ANY PROBLEMS, HOW DIFFICULT HAVE THESE PROBLEMS MADE IT FOR YOU TO DO YOUR WORK, TAKE CARE OF THINGS AT HOME, OR GET ALONG WITH OTHER PEOPLE: SOMEWHAT DIFFICULT

## 2023-11-24 NOTE — PROGRESS NOTES
Elliot is a 63 year old who is being evaluated via a billable video visit.      How would you like to obtain your AVS? MyChart  If the video visit is dropped, the invitation should be resent by: Text to cell phone: [phone]  Will anyone else be joining your video visit? No          Assessment & Plan     Acute diarrhea  Visit with patient today to talk about diarrhea.  Things actually seem to have improved over the last day or 2 but he has had almost 2 weeks of watery diarrhea with really no other symptoms.  Did not start with any particular illness and he has had no fever or chills or bodyaches.  No travel or no known ill contacts.  Not a lot of pain or cramping.  Colonoscopy from a couple of years ago showed some diverticulosis but otherwise unremarkable.  So if symptoms continue to improve we may never know the exact cause but if the diarrhea comes back and we should definitely test for C. difficile and stool cultures.  Had also been weaning off of his amitriptyline but the timeframe did not seem to fit and I am not so sure that that was involved.    Sensory polyneuropathy  We had him wean back off of his amitriptyline but the restless legs and neuropathy symptoms came back so we will add this back and ramping back up to 75 mg nightly.  - amitriptyline (ELAVIL) 25 MG tablet; Take 1 tablet (25 mg) by mouth at bedtime for 3 days, THEN 2 tablets (50 mg) at bedtime for 3 days, THEN 3 tablets (75 mg) at bedtime.       See Patient Instructions    Camelia Hancock MD  [facility]   Elliot is a 63 year old, presenting for the following health issues:  Diarrhea        11/24/2023     8:25 AM   Additional Questions   Roomed by Blayne       History of Present Illness           Diarrhea  Onset/Duration: 12 days  Description:       Consistency of stool: watery       Blood in stool: No       Number of loose stools past 24 hours: 2  Progression of Symptoms: improving and  constant  Accompanying signs and symptoms:       Fever: No       Nausea/Vomiting: YES- slight nausea       Abdominal pain: No       Weight loss: YES       Episodes of constipation: No  History   Ill contacts: No  Recent use of antibiotics: No  Recent travels: No  Recent medication-new or changes(Rx or OTC): No  Precipitating or alleviating factors: None  Therapies tried and outcome: electrolytes     Visit with patient today to talk about diarrhea as above.      Review of Systems   Gastrointestinal:  Positive for diarrhea.      Constitutional, HEENT, cardiovascular, pulmonary, gi and gu systems are negative, except as otherwise noted.      Objective           Vitals:  No vitals were obtained today due to virtual visit.    Physical Exam   GENERAL: Healthy, alert and no distress  EYES: Eyes grossly normal to inspection.  No discharge or erythema, or obvious scleral/conjunctival abnormalities.  RESP: No audible wheeze, cough, or visible cyanosis.  No visible retractions or increased work of breathing.    SKIN: Visible skin clear. No significant rash, abnormal pigmentation or lesions.  NEURO: Cranial nerves grossly intact.  Mentation and speech appropriate for age.  PSYCH: Mentation appears normal, affect normal/bright, judgement and insight intact, normal speech and appearance well-groomed.    Past labs reviewed with the patient.             Video-Visit Details    Type of service:  Video Visit     Originating Location (pt. Location): Home    Distant Location (provider location):  Off-site  Platform used for Video Visit: "[employer]

## 2023-11-27 ENCOUNTER — ANCILLARY PROCEDURE (OUTPATIENT)
Dept: CT IMAGING | Facility: CLINIC | Age: 63
End: 2023-11-27
Attending: SURGERY
Payer: COMMERCIAL

## 2023-11-27 DIAGNOSIS — R10.32 LEFT GROIN PAIN: ICD-10-CM

## 2023-11-27 DIAGNOSIS — R19.7 DIARRHEA, UNSPECIFIED TYPE: ICD-10-CM

## 2023-11-27 PROCEDURE — 74176 CT ABD & PELVIS W/O CONTRAST: CPT | Performed by: RADIOLOGY

## 2023-11-28 ENCOUNTER — APPOINTMENT (OUTPATIENT)
Dept: LAB | Facility: CLINIC | Age: 63
End: 2023-11-28
Payer: COMMERCIAL

## 2023-11-28 LAB

## 2023-11-28 PROCEDURE — 87507 IADNA-DNA/RNA PROBE TQ 12-25: CPT

## 2023-11-29 NOTE — RESULT ENCOUNTER NOTE
Elliot,  The stool tests were negative for the common infections we find.  Which is good news.  Does not give us an exact cause, but generally speaking these things heal up on their own.  So hopefully getting better?  PB Hancock M.D.

## 2023-12-12 ENCOUNTER — MYC MEDICAL ADVICE (OUTPATIENT)
Dept: SURGERY | Facility: CLINIC | Age: 63
End: 2023-12-12
Payer: MEDICARE

## 2023-12-22 DIAGNOSIS — G60.8 SENSORY POLYNEUROPATHY: ICD-10-CM

## 2024-01-01 NOTE — PATIENT INSTRUCTIONS
Thanks for coming today.  [facility]  [address] [place] [address]    To schedule future appointments in Ortho Clinic, you may call [phone].    To schedule ordered imaging by your provider:   Call Central Imaging [phone]    To schedule an injection ordered by your provider:  Call Central Imaging Injection scheduling line: [phone]  [employer] available online at:  [email or web address]    Please call if any further questions or concerns ([phone]).  Clinic hours 8 am to 5 pm.    Return to clinic (call) if symptoms worsen or fail to improve.     2024 02:06

## 2024-01-02 DIAGNOSIS — N40.1 BENIGN NON-NODULAR PROSTATIC HYPERPLASIA WITH LOWER URINARY TRACT SYMPTOMS: ICD-10-CM

## 2024-01-02 RX ORDER — DUTASTERIDE 0.5 MG/1
CAPSULE, LIQUID FILLED ORAL
Qty: 30 CAPSULE | Refills: 0 | Status: SHIPPED | OUTPATIENT
Start: 2024-01-02 | End: 2024-01-30

## 2024-01-04 NOTE — MR AVS SNAPSHOT
After Visit Summary   4/18/2017    Jack Marrero    MRN: [id number]           Patient Information     Date Of Birth          1960        Visit Information        Provider Department      4/18/2017 9:30 AM Nilson Gale MD [facility]        Today's Diagnoses     Lt inguinal pain    -  1      Care Instructions    Assessment: Is tender on on the left one but no obvious hernia.  But has some swelling in the area.    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  And started smoking again in January    Plan to do since not able to feel obvious hernia will get ct scan.  If shows hernia or if patient is able to see me when it is sticking out more then would fix most likely anterior but if able to reduce could do laparoscopic robotic surgery.  If not able to prove a hernia can do laparoscopic look and if has hernia fix either way.    Briefly discussed laparoscopic robotic versus open.      Risks of surgery include damage to nerves, bleeding, infection, damage to  Vessels, recurrence.  Although mesh is a better long term repair if it gets infected it must be removed.   If the patient has any bacterial infection the week before and is seen by their doctor and started on antibiotics, I can probably still do the surgery if they are vastly improved by the time of surgery, but if the infection starts closer to the surgery date it will be better to cancel and reschedule to a later date.  A cough will also be hard on the repair and uncomfortable post operative.  If the patient is a smoker I did discuss increase risk of recurrence and more pain with the cough.  If the patient is willing to quit smoking would encourage to do so and start at least a week before surgery.  However, if patient is not going to quit then must understand that his repair is more likely to fail.    Risks of surgery discussed including, but not limited to  bleeding, infection, recurrence, damage to nerves and what is in the hernia sac.  Risks of anesthesia also discussed.    Discussed massaging hernia back in and using ice if becomes more painful.  If not able to reduce then go to emergency room.  Also discussed hernia belt to use until able to get in for surgery.    For your feet neuralgia:  Capsaicin cream- apply to area of chronic pain multiple times for several days. The first 2-3 days may be more painful. But you will notice a difference shortly after that. Do not get it in to your eyes. Wash hands well after putting it on the area of tenderness. Must be used daily and sometimes several times a day. Please follow the directions on the container.        Follow-ups after your visit        Your next 10 appointments already scheduled     Apr 19, 2017  9:30 AM CDT   [facility]    [address] [facility] [address] [phone]           Bring a current list of meds and any records pertaining to this visit.  For Physicals, please bring immunization records and any forms needing to be filled out.  Please arrive 10 minutes early to complete paperwork.              Future tests that were ordered for you today     Open Future Orders        Priority Expected Expires Ordered    CT Abdomen Pelvis w Contrast Routine  6/2/2017 4/18/2017            Who to contact     If you have questions or need follow up information about today's clinic visit or your schedule please contact [facility] directly at [phone].  Normal or non-critical lab and imaging results will be communicated to you by MyChart, letter or phone within 4 business days after the clinic has received the results. If you do not hear from us within 7 days, please contact the clinic through MyChart or phone. If you have a critical or abnormal lab result, we will notify you by phone as soon as possible.  Submit  "refill requests through [employer] or call your pharmacy and they will forward the refill request to us. Please allow 3 business days for your refill to be completed.          Additional Information About Your Visit        [employer] Information     [employer] gives you secure access to your electronic health record. If you see a primary care provider, you can also send messages to your care team and make appointments. If you have questions, please call your primary care clinic.  If you do not have a primary care provider, please call [phone] and they will assist you.        Care EveryWhere ID     This is your Care EveryWhere ID. This could be used by other organizations to access your [facility] medical records  [id number]        Your Vitals Were     Height BMI (Body Mass Index)                6' 1\" (1.854 m) 25.6 kg/m2           Blood Pressure from Last 3 Encounters:   04/18/17 140/86   04/05/17 130/86   01/12/17 126/85    Weight from Last 3 Encounters:   04/18/17 194 lb (88 kg)   04/05/17 197 lb (89.4 kg)   01/12/17 195 lb (88.5 kg)               Primary Care Provider Office Phone # Fax #    Jamshid Miller [phone]       [facility] [address] [address]        Thank you!     Thank you for choosing [facility]  for your care. Our goal is always to provide you with excellent care. Hearing back from our patients is one way we can continue to improve our services. Please take a few minutes to complete the written survey that you may receive in the mail after your visit with us. Thank you!             Your Updated Medication List - Protect others around you: Learn how to safely use, store and throw away your medicines at www.disposemymeds.org.          This list is accurate as of: 4/18/17  9:56 AM.  Always use your most recent med list.                   Brand Name Dispense Instructions for use    aspirin 81 MG tablet      Take 1 tablet by mouth daily.    "    atorvastatin 40 MG tablet    LIPITOR    90 tablet    Take 1 tablet (40 mg) by mouth daily       dutasteride 0.5 MG capsule    AVODART         gabapentin 300 MG capsule    NEURONTIN         NICOTROL 10 MG Inhaler   Generic drug:  nicotine          order for DME      Equipment ordered: RESMED Auto PAP Mask type: Nasal Settings: 6-12 cm H2O          Render Post-Care Instructions In Note?: no Post-Care Instructions: I reviewed with the patient in detail post-care instructions. Patient is to wear sunprotection, and avoid picking at any of the treated lesions. Pt may apply Vaseline to crusted or scabbing areas. Detail Level: Detailed Medical Necessity Clause: This procedure was medically necessary because the lesions that were treated were: Spray Paint Text: The liquid nitrogen was applied to the skin utilizing a spray paint frosting technique. Show Topical Anesthesia Variable?: Yes Consent: The patient's consent was obtained including but not limited to risks of crusting, scabbing, blistering, scarring, darker or lighter pigmentary change, recurrence, incomplete removal and infection. Medical Necessity Information: It is in your best interest to select a reason for this procedure from the list below. All of these items fulfill various CMS LCD requirements except the new and changing color options.

## 2024-01-22 DIAGNOSIS — F33.1 DEPRESSION, MAJOR, RECURRENT, MODERATE (H): ICD-10-CM

## 2024-01-22 RX ORDER — ESCITALOPRAM OXALATE 20 MG/1
TABLET ORAL
Qty: 90 TABLET | Refills: 1 | Status: SHIPPED | OUTPATIENT
Start: 2024-01-22 | End: 2024-07-08

## 2024-01-30 DIAGNOSIS — N40.1 BENIGN NON-NODULAR PROSTATIC HYPERPLASIA WITH LOWER URINARY TRACT SYMPTOMS: ICD-10-CM

## 2024-01-30 RX ORDER — DUTASTERIDE 0.5 MG/1
CAPSULE, LIQUID FILLED ORAL
Qty: 90 CAPSULE | Refills: 0 | Status: SHIPPED | OUTPATIENT
Start: 2024-01-30 | End: 2024-05-05

## 2024-02-21 DIAGNOSIS — G60.8 SENSORY POLYNEUROPATHY: ICD-10-CM

## 2024-02-29 ENCOUNTER — OFFICE VISIT (OUTPATIENT)
Dept: SLEEP MEDICINE | Facility: CLINIC | Age: 64
End: 2024-02-29
Payer: MEDICARE

## 2024-02-29 VITALS
WEIGHT: 225 LBS | BODY MASS INDEX: 29.82 KG/M2 | HEIGHT: 73 IN | SYSTOLIC BLOOD PRESSURE: 122 MMHG | DIASTOLIC BLOOD PRESSURE: 81 MMHG | HEART RATE: 82 BPM | OXYGEN SATURATION: 96 %

## 2024-02-29 DIAGNOSIS — G47.33 OSA (OBSTRUCTIVE SLEEP APNEA): Primary | ICD-10-CM

## 2024-02-29 DIAGNOSIS — G25.81 RESTLESS LEGS SYNDROME (RLS): ICD-10-CM

## 2024-02-29 DIAGNOSIS — E66.811 CLASS 1 OBESITY DUE TO EXCESS CALORIES WITH SERIOUS COMORBIDITY AND BODY MASS INDEX (BMI) OF 31.0 TO 31.9 IN ADULT: ICD-10-CM

## 2024-02-29 DIAGNOSIS — E66.09 CLASS 1 OBESITY DUE TO EXCESS CALORIES WITH SERIOUS COMORBIDITY AND BODY MASS INDEX (BMI) OF 31.0 TO 31.9 IN ADULT: ICD-10-CM

## 2024-02-29 PROCEDURE — 99214 OFFICE O/P EST MOD 30 MIN: CPT | Performed by: INTERNAL MEDICINE

## 2024-02-29 NOTE — NURSING NOTE
"Chief Complaint   Patient presents with    CPAP Follow Up     Would like a replacement CPAP       Initial BP (!) 138/92   Pulse 82   Ht 1.854 m (6' 0.99\")   Wt 102.1 kg (225 lb)   SpO2 96%   BMI 29.69 kg/m   Estimated body mass index is 29.69 kg/m  as calculated from the following:    Height as of this encounter: 1.854 m (6' 0.99\").    Weight as of this encounter: 102.1 kg (225 lb).    Medication Reconciliation: complete    Neck circumference: 16.5 inches / 42 centimeters.    DME: Orlin Centeno CMA on 2/29/2024 at 10:18 AM     "

## 2024-02-29 NOTE — PATIENT INSTRUCTIONS
BMI Readings from Last 4 Encounters:   11/22/23 31.29 kg/m    10/23/23 30.74 kg/m    08/29/23 32.02 kg/m    08/21/23 32.47 kg/m      Your BMI is There is no height or weight on file to calculate BMI.    What is BMI?  Body mass index (BMI) is one way to tell whether you are at a healthy weight, overweight, or obese. It measures your weight in relation to your height.  A BMI of 18.5 to 24.9 is in the healthy range. A person with a BMI of 25 to 29.9 is considered overweight, and someone with a BMI of 30 or greater is considered obese.  Another way to find out if you are at risk for health problems caused by overweight and obesity is to measure your waist. If you are a woman and your waist is more than 35 inches, or if you are a man and your waist is more than 40 inches, your risk of disease may be higher.  More than two-thirds of American adults are considered overweight or obese. Being overweight or obese increases the risk for further weight gain.  Excess weight may lead to heart disease and diabetes. Creating and following plans for healthy eating and physical activity may help you improve your health.    Methods for maintaining or losing weight.  Weight control is part of healthy lifestyle and includes exercise, emotional health, and healthy eating habits.  Careful eating habits lifelong is the mainstay of weight control.  Though there are significant health benefits from weight loss, long-term weight loss with diet alone may be very difficult to achieve- studies show long-term success with dietary management in less than 10% of people. Attaining a healthy weight may be especially difficult to achieve in those with severe obesity. In some cases, medications, devices and surgical management might be considered.    What can you do?  If you are overweight or obese and are interested in methods for weight loss, you should discuss this with your provider. In addition, we recommend that you review healthy life styles  and methods for weight loss available through the National Institutes of Health patient information sites:   http://win.niddk.nih.gov/publications/index.htm

## 2024-03-08 ENCOUNTER — DOCUMENTATION ONLY (OUTPATIENT)
Dept: SLEEP MEDICINE | Facility: CLINIC | Age: 64
End: 2024-03-08
Payer: MEDICARE

## 2024-03-08 DIAGNOSIS — E66.811 CLASS 1 OBESITY DUE TO EXCESS CALORIES WITH SERIOUS COMORBIDITY AND BODY MASS INDEX (BMI) OF 31.0 TO 31.9 IN ADULT: ICD-10-CM

## 2024-03-08 DIAGNOSIS — G25.81 RESTLESS LEGS SYNDROME (RLS): ICD-10-CM

## 2024-03-08 DIAGNOSIS — E66.09 CLASS 1 OBESITY DUE TO EXCESS CALORIES WITH SERIOUS COMORBIDITY AND BODY MASS INDEX (BMI) OF 31.0 TO 31.9 IN ADULT: ICD-10-CM

## 2024-03-08 DIAGNOSIS — G47.33 OSA (OBSTRUCTIVE SLEEP APNEA): Primary | ICD-10-CM

## 2024-03-08 NOTE — PROGRESS NOTES
[facility] was offered [facility]  Patient Jack Marrero was set up at [facility]  on March 8, 2024. Patient received a Resmed Airsense 10 Pressures were set at  8-13 cm H2O.   Patient s ramp is 5 cm H2O for 10 min and FLEX/EPR is EPR, 2.  Patient received a Resmed Mask name: AIRFIT P10  Pillow mask size Medium, heated tubing and heated humidifier.  Patient has the following compliance requirements: using and visit requirements  Patient has a follow up on 5/28/2024 with ALDEN Carter, CNP.    Sergo Lagunas

## 2024-04-22 DIAGNOSIS — G60.8 SENSORY POLYNEUROPATHY: ICD-10-CM

## 2024-04-22 NOTE — TELEPHONE ENCOUNTER
This writer attempted to contact patient on 04/22/24      Reason for call med clarification and LVM and sent [employer] message.      If patient calls back:   Please clarify amitriptyline dosage and frequency. Current sig states: TAKE 1 TABLET BY MOUTH AT BEDTIME FOR 3 DAYS THEN TAKE 2 TABLETS AT BEDTIME FOR 3 DAYS THEN TAKE 3 TABLETS AT BEDTIME     Confirm what pt is taking, pend med and send to provider to update prescription and and send to pharmacy.     Sydnee Maldonado RN

## 2024-04-22 NOTE — TELEPHONE ENCOUNTER
Patient is requesting refill for amitriptyline which is a tapering dose- please contact patient to see what dose they are currently on, pend med and route to provider. Thanks!

## 2024-04-23 NOTE — TELEPHONE ENCOUNTER
This RN attempted to contact patient on 04/23/24    If patient calls back:              Please clarify amitriptyline dosage and frequency.     Current sig states: TAKE 1 TABLET BY MOUTH AT BEDTIME FOR 3 DAYS THEN TAKE 2 TABLETS AT BEDTIME FOR 3 DAYS THEN TAKE 3 TABLETS AT BEDTIME        Confirm what pt is taking, pend med and send to provider to update prescription and and send to pharmacy.     Kristina Kjellberg, MSN, RN

## 2024-04-24 RX ORDER — AMITRIPTYLINE HYDROCHLORIDE 75 MG/1
75 TABLET ORAL AT BEDTIME
Qty: 90 TABLET | Refills: 1 | Status: SHIPPED | OUTPATIENT
Start: 2024-04-24

## 2024-04-24 NOTE — TELEPHONE ENCOUNTER
Patient returned call. He states he doesn't have the medication bottles in front of him, but reports he is taking 3 amitriptyline at bedtime. New sig updated to reflect patient taking amitriptyline 75mg at bedtime. Routing to Dr. Hancock for review/signature.    Lima Landon RN    [facility]- Primary Care     English

## 2024-04-24 NOTE — TELEPHONE ENCOUNTER
This writer attempted to contact patient on 04/24/24      Reason for call refill and left message.      If patient calls back:   Registered Nurse called. Follow Triage Call workflow        Ana Rogel RN

## 2024-05-05 DIAGNOSIS — N40.1 BENIGN NON-NODULAR PROSTATIC HYPERPLASIA WITH LOWER URINARY TRACT SYMPTOMS: ICD-10-CM

## 2024-05-06 DIAGNOSIS — N40.1 BENIGN NON-NODULAR PROSTATIC HYPERPLASIA WITH LOWER URINARY TRACT SYMPTOMS: ICD-10-CM

## 2024-05-06 DIAGNOSIS — J43.9 PULMONARY EMPHYSEMA, UNSPECIFIED EMPHYSEMA TYPE (H): ICD-10-CM

## 2024-05-06 RX ORDER — DUTASTERIDE 0.5 MG/1
CAPSULE, LIQUID FILLED ORAL
Qty: 30 CAPSULE | Refills: 0 | OUTPATIENT
Start: 2024-05-06

## 2024-05-06 RX ORDER — DUTASTERIDE 0.5 MG/1
CAPSULE, LIQUID FILLED ORAL
Qty: 30 CAPSULE | Refills: 0 | Status: SHIPPED | OUTPATIENT
Start: 2024-05-06 | End: 2024-06-27

## 2024-05-06 RX ORDER — FLUTICASONE FUROATE AND VILANTEROL TRIFENATATE 100; 25 UG/1; UG/1
1 POWDER RESPIRATORY (INHALATION) DAILY
Qty: 60 EACH | Refills: 1 | Status: SHIPPED | OUTPATIENT
Start: 2024-05-06 | End: 2024-07-02

## 2024-05-27 ASSESSMENT — SLEEP AND FATIGUE QUESTIONNAIRES
HOW LIKELY ARE YOU TO NOD OFF OR FALL ASLEEP WHILE SITTING QUIETLY AFTER LUNCH WITHOUT ALCOHOL: WOULD NEVER DOZE
HOW LIKELY ARE YOU TO NOD OFF OR FALL ASLEEP WHILE SITTING AND TALKING TO SOMEONE: WOULD NEVER DOZE
HOW LIKELY ARE YOU TO NOD OFF OR FALL ASLEEP WHILE SITTING AND READING: WOULD NEVER DOZE
HOW LIKELY ARE YOU TO NOD OFF OR FALL ASLEEP WHILE WATCHING TV: SLIGHT CHANCE OF DOZING
HOW LIKELY ARE YOU TO NOD OFF OR FALL ASLEEP WHILE SITTING INACTIVE IN A PUBLIC PLACE: WOULD NEVER DOZE
HOW LIKELY ARE YOU TO NOD OFF OR FALL ASLEEP WHILE LYING DOWN TO REST IN THE AFTERNOON WHEN CIRCUMSTANCES PERMIT: HIGH CHANCE OF DOZING
HOW LIKELY ARE YOU TO NOD OFF OR FALL ASLEEP WHEN YOU ARE A PASSENGER IN A CAR FOR AN HOUR WITHOUT A BREAK: WOULD NEVER DOZE
HOW LIKELY ARE YOU TO NOD OFF OR FALL ASLEEP IN A CAR, WHILE STOPPED FOR A FEW MINUTES IN TRAFFIC: WOULD NEVER DOZE

## 2024-05-28 ENCOUNTER — VIRTUAL VISIT (OUTPATIENT)
Dept: SLEEP MEDICINE | Facility: CLINIC | Age: 64
End: 2024-05-28
Payer: MEDICARE

## 2024-05-28 VITALS — HEIGHT: 73 IN | WEIGHT: 220 LBS | BODY MASS INDEX: 29.16 KG/M2

## 2024-05-28 DIAGNOSIS — G47.33 OSA (OBSTRUCTIVE SLEEP APNEA): Primary | ICD-10-CM

## 2024-05-28 PROCEDURE — 99213 OFFICE O/P EST LOW 20 MIN: CPT | Mod: 95 | Performed by: NURSE PRACTITIONER

## 2024-05-28 ASSESSMENT — PAIN SCALES - GENERAL: PAINLEVEL: NO PAIN (0)

## 2024-05-28 NOTE — PROGRESS NOTES
Virtual Visit Details    Type of service:  Video Visit   Video Start Time: 11:32 AM  Video End Time:11:37 AM    Originating Location (pt. Location): Home    Distant Location (provider location):  Off-site  Platform used for Video Visit: [facility]      Sleep Apnea - Follow-up Visit:    Impression/Plan:  1. SHY (obstructive sleep apnea) - Moderate (AHI 16, RDI 55)     Moderate Sleep apnea. Tolerating PAP well. Daytime symptoms are improved.  Patient is meeting compliance requirements at this time.  He continues to use and benefit from PAP therapy.  Overall, he is doing very well on PAP therapy and has no immediate concerns.    A review of his APAP download data over the last 30 days shows excellent use and compliance and moderate SHY that is well-controlled on current pressure settings.  Of note, there is elevated air leak seen on download data.    Continue current plan of care.  No new orders placed at this visit.    Jack Marrero will follow up with Dr. Lucho Alejandro, primary sleep provider, in about 1 year(s).     17 minutes spent with patient, all of which were spent face-to-face counseling, consulting, chart review/documentation, and coordinating plan of care on the date of the encounter.      ALDEN Carter CNP  Sleep Medicine      CC:  Camelia Hancock,         History of Present Illness:  Chief Complaint   Patient presents with    RECHECK    CPAP Follow Up     Compliance       Jack Marrero is a 83-year-old male who presents for compliance follow-up of their moderate sleep apnea, managed with CPAP.  He was last seen in an office visit on 2/29/2024 in follow-up for SHY on CPAP therapy.  Subsequent to that visit, a comprehensive DME order was placed for new/replacement APAP device. Patient has the following compliance requirements: using and visit requirements.     DME [facility]    He initially presented with loud snoring, nocturia 2 - 3 times per night, nocturnal GERD,  morning headaches. ESS 6.    - Restless leg syndrome   - Insomnia with sleep onset difficulties     Split Night:Sleep Study 12/01/2016 - (190.0 lbs)   - AHI 16.3, RDI 55.6, Supine AHI 17.1, REM AHI n/a   - Low O2% 80.3%, Time Spent <=88% 14.2, Time Spent <=89% 21.5   - Periodic Limb Movement Index 76.6/hour.     - CPAP was titrated to a pressure of 8 with an AHI 0.3. Time spent in REM supine at this pressure was 40.0 minutes.     He was seen by Dr. Rodríguez 12/2016 for cognitive behavioral training    He represented to [facility] 2020 tolerating PAP 6-12 cmH2o well, good AHI on download, persistent reflux and morning headaches. Increase pressures to 8-13 cmH20   - Insomnia quiescent   - Restless leg syndrome, history of low ferritin (39 6/15/2012), history of neuropathy, previously managed with gabapentin. Mild-moderate symptoms, apparently off treatment at this time.      Ferritin 88 6/2023    Do you use a CPAP Machine at home: Yes  Overall, on a scale of 0-10 how would you rate your CPAP (0 poor, 10 great): 10    What type of mask do you use: Nasal Pillow  Is your mask comfortable: Yes  If not, why:      Is your mask leaking: No  If yes, where do you feel it:    How many night per week does the mask leak (0-7):      Do you notice snoring with mask on: No  Do you notice gasping arousals with mask on: No  Are you having significant oral or nasal dryness: No  Is the pressure setting comfortable: Yes  If not, why:      What is your typical bedtime: 2:00 am  How long does it take you to go to sleep on PAP therapy: 5-10 min  What time do you typically get out of bed for the day: 9:30 am  How many hours on average per night are you using PAP therapy: 7-8  How many hours are you sleeping per night: 7-8  Do you feel well rested in the morning: Yes      ResMed AirSense 10 (set up on 3/08/2024 at [facility])  Auto-PAP 8 - 13 cmH2O 30 day usage data:  4/28/24 - 5/27/24  97% of days with > 4 hours of use. 1/30 days with no use.    Average use 9 hours 8 minutes per day.   95%ile Leak 35.0 L/min.   CPAP 95% pressure 12.7 cm.   AHI 1.1 events per hour.            EPWORTH SLEEPINESS SCALE         5/27/2024    12:41 PM    Norman Sleepiness Scale ( NALDO Fletcher  [id number]<br>ESS - [place]/English - Final version - 21 Nov 07 - [facility] [place].)   Sitting and reading Would never doze   Watching TV Slight chance of dozing   Sitting, inactive in a public place (e.g. a theatre or a meeting) Would never doze   As a passenger in a car for an hour without a break Would never doze   Lying down to rest in the afternoon when circumstances permit High chance of dozing   Sitting and talking to someone Would never doze   Sitting quietly after a lunch without alcohol Would never doze   In a car, while stopped for a few minutes in traffic Would never doze   Norman Score (MC) 4   Norman Score (Sleep) 4       INSOMNIA SEVERITY INDEX (PAULETTE)          5/27/2024    12:38 PM   Insomnia Severity Index (PAULETTE)   Difficulty falling asleep 1   Difficulty staying asleep 0   Problems waking up too early 0   How SATISFIED/DISSATISFIED are you with your CURRENT sleep pattern? 0   How NOTICEABLE to others do you think your sleep problem is in terms of impairing the quality of your life? 0   How WORRIED/DISTRESSED are you about your current sleep problem? 0   To what extent do you consider your sleep problem to INTERFERE with your daily functioning (e.g. daytime fatigue, mood, ability to function at work/daily chores, concentration, memory, mood, etc.) CURRENTLY? 0   PAULETTE Total Score 1       Guidelines for Scoring/Interpretation:  Total score categories:  0-7 = No clinically significant insomnia   8-14 = Subthreshold insomnia   15-21 = Clinical insomnia (moderate severity)  22-28 = Clinical insomnia (severe)  Used via courtesy of www.[employer]Deliveryealth.va.gov with permission from Desean Metcalf PhD., [facility]      Past medical/surgical history, family history, social history,  medications and allergies were reviewed.        Problem List:  Patient Active Problem List    Diagnosis Date Noted    Solar purpura (H24) 08/21/2023     Priority: Medium    Dilation of thoracic aorta (H24) 11/10/2022     Priority: Medium    Moderate episode of recurrent major depressive disorder (H) 10/05/2022     Priority: Medium    Pulmonary emphysema, unspecified emphysema type (H) 10/05/2022     Priority: Medium     Mild centrilobular emphysema on CT 2021  No PFTS in EPIC      Hypertension goal BP (blood pressure) < 140/90 11/16/2020     Priority: Medium    Coronary artery calcification 08/07/2020     Priority: Medium     CT 2022- Moderate coronary calcifications.  The total Agatston calcium score is 202 placing the patient in the 70th percentile when compared to age and gender matched control group.      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 Time spent in REM supine at this pressure was 40.0 minutes.      Restless legs syndrome (RLS) 11/16/2016     Priority: Medium    Benign non-nodular prostatic hyperplasia with lower urinary tract symptoms 10/24/2016     Priority: Medium    Tobacco abuse 10/24/2016     Priority: Medium    Venous lake of lip, left lower 05/20/2016     Priority: Medium     Patient will call for referral if ever needed      Diverticulosis of colon 01/11/2016     Priority: Medium    Sensory polyneuropathy 11/19/2015      "Priority: Medium    IGT (impaired glucose tolerance) 10/26/2015     Priority: Medium    Tubular adenoma of colon on colonoscopy 01/12/2012     Priority: Medium    Hyperlipidemia LDL goal <70 10/31/2010     Priority: Medium    Class 1 obesity due to excess calories with serious comorbidity and body mass index (BMI) of 31.0 to 31.9 in adult 02/29/2024     Priority: Low      Current Outpatient Medications   Medication Sig Dispense Refill    amitriptyline (ELAVIL) 75 MG tablet Take 1 tablet (75 mg) by mouth at bedtime 90 tablet 1    aspirin 81 MG tablet Take 1 tablet by mouth daily.      atorvastatin (LIPITOR) 40 MG tablet Take 1 tablet (40 mg) by mouth At Bedtime 90 tablet 3    dutasteride (AVODART) 0.5 MG capsule TAKE 1 CAPSULE(0.5 MG) BY MOUTH DAILY 30 capsule 0    escitalopram (LEXAPRO) 20 MG tablet TAKE 1 TABLET(20 MG) BY MOUTH DAILY 90 tablet 1    fluticasone-vilanterol (BREO ELLIPTA) 100-25 MCG/ACT inhaler INHALE 1 PUFF INTO THE LUNGS DAILY 60 each 1    hydrochlorothiazide (HYDRODIURIL) 12.5 MG tablet Take 1 tablet (12.5 mg) by mouth daily 90 tablet 3    lisinopril (ZESTRIL) 40 MG tablet TAKE 1 TABLET(40 MG) BY MOUTH IN THE MORNING 90 tablet 3    Multiple Vitamins-Minerals (MULTIVITAMIN ADULT) CHEW       order for DME Equipment ordered: RESMED Auto PAP Mask type: Nasal Settings: 6-12 cm H2O      tadalafil (CIALIS) 20 MG tablet Take 1 tablet (20 mg) by mouth daily as needed (ED) 12 tablet 11     No current facility-administered medications for this visit.     Ht 1.854 m (6' 1\")   Wt 99.8 kg (220 lb)   BMI 29.03 kg/m        This note was written with the assistance of the Dragon voice-dictation technology software. The final document, although reviewed, may contain errors. For corrections, please contact the office.    "

## 2024-05-28 NOTE — PATIENT INSTRUCTIONS
DME:  [facility] [place] [address], [address]  [place] [address]  Phone: [phone]    Hours:  Mon - Fri: 8:00 a.m. - 4:30 p.m.  Sat: Closed  Sun: Closed        Your BMI is Body mass index is 29.03 kg/m .    What is BMI?  Body mass index (BMI) is one way to tell whether you are at a healthy weight, overweight, or obese. It measures your weight in relation to your height.  A BMI of 18.5 to 24.9 is in the healthy range. A person with a BMI of 25 to 29.9 is considered overweight, and someone with a BMI of 30 or greater is considered obese.  Another way to find out if you are at risk for health problems caused by overweight and obesity is to measure your waist. If you are a woman and your waist is more than 35 inches, or if you are a man and your waist is more than 40 inches, your risk of disease may be higher.  More than two-thirds of American adults are considered overweight or obese. Being overweight or obese increases the risk for further weight gain.  Excess weight may lead to heart disease and diabetes. Creating and following plans for healthy eating and physical activity may help you improve your health.    Methods for maintaining or losing weight.  Weight control is part of healthy lifestyle and includes exercise, emotional health, and healthy eating habits.  Careful eating habits lifelong is the mainstay of weight control.  Though there are significant health benefits from weight loss, long-term weight loss with diet alone may be very difficult to achieve- studies show long-term success with dietary management in less than 10% of people. Attaining a healthy weight may be especially difficult to achieve in those with severe obesity. In some cases, medications, devices and surgical management might be considered.    What can you do?  If you are overweight or obese and are interested in methods for weight loss, you should discuss this with your provider. In  addition, we recommend that you review healthy life styles and methods for weight loss available through the National Institutes of Health patient information sites:   http://win.niddk.nih.gov/publications/index.htm

## 2024-05-28 NOTE — NURSING NOTE
Patient confirms medications and allergies are accurate via patients echeck in completion, and or denies any changes since last reviewed/verified.       Estella Fuentes, Virtual Facilitator Is the patient currently in the state of [place]? YES    Visit mode:VIDEO    If the visit is dropped, the patient can be reconnected by: VIDEO VISIT: Text to cell phone:   Telephone Information:   Mobile [phone]       Will anyone else be joining the visit? NO  (If patient encounters technical issues they should call [phone]    How would you like to obtain your AVS? MyChart    Are changes needed to the allergy or medication list? No    Are refills needed on medications prescribed by this physician? NO    Reason for visit: RECHECK    Estella Fuentes VVF

## 2024-06-02 DIAGNOSIS — N40.1 BENIGN NON-NODULAR PROSTATIC HYPERPLASIA WITH LOWER URINARY TRACT SYMPTOMS: ICD-10-CM

## 2024-06-27 RX ORDER — DUTASTERIDE 0.5 MG/1
CAPSULE, LIQUID FILLED ORAL
Qty: 90 CAPSULE | Refills: 0 | Status: SHIPPED | OUTPATIENT
Start: 2024-06-27 | End: 2024-09-24

## 2024-07-02 DIAGNOSIS — J43.9 PULMONARY EMPHYSEMA, UNSPECIFIED EMPHYSEMA TYPE (H): ICD-10-CM

## 2024-07-02 RX ORDER — FLUTICASONE FUROATE AND VILANTEROL TRIFENATATE 100; 25 UG/1; UG/1
1 POWDER RESPIRATORY (INHALATION) DAILY
Qty: 60 EACH | Refills: 1 | Status: SHIPPED | OUTPATIENT
Start: 2024-07-02 | End: 2024-09-03

## 2024-07-06 DIAGNOSIS — F33.1 DEPRESSION, MAJOR, RECURRENT, MODERATE (H): ICD-10-CM

## 2024-07-08 RX ORDER — ESCITALOPRAM OXALATE 20 MG/1
TABLET ORAL
Qty: 90 TABLET | Refills: 1 | Status: SHIPPED | OUTPATIENT
Start: 2024-07-08

## 2024-07-22 ENCOUNTER — PATIENT OUTREACH (OUTPATIENT)
Dept: CARE COORDINATION | Facility: CLINIC | Age: 64
End: 2024-07-22
Payer: MEDICARE

## 2024-07-27 DIAGNOSIS — G60.8 SENSORY POLYNEUROPATHY: ICD-10-CM

## 2024-07-29 RX ORDER — AMITRIPTYLINE HYDROCHLORIDE 75 MG/1
TABLET ORAL
Qty: 90 TABLET | Refills: 1 | OUTPATIENT
Start: 2024-07-29

## 2024-08-05 ENCOUNTER — PATIENT OUTREACH (OUTPATIENT)
Dept: CARE COORDINATION | Facility: CLINIC | Age: 64
End: 2024-08-05
Payer: MEDICARE

## 2024-08-11 DIAGNOSIS — I10 HYPERTENSION GOAL BP (BLOOD PRESSURE) < 140/90: ICD-10-CM

## 2024-08-12 RX ORDER — LISINOPRIL 40 MG/1
TABLET ORAL
Qty: 90 TABLET | Refills: 0 | Status: SHIPPED | OUTPATIENT
Start: 2024-08-12

## 2024-08-16 DIAGNOSIS — I10 HYPERTENSION GOAL BP (BLOOD PRESSURE) < 140/90: ICD-10-CM

## 2024-08-16 DIAGNOSIS — E78.5 HYPERLIPIDEMIA LDL GOAL <70: ICD-10-CM

## 2024-08-16 RX ORDER — HYDROCHLOROTHIAZIDE 12.5 MG/1
12.5 TABLET ORAL DAILY
Qty: 90 TABLET | Refills: 0 | Status: SHIPPED | OUTPATIENT
Start: 2024-08-16

## 2024-08-16 RX ORDER — LISINOPRIL 40 MG/1
TABLET ORAL
Qty: 90 TABLET | Refills: 0 | OUTPATIENT
Start: 2024-08-16

## 2024-08-16 RX ORDER — ATORVASTATIN CALCIUM 40 MG/1
40 TABLET, FILM COATED ORAL AT BEDTIME
Qty: 90 TABLET | Refills: 0 | Status: SHIPPED | OUTPATIENT
Start: 2024-08-16

## 2024-08-21 ENCOUNTER — OFFICE VISIT (OUTPATIENT)
Dept: NURSING | Facility: CLINIC | Age: 64
End: 2024-08-21
Attending: FAMILY MEDICINE
Payer: MEDICARE

## 2024-08-21 VITALS — OXYGEN SATURATION: 100 % | BODY MASS INDEX: 29.72 KG/M2 | WEIGHT: 225.3 LBS | HEART RATE: 83 BPM

## 2024-08-21 DIAGNOSIS — J43.9 PULMONARY EMPHYSEMA, UNSPECIFIED EMPHYSEMA TYPE (H): ICD-10-CM

## 2024-08-21 PROCEDURE — 94729 DIFFUSING CAPACITY: CPT | Performed by: INTERNAL MEDICINE

## 2024-08-21 PROCEDURE — 94060 EVALUATION OF WHEEZING: CPT | Performed by: INTERNAL MEDICINE

## 2024-08-21 PROCEDURE — 94726 PLETHYSMOGRAPHY LUNG VOLUMES: CPT | Performed by: INTERNAL MEDICINE

## 2024-08-21 NOTE — LETTER
August 26, 2024      Elliot Marrero  [address] [address]        Hi Elliot,     Well, I am happy to tell you that your pulmonary function testing was actually pretty normal.  Things actually look pretty good overall.     Resulted Orders   General PFT Lab (Please always keep checked)   Result Value Ref Range    FVC-Pred 4.34 L    FVC-Pre 3.56 L    FVC-%Pred-Pre 82 %    FEV1-Pre 2.64 L    FEV1-%Pred-Pre 79 %    FEV1FVC-Pred 77 %    FEV1FVC-Pre 74 %    FEFMax-Pred 9.20 L/sec    FEFMax-Pre 7.41 L/sec    FEFMax-%Pred-Pre 80 %    FEF2575-Pred 2.64 L/sec    FEF2575-Pre 1.97 L/sec    DPM8920-%Pred-Pre 74 %    FEF2575-Post 2.33 L/sec    TUY1793-%Pred-Post 88 %    ExpTime-Pre 8.27 sec    VC-Pred 4.53 L    VC-Pre 3.78 L    VC-%Pred-Pre 83 %    IC-Pred 3.24 L    IC-Pre 3.31 L    IC-%Pred-Pre 102 %    ERV-Pred 1.62 L    ERV-Pre 0.47 L    ERV-%Pred-Pre 29 %    FEV1FEV6-Pred 78 %    FEV1FEV6-Pre 75 %    FRCPleth-Pred 3.78 L    FRCPleth-Pre 2.59 L    FRCPleth-%Pred-Pre 68 %    RVPleth-Pred 2.62 L    RVPleth-Pre 2.12 L    RVPleth-%Pred-Pre 81 %    TLCPleth-Pred 7.53 L    TLCPleth-Pre 5.90 L    TLCPleth-%Pred-Pre 78 %    DLCOunc-Pred 28.12 ml/min/mmHg    DLCOunc-Pre 22.56 ml/min/mmHg    DLCOunc-%Pred-Pre 80 %    VA-Pre 5.70 L    VA-%Pred-Pre 82 %    FEV1SVC-Pred 74 %    FEV1SVC-Pre 70 %    Narrative    The FVC, FEV1 and the FEV1/FVC ratio are within normal limits.   Lung volumes are reduced.  Following administration of bronchodilators, there is no significant response.  The diffusing capacity is normal.  However, the diffusing capacity was not   corrected for the patient's hemoglobin.        IMPRESSION:    Mild restriction.    No significant change in spirometry following bronchodilators but this does not rule out clinical efficacy.    Normal diffusing capacity.    Rangel Carter MD      This interpretation has been electronically signed:  RANGEL CARTER 08/24/2024  04:23:22 PM         If you have any  questions or concerns, please call the clinic at the number listed above.       Sincerely,      Camelia Hancock MD

## 2024-08-24 LAB
DLCOUNC-%PRED-PRE: 80 %
DLCOUNC-PRE: 22.56 ML/MIN/MMHG
DLCOUNC-PRED: 28.12 ML/MIN/MMHG
ERV-%PRED-PRE: 29 %
ERV-PRE: 0.47 L
ERV-PRED: 1.62 L
EXPTIME-PRE: 8.27 SEC
FEF2575-%PRED-POST: 88 %
FEF2575-%PRED-PRE: 74 %
FEF2575-POST: 2.33 L/SEC
FEF2575-PRE: 1.97 L/SEC
FEF2575-PRED: 2.64 L/SEC
FEFMAX-%PRED-PRE: 80 %
FEFMAX-PRE: 7.41 L/SEC
FEFMAX-PRED: 9.2 L/SEC
FEV1-%PRED-PRE: 79 %
FEV1-PRE: 2.64 L
FEV1FEV6-PRE: 75 %
FEV1FEV6-PRED: 78 %
FEV1FVC-PRE: 74 %
FEV1FVC-PRED: 77 %
FEV1SVC-PRE: 70 %
FEV1SVC-PRED: 74 %
FRCPLETH-%PRED-PRE: 68 %
FRCPLETH-PRE: 2.59 L
FRCPLETH-PRED: 3.78 L
FVC-%PRED-PRE: 82 %
FVC-PRE: 3.56 L
FVC-PRED: 4.34 L
IC-%PRED-PRE: 102 %
IC-PRE: 3.31 L
IC-PRED: 3.24 L
RVPLETH-%PRED-PRE: 81 %
RVPLETH-PRE: 2.12 L
RVPLETH-PRED: 2.62 L
TLCPLETH-%PRED-PRE: 78 %
TLCPLETH-PRE: 5.9 L
TLCPLETH-PRED: 7.53 L
VA-%PRED-PRE: 82 %
VA-PRE: 5.7 L
VC-%PRED-PRE: 83 %
VC-PRE: 3.78 L
VC-PRED: 4.53 L

## 2024-08-25 NOTE — RESULT ENCOUNTER NOTE
Elliot Macario,  Well, I am happy to tell you that your pulmonary function testing was actually pretty normal.  Things actually look pretty good overall.  PB Hancock M.D.

## 2024-09-03 DIAGNOSIS — J43.9 PULMONARY EMPHYSEMA, UNSPECIFIED EMPHYSEMA TYPE (H): ICD-10-CM

## 2024-09-03 RX ORDER — FLUTICASONE FUROATE AND VILANTEROL TRIFENATATE 100; 25 UG/1; UG/1
1 POWDER RESPIRATORY (INHALATION) DAILY
Qty: 60 EACH | Refills: 1 | Status: SHIPPED | OUTPATIENT
Start: 2024-09-03

## 2024-09-19 ENCOUNTER — MYC MEDICAL ADVICE (OUTPATIENT)
Dept: FAMILY MEDICINE | Facility: CLINIC | Age: 64
End: 2024-09-19
Payer: MEDICARE

## 2024-09-20 ENCOUNTER — NURSE TRIAGE (OUTPATIENT)
Dept: FAMILY MEDICINE | Facility: CLINIC | Age: 64
End: 2024-09-20
Payer: MEDICARE

## 2024-09-20 NOTE — TELEPHONE ENCOUNTER
Patient sent ESKY message on 9/19, reporting the following:    Hi, Doctor. I hope you re doing well. I m writing to ask for a referral to visit an ornithologist. No. Check that, an audiologist. (I think my parrot is fine, thank you, though he hasn t moved at all in weeks.)  For the past couple of months I ve been having a change in my hearing: a prevalent  white noise  like when the speakers are on but you re not playing any music. It s gotten increasingly more noticeable. No changes in my meds or behavior,  just the white noise.    Hello, the noise prevalent in both ears, and there is no pain associated with the noise. The only other symptom is a slight pressure in the ears (like after an air flight, when you re trying to  pop  your ears back to normal). Thank you.     Called patient to further triage symptoms. Patient denies any pain in ears. Reports this started gradually. Has had no medication changes. Denies any other symptoms including ringing in ears or dizziness. Reports partial hearing loss in both ears.     Per protocol, advised patient to schedule an appointment within the next two weeks. RN was able to help patient schedule appointment on 9/25/24 at 8:40 AM. Advised patient to call clinic back if he has any new or worsening symptoms (dizziness, increased hearing loss, pain, ringing, etc). Patient verbalized understanding & had no further questions/concerns at this time.     BANDAR LockettN, RN   [facility]    Reason for Disposition   ALL other adults with decreased hearing that has gradually decreased    Additional Information   Negative: Followed an ear injury   Negative: Patient sounds very sick or weak to the triager   Negative: Ringing in the ears (tinnitus) and taking aspirin and dosage sounds high (i.e., > 1500 mg/day)   Negative: Earache   Negative: Hearing loss in one or both ears of sudden onset and present now   Negative: Decreased hearing followed  "sudden, extremely loud noise (e.g., explosion, not just loud concert)   Negative: Decreased hearing in 1 ear and gradual onset   Negative: Recurrent episodes of hearing loss, dizziness, and ringing in the ear   Negative: Tinnitus (ringing, hissing, beating) and only or mainly in 1 ear   Negative: Tinnitus (ringing, hissing, beating) and interferes with work, school, or sleep   Negative: Decreased hearing followed exposure to prolonged loud noise (e.g., concerts, headphones, work environment)   Negative: Decreased hearing following an ear infection   Negative: Dizziness is main symptom   Negative: Tinnitus (e.g., ringing, hissing, beating) is main symptom   Negative: Earwax, questions about   Negative: Part of a cold   Negative: Follows air travel or mountain driving   Negative: Decreased hearing and taking medication that can damage hearing (i.e., gentamycin, tobramycin, furosemide, ethacrynic acid, cisplatin, quinidine)   Negative: Patient wants to be seen    Answer Assessment - Initial Assessment Questions  1. DESCRIPTION: \"What type of hearing problem are you having? Describe it for me.\" (e.g., complete hearing loss, partial loss)      \"White nose\"  2. LOCATION: \"One or both ears?\" If one, ask: \"Which ear?\"      Both ears  3. SEVERITY: \"Can you hear anything?\" If Yes, ask: \"What can you hear?\" (e.g., ticking watch, whisper, talking)    - MILD:  Difficulty hearing soft speech, quiet library sounds, or speech from a distance or over background noise.    - MODERATE: Difficulty hearing normal speech even at closed distances.    - SEVERE: Unable to hear most normal conversation and talking; only able to hear loud sounds such as an alarm clock.      Did not ask, could hear writer on phone  4. ONSET: \"When did this begin?\" \"Did it start suddenly or come on gradually?\"      A few months ago  5. PATTERN: \"Does this come and go, or has it been constant since it started?\"      N/A  6. PAIN: \"Is there any pain in your " "ear(s)?\"  (Scale 1-10; or mild, moderate, severe)    - NONE (0): no pain    - MILD (1-3): doesn't interfere with normal activities     - MODERATE (4-7): interferes with normal activities or awakens from sleep     - SEVERE (8-10): excruciating pain, unable to do any normal activities       No pain  7. CAUSE: \"What do you think is causing this hearing problem?\"      Unsure  8. OTHER SYMPTOMS: \"Do you have any other symptoms?\" (e.g., dizziness, ringing in ears)      No dizziness or ringing in ears  9. PREGNANCY: \"Is there any chance you are pregnant?\" \"When was your last menstrual period?\"      N/A    Protocols used: Hearing Loss or Change-A-OH    "

## 2024-09-20 NOTE — TELEPHONE ENCOUNTER
RN called patient to further triage symptoms. Please see separate telephone encounter from today, 9/20.    BANDAR LockettN, RN   [facility]

## 2024-09-24 DIAGNOSIS — N40.1 BENIGN NON-NODULAR PROSTATIC HYPERPLASIA WITH LOWER URINARY TRACT SYMPTOMS: ICD-10-CM

## 2024-09-24 RX ORDER — DUTASTERIDE 0.5 MG/1
CAPSULE, LIQUID FILLED ORAL
Qty: 90 CAPSULE | Refills: 0 | Status: SHIPPED | OUTPATIENT
Start: 2024-09-24

## 2024-09-25 ENCOUNTER — OFFICE VISIT (OUTPATIENT)
Dept: FAMILY MEDICINE | Facility: CLINIC | Age: 64
End: 2024-09-25
Payer: MEDICARE

## 2024-09-25 VITALS
RESPIRATION RATE: 22 BRPM | WEIGHT: 225.31 LBS | SYSTOLIC BLOOD PRESSURE: 120 MMHG | HEIGHT: 72 IN | HEART RATE: 80 BPM | DIASTOLIC BLOOD PRESSURE: 80 MMHG | BODY MASS INDEX: 30.52 KG/M2 | TEMPERATURE: 97.8 F | OXYGEN SATURATION: 98 %

## 2024-09-25 DIAGNOSIS — Z23 NEED FOR PROPHYLACTIC VACCINATION AND INOCULATION AGAINST INFLUENZA: ICD-10-CM

## 2024-09-25 DIAGNOSIS — H91.90 HEARING LOSS, UNSPECIFIED HEARING LOSS TYPE, UNSPECIFIED LATERALITY: Primary | ICD-10-CM

## 2024-09-25 DIAGNOSIS — Z23 HIGH PRIORITY FOR 2019-NCOV VACCINE: ICD-10-CM

## 2024-09-25 PROCEDURE — 90480 ADMN SARSCOV2 VAC 1/ONLY CMP: CPT | Performed by: FAMILY MEDICINE

## 2024-09-25 PROCEDURE — V5008 HEARING SCREENING: HCPCS | Performed by: FAMILY MEDICINE

## 2024-09-25 PROCEDURE — G0008 ADMIN INFLUENZA VIRUS VAC: HCPCS | Performed by: FAMILY MEDICINE

## 2024-09-25 PROCEDURE — 99214 OFFICE O/P EST MOD 30 MIN: CPT | Mod: 25 | Performed by: FAMILY MEDICINE

## 2024-09-25 PROCEDURE — 91320 SARSCV2 VAC 30MCG TRS-SUC IM: CPT | Performed by: FAMILY MEDICINE

## 2024-09-25 PROCEDURE — 90662 IIV NO PRSV INCREASED AG IM: CPT | Performed by: FAMILY MEDICINE

## 2024-09-25 ASSESSMENT — PAIN SCALES - GENERAL: PAINLEVEL: NO PAIN (0)

## 2024-09-25 ASSESSMENT — PATIENT HEALTH QUESTIONNAIRE - PHQ9
10. IF YOU CHECKED OFF ANY PROBLEMS, HOW DIFFICULT HAVE THESE PROBLEMS MADE IT FOR YOU TO DO YOUR WORK, TAKE CARE OF THINGS AT HOME, OR GET ALONG WITH OTHER PEOPLE: NOT DIFFICULT AT ALL
SUM OF ALL RESPONSES TO PHQ QUESTIONS 1-9: 0
SUM OF ALL RESPONSES TO PHQ QUESTIONS 1-9: 0

## 2024-09-25 NOTE — PROGRESS NOTES
Assessment & Plan     Hearing loss, unspecified hearing loss type, unspecified laterality  Has noted for quite some time but it seems to be increasing at there is a whooshing sound in his ears.  Almost like a static.  Does not seem to be pulsatile.  I inquired about hearing loss and he said that he has not noticed as much but thinks that his wife would disagree.  She is told that his hearing seems to be down.  We talked about whether a has increased volumes on TV or radio is not quite sure.  More notable when it is quiet.  So we talked about tinnitus and what may be behind it, specifically high-frequency hearing loss.  Exam unremarkable with very minimal wax.  So I think what we should do is set him up with a formal hearing screen as this may answer a lot of questions.  Patient agrees.  - HEARING SCREENING  - Adult Audiology  Referral; Future    Need for prophylactic vaccination and inoculation against influenza      High priority for 2019-nCoV vaccine            Nicotine/Tobacco Cessation  He reports that he has been smoking cigarettes. He started smoking about 49 years ago. He has a 48.9 pack-year smoking history. He has been exposed to tobacco smoke. He has never used smokeless tobacco.  Nicotine/Tobacco Cessation Plan  Self help information given to patient      BMI  Estimated body mass index is 30.56 kg/m  as calculated from the following:    Height as of this encounter: 1.829 m (6').    Weight as of this encounter: 102.2 kg (225 lb 5 oz).         See Patient Instructions    Subjective   Elliot is a 64 year old, presenting for the following health issues:  Ear Problem (Both (hearing concerns ongoing for two months) ), Imm/Inj (Flu Shot), and Imm/Inj (COVID-19 VACCINE)        9/25/2024     8:43 AM   Additional Questions   Roomed by Opal MINER   Accompanied by self     History of Present Illness       Reason for visit:  Hearing issue   He is taking medications regularly.           Here today to talk about  hearing issues as above.      Review of Systems  Constitutional, HEENT, cardiovascular, pulmonary, gi and gu systems are negative, except as otherwise noted.      Objective    /80 (BP Location: Right arm, Patient Position: Sitting, Cuff Size: Adult Large)   Pulse 80   Temp 97.8  F (36.6  C) (Oral)   Resp 22   Ht 1.829 m (6')   Wt 102.2 kg (225 lb 5 oz)   SpO2 98%   BMI 30.56 kg/m    Body mass index is 30.56 kg/m .  Physical Exam   Alert, pleasant, upbeat, and in no apparent discomfort.   Ears normal. Throat and pharynx normal. Neck supple. No adenopathy or masses in the neck or supraclavicular regions. Sinuses non tender.  S1 and S2 normal, no murmurs, clicks, gallops or rubs. Regular rate and rhythm. Chest is clear; no wheezes or rales. No edema or JVD.             Signed Electronically by: Camelia Hancock MD

## 2024-09-25 NOTE — NURSING NOTE
Prior to immunization administration, verified patients identity using patient s name and date of birth. Please see Immunization Activity for additional information.     Screening Questionnaire for Adult Immunization    Are you sick today?   No   Do you have allergies to medications, food, a vaccine component or latex?   No   Have you ever had a serious reaction after receiving a vaccination?   No   Do you have a long-term health problem with heart, lung, kidney, or metabolic disease (e.g., diabetes), asthma, a blood disorder, no spleen, complement component deficiency, a cochlear implant, or a spinal fluid leak?  Are you on long-term aspirin therapy?   No   Do you have cancer, leukemia, HIV/AIDS, or any other immune system problem?   No   Do you have a parent, brother, or sister with an immune system problem?   No   In the past 3 months, have you taken medications that affect  your immune system, such as prednisone, other steroids, or anticancer drugs; drugs for the treatment of rheumatoid arthritis, Crohn s disease, or psoriasis; or have you had radiation treatments?   No   Have you had a seizure, or a brain or other nervous system problem?   No   During the past year, have you received a transfusion of blood or blood    products, or been given immune (gamma) globulin or antiviral drug?   No   For women: Are you pregnant or is there a chance you could become       pregnant during the next month?   No   Have you received any vaccinations in the past 4 weeks?   No     Immunization questionnaire answers were all negative.      Patient instructed to remain in clinic for 15 minutes afterwards, and to report any adverse reactions.     Screening performed by Ibis Whitaker MA on 9/25/2024 at 9:27 AM.

## 2024-10-25 ENCOUNTER — OFFICE VISIT (OUTPATIENT)
Dept: AUDIOLOGY | Facility: CLINIC | Age: 64
End: 2024-10-25
Attending: FAMILY MEDICINE
Payer: MEDICARE

## 2024-10-25 DIAGNOSIS — H90.3 SNHL (SENSORY-NEURAL HEARING LOSS), ASYMMETRICAL: Primary | ICD-10-CM

## 2024-10-25 DIAGNOSIS — H91.90 HEARING LOSS, UNSPECIFIED HEARING LOSS TYPE, UNSPECIFIED LATERALITY: ICD-10-CM

## 2024-10-25 PROCEDURE — 92550 TYMPANOMETRY & REFLEX THRESH: CPT | Performed by: AUDIOLOGIST

## 2024-10-25 PROCEDURE — 92557 COMPREHENSIVE HEARING TEST: CPT | Performed by: AUDIOLOGIST

## 2024-10-25 NOTE — PROGRESS NOTES
AUDIOLOGY REPORT    SUBJECTIVE:  Jack Marrero is a 64 year old male who was seen in the Audiology Clinic at the [facility] for audiologic evaluation, referred by Camelia Hancock M.D. The patient reports bilateral hissing tinnitus for approximately 3 months. The patient denies otalgia, aural fullness, otorrhea, and dizziness.  The patient notes difficulty with communication in a variety of listening situations.    OBJECTIVE:  Abuse Screening:  Do you feel unsafe at home or work/school? No  Do you feel threatened by someone? No  Does anyone try to keep you from having contact with others, or doing things outside of your home? No  Physical signs of abuse present? No     Fall Risk Screen:  1. Have you fallen two or more times in the past year? No  2. Have you fallen and had an injury in the past year? No    Otoscopic exam indicates ears are clear of cerumen bilaterally     Pure Tone Thresholds assessed using conventional audiometry with good  reliability from 250-8000 Hz bilaterally using insert earphones and circumaural headphones     RIGHT:  normal sloping to mild sensorineural hearing loss    LEFT:    normal sloping to moderate-severe sensorineural hearing loss    Tympanogram:    RIGHT: normal eardrum mobility    LEFT:   normal eardrum mobility    Reflexes (reported by stimulus ear):  RIGHT: Ipsilateral is present at normal levels  RIGHT: Contralateral is present at normal levels  LEFT:   Ipsilateral is present at normal levels  LEFT:   Contralateral is present at normal levels      Speech Reception Threshold:    RIGHT: 15 dB HL    LEFT:   10 dB HL  Word Recognition Score:     RIGHT: 100% at 55 dB HL using NU-6 recorded word list.    LEFT:   100% at 55 dB HL using NU-6 recorded word list.      ASSESSMENT:     ICD-10-CM    1. SNHL (sensory-neural hearing loss), asymmetrical  H90.3 Cmprhn Audiometry Thrshld Eval & Speech Recog (43967)     Tymps / Reflex   (99786)      2. Hearing  loss, unspecified hearing loss type, unspecified laterality  H91.90 Adult Audiology  Referral        Today s results were discussed with the patient in detail.     PLAN:  Patient was counseled regarding hearing loss and impact on communication. It is recommended that the patient be evaluated by ENT.  Please call this clinic with questions regarding these results or recommendations.        Woodrow Hamilton.  Doctor of Audiology  [place] License # [id number]

## 2024-10-28 ENCOUNTER — LAB (OUTPATIENT)
Dept: LAB | Facility: CLINIC | Age: 64
End: 2024-10-28
Payer: MEDICARE

## 2024-10-28 DIAGNOSIS — J43.9 PULMONARY EMPHYSEMA, UNSPECIFIED EMPHYSEMA TYPE (H): ICD-10-CM

## 2024-10-28 DIAGNOSIS — I25.10 CORONARY ARTERY CALCIFICATION: Primary | ICD-10-CM

## 2024-10-28 DIAGNOSIS — I10 HYPERTENSION GOAL BP (BLOOD PRESSURE) < 140/90: ICD-10-CM

## 2024-10-28 LAB
ANION GAP SERPL CALCULATED.3IONS-SCNC: 7 MMOL/L (ref 7–15)
BUN SERPL-MCNC: 9.8 MG/DL (ref 8–23)
CALCIUM SERPL-MCNC: 9.5 MG/DL (ref 8.8–10.4)
CHLORIDE SERPL-SCNC: 100 MMOL/L (ref 98–107)
CHOLEST SERPL-MCNC: 164 MG/DL
CREAT SERPL-MCNC: 1.09 MG/DL (ref 0.67–1.17)
EGFRCR SERPLBLD CKD-EPI 2021: 75 ML/MIN/1.73M2
FASTING STATUS PATIENT QL REPORTED: NO
FASTING STATUS PATIENT QL REPORTED: NO
GLUCOSE SERPL-MCNC: 107 MG/DL (ref 70–99)
HCO3 SERPL-SCNC: 30 MMOL/L (ref 22–29)
HDLC SERPL-MCNC: 34 MG/DL
LDLC SERPL CALC-MCNC: 73 MG/DL
NONHDLC SERPL-MCNC: 130 MG/DL
POTASSIUM SERPL-SCNC: 5 MMOL/L (ref 3.4–5.3)
SODIUM SERPL-SCNC: 137 MMOL/L (ref 135–145)
TRIGL SERPL-MCNC: 286 MG/DL

## 2024-10-28 PROCEDURE — 36415 COLL VENOUS BLD VENIPUNCTURE: CPT

## 2024-10-28 PROCEDURE — 80048 BASIC METABOLIC PNL TOTAL CA: CPT

## 2024-10-28 PROCEDURE — 80061 LIPID PANEL: CPT

## 2024-10-28 RX ORDER — FLUTICASONE FUROATE AND VILANTEROL TRIFENATATE 100; 25 UG/1; UG/1
1 POWDER RESPIRATORY (INHALATION) DAILY
Qty: 60 EACH | Refills: 0 | Status: SHIPPED | OUTPATIENT
Start: 2024-10-28 | End: 2024-11-05

## 2024-10-29 DIAGNOSIS — G60.8 SENSORY POLYNEUROPATHY: ICD-10-CM

## 2024-10-30 RX ORDER — AMITRIPTYLINE HYDROCHLORIDE 75 MG/1
TABLET ORAL
Qty: 90 TABLET | Refills: 1 | Status: SHIPPED | OUTPATIENT
Start: 2024-10-30

## 2024-11-04 SDOH — HEALTH STABILITY: PHYSICAL HEALTH: ON AVERAGE, HOW MANY DAYS PER WEEK DO YOU ENGAGE IN MODERATE TO STRENUOUS EXERCISE (LIKE A BRISK WALK)?: 1 DAY

## 2024-11-04 SDOH — HEALTH STABILITY: PHYSICAL HEALTH: ON AVERAGE, HOW MANY MINUTES DO YOU ENGAGE IN EXERCISE AT THIS LEVEL?: 30 MIN

## 2024-11-04 ASSESSMENT — PATIENT HEALTH QUESTIONNAIRE - PHQ9
10. IF YOU CHECKED OFF ANY PROBLEMS, HOW DIFFICULT HAVE THESE PROBLEMS MADE IT FOR YOU TO DO YOUR WORK, TAKE CARE OF THINGS AT HOME, OR GET ALONG WITH OTHER PEOPLE: SOMEWHAT DIFFICULT
SUM OF ALL RESPONSES TO PHQ QUESTIONS 1-9: 5
SUM OF ALL RESPONSES TO PHQ QUESTIONS 1-9: 5

## 2024-11-04 ASSESSMENT — SOCIAL DETERMINANTS OF HEALTH (SDOH): HOW OFTEN DO YOU GET TOGETHER WITH FRIENDS OR RELATIVES?: THREE TIMES A WEEK

## 2024-11-05 ENCOUNTER — OFFICE VISIT (OUTPATIENT)
Dept: FAMILY MEDICINE | Facility: CLINIC | Age: 64
End: 2024-11-05
Payer: MEDICARE

## 2024-11-05 VITALS
WEIGHT: 228.13 LBS | SYSTOLIC BLOOD PRESSURE: 128 MMHG | DIASTOLIC BLOOD PRESSURE: 85 MMHG | OXYGEN SATURATION: 97 % | HEIGHT: 72 IN | TEMPERATURE: 97.6 F | RESPIRATION RATE: 20 BRPM | BODY MASS INDEX: 30.9 KG/M2 | HEART RATE: 81 BPM

## 2024-11-05 DIAGNOSIS — N40.1 BENIGN NON-NODULAR PROSTATIC HYPERPLASIA WITH LOWER URINARY TRACT SYMPTOMS: ICD-10-CM

## 2024-11-05 DIAGNOSIS — Z87.891 PERSONAL HISTORY OF TOBACCO USE: ICD-10-CM

## 2024-11-05 DIAGNOSIS — I25.10 CORONARY ARTERY CALCIFICATION: ICD-10-CM

## 2024-11-05 DIAGNOSIS — I10 HYPERTENSION GOAL BP (BLOOD PRESSURE) < 140/90: ICD-10-CM

## 2024-11-05 DIAGNOSIS — J43.9 PULMONARY EMPHYSEMA, UNSPECIFIED EMPHYSEMA TYPE (H): ICD-10-CM

## 2024-11-05 DIAGNOSIS — Z00.00 ENCOUNTER FOR MEDICARE ANNUAL WELLNESS EXAM: Primary | ICD-10-CM

## 2024-11-05 DIAGNOSIS — I70.0 ATHEROSCLEROSIS OF ABDOMINAL AORTA (H): ICD-10-CM

## 2024-11-05 DIAGNOSIS — F33.1 DEPRESSION, MAJOR, RECURRENT, MODERATE (H): ICD-10-CM

## 2024-11-05 DIAGNOSIS — E78.5 HYPERLIPIDEMIA LDL GOAL <70: ICD-10-CM

## 2024-11-05 DIAGNOSIS — D69.2 SOLAR PURPURA (H): ICD-10-CM

## 2024-11-05 PROCEDURE — G0296 VISIT TO DETERM LDCT ELIG: HCPCS | Performed by: FAMILY MEDICINE

## 2024-11-05 PROCEDURE — 99214 OFFICE O/P EST MOD 30 MIN: CPT | Mod: 25 | Performed by: FAMILY MEDICINE

## 2024-11-05 PROCEDURE — G0438 PPPS, INITIAL VISIT: HCPCS | Performed by: FAMILY MEDICINE

## 2024-11-05 RX ORDER — FLUTICASONE FUROATE AND VILANTEROL 100; 25 UG/1; UG/1
1 POWDER RESPIRATORY (INHALATION) DAILY
Qty: 180 EACH | Refills: 3 | Status: SHIPPED | OUTPATIENT
Start: 2024-11-05

## 2024-11-05 RX ORDER — HYDROCHLOROTHIAZIDE 12.5 MG/1
12.5 TABLET ORAL DAILY
Qty: 90 TABLET | Refills: 3 | Status: SHIPPED | OUTPATIENT
Start: 2024-11-05

## 2024-11-05 RX ORDER — ATORVASTATIN CALCIUM 40 MG/1
40 TABLET, FILM COATED ORAL DAILY
Qty: 90 TABLET | Refills: 3 | Status: SHIPPED | OUTPATIENT
Start: 2024-11-05

## 2024-11-05 RX ORDER — LISINOPRIL 40 MG/1
TABLET ORAL
Qty: 90 TABLET | Refills: 3 | Status: SHIPPED | OUTPATIENT
Start: 2024-11-05

## 2024-11-05 RX ORDER — DUTASTERIDE 0.5 MG/1
CAPSULE, LIQUID FILLED ORAL
Qty: 90 CAPSULE | Refills: 3 | Status: SHIPPED | OUTPATIENT
Start: 2024-11-05

## 2024-11-05 ASSESSMENT — PAIN SCALES - GENERAL: PAINLEVEL_OUTOF10: NO PAIN (0)

## 2024-11-05 NOTE — PROGRESS NOTES
Lung Cancer Screening Shared Decision Making Visit     Jack Marrero, a 64 year old male, is eligible for lung cancer screening    History   Smoking Status     Every Day     Types: Cigarettes   Smokeless Tobacco     Never       I have discussed with patient the risks and benefits of screening for lung cancer with low-dose CT.     The risks include:    radiation exposure: one low dose chest CT has as much ionizing radiation as about 15 chest x-rays, or 6 months of background radiation living in [place]      false positives: most findings/nodules are NOT cancer, but some might still require additional diagnostic evaluation, including biopsy    over-diagnosis: some slow growing cancers that might never have been clinically significant will be detected and treated unnecessarily     The benefit of early detection of lung cancer is contingent upon adherence to annual screening or more frequent follow up if indicated.     Furthermore, to benefit from screening, Jack must be willing and able to undergo diagnostic procedures, if indicated. Although no specific guide is available for determining severity of comorbidities, it is reasonable to withhold screening in patients who have greater mortality risk from other diseases.     We did discuss that the best way to prevent lung cancer is to not smoke.    Some patients may value a numeric estimation of lung cancer risk when evaluating if lung cancer screening is right for them, here is one calculator:    ShouldIScreen

## 2024-11-05 NOTE — PROGRESS NOTES
Preventive Care Visit  [facility] KAREN Denise Galdino Hancock MD, Family Medicine  Nov 5, 2024      Assessment & Plan     Encounter for Medicare annual wellness exam  Routine health maintenance otherwise up-to-date    Hypertension goal BP (blood pressure) < 140/90  Stable on current regimen.  Continue same plan and routine follow-up.   - hydroCHLOROthiazide 12.5 MG tablet; Take 1 tablet (12.5 mg) by mouth daily.  - lisinopril (ZESTRIL) 40 MG tablet; TAKE 1 TABLET(40 MG) BY MOUTH IN THE MORNING    Hyperlipidemia LDL goal <70  Stable on current regimen.  Continue same plan and routine follow-up.   - atorvastatin (LIPITOR) 40 MG tablet; Take 1 tablet (40 mg) by mouth daily.    Coronary artery calcification  Secondary risk factor control.  Work on smoking cessation.    Atherosclerosis of abdominal aorta (H), seen on CT  As above    Pulmonary emphysema, unspecified emphysema type (H)  Smoking cessation and lung cancer screening  - fluticasone-vilanterol (BREO ELLIPTA) 100-25 MCG/ACT inhaler; Inhale 1 puff into the lungs daily.    Depression, major, recurrent, moderate (H)  Stable on current regimen.  Continue same plan and routine follow-up.     Solar purpura (H)  Continues to follow with dermatology    Personal history of tobacco use    - Prof fee: Shared Decision Making for Lung Cancer Screening  - CT Chest Lung Cancer Scrn Low Dose wo; Future    Benign non-nodular prostatic hyperplasia with lower urinary tract symptoms    - dutasteride (AVODART) 0.5 MG capsule; TAKE 1 CAPSULE(0.5 MG) BY MOUTH DAILY    Patient has been advised of split billing requirements and indicates understanding: Yes        Counseling  Appropriate preventive services were addressed with this patient via screening, questionnaire, or discussion as appropriate for fall prevention, nutrition, physical activity, Tobacco-use cessation, social engagement, weight loss and cognition.  Checklist reviewing preventive services available  has been given to the patient.  Reviewed patient's diet, addressing concerns and/or questions.   He is at risk for lack of exercise and has been provided with information to increase physical activity for the benefit of his well-being.   Discussed possible causes of fatigue. The patient's PHQ-9 score is consistent with mild depression. He was provided with information regarding depression.       Regular exercise  See Patient Instructions    Emmanuelle Zarate is a 64 year old, presenting for the following:  Medicare Visit        11/5/2024     7:55 AM   Additional Questions   Roomed by Opal MINER   Accompanied by self          HPI  Here today for routine wellness exam        Health Care Directive  Patient does not have a Health Care Directive:       11/4/2024   General Health   How would you rate your overall physical health? Good   Feel stress (tense, anxious, or unable to sleep) To some extent      (!) STRESS CONCERN      11/4/2024   Nutrition   Diet: Regular (no restrictions)            11/4/2024   Exercise   Days per week of moderate/strenous exercise 1 day   Average minutes spent exercising at this level 30 min      (!) EXERCISE CONCERN      11/4/2024   Social Factors   Frequency of gathering with friends or relatives Three times a week   Worry food won't last until get money to buy more No   Food not last or not have enough money for food? No   Do you have housing? (Housing is defined as stable permanent housing and does not include staying ouside in a car, in a tent, in an abandoned building, in an overnight shelter, or couch-surfing.) Yes   Are you worried about losing your housing? No   Lack of transportation? No   Unable to get utilities (heat,electricity)? No            11/5/2024   Fall Risk   Gait Speed Test (Document in seconds) 5   Gait Speed Test Interpretation Less than or equal to 5.00 seconds - PASS             11/4/2024   Activities of Daily Living- Home Safety   Needs help with the following daily  activites None of the above   Safety concerns in the home None of the above            2024   Dental   Dentist two times every year? Yes            2024   Hearing Screening   Hearing concerns? None of the above            2024   Driving Risk Screening   Patient/family members have concerns about driving No            2024   General Alertness/Fatigue Screening   Have you been more tired than usual lately? (!) YES            2024   Urinary Incontinence Screening   Bothered by leaking urine in past 6 months No             Today's PHQ-9 Score:       2024    12:24 PM   PHQ-9 SCORE   PHQ-9 Total Score MyChart 5 (Mild depression)   PHQ-9 Total Score 5        Patient-reported         2024   Substance Use   If I could quit smoking, I would Neutral   I want to quit somking, worry about health affects Completely agree   Willing to make a plan to quit smoking Neutral   Willing to cut down before quitting Completely agree   Alcohol more than 3/day or more than 7/wk Not Applicable   Do you have a current opioid prescription? No   How severe/bad is pain from 1 to 10? 0/10 (No Pain)   Do you use any other substances recreationally? No        Social History     Tobacco Use    Smoking status: Every Day     Current packs/day: 0.00     Average packs/day: 1 pack/day for 48.9 years (48.9 ttl pk-yrs)     Types: Cigarettes     Start date: 10/10/1974     Last attempt to quit: 2023     Years since quittin.2     Passive exposure: Current    Smokeless tobacco: Never    Tobacco comments:     Wants to quit by 2023   Vaping Use    Vaping status: Never Used   Substance Use Topics    Alcohol use: Not Currently     Comment: Occasional; about 1-2 drinks/week    Drug use: Never       Last PSA:   PSA   Date Value Ref Range Status   2019 0.92 0 - 4 ug/L Final     Comment:     Assay Method:  Chemiluminescence using Siemens Vista analyzer     Prostate Specific Antigen Screen   Date Value Ref Range  Status   10/23/2023 1.69 0.00 - 4.50 ng/mL Final   01/18/2023 1.44 0.00 - 4.00 ug/L Final     ASCVD Risk   The 10-year ASCVD risk score (Donald HADDAD, et al., 2019) is: 22.8%    Values used to calculate the score:      Age: 64 years      Sex: Male      Is Non- : No      Diabetic: No      Tobacco smoker: Yes      Systolic Blood Pressure: 128 mmHg      Is BP treated: Yes      HDL Cholesterol: 34 mg/dL      Total Cholesterol: 164 mg/dL            Reviewed and updated as needed this visit by Provider   Tobacco  Allergies  Meds  Problems  Med Hx  Surg Hx  Fam Hx            Labs reviewed in EPIC  BP Readings from Last 3 Encounters:   11/05/24 128/85   09/25/24 120/80   02/29/24 122/81    Wt Readings from Last 3 Encounters:   11/05/24 103.5 kg (228 lb 2 oz)   09/25/24 102.2 kg (225 lb 5 oz)   08/21/24 102.2 kg (225 lb 4.8 oz)                  Current providers sharing in care for this patient include:  Patient Care Team:  Camelia Hancock MD as PCP - General (Family Medicine)  Galdino Gifford MD as MD (Dermatology)  Camelia Hancock MD as Assigned PCP  Sudhir Richardson MD as MD (Cardiovascular Disease)  Lucho Alejandro MD as Assigned Sleep Provider  Eduardo Nolan MD as MD (Surgery)  Nnai Nix PA-C as Physician Assistant (Urology)  Eduardo Nolan MD as Assigned Surgical Provider    The following health maintenance items are reviewed in Epic and correct as of today:  Health Maintenance   Topic Date Due    ADVANCE CARE PLANNING  01/28/2024    LUNG CANCER SCREENING  02/02/2024    CMP  10/23/2024    PHQ-9  05/05/2025    NICOTINE/TOBACCO CESSATION COUNSELING Q 1 YR  09/25/2025    LIPID  10/28/2025    MEDICARE ANNUAL WELLNESS VISIT  11/05/2025    ANNUAL REVIEW OF HM ORDERS  11/05/2025    FALL RISK ASSESSMENT  11/05/2025    COLORECTAL CANCER SCREENING  03/16/2026    GLUCOSE  10/28/2027    DTAP/TDAP/TD IMMUNIZATION (3 - Td or Tdap) 08/19/2031     SPIROMETRY  Completed    HEPATITIS C SCREENING  Completed    COPD ACTION PLAN  Completed    DEPRESSION ACTION PLAN  Completed    INFLUENZA VACCINE  Completed    Pneumococcal Vaccine: 65+ Years  Completed    ZOSTER IMMUNIZATION  Completed    RSV VACCINE  Completed    AORTIC ANEURYSM SCREENING (SYSTEM ASSIGNED)  Completed    COVID-19 Vaccine  Completed    HPV IMMUNIZATION  Aged Out    MENINGITIS IMMUNIZATION  Aged Out    RSV MONOCLONAL ANTIBODY  Aged Out    BMP  Discontinued         Review of Systems  CONSTITUTIONAL: NEGATIVE for fever, chills, change in weight  INTEGUMENTARY/SKIN: NEGATIVE for worrisome rashes, moles or lesions  EYES: NEGATIVE for vision changes or irritation  ENT/MOUTH: NEGATIVE for ear, mouth and throat problems  RESP: NEGATIVE for significant cough or SOB  BREAST: NEGATIVE for masses, tenderness or discharge  CV: NEGATIVE for chest pain, palpitations or peripheral edema  GI: NEGATIVE for nausea, abdominal pain, heartburn, or change in bowel habits  : NEGATIVE for frequency, dysuria, or hematuria  MUSCULOSKELETAL: NEGATIVE for significant arthralgias or myalgia  NEURO: NEGATIVE for weakness, dizziness or paresthesias  ENDOCRINE: NEGATIVE for temperature intolerance, skin/hair changes  HEME: NEGATIVE for bleeding problems  PSYCHIATRIC: NEGATIVE for changes in mood or affect     Objective    Exam  /85 (BP Location: Right arm, Patient Position: Sitting, Cuff Size: Adult Large)   Pulse 81   Temp 97.6  F (36.4  C) (Temporal)   Resp 20   Ht 1.829 m (6')   Wt 103.5 kg (228 lb 2 oz)   SpO2 97%   BMI 30.94 kg/m     Estimated body mass index is 30.94 kg/m  as calculated from the following:    Height as of this encounter: 1.829 m (6').    Weight as of this encounter: 103.5 kg (228 lb 2 oz).    Physical Exam  GENERAL: alert and no distress  EYES: Eyes grossly normal to inspection, PERRL and conjunctivae and sclerae normal  HENT: ear canals and TM's normal, nose and mouth without ulcers or  lesions  NECK: no adenopathy, no asymmetry, masses, or scars  RESP: lungs clear to auscultation - no rales, rhonchi or wheezes  CV: regular rate and rhythm, normal S1 S2, no S3 or S4, no murmur, click or rub, no peripheral edema  ABDOMEN: soft, nontender, no hepatosplenomegaly, no masses and bowel sounds normal  MS: no gross musculoskeletal defects noted, no edema  SKIN: no suspicious lesions or rashes  NEURO: Normal strength and tone, mentation intact and speech normal  PSYCH: mentation appears normal, affect normal/bright        11/5/2024   Mini Cog   Clock Draw Score 2 Normal   3 Item Recall 3 objects recalled   Mini Cog Total Score 5            Vision Screen  Vision Screen Results: Pass      Signed Electronically by: Camelia Hancock MD    Answers submitted by the patient for this visit:  Patient Health Questionnaire (Submitted on 11/4/2024)  If you checked off any problems, how difficult have these problems made it for you to do your work, take care of things at home, or get along with other people?: Somewhat difficult  PHQ9 TOTAL SCORE: 5

## 2024-11-05 NOTE — PATIENT INSTRUCTIONS
Patient Education   Preventive Care Advice   This is general advice given by our system to help you stay healthy. However, your care team may have specific advice just for you. Please talk to your care team about your preventive care needs.  Nutrition  Eat 5 or more servings of fruits and vegetables each day.  Try wheat bread, brown rice and whole grain pasta (instead of white bread, rice, and pasta).  Get enough calcium and vitamin D. Check the label on foods and aim for 100% of the RDA (recommended daily allowance).  Lifestyle  Exercise at least 150 minutes each week  (30 minutes a day, 5 days a week).  Do muscle strengthening activities 2 days a week. These help control your weight and prevent disease.  No smoking.  Wear sunscreen to prevent skin cancer.  Have a dental exam and cleaning every 6 months.  Yearly exams  See your health care team every year to talk about:  Any changes in your health.  Any medicines your care team has prescribed.  Preventive care, family planning, and ways to prevent chronic diseases.  Shots (vaccines)   HPV shots (up to age 26), if you've never had them before.  Hepatitis B shots (up to age 59), if you've never had them before.  COVID-19 shot: Get this shot when it's due.  Flu shot: Get a flu shot every year.  Tetanus shot: Get a tetanus shot every 10 years.  Pneumococcal, hepatitis A, and RSV shots: Ask your care team if you need these based on your risk.  Shingles shot (for age 50 and up)  General health tests  Diabetes screening:  Starting at age 35, Get screened for diabetes at least every 3 years.  If you are younger than age 35, ask your care team if you should be screened for diabetes.  Cholesterol test: At age 39, start having a cholesterol test every 5 years, or more often if advised.  Bone density scan (DEXA): At age 50, ask your care team if you should have this scan for osteoporosis (brittle bones).  Hepatitis C: Get tested at least once in your life.  STIs (sexually  transmitted infections)  Before age 24: Ask your care team if you should be screened for STIs.  After age 24: Get screened for STIs if you're at risk. You are at risk for STIs (including HIV) if:  You are sexually active with more than one person.  You don't use condoms every time.  You or a partner was diagnosed with a sexually transmitted infection.  If you are at risk for HIV, ask about PrEP medicine to prevent HIV.  Get tested for HIV at least once in your life, whether you are at risk for HIV or not.  Cancer screening tests  Cervical cancer screening: If you have a cervix, begin getting regular cervical cancer screening tests starting at age 21.  Breast cancer scan (mammogram): If you've ever had breasts, begin having regular mammograms starting at age 40. This is a scan to check for breast cancer.  Colon cancer screening: It is important to start screening for colon cancer at age 45.  Have a colonoscopy test every 10 years (or more often if you're at risk) Or, ask your provider about stool tests like a FIT test every year or Cologuard test every 3 years.  To learn more about your testing options, visit:   .  For help making a decision, visit:   https://bit.ly/rx50517.  Prostate cancer screening test: If you have a prostate, ask your care team if a prostate cancer screening test (PSA) at age 55 is right for you.  Lung cancer screening: If you are a current or former smoker ages 50 to 80, ask your care team if ongoing lung cancer screenings are right for you.  For informational purposes only. Not to replace the advice of your health care provider. Copyright   2023 [facility]. All rights reserved. Clinically reviewed by the [facility] Transitions Program. [employer] 288467 - REV 01/24.  Preventing Falls: Care Instructions  Injuries and health problems such as trouble walking or poor eyesight can increase your risk of falling. So can some medicines. But there are things you can do to help  "prevent falls. You can exercise to get stronger. You can also arrange your home to make it safer.    Talk to your doctor about the medicines you take. Ask if any of them increase the risk of falls and whether they can be changed or stopped.   Try to exercise regularly. It can help improve your strength and balance. This can help lower your risk of falling.         Practice fall safety and prevention.   Wear low-heeled shoes that fit well and give your feet good support. Talk to your doctor if you have foot problems that make this hard.  Carry a cellphone or wear a medical alert device that you can use to call for help.  Use stepladders instead of chairs to reach high objects. Don't climb if you're at risk for falls. Ask for help, if needed.  Wear the correct eyeglasses, if you need them.        Make your home safer.   Remove rugs, cords, clutter, and furniture from walkways.  Keep your house well lit. Use night-lights in hallways and bathrooms.  Install and use sturdy handrails on stairways.  Wear nonskid footwear, even inside. Don't walk barefoot or in socks without shoes.        Be safe outside.   Use handrails, curb cuts, and ramps whenever possible.  Keep your hands free by using a shoulder bag or backpack.  Try to walk in well-lit areas. Watch out for uneven ground, changes in pavement, and debris.  Be careful in the winter. Walk on the grass or gravel when sidewalks are slippery. Use de-icer on steps and walkways. Add non-slip devices to shoes.    Put grab bars and nonskid mats in your shower or tub and near the toilet. Try to use a shower chair or bath bench when bathing.   Get into a tub or shower by putting in your weaker leg first. Get out with your strong side first. Have a phone or medical alert device in the bathroom with you.   Where can you learn more?  Go to https://www.Algorithmicswise.net/patiented  Enter G117 in the search box to learn more about \"Preventing Falls: Care Instructions.\"  Current as of: " July 17, 2023  Content Version: 14.2 2024 [facility] [employer].   Care instructions adapted under license by your healthcare professional. If you have questions about a medical condition or this instruction, always ask your healthcare professional. Healthwise, Incorporated disclaims any warranty or liability for your use of this information.    Learning About Sleeping Well  What does sleeping well mean?     Sleeping well means getting enough sleep to feel good and stay healthy. How much sleep is enough varies among people.  The number of hours you sleep and how you feel when you wake up are both important. If you do not feel refreshed, you probably need more sleep. Another sign of not getting enough sleep is feeling tired during the day.  Experts recommend that adults get at least 7 or more hours of sleep per day. Children and older adults need more sleep.  Why is getting enough sleep important?  Getting enough quality sleep is a basic part of good health. When your sleep suffers, your physical health, mood, and your thoughts can suffer too. You may find yourself feeling more grumpy or stressed. Not getting enough sleep also can lead to serious problems, including injury, accidents, anxiety, and depression.  What might cause poor sleeping?  Many things can cause sleep problems, including:  Changes to your sleep schedule.  Stress. Stress can be caused by fear about a single event, such as giving a speech. Or you may have ongoing stress, such as worry about work or school.  Depression, anxiety, and other mental or emotional conditions.  Changes in your sleep habits or surroundings. This includes changes that happen where you sleep, such as noise, light, or sleeping in a different bed. It also includes changes in your sleep pattern, such as having jet lag or working a late shift.  Health problems, such as pain, breathing problems, and restless legs syndrome.  Lack of regular exercise.  Using alcohol, nicotine, or  "caffeine before bed.  How can you help yourself?  Here are some tips that may help you sleep more soundly and wake up feeling more refreshed.  Your sleeping area   Use your bedroom only for sleeping and sex. A bit of light reading may help you fall asleep. But if it doesn't, do your reading elsewhere in the house. Try not to use your TV, computer, smartphone, or tablet while you are in bed.  Be sure your bed is big enough to stretch out comfortably, especially if you have a sleep partner.  Keep your bedroom quiet, dark, and cool. Use curtains, blinds, or a sleep mask to block out light. To block out noise, use earplugs, soothing music, or a \"white noise\" machine.  Your evening and bedtime routine   Create a relaxing bedtime routine. You might want to take a warm shower or bath, or listen to soothing music.  Go to bed at the same time every night. And get up at the same time every morning, even if you feel tired.  What to avoid   Limit caffeine (coffee, tea, caffeinated sodas) during the day, and don't have any for at least 6 hours before bedtime.  Avoid drinking alcohol before bedtime. Alcohol can cause you to wake up more often during the night.  Try not to smoke or use tobacco, especially in the evening. Nicotine can keep you awake.  Limit naps during the day, especially close to bedtime.  Avoid lying in bed awake for too long. If you can't fall asleep or if you wake up in the middle of the night and can't get back to sleep within about 20 minutes, get out of bed and go to another room until you feel sleepy.  Avoid taking medicine right before bed that may keep you awake or make you feel hyper or energized. Your doctor can tell you if your medicine may do this and if you can take it earlier in the day.  If you can't sleep   Imagine yourself in a peaceful, pleasant scene. Focus on the details and feelings of being in a place that is relaxing.  Get up and do a quiet or boring activity until you feel sleepy.  Avoid " "drinking any liquids before going to bed to help prevent waking up often to use the bathroom.  Where can you learn more?  Go to https://www.Paperless World.net/patiented  Enter J942 in the search box to learn more about \"Learning About Sleeping Well.\"  Current as of: July 10, 2023  Content Version: 14.2 2024 [employer].   Care instructions adapted under license by your healthcare professional. If you have questions about a medical condition or this instruction, always ask your healthcare professional. Healthwise, Incorporated disclaims any warranty or liability for your use of this information.    Learning About Depression Screening  What is depression screening?  Depression screening is a way to see if you have depression symptoms. It may be done by a doctor or counselor. It's often part of a routine checkup. That's because your mental health is just as important as your physical health.  Depression is a mental health condition that affects how you feel, think, and act. You may:  Have less energy.  Lose interest in your daily activities.  Feel sad and grouchy for a long time.  Depression is very common. It affects people of all ages.  Many things can lead to depression. Some people become depressed after they have a stroke or find out they have a major illness like cancer or heart disease. The death of a loved one or a breakup may lead to depression. It can run in families. Most experts believe that a combination of inherited genes and stressful life events can cause it.  What happens during screening?  You may be asked to fill out a form about your depression symptoms. You and the doctor will discuss your answers. The doctor may ask you more questions to learn more about how you think, act, and feel.  What happens after screening?  If you have symptoms of depression, your doctor will talk to you about your options.  Doctors usually treat depression with medicines or counseling. Often, combining the two " "works best. Many people don't get help because they think that they'll get over the depression on their own. But people with depression may not get better unless they get treatment.  The cause of depression is not well understood. There may be many factors involved. But if you have depression, it's not your fault.  A serious symptom of depression is thinking about death or suicide. If you or someone you care about talks about this or about feeling hopeless, get help right away.  It's important to know that depression can be treated. Medicine, counseling, and self-care may help.  Where can you learn more?  Go to https://www.Core Dynamics.net/patiented  Enter T185 in the search box to learn more about \"Learning About Depression Screening.\"  Current as of: June 24, 2023  Content Version: 14.2 2024 [employer].   Care instructions adapted under license by your healthcare professional. If you have questions about a medical condition or this instruction, always ask your healthcare professional. Healthwise, Incorporated disclaims any warranty or liability for your use of this information.       Lung Cancer Screening   Frequently Asked Questions  If you are at high-risk for lung cancer, getting screened with low-dose computed tomography (LDCT) every year can help save your life. This handout offers answers to some of the most common questions about lung cancer screening. If you have other questions, please call [phone] ([phone]).     What is it?  Lung cancer screening uses special X-ray technology to create an image of your lung tissue. The exam is quick and easy and takes less than 10 seconds. We don t give you any medicine or use any needles. You can eat before and after the exam. You don t need to change your clothes as long as the clothing on your chest doesn t contain metal. But, you do need to be able to hold your breath for at least 6 seconds during the exam.    What is the goal of lung " cancer screening?  The goal of lung cancer screening is to save lives. Many times, lung cancer is not found until a person starts having physical symptoms. Lung cancer screening can help detect lung cancer in the earliest stages when it may be easier to treat.    Who should be screened for lung cancer?  We suggest lung cancer screening for anyone who is at high-risk for lung cancer. You are in the high-risk group if you:      are between the ages of 55 and 79, and    have smoked at least 1 pack of cigarettes a day for 20 or more years, and    still smoke or have quit within the past 15 years.    However, if you have a new cough or shortness of breath, you should talk to your doctor before being screened.    Why does it matter if I have symptoms?  Certain symptoms can be a sign that you have a condition in your lungs that should be checked and treated by your doctor. These symptoms include fever, chest pain, a new or changing cough, shortness of breath that you have never felt before, coughing up blood or unexplained weight loss. Having any of these symptoms can greatly affect the results of lung cancer screening.       Should all smokers get an LDCT lung cancer screening exam?  It depends. Lung cancer screening is for a very specific group of men and women who have a history of heavy smoking over a long period of time (see  Who should be screened for lung cancer  above).  I am in the high-risk group, but have been diagnosed with cancer in the past. Is LDCT lung cancer screening right for me?  In some cases, you should not have LDCT lung screening, such as when your doctor is already following your cancer with CT scan studies. Your doctor will help you decide if LDCT lung screening is right for you.  Do I need to have a screening exam every year?  Yes. If you are in the high-risk group described earlier, you should get an LDCT lung cancer screening exam every year until you are 79, or are no longer willing or able to  undergo screening and possible procedures to diagnose and treat lung cancer.  How effective is LDCT at preventing death from lung cancer?  Studies have shown that LDCT lung cancer screening can lower the risk of death from lung cancer by 20 percent in people who are at high-risk.  What are the risks?  There are some risks and limitations of LDCT lung cancer screening. We want to make sure you understand the risks and benefits, so please let us know if you have any questions. Your doctor may want to talk with you more about these risks.    Radiation exposure: As with any exam that uses radiation, there is a very small increased risk of cancer. The amount of radiation in LDCT is small--about the same amount a person would get from a mammogram. Your doctor orders the exam when he or she feels the potential benefits outweigh the risks.    False negatives: No test is perfect, including LDCT. It is possible that you may have a medical condition, including lung cancer, that is not found during your exam. This is called a false negative result.    False positives and more testing: LDCT very often finds something in the lung that could be cancer, but in fact is not. This is called a false positive result. False positive tests often cause anxiety. To make sure these findings are not cancer, you may need to have more tests. These tests will be done only if you give us permission. Sometimes patients need a treatment that can have side effects, such as a biopsy. For more information on false positives, see  What can I expect from the results?     Findings not related to lung cancer: Your LDCT exam also takes pictures of areas of your body next to your lungs. In a very small number of cases, the CT scan will show an abnormal finding in one of these areas, such as your kidneys, adrenal glands, liver or thyroid. This finding may not be serious, but you may need more tests. Your doctor can help you decide what other tests you may  need, if any.  What can I expect from the results?  About 1 out of 4 LDCT exams will find something that may need more tests. Most of the time, these findings are lung nodules. Lung nodules are very small collections of tissue in the lung. These nodules are very common, and the vast majority--more than 97 percent--are not cancer (benign). Most are normal lymph nodes or small areas of scarring from past infections.  But, if a small lung nodule is found to be cancer, the cancer can be cured more than 90 percent of the time. To know if the nodule is cancer, we may need to get more images before your next yearly screening exam. If the nodule has suspicious features (for example, it is large, has an odd shape or grows over time), we will refer you to a specialist for further testing.  Will my doctor also get the results?  Yes. Your doctor will get a copy of your results.  Is it okay to keep smoking now that there s a cancer screening exam?  No. Tobacco is one of the strongest cancer-causing agents. It causes not only lung cancer, but other cancers and cardiovascular (heart) diseases as well. The damage caused by smoking builds over time. This means that the longer you smoke, the higher your risk of disease. While it is never too late to quit, the sooner you quit, the better.  Where can I find help to quit smoking?  The best way to prevent lung cancer is to stop smoking. If you have already quit smoking, congratulations and keep it up! For help on quitting smoking, please call [employer] at 1-117-QUIT-NOW (1-383.945.7511) or the American Cancer Society at 1-944.277.4318 to find local resources near you.  One-on-one health coaching:  If you d prefer to work individually with a health care provider on tobacco cessation, we offer:      Medication Therapy Management:  Our specially trained pharmacists work closely with you and your doctor to help you quit smoking.  Call [phone] or [phone] (toll free).

## 2024-11-19 ENCOUNTER — ANCILLARY PROCEDURE (OUTPATIENT)
Dept: CT IMAGING | Facility: CLINIC | Age: 64
End: 2024-11-19
Attending: FAMILY MEDICINE
Payer: MEDICARE

## 2024-11-19 DIAGNOSIS — Z87.891 PERSONAL HISTORY OF TOBACCO USE: ICD-10-CM

## 2024-11-19 PROCEDURE — 71271 CT THORAX LUNG CANCER SCR C-: CPT | Mod: GC | Performed by: RADIOLOGY

## 2024-11-19 NOTE — LETTER
"2024      Elliot Marrero  [address] [address] [id number]        Dear ,    We are writing to inform you of your test results.    The screening test still look good.  It is interesting in the sense that they noted the previous 5 mm nodule, benign looking, as well as a smaller one that is new but a previous one that is no longer there.  None of them look \"bad\" by any stretch the imagination.  They mention perhaps checking this again in 6 months, but I think we can probably wait a year just to do the routine follow-up.  But if that makes you nervous then just let me know.       Resulted Orders   CT Chest Lung Cancer Scrn Low Dose wo    Narrative    EXAM: CT CHEST LUNG CANCER SCREEN LOW DOSE WITHOUT 2024 4:14 PM    HISTORY: Personal history of tobacco use.    Number of packs-year of smokin  Current or former smoker?: Current  If former, number of years since quit?: 0    COMPARISON: Screening CT, 2023.    TECHNIQUE: Helical acquisition low dose CT chest. Images reviewed in  lung, soft tissue and bone windows.  DLP: 96.8 (mGy*cm)  CTDIvol: 2.82 (mGy)    FINDINGS:   [All follow up of nodules are based on ACR guidelines for lung cancer  screening and measurements of each nodule size must be the mean of the  longest axial plane measurement by its perpendicular measured to the  nearest decimal and rounded up to the nearest whole number.]    NODULES:  1.  The largest and/or fastest growing of these nodule(s) are as  follows:    - 5 mm solid nodule in the right upper lobe on series:  4 image:  71.  Previously 5 mm    - 4 mm solid nodule in the left upper lobe on series:  4 image:  118.  New from prior.    Previously seen 2 mm solid nodule in the left upper lobe is not  present on the current exam    EMPHYSEMA: Mild centrilobular emphysema.    CORONARY ARTERY CALCIFICATION: Mild.    ADDITIONAL FINDINGS:     Unremarkable noncontrast appearance of the thyroid. The heart is  normal " "in size. No significant pericardial effusion. The ascending  aorta and main pulmonary artery are normal in caliber. No thoracic  lymphadenopathy. The esophagus is unremarkable. The central airway is  clear. Mild biapical scarring. No focal airspace consolidation.    Upper abdomen: Atherosclerotic calcification of the abdominal aorta  and abdominal arterial vasculature.    Bones: Mild degenerative and scoliotic changes visualized spine. No  suspicious osseous lesion.      Impression    IMPRESSION:   1. ACR Assessment Category (v1.1):  Lung-RADS Category 3. Probably  benign finding(s)      Recommendation:  Lung-RADS Category 3. Probably benign finding(s)-  short term follow up suggested 6 month low dose CT Chest without  contrast (please order exam code [id number]).     2. Significant Incidental Finding(s):  Category S: No.    3. Any moderate or severe Emphysema or bronchial wall thickening or  mosaic attenuation?    4. Avoidance of tobacco smoke is strongly advised. Please consider  referral for smoking cessation to [facility] Medication Therapy Management  (MTM) if clinically appropriate.    Download the \"Lung-RADS 2022\" table at this site:   [email or web address]      I have personally reviewed the examination and initial interpretation  and I agree with the findings.    DAKSHA HENDERSON MD         SYSTEM ID:  [id number]       If you have any questions or concerns, please call the clinic at the number listed above.       Sincerely,      Camelia Hancock MD            "

## 2024-11-20 NOTE — RESULT ENCOUNTER NOTE
"Elliot,  The screening test still look good.  It is interesting in the sense that they noted the previous 5 mm nodule, benign looking, as well as a smaller one that is new but a previous one that is no longer there.  None of them look \"bad\" by any stretch the imagination.  They mention perhaps checking this again in 6 months, but I think we can probably wait a year just to do the routine follow-up.  But if that makes you nervous then just let me know.    PB Hancock M.D. "

## 2024-11-27 ENCOUNTER — TELEPHONE (OUTPATIENT)
Dept: NURSING | Facility: CLINIC | Age: 64
End: 2024-11-27
Payer: MEDICARE

## 2024-11-27 DIAGNOSIS — R91.8 ABNORMAL CT LUNG SCREENING: ICD-10-CM

## 2024-11-27 NOTE — TELEPHONE ENCOUNTER
[facility] Radiology Lung Cancer Screening CT result notification:     LDCT/Lung Cancer Screening CT Exam date: 11/19/24  Radiologist Impression AND Recommendations:   IMPRESSION:   1. ACR Assessment Category (v1.1):  Lung-RADS Category 3. Probably  benign finding(s)       Recommendation:  Lung-RADS Category 3. Probably benign finding(s)-  short term follow up suggested 6 month low dose CT Chest without  contrast (please order exam code [id number]).    Pertinent Findings:  NODULES:  1.  The largest and/or fastest growing of these nodule(s) are as  follows:   - 5 mm solid nodule in the right upper lobe on series:  4 image:  71.  Previously 5 mm   - 4 mm solid nodule in the left upper lobe on series:  4 image:  118.  New from prior.   Previously seen 2 mm solid nodule in the left upper lobe is not  present on the current exam     Ordering Provider: Camelia Hancock MD James E Shoulak did receive the remaining radiology results from their PCP.          Lung Nodule Program Protocol recommendation [Pertaining to lung nodules]:  Lung RADS 3 Protocol: Results RN to notify Patient of results/recommendations and place order for 6 month CT ([id number]) - MD garcia  CT Chest order ([id number]) placed to be completed within 6 months (Yes/No):  Yes due on or after 5/27/25.  Image scheduling will contact Patient 1 month prior to due date.    RN communicated the lung nodule finding to the patient (Yes/No):  At 12:11P, Left voicemail message requesting a call back to [facility] ED Lab Result RN at [phone]. RN is available every day between 9 a.m. and 5:30 p.m.   The patient had the following questions: NA  Correct letter sent as per [facility] Lung nodule protocol (Yes/No):  yes  Did Patient have any CT's of chest previous? (Yes/No/NA) yes, Screening CT, 2/2/2023.   If no comparisons used, inquire if patient has had any chest CT's in the past and if so, request priors.    If scheduling LDCT only, RN will  contact Image Scheduling Team  Hours available (all sites below):  Mon-Fri 7A to 7P; Sat 7A to 3:30P.  No schedulers available on Sunday.  [facility] ([place], [place], [place], and [place]): [phone]  [place] ([place] and [place]): [phone]  [place] region ([place], [facility], and [place]): [phone]      Yonny Caldera RN  [facility]  Lung Nodule and Emergency Dept Lab Result RN  Ph# [phone]

## 2024-12-02 NOTE — TELEPHONE ENCOUNTER
Elliot returns call and he is notified of the CT result and recommendation.  He would like to get the 6 month chest CT.  Order has been placed and is due the end of May.  Someone from image scheduling will contact you one month prior to the due date.  Yonny Caldera RN  Pain Doctorer [facility]  Emergency Dept Lab Result RN  Ph# [phone]

## 2025-01-14 DIAGNOSIS — F33.1 DEPRESSION, MAJOR, RECURRENT, MODERATE (H): ICD-10-CM

## 2025-01-14 RX ORDER — ESCITALOPRAM OXALATE 20 MG/1
TABLET ORAL
Qty: 90 TABLET | Refills: 1 | Status: SHIPPED | OUTPATIENT
Start: 2025-01-14

## 2025-03-03 NOTE — PROGRESS NOTES
Chief Complaint - Hearing loss    History of Present Illness - Jack Marrero is a 64 year old male who presents to me today with hearing loss in the left ear. Also has tinnitus more so left ear. It has been present and noticeable for approximately 4 months. The patient has noticed increased difficulty hearing certain sounds.  There is no history of recent head trauma, or chronic ear disease or ear surgery.  A couple years ago hit head with concussion. 7With regards to recreational, , and work-related noise exposure he has none. No family history of hearing loss at a young age. The patient denies otalgia. He has some imbalance.    Tests personally reviewed today for this visit:   1.) audiogram was performed 10/25/24 as part of the evaluation and personally reviewed. The audiogram showed a significant asymmetric high frequency sensorineural hearing loss worse in the left ear.    2.) tympanograms were performed 10/25/24 and were normal Type A curves, with normal canal volume and middle ear pressure.   3.) MRI brain ordered today    Past Medical History -   Patient Active Problem List   Diagnosis    Hyperlipidemia LDL goal <70    Tubular adenoma of colon on colonoscopy    Sensory polyneuropathy    Diverticulosis of colon    Venous lake of lip, left lower    Benign non-nodular prostatic hyperplasia with lower urinary tract symptoms    Personal history of tobacco use    Restless legs syndrome (RLS)    SYH (obstructive sleep apnea) - Moderate (AHI 16, RDI 55)    Gallbladder polyp, need fu US yearly    Atherosclerosis of abdominal aorta (H), seen on CT    Shoulder girdle syndrome    Family history of prostate cancer, pt would like annual PSA test    Coronary artery calcification    Hypertension goal BP (blood pressure) < 140/90    Moderate episode of recurrent major depressive disorder (H)    Pulmonary emphysema, unspecified emphysema type (H)    Dilation of thoracic aorta    Solar purpura    Class 1 obesity due  to excess calories with serious comorbidity and body mass index (BMI) of 31.0 to 31.9 in adult    Abnormal CT lung screening       Current Medications -   Current Outpatient Medications:     albuterol (PROAIR HFA/PROVENTIL HFA/VENTOLIN HFA) 108 (90 Base) MCG/ACT inhaler, Inhale 2 puffs into the lungs every 4 hours as needed for shortness of breath, wheezing or cough., Disp: 18 g, Rfl: 2    amitriptyline (ELAVIL) 75 MG tablet, TAKE 1 TABLET(75 MG) BY MOUTH AT BEDTIME, Disp: 90 tablet, Rfl: 1    aspirin 81 MG tablet, Take 1 tablet by mouth daily., Disp: , Rfl:     atorvastatin (LIPITOR) 40 MG tablet, Take 1 tablet (40 mg) by mouth daily., Disp: 90 tablet, Rfl: 3    dutasteride (AVODART) 0.5 MG capsule, TAKE 1 CAPSULE(0.5 MG) BY MOUTH DAILY, Disp: 90 capsule, Rfl: 3    escitalopram (LEXAPRO) 20 MG tablet, TAKE 1 TABLET(20 MG) BY MOUTH DAILY, Disp: 90 tablet, Rfl: 1    fluticasone-vilanterol (BREO ELLIPTA) 100-25 MCG/ACT inhaler, Inhale 1 puff into the lungs daily., Disp: 180 each, Rfl: 3    hydroCHLOROthiazide 12.5 MG tablet, Take 1 tablet (12.5 mg) by mouth daily., Disp: 90 tablet, Rfl: 3    lisinopril (ZESTRIL) 40 MG tablet, TAKE 1 TABLET(40 MG) BY MOUTH IN THE MORNING, Disp: 90 tablet, Rfl: 3    Multiple Vitamins-Minerals (MULTIVITAMIN ADULT) CHEW, , Disp: , Rfl:     order for DME, Equipment ordered: RESMED Auto PAP Mask type: Nasal Settings: 6-12 cm H2O, Disp: , Rfl:     tadalafil (CIALIS) 20 MG tablet, Take 1 tablet (20 mg) by mouth daily as needed (ED), Disp: 12 tablet, Rfl: 11    Allergies -   Allergies   Allergen Reactions    Morphine And Codeine      FELT WIRED ON THIS       Social History -   Social History     Socioeconomic History    Marital status:    Occupational History    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[place], 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   Tobacco Use    Smoking status: Every Day     Current packs/day: 0.00     Average packs/day: 1 pack/day for 48.9 years (48.9 ttl pk-yrs)      Types: Cigarettes     Start date: 10/10/1974     Last attempt to quit: 2023     Years since quittin.5     Passive exposure: Current    Smokeless tobacco: Never    Tobacco comments:     Wants to quit by 2023   Vaping Use    Vaping status: Never Used   Substance and Sexual Activity    Alcohol use: Not Currently     Comment: Occasional; about 1-2 drinks/week    Drug use: Never    Sexual activity: Yes     Partners: Female   Other Topics Concern    Parent/sibling w/ CABG, MI or angioplasty before 65F 55M? No     Social Drivers of Health     Financial Resource Strain: Low Risk  (2024)    Financial Resource Strain     Within the past 12 months, have you or your family members you live with been unable to get utilities (heat, electricity) when it was really needed?: No   Food Insecurity: Low Risk  (2024)    Food Insecurity     Within the past 12 months, did you worry that your food would run out before you got money to buy more?: No     Within the past 12 months, did the food you bought just not last and you didn t have money to get more?: No   Transportation Needs: Low Risk  (2024)    Transportation Needs     Within the past 12 months, has lack of transportation kept you from medical appointments, getting your medicines, non-medical meetings or appointments, work, or from getting things that you need?: No   Physical Activity: Insufficiently Active (2024)    Exercise Vital Sign     Days of Exercise per Week: 1 day     Minutes of Exercise per Session: 30 min   Stress: Stress Concern Present (2024)    [employer] [place] [employer] - Occupational Stress Questionnaire     Feeling of Stress : To some extent   Social Connections: Unknown (2024)    Social Connection and Isolation Panel [NHANES]     Frequency of Social Gatherings with Friends and Family: Three times a week   Interpersonal Safety: Low Risk  (2024)    Interpersonal Safety     Do you feel physically and  emotionally safe where you currently live?: Yes     Within the past 12 months, have you been hit, slapped, kicked or otherwise physically hurt by someone?: No     Within the past 12 months, have you been humiliated or emotionally abused in other ways by your partner or ex-partner?: No   Housing Stability: Low Risk  (2024)    Housing Stability     Do you have housing? : Yes     Are you worried about losing your housing?: No       Family History -   Family History   Problem Relation Age of Onset    Arthritis Mother     Heart Disease Mother     Lipids Mother     Eye Disorder Mother     Gastrointestinal Disease Mother         irritable bowel    Breast Cancer Mother     Osteoporosis Mother     Lipids Father     Heart Disease Father     Psychotic Disorder Father     Depression Father     Eye Disorder Father     C.A.D. Father 56        CABG at age 56    Coronary Artery Disease Father         Heart attack at age 56    Hyperlipidemia Father     Depression/Anxiety Father     Mental Illness Father     Hypertension Father     Anxiety Disorder Father     Arthritis Maternal Grandmother     Alcohol/Drug Maternal Grandfather     Cancer - colorectal Maternal Grandfather     Hypertension Paternal Grandmother     Breast Cancer Paternal Grandmother     Heart Disease Paternal Grandfather     Hypertension Paternal Grandfather     Coronary Artery Disease Paternal Grandfather          of heart attack at age 59    Hypertension Brother     Depression/Anxiety Brother     Depression Brother     Coronary Artery Disease Brother     Hypertension Brother     Depression/Anxiety Brother     Coronary Stenting Brother     Hypertension Brother     Gastrointestinal Disease Brother     Prostate Cancer Brother     Mental Illness Brother     Anxiety Disorder Brother     Thyroid Disease Sister     No Known Problems Son     Substance Abuse Son         alcohol    No Known Problems Daughter     Diabetes Maternal Half-Brother     Cardiovascular Other      Other Cancer Other         Lung; Paternal Uncle/Lung; Paternal Uncle    Coronary Artery Disease Other     Hyperlipidemia Other     Other Cancer Other         Lung; Paternal Uncle    Cerebrovascular Disease No family hx of     Skin Cancer No family hx of     Melanoma No family hx of        Physical Exam  General - The patient is in no distress.  Alert and oriented to person and place, answers questions and cooperates with examination appropriately.   Voice and Breathing - The patient was breathing comfortably without the use of accessory muscles. There was no wheezing, stridor, or stertor.  The patients voice was clear and strong.  Ears - The auricles are normal. Bilateral cerumen impaction, see below for cleaning. Once clean, the tympanic membranes are normal in appearance, bony landmarks are intact.  No retraction, perforation, or masses.  No fluid or purulence was seen in the external canal or the middle ear. No evidence of infection of the middle ear or external canal, cerumen was normal in appearance.  Eyes - Extraocular movements intact. PERRL. Sclera were not icteric or injected.  Neck - Soft, non-tender. Palpation of the occipital, submental, submandibular, internal jugular chain, and supraclavicular nodes did not demonstrate any abnormal lymph nodes or masses. Parotid glands had no masses. Palpation of the thyroid was soft and smooth, with no nodules or goiter appreciated.  The trachea was mobile and midline.  Neurological - Cranial nerves 2 through 12 were grossly intact. House-Brackmann grade 1 out of 6 bilaterally.     Physical Exam and Procedure  Ears - On examination of the ears, I found that both ears were impacted with cerumen.  Therefore, I positioned the patient in the examination chair in a semi-supine position. I used the binocular surgical microscope to perform cerumen removal.  On the right side, I began by using a cerumen loop to gently lift the edges of the cerumen mass away from the walls  of the external canal.  Once I did this, I was able to pull away fragments of wax and debris. I removed all the wax and debri.  The tympanic membrane was intact, no sign of perforation or middle ear effusion.    I turned my attention to the left side once again using the binocular surgical microscope to perform cerumen removal.  I began by using a cerumen loop to gently lift the edges of the cerumen mass away from the walls of the external canal.  Once I did this, I was able to pull away fragments of wax and debris. He also had a lot of hairs I removed with the alligator. I removed all the wax and debri. The tympanic membrane was intact, no sign of perforation or middle ear effusion.      Assessment and Plan -     ICD-10-CM    1. SNHL (sensory-neural hearing loss), asymmetrical  H90.3 MR Brain w/o & w Contrast      2. Bilateral impacted cerumen  H61.23 REMOVE IMPACTED CERUMEN          Jack Marrero is a 64 year old male who presents to me today with asymmetric sensorineural hearing loss and tinnitus left side. Started 11/2024. No identifiable etiology. Will get MRI brain to rule-out retrocochlear pathology.     The patient will follow up as necessary for worsening symptoms or changes in symptoms. I have also recommended an audiogram in 6 months. Consider hearing aid consultation and hearing aids.    Shashank Pierre MD  Otolaryngology  [facility]

## 2025-03-06 ENCOUNTER — OFFICE VISIT (OUTPATIENT)
Dept: OTOLARYNGOLOGY | Facility: CLINIC | Age: 65
End: 2025-03-06
Payer: MEDICARE

## 2025-03-06 VITALS — OXYGEN SATURATION: 96 % | HEART RATE: 82 BPM | DIASTOLIC BLOOD PRESSURE: 84 MMHG | SYSTOLIC BLOOD PRESSURE: 127 MMHG

## 2025-03-06 DIAGNOSIS — H90.3 SNHL (SENSORY-NEURAL HEARING LOSS), ASYMMETRICAL: Primary | ICD-10-CM

## 2025-03-06 DIAGNOSIS — H61.23 BILATERAL IMPACTED CERUMEN: ICD-10-CM

## 2025-03-06 NOTE — LETTER
3/6/2025      Jack Marrero  [address] [address] [address]      Dear Colleague,    Thank you for referring your patient, Jack Marrero, to the [facility]. Please see a copy of my visit note below.    Chief Complaint - Hearing loss    History of Present Illness - Jack Marrero is a 64 year old male who presents to me today with hearing loss in the left ear. Also has tinnitus more so left ear. It has been present and noticeable for approximately 4 months. The patient has noticed increased difficulty hearing certain sounds.  There is no history of recent head trauma, or chronic ear disease or ear surgery.  A couple years ago hit head with concussion. 7With regards to recreational, , and work-related noise exposure he has none. No family history of hearing loss at a young age. The patient denies otalgia. He has some imbalance.    Tests personally reviewed today for this visit:   1.) audiogram was performed 10/25/24 as part of the evaluation and personally reviewed. The audiogram showed a significant asymmetric high frequency sensorineural hearing loss worse in the left ear.    2.) tympanograms were performed 10/25/24 and were normal Type A curves, with normal canal volume and middle ear pressure.   3.) MRI brain ordered today    Past Medical History -   Patient Active Problem List   Diagnosis     Hyperlipidemia LDL goal <70     Tubular adenoma of colon on colonoscopy     Sensory polyneuropathy     Diverticulosis of colon     Venous lake of lip, left lower     Benign non-nodular prostatic hyperplasia with lower urinary tract symptoms     Personal history of tobacco use     Restless legs syndrome (RLS)     SHY (obstructive sleep apnea) - Moderate (AHI 16, RDI 55)     Gallbladder polyp, need fu US yearly     Atherosclerosis of abdominal aorta (H), seen on CT     Shoulder girdle syndrome     Family history of prostate cancer, pt would like annual PSA test     Coronary  artery calcification     Hypertension goal BP (blood pressure) < 140/90     Moderate episode of recurrent major depressive disorder (H)     Pulmonary emphysema, unspecified emphysema type (H)     Dilation of thoracic aorta     Solar purpura     Class 1 obesity due to excess calories with serious comorbidity and body mass index (BMI) of 31.0 to 31.9 in adult     Abnormal CT lung screening       Current Medications -   Current Outpatient Medications:      albuterol (PROAIR HFA/PROVENTIL HFA/VENTOLIN HFA) 108 (90 Base) MCG/ACT inhaler, Inhale 2 puffs into the lungs every 4 hours as needed for shortness of breath, wheezing or cough., Disp: 18 g, Rfl: 2     amitriptyline (ELAVIL) 75 MG tablet, TAKE 1 TABLET(75 MG) BY MOUTH AT BEDTIME, Disp: 90 tablet, Rfl: 1     aspirin 81 MG tablet, Take 1 tablet by mouth daily., Disp: , Rfl:      atorvastatin (LIPITOR) 40 MG tablet, Take 1 tablet (40 mg) by mouth daily., Disp: 90 tablet, Rfl: 3     dutasteride (AVODART) 0.5 MG capsule, TAKE 1 CAPSULE(0.5 MG) BY MOUTH DAILY, Disp: 90 capsule, Rfl: 3     escitalopram (LEXAPRO) 20 MG tablet, TAKE 1 TABLET(20 MG) BY MOUTH DAILY, Disp: 90 tablet, Rfl: 1     fluticasone-vilanterol (BREO ELLIPTA) 100-25 MCG/ACT inhaler, Inhale 1 puff into the lungs daily., Disp: 180 each, Rfl: 3     hydroCHLOROthiazide 12.5 MG tablet, Take 1 tablet (12.5 mg) by mouth daily., Disp: 90 tablet, Rfl: 3     lisinopril (ZESTRIL) 40 MG tablet, TAKE 1 TABLET(40 MG) BY MOUTH IN THE MORNING, Disp: 90 tablet, Rfl: 3     Multiple Vitamins-Minerals (MULTIVITAMIN ADULT) CHEW, , Disp: , Rfl:      order for DME, Equipment ordered: RESMED Auto PAP Mask type: Nasal Settings: 6-12 cm H2O, Disp: , Rfl:      tadalafil (CIALIS) 20 MG tablet, Take 1 tablet (20 mg) by mouth daily as needed (ED), Disp: 12 tablet, Rfl: 11    Allergies -   Allergies   Allergen Reactions     Morphine And Codeine      FELT WIRED ON THIS       Social History -   Social History     Socioeconomic History      Marital status:    Occupational History     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, 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   Tobacco Use     Smoking status: Every Day     Current packs/day: 0.00     Average packs/day: 1 pack/day for 48.9 years (48.9 ttl pk-yrs)     Types: Cigarettes     Start date: 10/10/1974     Last attempt to quit: 2023     Years since quittin.5     Passive exposure: Current     Smokeless tobacco: Never     Tobacco comments:     Wants to quit by 2023   Vaping Use     Vaping status: Never Used   Substance and Sexual Activity     Alcohol use: Not Currently     Comment: Occasional; about 1-2 drinks/week     Drug use: Never     Sexual activity: Yes     Partners: Female   Other Topics Concern     Parent/sibling w/ CABG, MI or angioplasty before 65F 55M? No     Social Drivers of Health     Financial Resource Strain: Low Risk  (2024)    Financial Resource Strain      Within the past 12 months, have you or your family members you live with been unable to get utilities (heat, electricity) when it was really needed?: No   Food Insecurity: Low Risk  (2024)    Food Insecurity      Within the past 12 months, did you worry that your food would run out before you got money to buy more?: No      Within the past 12 months, did the food you bought just not last and you didn t have money to get more?: No   Transportation Needs: Low Risk  (2024)    Transportation Needs      Within the past 12 months, has lack of transportation kept you from medical appointments, getting your medicines, non-medical meetings or appointments, work, or from getting things that you need?: No   Physical Activity: Insufficiently Active (2024)    Exercise Vital Sign      Days of Exercise per Week: 1 day      Minutes of Exercise per Session: 30 min   Stress: Stress Concern Present (2024)    [employer] - Occupational Stress Questionnaire      Feeling of  Stress : To some extent   Social Connections: Unknown (2024)    Social Connection and Isolation Panel [NHANES]      Frequency of Social Gatherings with Friends and Family: Three times a week   Interpersonal Safety: Low Risk  (2024)    Interpersonal Safety      Do you feel physically and emotionally safe where you currently live?: Yes      Within the past 12 months, have you been hit, slapped, kicked or otherwise physically hurt by someone?: No      Within the past 12 months, have you been humiliated or emotionally abused in other ways by your partner or ex-partner?: No   Housing Stability: Low Risk  (2024)    Housing Stability      Do you have housing? : Yes      Are you worried about losing your housing?: No       Family History -   Family History   Problem Relation Age of Onset     Arthritis Mother      Heart Disease Mother      Lipids Mother      Eye Disorder Mother      Gastrointestinal Disease Mother         irritable bowel     Breast Cancer Mother      Osteoporosis Mother      Lipids Father      Heart Disease Father      Psychotic Disorder Father      Depression Father      Eye Disorder Father      C.A.D. Father 56        CABG at age 56     Coronary Artery Disease Father         Heart attack at age 56     Hyperlipidemia Father      Depression/Anxiety Father      Mental Illness Father      Hypertension Father      Anxiety Disorder Father      Arthritis Maternal Grandmother      Alcohol/Drug Maternal Grandfather      Cancer - colorectal Maternal Grandfather      Hypertension Paternal Grandmother      Breast Cancer Paternal Grandmother      Heart Disease Paternal Grandfather      Hypertension Paternal Grandfather      Coronary Artery Disease Paternal Grandfather          of heart attack at age 59     Hypertension Brother      Depression/Anxiety Brother      Depression Brother      Coronary Artery Disease Brother      Hypertension Brother      Depression/Anxiety Brother      Coronary Stenting  Brother      Hypertension Brother      Gastrointestinal Disease Brother      Prostate Cancer Brother      Mental Illness Brother      Anxiety Disorder Brother      Thyroid Disease Sister      No Known Problems Son      Substance Abuse Son         alcohol     No Known Problems Daughter      Diabetes Maternal Half-Brother      Cardiovascular Other      Other Cancer Other         Lung; Paternal Uncle/Lung; Paternal Uncle     Coronary Artery Disease Other      Hyperlipidemia Other      Other Cancer Other         Lung; Paternal Uncle     Cerebrovascular Disease No family hx of      Skin Cancer No family hx of      Melanoma No family hx of        Physical Exam  General - The patient is in no distress.  Alert and oriented to person and place, answers questions and cooperates with examination appropriately.   Voice and Breathing - The patient was breathing comfortably without the use of accessory muscles. There was no wheezing, stridor, or stertor.  The patients voice was clear and strong.  Ears - The auricles are normal. Bilateral cerumen impaction, see below for cleaning. Once clean, the tympanic membranes are normal in appearance, bony landmarks are intact.  No retraction, perforation, or masses.  No fluid or purulence was seen in the external canal or the middle ear. No evidence of infection of the middle ear or external canal, cerumen was normal in appearance.  Eyes - Extraocular movements intact. PERRL. Sclera were not icteric or injected.  Neck - Soft, non-tender. Palpation of the occipital, submental, submandibular, internal jugular chain, and supraclavicular nodes did not demonstrate any abnormal lymph nodes or masses. Parotid glands had no masses. Palpation of the thyroid was soft and smooth, with no nodules or goiter appreciated.  The trachea was mobile and midline.  Neurological - Cranial nerves 2 through 12 were grossly intact. House-Brackmann grade 1 out of 6 bilaterally.     Physical Exam and Procedure  Ears  - On examination of the ears, I found that both ears were impacted with cerumen.  Therefore, I positioned the patient in the examination chair in a semi-supine position. I used the binocular surgical microscope to perform cerumen removal.  On the right side, I began by using a cerumen loop to gently lift the edges of the cerumen mass away from the walls of the external canal.  Once I did this, I was able to pull away fragments of wax and debris. I removed all the wax and debri.  The tympanic membrane was intact, no sign of perforation or middle ear effusion.    I turned my attention to the left side once again using the binocular surgical microscope to perform cerumen removal.  I began by using a cerumen loop to gently lift the edges of the cerumen mass away from the walls of the external canal.  Once I did this, I was able to pull away fragments of wax and debris. He also had a lot of hairs I removed with the alligator. I removed all the wax and debri. The tympanic membrane was intact, no sign of perforation or middle ear effusion.      Assessment and Plan -     ICD-10-CM    1. SNHL (sensory-neural hearing loss), asymmetrical  H90.3 MR Brain w/o & w Contrast      2. Bilateral impacted cerumen  H61.23 REMOVE IMPACTED CERUMEN          Jack Marrero is a 64 year old male who presents to me today with asymmetric sensorineural hearing loss and tinnitus left side. Started 11/2024. No identifiable etiology. Will get MRI brain to rule-out retrocochlear pathology.     The patient will follow up as necessary for worsening symptoms or changes in symptoms. I have also recommended an audiogram in 6 months. Consider hearing aid consultation and hearing aids.    Shashank Pierre MD  Otolaryngology  [facility]        Again, thank you for allowing me to participate in the care of your patient.        Sincerely,        Shashank Pierre MD    Electronically signed

## 2025-03-24 DIAGNOSIS — G47.33 OSA (OBSTRUCTIVE SLEEP APNEA): Primary | ICD-10-CM

## 2025-04-05 ENCOUNTER — ANCILLARY PROCEDURE (OUTPATIENT)
Dept: MRI IMAGING | Facility: CLINIC | Age: 65
End: 2025-04-05
Attending: OTOLARYNGOLOGY
Payer: MEDICARE

## 2025-04-05 DIAGNOSIS — H90.3 SNHL (SENSORY-NEURAL HEARING LOSS), ASYMMETRICAL: ICD-10-CM

## 2025-04-05 PROCEDURE — A9585 GADOBUTROL INJECTION: HCPCS | Mod: JZ | Performed by: RADIOLOGY

## 2025-04-05 PROCEDURE — 70553 MRI BRAIN STEM W/O & W/DYE: CPT | Performed by: RADIOLOGY

## 2025-04-05 RX ORDER — GADOBUTROL 604.72 MG/ML
10 INJECTION INTRAVENOUS ONCE
Status: COMPLETED | OUTPATIENT
Start: 2025-04-05 | End: 2025-04-05

## 2025-04-05 RX ADMIN — GADOBUTROL 10 ML: 604.72 INJECTION INTRAVENOUS at 09:43

## 2025-04-14 DIAGNOSIS — D32.9 MENINGIOMA (H): ICD-10-CM

## 2025-04-14 DIAGNOSIS — H90.3 SNHL (SENSORY-NEURAL HEARING LOSS), ASYMMETRICAL: Primary | ICD-10-CM

## 2025-04-14 NOTE — PROGRESS NOTES
Spoke with patient over the phone and gave him MRI results. He has a small, likely meningioma, 1.6 cm with 4 mm of growth in 8 years. Will have him see neurosurgery for their opinion on observation or if removal is necessary.

## 2025-04-16 NOTE — TELEPHONE ENCOUNTER
SPINE PATIENTS - NEW PROTOCOL PREVISIT     RECORDS RECEVEIVED FROM: Referred by Shashank Pierre MD   REASON FOR VISIT: Meningioma    PROVIDER: Lizbeth   DATE OF APPT: 04/17/2025     NOTES (FOR ALL VISITS) STATUS DETAILS   OFFICE NOTE from referring provider  Referral and notes in chart   OFFICE NOTE from other specialist     DISCHARGE SUMMARY from hospital     DISCHARGE REPORT from ER     OPERATIVE REPORT     EMG REPORT     Injection     Physical therapy       IMAGING  (FOR ALL VISITS) STATUS DETAILS   MRI (HEAD, NECK, SPINE) internal MR brain 4/5/25    XRAY (SPINE) *NEUROSURGERY*     CT (HEAD, NECK, SPINE)          Does patient have C2C?  Year last updated Action     YES   [x]   2015   Please update at appointment if outdated more than 5 years       NO     []   N/A   Please complete C2C at appointment

## 2025-04-17 ENCOUNTER — OFFICE VISIT (OUTPATIENT)
Dept: NEUROSURGERY | Facility: CLINIC | Age: 65
End: 2025-04-17
Attending: OTOLARYNGOLOGY
Payer: MEDICARE

## 2025-04-17 ENCOUNTER — PRE VISIT (OUTPATIENT)
Dept: NEUROSURGERY | Facility: CLINIC | Age: 65
End: 2025-04-17

## 2025-04-17 VITALS
HEIGHT: 72 IN | BODY MASS INDEX: 30.4 KG/M2 | HEART RATE: 83 BPM | WEIGHT: 224.4 LBS | DIASTOLIC BLOOD PRESSURE: 75 MMHG | SYSTOLIC BLOOD PRESSURE: 133 MMHG

## 2025-04-17 DIAGNOSIS — D32.9 MENINGIOMA (H): ICD-10-CM

## 2025-04-17 ASSESSMENT — PAIN SCALES - GENERAL: PAINLEVEL_OUTOF10: NO PAIN (0)

## 2025-04-17 NOTE — PROGRESS NOTES
HPI:  64-year-old male presents today for follow-up after an MRI of the brain.  He recently underwent an MRI of the brain for bilateral sensorineural hearing loss.  He denies any other neurologic symptoms right now.  He denies any numbness weakness vision problems or headaches.  Years ago on his brain but does not recall the reason for this right now.  Current Outpatient Medications   Medication Sig Dispense Refill    albuterol (PROAIR HFA/PROVENTIL HFA/VENTOLIN HFA) 108 (90 Base) MCG/ACT inhaler Inhale 2 puffs into the lungs every 4 hours as needed for shortness of breath, wheezing or cough. 18 g 2    amitriptyline (ELAVIL) 75 MG tablet TAKE 1 TABLET(75 MG) BY MOUTH AT BEDTIME 90 tablet 1    aspirin 81 MG tablet Take 1 tablet by mouth daily.      atorvastatin (LIPITOR) 40 MG tablet Take 1 tablet (40 mg) by mouth daily. 90 tablet 3    dutasteride (AVODART) 0.5 MG capsule TAKE 1 CAPSULE(0.5 MG) BY MOUTH DAILY 90 capsule 3    escitalopram (LEXAPRO) 20 MG tablet TAKE 1 TABLET(20 MG) BY MOUTH DAILY 90 tablet 1    fluticasone-vilanterol (BREO ELLIPTA) 100-25 MCG/ACT inhaler Inhale 1 puff into the lungs daily. 180 each 3    hydroCHLOROthiazide 12.5 MG tablet Take 1 tablet (12.5 mg) by mouth daily. 90 tablet 3    lisinopril (ZESTRIL) 40 MG tablet TAKE 1 TABLET(40 MG) BY MOUTH IN THE MORNING 90 tablet 3    Multiple Vitamins-Minerals (MULTIVITAMIN ADULT) CHEW       order for DME Equipment ordered: RESMED Auto PAP Mask type: Nasal Settings: 6-12 cm H2O      tadalafil (CIALIS) 20 MG tablet Take 1 tablet (20 mg) by mouth daily as needed (ED) 12 tablet 11     No current facility-administered medications for this visit.      Physical Exam:  Vital signs:      BP: 133/75 Pulse: 83           Height: 182.9 cm (6') Weight: 101.8 kg (224 lb 6.4 oz)  Estimated body mass index is 30.43 kg/m  as calculated from the following:    Height as of this encounter: 1.829 m (6').    Weight as of this encounter: 101.8 kg (224 lb 6.4 oz).  Pupils  are equal reactive to light extraocular movements are intact face is symmetric with tongue midline.  No pronator drift on exam.  He has full strength in his bilateral upper and lower extremities.  Results Reviewed:  I personally viewed the images of an MRI of the brain.  This shows a left anterior temporal extra-axial lesion consistent with meningioma.  This measures approximately 20 mm in AP diameter and 10 mm in left-to-right diameter.  This is comparing to an MRI from January 2016 showing 15 mm in AP diameter and 5 mm left-to-right diameter.  There is no significant edema around the tumor.  The MRI of the brain also shows significant microvascular ischemic changes without any clear acute stroke.  Assessment:  64-year-old male with a left temporal extraocular lesion most likely meningioma which is relatively slow-growing and no significant mass effect.    Plan:  We discussed conservative and surgical management options going forward.  Surgical option would include a left temporal craniotomy for resection of the meningioma to get a clear diagnosis.  We discussed risks and benefits of this procedure.  We also discussed further imaging including another MRI in 1 year to evaluate for further growth.  I do believe this would be a reasonable option as over 9 years that is experienced only a small amount of growth and is asymptomatic at this time.  For his microvascular ischemic changes I will have him see neurology to further evaluate if other investigations such as a carotid duplex or vascular MRI is necessary to evaluate for risk of stroke.  He can otherwise follow-up with me again in 1 year with an MRI of the brain to follow the meningioma.    Te Nieves MD

## 2025-04-17 NOTE — LETTER
4/17/2025      Jack Marrero  [address] [address] [address]      Dear Colleague,    Thank you for referring your patient, Jack Marrero, to the [facility] [place]. Please see a copy of my visit note below.    HPI:  64-year-old male presents today for follow-up after an MRI of the brain.  He recently underwent an MRI of the brain for bilateral sensorineural hearing loss.  He denies any other neurologic symptoms right now.  He denies any numbness weakness vision problems or headaches.  Years ago on his brain but does not recall the reason for this right now.  Current Outpatient Medications   Medication Sig Dispense Refill     albuterol (PROAIR HFA/PROVENTIL HFA/VENTOLIN HFA) 108 (90 Base) MCG/ACT inhaler Inhale 2 puffs into the lungs every 4 hours as needed for shortness of breath, wheezing or cough. 18 g 2     amitriptyline (ELAVIL) 75 MG tablet TAKE 1 TABLET(75 MG) BY MOUTH AT BEDTIME 90 tablet 1     aspirin 81 MG tablet Take 1 tablet by mouth daily.       atorvastatin (LIPITOR) 40 MG tablet Take 1 tablet (40 mg) by mouth daily. 90 tablet 3     dutasteride (AVODART) 0.5 MG capsule TAKE 1 CAPSULE(0.5 MG) BY MOUTH DAILY 90 capsule 3     escitalopram (LEXAPRO) 20 MG tablet TAKE 1 TABLET(20 MG) BY MOUTH DAILY 90 tablet 1     fluticasone-vilanterol (BREO ELLIPTA) 100-25 MCG/ACT inhaler Inhale 1 puff into the lungs daily. 180 each 3     hydroCHLOROthiazide 12.5 MG tablet Take 1 tablet (12.5 mg) by mouth daily. 90 tablet 3     lisinopril (ZESTRIL) 40 MG tablet TAKE 1 TABLET(40 MG) BY MOUTH IN THE MORNING 90 tablet 3     Multiple Vitamins-Minerals (MULTIVITAMIN ADULT) CHEW        order for DME Equipment ordered: RESMED Auto PAP Mask type: Nasal Settings: 6-12 cm H2O       tadalafil (CIALIS) 20 MG tablet Take 1 tablet (20 mg) by mouth daily as needed (ED) 12 tablet 11     No current facility-administered medications for this visit.      Physical Exam:  Vital signs:      BP:  133/75 Pulse: 83           Height: 182.9 cm (6') Weight: 101.8 kg (224 lb 6.4 oz)  Estimated body mass index is 30.43 kg/m  as calculated from the following:    Height as of this encounter: 1.829 m (6').    Weight as of this encounter: 101.8 kg (224 lb 6.4 oz).  Pupils are equal reactive to light extraocular movements are intact face is symmetric with tongue midline.  No pronator drift on exam.  He has full strength in his bilateral upper and lower extremities.  Results Reviewed:  I personally viewed the images of an MRI of the brain.  This shows a left anterior temporal extra-axial lesion consistent with meningioma.  This measures approximately 20 mm in AP diameter and 10 mm in left-to-right diameter.  This is comparing to an MRI from January 2016 showing 15 mm in AP diameter and 5 mm left-to-right diameter.  There is no significant edema around the tumor.  The MRI of the brain also shows significant microvascular ischemic changes without any clear acute stroke.  Assessment:  64-year-old male with a left temporal extraocular lesion most likely meningioma which is relatively slow-growing and no significant mass effect.    Plan:  We discussed conservative and surgical management options going forward.  Surgical option would include a left temporal craniotomy for resection of the meningioma to get a clear diagnosis.  We discussed risks and benefits of this procedure.  We also discussed further imaging including another MRI in 1 year to evaluate for further growth.  I do believe this would be a reasonable option as over 9 years that is experienced only a small amount of growth and is asymptomatic at this time.  For his microvascular ischemic changes I will have him see neurology to further evaluate if other investigations such as a carotid duplex or vascular MRI is necessary to evaluate for risk of stroke.  He can otherwise follow-up with me again in 1 year with an MRI of the brain to follow the meningioma.    Te  MD Lizbeth      Again, thank you for allowing me to participate in the care of your patient.        Sincerely,        Te Nieves MD    Electronically signed

## 2025-05-26 ENCOUNTER — PATIENT OUTREACH (OUTPATIENT)
Dept: CARE COORDINATION | Facility: CLINIC | Age: 65
End: 2025-05-26
Payer: MEDICARE

## 2025-05-28 ENCOUNTER — PATIENT OUTREACH (OUTPATIENT)
Dept: CARE COORDINATION | Facility: CLINIC | Age: 65
End: 2025-05-28
Payer: MEDICARE

## 2025-05-28 DIAGNOSIS — Z87.891 PERSONAL HISTORY OF NICOTINE DEPENDENCE: ICD-10-CM

## 2025-05-28 DIAGNOSIS — R91.1 LUNG NODULE: Primary | ICD-10-CM

## 2025-06-02 ENCOUNTER — TELEPHONE (OUTPATIENT)
Dept: CARDIOLOGY | Facility: CLINIC | Age: 65
End: 2025-06-02
Payer: MEDICARE

## 2025-06-02 NOTE — TELEPHONE ENCOUNTER
[facility] Call Center    Phone Message    May a detailed message be left on voicemail: yes     Reason for Call: Other: Patient scheduled out to October 2025 and would like to be on Wait List if possible. Thanks     Action Taken: Other: Cardio    Travel Screening: Not Applicable     Date of Service:

## 2025-06-19 ENCOUNTER — TELEPHONE (OUTPATIENT)
Dept: CARDIOLOGY | Facility: CLINIC | Age: 65
End: 2025-06-19
Payer: MEDICARE

## 2025-06-25 ENCOUNTER — TELEPHONE (OUTPATIENT)
Dept: CARDIOLOGY | Facility: CLINIC | Age: 65
End: 2025-06-25
Payer: MEDICARE

## 2025-06-26 NOTE — TELEPHONE ENCOUNTER
REASON FOR VISIT: Meningioma,significant microvascular ischemic findings on MRI, asymptomatic    DATE OF APPT: 9/23/2025   NOTES (FOR ALL VISITS) STATUS DETAILS   OFFICE NOTE from referring provider [facility]  Te Nieves MD 4/17/2025   MEDICATION LIST N/A    IMAGING  (FOR ALL VISITS)     XR N/A    MRI (HEAD, NECK, SPINE) [facility]  MR Brain 4/05/2025   CT (HEAD, NECK, SPINE) N/A

## 2025-07-07 ENCOUNTER — ANCILLARY PROCEDURE (OUTPATIENT)
Dept: CARDIOLOGY | Facility: CLINIC | Age: 65
End: 2025-07-07
Attending: FAMILY MEDICINE
Payer: MEDICARE

## 2025-07-07 DIAGNOSIS — I77.810 DILATION OF THORACIC AORTA: ICD-10-CM

## 2025-07-07 DIAGNOSIS — I25.10 CORONARY ARTERY CALCIFICATION: ICD-10-CM

## 2025-07-07 PROCEDURE — 93016 CV STRESS TEST SUPVJ ONLY: CPT | Performed by: INTERNAL MEDICINE

## 2025-07-07 PROCEDURE — 93352 ADMIN ECG CONTRAST AGENT: CPT | Performed by: INTERNAL MEDICINE

## 2025-07-07 PROCEDURE — 93018 CV STRESS TEST I&R ONLY: CPT | Performed by: INTERNAL MEDICINE

## 2025-07-07 PROCEDURE — 93017 CV STRESS TEST TRACING ONLY: CPT | Performed by: INTERNAL MEDICINE

## 2025-07-07 PROCEDURE — 93350 STRESS TTE ONLY: CPT | Performed by: INTERNAL MEDICINE

## 2025-07-07 PROCEDURE — 93325 DOPPLER ECHO COLOR FLOW MAPG: CPT | Performed by: INTERNAL MEDICINE

## 2025-07-07 PROCEDURE — 93321 DOPPLER ECHO F-UP/LMTD STD: CPT | Performed by: INTERNAL MEDICINE

## 2025-07-07 RX ADMIN — Medication 5 ML (DILUTED): at 15:45

## 2025-07-10 DIAGNOSIS — F33.1 DEPRESSION, MAJOR, RECURRENT, MODERATE (H): ICD-10-CM

## 2025-07-10 RX ORDER — ESCITALOPRAM OXALATE 20 MG/1
20 TABLET ORAL DAILY
Qty: 90 TABLET | Refills: 1 | Status: SHIPPED | OUTPATIENT
Start: 2025-07-10

## 2025-07-16 ENCOUNTER — PATIENT OUTREACH (OUTPATIENT)
Dept: CARE COORDINATION | Facility: CLINIC | Age: 65
End: 2025-07-16
Payer: MEDICARE

## 2025-07-24 DIAGNOSIS — G60.8 SENSORY POLYNEUROPATHY: ICD-10-CM

## 2025-07-24 RX ORDER — AMITRIPTYLINE HYDROCHLORIDE 75 MG/1
TABLET ORAL
Qty: 90 TABLET | Refills: 1 | OUTPATIENT
Start: 2025-07-24

## 2025-08-05 ENCOUNTER — TELEPHONE (OUTPATIENT)
Dept: NEUROLOGY | Facility: CLINIC | Age: 65
End: 2025-08-05
Payer: MEDICARE

## 2025-08-11 DIAGNOSIS — F33.1 MODERATE EPISODE OF RECURRENT MAJOR DEPRESSIVE DISORDER (H): ICD-10-CM

## 2025-08-11 RX ORDER — VENLAFAXINE HYDROCHLORIDE 37.5 MG/1
37.5 CAPSULE, EXTENDED RELEASE ORAL DAILY
Qty: 30 CAPSULE | Refills: 0 | Status: SHIPPED | OUTPATIENT
Start: 2025-08-11

## 2025-09-23 ENCOUNTER — PRE VISIT (OUTPATIENT)
Dept: NEUROLOGY | Facility: CLINIC | Age: 65
End: 2025-09-23

## (undated) DEVICE — PAD CHUX UNDERPAD 23X24" 7136

## (undated) DEVICE — KIT ENDO FIRST STEP DISINFECTANT 200ML W/POUCH EP-4

## (undated) DEVICE — PREP CHLORAPREP 26ML TINTED ORANGE  260815

## (undated) RX ORDER — FENTANYL CITRATE 50 UG/ML
INJECTION, SOLUTION INTRAMUSCULAR; INTRAVENOUS
Status: DISPENSED
Start: 2021-03-16